# Patient Record
Sex: FEMALE | Race: WHITE | NOT HISPANIC OR LATINO | Employment: FULL TIME | URBAN - METROPOLITAN AREA
[De-identification: names, ages, dates, MRNs, and addresses within clinical notes are randomized per-mention and may not be internally consistent; named-entity substitution may affect disease eponyms.]

---

## 2017-01-17 LAB
C TRACH DNA SPEC QL PROBE+SIG AMP: NEGATIVE
N GONORRHOEA DNA SPEC QL PROBE+SIG AMP: NEGATIVE
SPECIMEN DESCRIP: NORMAL
SPECIMEN DESCRIPTION: NORMAL

## 2017-02-22 ENCOUNTER — TRANSFERRED RECORDS (OUTPATIENT)
Dept: HEALTH INFORMATION MANAGEMENT | Facility: CLINIC | Age: 27
End: 2017-02-22

## 2017-04-10 ENCOUNTER — PRENATAL OFFICE VISIT (OUTPATIENT)
Dept: OBGYN | Facility: CLINIC | Age: 27
End: 2017-04-10
Payer: COMMERCIAL

## 2017-04-10 ENCOUNTER — MYC MEDICAL ADVICE (OUTPATIENT)
Dept: OBGYN | Facility: CLINIC | Age: 27
End: 2017-04-10

## 2017-04-10 VITALS
DIASTOLIC BLOOD PRESSURE: 67 MMHG | WEIGHT: 197.2 LBS | HEIGHT: 64 IN | TEMPERATURE: 97.3 F | SYSTOLIC BLOOD PRESSURE: 113 MMHG | BODY MASS INDEX: 33.67 KG/M2 | HEART RATE: 88 BPM

## 2017-04-10 DIAGNOSIS — Z34.02 ENCOUNTER FOR SUPERVISION OF NORMAL FIRST PREGNANCY IN SECOND TRIMESTER: Primary | ICD-10-CM

## 2017-04-10 DIAGNOSIS — Z34.02 ENCOUNTER FOR SUPERVISION OF NORMAL FIRST PREGNANCY IN SECOND TRIMESTER: ICD-10-CM

## 2017-04-10 DIAGNOSIS — E03.9 HYPOTHYROIDISM, UNSPECIFIED TYPE: Primary | ICD-10-CM

## 2017-04-10 PROBLEM — Z34.00 SUPERVISION OF NORMAL FIRST PREGNANCY: Status: ACTIVE | Noted: 2017-04-10

## 2017-04-10 PROBLEM — Z23 NEED FOR TDAP VACCINATION: Status: ACTIVE | Noted: 2017-04-10

## 2017-04-10 PROCEDURE — 99207 ZZC PRENATAL VISIT: CPT | Performed by: NURSE PRACTITIONER

## 2017-04-10 PROCEDURE — 99000 SPECIMEN HANDLING OFFICE-LAB: CPT | Performed by: NURSE PRACTITIONER

## 2017-04-10 PROCEDURE — 81511 FTL CGEN ABNOR FOUR ANAL: CPT | Mod: 90 | Performed by: NURSE PRACTITIONER

## 2017-04-10 PROCEDURE — 36415 COLL VENOUS BLD VENIPUNCTURE: CPT | Performed by: NURSE PRACTITIONER

## 2017-04-10 RX ORDER — LEVOTHYROXINE SODIUM 137 UG/1
137 TABLET ORAL DAILY
COMMUNITY
End: 2017-04-12

## 2017-04-10 RX ORDER — RIZATRIPTAN BENZOATE 10 MG/1
10 TABLET, ORALLY DISINTEGRATING ORAL
COMMUNITY
End: 2017-11-16

## 2017-04-10 ASSESSMENT — PAIN SCALES - GENERAL: PAINLEVEL: NO PAIN (0)

## 2017-04-10 NOTE — PROGRESS NOTES
Patient presents for routine prenatal visit. She is transferring care from [place].  Had 2 prenatal visits there including all her baseline labs and a dating ultrasound confirming FARHAN-which is based on her LMP.  Has all her records on a flash drive and will print off her OB records and bring them to the clinic to be scanned into her chart. Did have her thyroid checked at her last visit in [place] and was normal.     I discussed with the patient the option of maternal serum screening for chromasomal abnormalities (such as Trisomy 18 and 21) and neural tube defects.  We discussed the screening nature of this test and the potential for false negative and false positive results and the possible need for additional testing including Level 2 ultrasound and amniocentesis. She is given the opportunity to ask questions and have them answered. She accepts testing.  Screening ultrasound ordered.    Discussed visit schedule, delivery hospital, delivery providers.    Prenatal flowsheet reviewed and updated as needed.  Denies vaginal bleeding, loss of fluid, contractions or cramping.  Patient without complaint.   Advice as per Anticipatory Guidance/Checklist updated.  PE: See OB vitals    Questions asked and answered. Next OB visit in 4 week(s) with Dr. Robles.    Estelle TRINH CNP

## 2017-04-10 NOTE — Clinical Note
Please abstract the following data from this visit with this patient into the appropriate field in Epic:  Pap smear done on this date: 6/2016 (approximately), by this group: [place], results were Normal.

## 2017-04-10 NOTE — MR AVS SNAPSHOT
After Visit Summary   4/10/2017    Jennifer Pastrana    MRN: [id number]           Patient Information     Date Of Birth          1990        Visit Information        Provider Department      4/10/2017 7:30 AM Estelle Pacheco APRN CNP [facility]        Today's Diagnoses     Encounter for supervision of normal first pregnancy in second trimester    -  1       Follow-ups after your visit        Future tests that were ordered for you today     Open Future Orders        Priority Expected Expires Ordered    US OB > 14 Weeks Complete Single Routine  7/9/2017 4/10/2017            Who to contact     If you have questions or need follow up information about today's clinic visit or your schedule please contact [facility] directly at [phone].  Normal or non-critical lab and imaging results will be communicated to you by Revealhart, letter or phone within 4 business days after the clinic has received the results. If you do not hear from us within 7 days, please contact the clinic through Revealhart or phone. If you have a critical or abnormal lab result, we will notify you by phone as soon as possible.  Submit refill requests through Missy's Candy or call your pharmacy and they will forward the refill request to us. Please allow 3 business days for your refill to be completed.          Additional Information About Your Visit        MyChart Information     Missy's Candy gives you secure access to your electronic health record. If you see a primary care provider, you can also send messages to your care team and make appointments. If you have questions, please call your primary care clinic.  If you do not have a primary care provider, please call [phone] and they will assist you.        Care EveryWhere ID     This is your Care EveryWhere ID. This could be used by other organizations to access your [place] medical records  [id number]        Your Vitals Were     Pulse Temperature Height  "Last Period BMI (Body Mass Index)       88 97.3  F (36.3  C) (Oral) 5' 3.5\" (1.613 m) 12/04/2016 (Exact Date) 34.38 kg/m2        Blood Pressure from Last 3 Encounters:   04/10/17 113/67   07/24/15 121/77   02/25/15 125/79    Weight from Last 3 Encounters:   04/10/17 197 lb 3.2 oz (89.4 kg)   07/24/15 188 lb 6.4 oz (85.5 kg)   02/25/15 183 lb (83 kg)              We Performed the Following     Maternal quad screen          Today's Medication Changes          These changes are accurate as of: 4/10/17  7:59 AM.  If you have any questions, ask your nurse or doctor.               These medicines have changed or have updated prescriptions.        Dose/Directions    albuterol 108 (90 BASE) MCG/ACT Inhaler   Commonly known as:  PROAIR HFA/PROVENTIL HFA/VENTOLIN HFA   This may have changed:  Another medication with the same name was removed. Continue taking this medication, and follow the directions you see here.   Used for:  SOB (shortness of breath)   Changed by:  Rik Tate PA-C        Dose:  2 puff   Inhale 2 puffs into the lungs every 6 hours   Quantity:  1 Inhaler   Refills:  0       SYNTHROID 137 MCG tablet   This may have changed:  Another medication with the same name was removed. Continue taking this medication, and follow the directions you see here.   Generic drug:  levothyroxine   Changed by:  Estelle Pacheco APRN CNP        Dose:  137 mcg   Take 137 mcg by mouth daily   Refills:  0         Stop taking these medicines if you haven't already. Please contact your care team if you have questions.     calcium carbonate-vitamin D 500-400 MG-UNIT Tabs per tablet   Stopped by:  Estelle Pacheco APRN CNP           fexofenadine 180 MG tablet   Commonly known as:  ALLEGRA   Stopped by:  Estelle Pacheco APRN CNP           levonorgestrel-ethinyl estradiol 0.15-0.03 MG per tablet   Commonly known as:  SEASONALE   Stopped by:  Estelle Pacheco APRN CNP           multivitamin, therapeutic " with minerals Tabs tablet   Stopped by:  Estelle Pacheco APRN CNP           OVER-THE-COUNTER   Stopped by:  Estelle Pacheco APRN CNP           sertraline 50 MG tablet   Commonly known as:  ZOLOFT   Stopped by:  Estelle Pacheco APRN CNP           SUMAtriptan 50 MG tablet   Commonly known as:  IMITREX   Stopped by:  Estelle Pacheco APRN CNP                    Primary Care Provider Office Phone # Fax #    [facility] [phone] [address] [facility] [address]        Thank you!     Thank you for choosing [facility]  for your care. Our goal is always to provide you with excellent care. Hearing back from our patients is one way we can continue to improve our services. Please take a few minutes to complete the written survey that you may receive in the mail after your visit with us. Thank you!             Your Updated Medication List - Protect others around you: Learn how to safely use, store and throw away your medicines at www.disposemymeds.org.          This list is accurate as of: 4/10/17  7:59 AM.  Always use your most recent med list.                   Brand Name Dispense Instructions for use    albuterol 108 (90 BASE) MCG/ACT Inhaler    PROAIR HFA/PROVENTIL HFA/VENTOLIN HFA    1 Inhaler    Inhale 2 puffs into the lungs every 6 hours       CLARITIN PO          fluticasone 50 MCG/ACT spray    FLONASE    1 Package    Spray 1-2 sprays into both nostrils daily       MAGNESIUM OXIDE PO      Take 400 mg by mouth daily       PRENATAL VITAMINS PO      Take 1 tablet by mouth daily       rizatriptan 10 MG ODT tab    MAXALT-MLT     Take 10 mg by mouth at onset of headache for migraine       SYNTHROID 137 MCG tablet   Generic drug:  levothyroxine      Take 137 mcg by mouth daily

## 2017-04-10 NOTE — PROGRESS NOTES
Patient brought in records - done on 2/2017:    Thyroid panel-normal  HIV negative  HepBSAg Negative  Varicella Immune  Rubella Immune  RPR NR  1 hour   CBC WNL  Urine Culture Negative  A positive, antibody negative  Chlamydia Negative  Gonorrhea Negative  Pap 2016 NIL  Estelle Pacheco APRN CNP

## 2017-04-10 NOTE — NURSING NOTE
"Chief Complaint   Patient presents with     Prenatal Care     OB Transfer       Initial /67  Pulse 88  Temp 97.3  F (36.3  C) (Oral)  Ht 5' 3.5\" (1.613 m)  Wt 197 lb 3.2 oz (89.4 kg)  LMP 12/04/2016 (Exact Date)  BMI 34.38 kg/m2 Estimated body mass index is 34.38 kg/(m^2) as calculated from the following:    Height as of this encounter: 5' 3.5\" (1.613 m).    Weight as of this encounter: 197 lb 3.2 oz (89.4 kg)..  BP completed using cuff size: regular    [place] expectant family book and hospital information given to patient      Maddi Gennyamanda CMA      "

## 2017-04-11 NOTE — TELEPHONE ENCOUNTER
Patient calling to check on status of message below has not heard back please call to advise.  was seen in clinic yesterday and forgot to request this. Wants Estelle to fill all of her meds she states. Please call to advise.

## 2017-04-12 LAB
# FETUSES US: NORMAL
AFP ADJ MOM AMN: 1.28
AFP SERPL-MCNC: 46 NG/ML
AGE - REPORTED: 28.8
DATING METHOD: NORMAL
DIABETIC AT CONCEPTION: NO
FAMILY MEMBER DISEASES HX: NO
FAMILY MEMBER DISEASES HX: NO
GA METHOD: NORMAL
GA: 18.14 WK
HCG MOM SERPL: 0.55
HCG SERPL-ACNC: NORMAL M[IU]/ML
HX OF HEREDITARY DISORDERS: NO
IDDM PATIENT QL: NO
INHIBIN A MOM SERPL: 0.9
INHIBIN A SERPL-MCNC: 128
INTEGRATED SCN PATIENT-IMP: NORMAL
LMP START DATE: NORMAL
PATHOLOGY STUDY: NORMAL
PREV HX CHROMOSOME ABNORMALITY: NO
SPECIMEN DRAWN SERPL: NORMAL
TWINS: NO
U ESTRIOL MOM SERPL: 1.26
U ESTRIOL SERPL-MCNC: 1.73 NG/ML

## 2017-04-12 RX ORDER — LEVOTHYROXINE SODIUM 137 UG/1
137 TABLET ORAL DAILY
Qty: 90 TABLET | Refills: 1 | Status: SHIPPED | OUTPATIENT
Start: 2017-04-12 | End: 2017-04-27 | Stop reason: DRUGHIGH

## 2017-04-12 RX ORDER — SWAB
1 SWAB, NON-MEDICATED MISCELLANEOUS DAILY
Qty: 90 EACH | Refills: 1 | Status: SHIPPED | OUTPATIENT
Start: 2017-04-12 | End: 2017-10-11

## 2017-04-18 ENCOUNTER — OFFICE VISIT (OUTPATIENT)
Dept: FAMILY MEDICINE | Facility: CLINIC | Age: 27
End: 2017-04-18
Payer: COMMERCIAL

## 2017-04-18 VITALS
SYSTOLIC BLOOD PRESSURE: 138 MMHG | TEMPERATURE: 97.2 F | HEART RATE: 88 BPM | DIASTOLIC BLOOD PRESSURE: 64 MMHG | WEIGHT: 199 LBS | OXYGEN SATURATION: 100 % | BODY MASS INDEX: 34.7 KG/M2

## 2017-04-18 DIAGNOSIS — R06.02 SOB (SHORTNESS OF BREATH): ICD-10-CM

## 2017-04-18 DIAGNOSIS — J31.0 CHRONIC RHINITIS: ICD-10-CM

## 2017-04-18 PROCEDURE — 99213 OFFICE O/P EST LOW 20 MIN: CPT | Performed by: PHYSICIAN ASSISTANT

## 2017-04-18 RX ORDER — FLUTICASONE PROPIONATE 50 MCG
1-2 SPRAY, SUSPENSION (ML) NASAL DAILY
Qty: 1 BOTTLE | Refills: 11 | Status: SHIPPED | OUTPATIENT
Start: 2017-04-18 | End: 2018-06-29

## 2017-04-18 RX ORDER — ALBUTEROL SULFATE 90 UG/1
2 AEROSOL, METERED RESPIRATORY (INHALATION) EVERY 6 HOURS
Qty: 1 INHALER | Refills: 2 | Status: SHIPPED | OUTPATIENT
Start: 2017-04-18 | End: 2018-09-21

## 2017-04-18 RX ORDER — LEVOTHYROXINE SODIUM 137 UG/1
137 TABLET ORAL DAILY
Qty: 90 TABLET | Refills: 1 | Status: CANCELLED | OUTPATIENT
Start: 2017-04-18

## 2017-04-18 RX ORDER — RIZATRIPTAN BENZOATE 10 MG/1
10 TABLET, ORALLY DISINTEGRATING ORAL
Qty: 30 TABLET | Status: CANCELLED | OUTPATIENT
Start: 2017-04-18

## 2017-04-18 NOTE — PROGRESS NOTES
SUBJECTIVE:                                                    Jennifer Pastrana is a 26 year old female who presents to clinic today for the following health issues:      Establish Care and refill medications.     Flonase controls - nasal congestion. She uses this year round.  Albuterol - uses once monthly at the most. Uses for shortness of breath or wheezing. Symptoms are induced by smoke and pollution.   She is 19 weeks pregnant.    Thyroid checked 2/22/17. Well controlled. Medical records are being scanned into chart. She does not need any synthroid at this time.    She is having headaches 1-2 times weekly. Feels like her typical headaches. She is taking tylenol, which is helping to control her headaches.     She denies any other concerns today.      Problem list and histories reviewed & adjusted, as indicated.  Additional history: as documented    Patient Active Problem List   Diagnosis     CARDIOVASCULAR SCREENING; LDL GOAL LESS THAN 160     Hypothyroidism     Dry eye syndrome- mild     Dysplasia of cervix, low grade (MILO 1)     Generalized anxiety disorder     Need for Tdap vaccination     Supervision of normal first pregnancy     Past Surgical History:   Procedure Laterality Date     NO HISTORY OF SURGERY         Social History   Substance Use Topics     Smoking status: Never Smoker     Smokeless tobacco: Never Used      Comment: Nonsmoking household     Alcohol use No     Family History   Problem Relation Age of Onset     Breast Cancer Maternal Grandmother      CANCER Maternal Grandmother      breast     HEART DISEASE Maternal Grandfather      50s     Myocardial Infarction Maternal Grandfather      HEART DISEASE Paternal Grandfather      50s     Myocardial Infarction Paternal Grandfather      Thyroid Disease Mother      graves-decompression and strabismus     CANCER Paternal Grandmother      cervical     Thyroid Disease Paternal Aunt      nine affected in family     Glaucoma No family hx of      Macular  Degeneration No family hx of      CEREBROVASCULAR DISEASE No family hx of      Hypertension No family hx of      DIABETES No family hx of          Current Outpatient Prescriptions   Medication Sig Dispense Refill     levothyroxine (SYNTHROID) 137 MCG tablet Take 1 tablet (137 mcg) by mouth daily 90 tablet 1     Prenatal Vit-Fe Fumarate-FA (PRENATAL MULTIVITAMIN  WITH IRON) 28-0.8 MG TABS Take 1 tablet by mouth daily 90 each 1     Loratadine (CLARITIN PO)        Prenatal Multivit-Min-Fe-FA (PRENATAL VITAMINS PO) Take 1 tablet by mouth daily       MAGNESIUM OXIDE PO Take 400 mg by mouth daily       albuterol (PROAIR HFA, PROVENTIL HFA, VENTOLIN HFA) 108 (90 BASE) MCG/ACT inhaler Inhale 2 puffs into the lungs every 6 hours 1 Inhaler 0     fluticasone (FLONASE) 50 MCG/ACT nasal spray Spray 1-2 sprays into both nostrils daily 1 Package 11     rizatriptan (MAXALT-MLT) 10 MG ODT tab Take 10 mg by mouth at onset of headache for migraine Reported on 4/18/2017       No Known Allergies  BP Readings from Last 3 Encounters:   04/18/17 138/64   04/10/17 113/67   07/24/15 121/77    Wt Readings from Last 3 Encounters:   04/18/17 199 lb (90.3 kg)   04/10/17 197 lb 3.2 oz (89.4 kg)   07/24/15 188 lb 6.4 oz (85.5 kg)                    Reviewed and updated as needed this visit by clinical staff       Reviewed and updated as needed this visit by Provider         ROS:  Constitutional, HEENT, cardiovascular, pulmonary systems are negative, except as otherwise noted.    OBJECTIVE:                                                    /64  Pulse 88  Temp 97.2  F (36.2  C) (Oral)  Wt 199 lb (90.3 kg)  LMP 12/04/2016 (Exact Date)  SpO2 100%  BMI 34.7 kg/m2  Body mass index is 34.7 kg/(m^2).  GENERAL: healthy, alert and no distress  EYES: Eyes grossly normal to inspection  HENT: normal cephalic/atraumatic, ear canals and TM's normal, nose and mouth without ulcers or lesions, rhinorrhea clear, oropharynx clear and oral mucous  membranes moist  NECK: no adenopathy, no asymmetry, masses, or scars and thyroid normal to palpation  RESP: lungs clear to auscultation - no rales, rhonchi or wheezes  CV: regular rate and rhythm, normal S1 S2, no S3 or S4, no murmur, click or rub, no peripheral edema and peripheral pulses strong  MS: no gross musculoskeletal defects noted, no edema  SKIN: no suspicious lesions or rashes    Diagnostic Test Results:  none      ASSESSMENT/PLAN:                                                        ICD-10-CM    1. SOB (shortness of breath) R06.02 albuterol (PROAIR HFA/PROVENTIL HFA/VENTOLIN HFA) 108 (90 BASE) MCG/ACT Inhaler   2. Chronic rhinitis J31.0 fluticasone (FLONASE) 50 MCG/ACT spray     Discussed Maxalt is not safe in pregnancy. She does not need a refill of this medication today. Will be okay to refill after pregnancy if not breast feeding for treatment of headaches.   Patient advised to return in 1 year for medication review. Return sooner if any concerns.    Katerin Urrutia PA-C  [facility]

## 2017-04-18 NOTE — MR AVS SNAPSHOT
After Visit Summary   4/18/2017    Jennifer Pastrana    MRN: [id number]           Patient Information     Date Of Birth          1990        Visit Information        Provider Department      4/18/2017 7:50 AM Katerin Urrutia PA-C [facility]        Today's Diagnoses     SOB (shortness of breath)        Chronic rhinitis           Follow-ups after your visit        Your next 10 appointments already scheduled     Apr 20, 2017  8:00 AM CDT   US OB > 14 WEEKS COMPLETE SINGLE with ANDUS1   [facility] ([facility])    [address] [facility] [address] [phone]           Please bring a list of your medicines (including vitamins, minerals and over-the-counter drugs). Also, tell your doctor about any allergies you may have. Wear comfortable clothes and leave your valuables at home.  If you re less than 20 weeks drink four 8-ounce glasses of fluid an hour before your exam. If you need to empty your bladder before your exam, try to release only a little urine. Then, drink another glass of fluid.  You may have up to two family members in the exam room. If you bring a small child, an adult must be there to care for him or her.  Please call the Imaging Department at your exam site with any questions.              Who to contact     If you have questions or need follow up information about today's clinic visit or your schedule please contact [facility] directly at [phone].  Normal or non-critical lab and imaging results will be communicated to you by MyChart, letter or phone within 4 business days after the clinic has received the results. If you do not hear from us within 7 days, please contact the clinic through MyChart or phone. If you have a critical or abnormal lab result, we will notify you by phone as soon as possible.  Submit refill requests through Sunrise or call your pharmacy and they will forward the refill request to us.  Please allow 3 business days for your refill to be completed.          Additional Information About Your Visit        MyChart Information     P[employer] Diagnosticshart gives you secure access to your electronic health record. If you see a primary care provider, you can also send messages to your care team and make appointments. If you have questions, please call your primary care clinic.  If you do not have a primary care provider, please call [phone] and they will assist you.        Care EveryWhere ID     This is your Care EveryWhere ID. This could be used by other organizations to access your [place] medical records  [id number]        Your Vitals Were     Pulse Temperature Last Period Pulse Oximetry BMI (Body Mass Index)       88 97.2  F (36.2  C) (Oral) 12/04/2016 (Exact Date) 100% 34.7 kg/m2        Blood Pressure from Last 3 Encounters:   04/18/17 138/64   04/10/17 113/67   07/24/15 121/77    Weight from Last 3 Encounters:   04/18/17 199 lb (90.3 kg)   04/10/17 197 lb 3.2 oz (89.4 kg)   07/24/15 188 lb 6.4 oz (85.5 kg)              Today, you had the following     No orders found for display         Where to get your medicines      These medications were sent to [id number] [address] [address]  [address] [facility] [address]    Hours:  24-hours Phone:  [phone]     albuterol 108 (90 BASE) MCG/ACT Inhaler    fluticasone 50 MCG/ACT spray          Primary Care Provider Office Phone # Fax #    [facility] [phone] [address] [address] [facility] [address] [address]        Thank you!     Thank you for choosing [facility]  for your care. Our goal is always to provide you with excellent care. Hearing back from our patients is one way we can continue to improve our services. Please take a few minutes to complete the written survey that you may receive in the mail after your visit with us. Thank you!              Your Updated Medication List - Protect others around you: Learn how to safely use, store and throw away your medicines at www.disposemymeds.org.          This list is accurate as of: 4/18/17  8:31 AM.  Always use your most recent med list.                   Brand Name Dispense Instructions for use    albuterol 108 (90 BASE) MCG/ACT Inhaler    PROAIR HFA/PROVENTIL HFA/VENTOLIN HFA    1 Inhaler    Inhale 2 puffs into the lungs every 6 hours       CLARITIN PO          fluticasone 50 MCG/ACT spray    FLONASE    1 Bottle    Spray 1-2 sprays into both nostrils daily       levothyroxine 137 MCG tablet    SYNTHROID    90 tablet    Take 1 tablet (137 mcg) by mouth daily       MAGNESIUM OXIDE PO      Take 400 mg by mouth daily       prenatal multivitamin  with iron 28-0.8 MG Tabs     90 each    Take 1 tablet by mouth daily       PRENATAL VITAMINS PO      Take 1 tablet by mouth daily       rizatriptan 10 MG ODT tab    MAXALT-MLT     Take 10 mg by mouth at onset of headache for migraine Reported on 4/18/2017

## 2017-04-20 ENCOUNTER — RADIANT APPOINTMENT (OUTPATIENT)
Dept: ULTRASOUND IMAGING | Facility: CLINIC | Age: 27
End: 2017-04-20
Attending: NURSE PRACTITIONER
Payer: COMMERCIAL

## 2017-04-20 DIAGNOSIS — Z34.02 ENCOUNTER FOR SUPERVISION OF NORMAL FIRST PREGNANCY IN SECOND TRIMESTER: ICD-10-CM

## 2017-04-20 PROCEDURE — 76805 OB US >/= 14 WKS SNGL FETUS: CPT

## 2017-04-26 ENCOUNTER — PRENATAL OFFICE VISIT (OUTPATIENT)
Dept: OBGYN | Facility: CLINIC | Age: 27
End: 2017-04-26
Payer: COMMERCIAL

## 2017-04-26 VITALS
WEIGHT: 200 LBS | SYSTOLIC BLOOD PRESSURE: 111 MMHG | DIASTOLIC BLOOD PRESSURE: 72 MMHG | TEMPERATURE: 98.2 F | BODY MASS INDEX: 34.87 KG/M2 | OXYGEN SATURATION: 97 % | HEART RATE: 86 BPM

## 2017-04-26 DIAGNOSIS — R51.9 HEADACHE IN PREGNANCY, SECOND TRIMESTER: Primary | ICD-10-CM

## 2017-04-26 DIAGNOSIS — O26.892 HEADACHE IN PREGNANCY, SECOND TRIMESTER: Primary | ICD-10-CM

## 2017-04-26 DIAGNOSIS — E03.9 HYPOTHYROIDISM, UNSPECIFIED TYPE: ICD-10-CM

## 2017-04-26 PROBLEM — O26.899 HEADACHE IN PREGNANCY: Status: ACTIVE | Noted: 2017-04-26

## 2017-04-26 LAB
T4 FREE SERPL-MCNC: 1.13 NG/DL (ref 0.76–1.46)
TSH SERPL DL<=0.005 MIU/L-ACNC: 5.41 MU/L (ref 0.4–4)

## 2017-04-26 PROCEDURE — 99207 ZZC PRENATAL VISIT: CPT | Performed by: NURSE PRACTITIONER

## 2017-04-26 PROCEDURE — 84443 ASSAY THYROID STIM HORMONE: CPT | Performed by: NURSE PRACTITIONER

## 2017-04-26 PROCEDURE — 84439 ASSAY OF FREE THYROXINE: CPT | Performed by: NURSE PRACTITIONER

## 2017-04-26 PROCEDURE — 36415 COLL VENOUS BLD VENIPUNCTURE: CPT | Performed by: NURSE PRACTITIONER

## 2017-04-26 NOTE — MR AVS SNAPSHOT
After Visit Summary   4/26/2017    Jennifer Pastrana    MRN: [id number]           Patient Information     Date Of Birth          1990        Visit Information        Provider Department      4/26/2017 1:30 PM Estelle Pacheco APRN CNP [facility]        Today's Diagnoses     Headache in pregnancy, second trimester    -  1    Hypothyroidism, unspecified type           Follow-ups after your visit        Additional Services     NEUROLOGY ADULT REFERRAL       Your provider has referred you to: [facility]: [facility] [phone]   [email or web address]    Reason for Referral: Consult    Please be aware that coverage of these services is subject to the terms and limitations of your health insurance plan.  Call member services at your health plan with any benefit or coverage questions.      Please bring the following with you to your appointment:    (1) Any X-Rays, CTs or MRIs which have been performed.  Contact the facility where they were done to arrange for  prior to your scheduled appointment.    (2) List of current medications  (3) This referral request   (4) Any documents/labs given to you for this referral                  Your next 10 appointments already scheduled     Apr 28, 2017  8:20 AM DEANDRE Nice Short with Katerin Urrutia PA-C   [facility] ([facility]    [address] [address] [address] [phone]              Who to contact     If you have questions or need follow up information about today's clinic visit or your schedule please contact [facility] directly at [phone].  Normal or non-critical lab and imaging results will be communicated to you by MyChart, letter or phone within 4 business days after the clinic has received the results. If you do not hear from us within 7 days, please contact the clinic through [employer] or  phone. If you have a critical or abnormal lab result, we will notify you by phone as soon as possible.  Submit refill requests through NCTech or call your pharmacy and they will forward the refill request to us. Please allow 3 business days for your refill to be completed.          Additional Information About Your Visit        gridCommhart Information     NCTech gives you secure access to your electronic health record. If you see a primary care provider, you can also send messages to your care team and make appointments. If you have questions, please call your primary care clinic.  If you do not have a primary care provider, please call [phone] and they will assist you.        Care EveryWhere ID     This is your Care EveryWhere ID. This could be used by other organizations to access your [place] medical records  [id number]        Your Vitals Were     Pulse Temperature Last Period Pulse Oximetry BMI (Body Mass Index)       86 98.2  F (36.8  C) (Oral) 12/04/2016 (Exact Date) 97% 34.87 kg/m2        Blood Pressure from Last 3 Encounters:   04/26/17 111/72   04/18/17 138/64   04/10/17 113/67    Weight from Last 3 Encounters:   04/26/17 200 lb (90.7 kg)   04/18/17 199 lb (90.3 kg)   04/10/17 197 lb 3.2 oz (89.4 kg)              We Performed the Following     NEUROLOGY ADULT REFERRAL     TSH with free T4 reflex        Primary Care Provider Office Phone # Fax #    [facility] [phone] [address] [facility] [address]        Thank you!     Thank you for choosing [facility]  for your care. Our goal is always to provide you with excellent care. Hearing back from our patients is one way we can continue to improve our services. Please take a few minutes to complete the written survey that you may receive in the mail after your visit with us. Thank you!             Your Updated Medication List - Protect others around you: Learn how to safely use, store and throw away  your medicines at www.disposemymeds.org.          This list is accurate as of: 4/26/17  2:01 PM.  Always use your most recent med list.                   Brand Name Dispense Instructions for use    albuterol 108 (90 BASE) MCG/ACT Inhaler    PROAIR HFA/PROVENTIL HFA/VENTOLIN HFA    1 Inhaler    Inhale 2 puffs into the lungs every 6 hours       CLARITIN PO      Reported on 4/26/2017       fluticasone 50 MCG/ACT spray    FLONASE    1 Bottle    Spray 1-2 sprays into both nostrils daily       levothyroxine 137 MCG tablet    SYNTHROID    90 tablet    Take 1 tablet (137 mcg) by mouth daily       MAGNESIUM OXIDE PO      Take 400 mg by mouth daily Reported on 4/26/2017       prenatal multivitamin  with iron 28-0.8 MG Tabs     90 each    Take 1 tablet by mouth daily       PRENATAL VITAMINS PO      Take 1 tablet by mouth daily       rizatriptan 10 MG ODT tab    MAXALT-MLT     Take 10 mg by mouth at onset of headache for migraine Reported on 4/26/2017

## 2017-04-26 NOTE — PROGRESS NOTES
Patient presents for problem prenatal visit. Prenatal flowsheet reviewed and updated as needed.  Denies vaginal bleeding, loss of fluid, contractions or cramping.  Patient with complaint. Has been having daily headaches and they seem to be worsening in the last 1 week. Has a history of migraine headaches and would take Maxalt for treatment, but knows she should not use it in pregnancy. Headaches not as severe as her migraines, but occurring daily. Frontal and slightly right temporal. No associated vision changes, nausea, sensitivity to lights, sounds. Tylenol had been resolving the headaches, but has not helped in the last 2 days. Is staying hydrated. Does have congestion from seasonal allergies.   Today had some mild lightheadedness a few times this morning-occurred with bending down to  something, turning her head quickly. This was prior to eating or drinking anything today. This seems to have resolved. No LOC, SOB, chest pains, FELIX, vision changes. Is feeling some minimal fetal movement.   Advice as per Anticipatory Guidance/Checklist updated.  PE: See OB vitals  NEURO Exam WNL  RESPIRATORY: Clear to auscultation bilaterally.  CV: Regular rate and rhythm without murmur, gallop, rub  ABDOMEN: Soft, nontender, nondistended, normoactive bowel sounds. No hepatosplenomegaly. No guarding, rebounding, or rigidity.    A/P:  (O26.892,  R51) Headache in pregnancy, second trimester  (primary encounter diagnosis)  Comment: We discussed headaches vs migraines. Patient has never seen Neurology. We reviewed relief measures in addition to Tylenol she can try including small amounts of caffeine, massage., chiropractor. Neurology referral entered as this has become an ongoing daily issue. Warning signs to monitor for and report immediately discussed with patient and she verbalizes understanding. We reviewed her lightheadedness and reviewed slow position changes, good body mechanics, adequate hydration. Patient is given an  opportunity to ask questions and have them answered. Aware of when she would need to return to clinic.  Plan: NEUROLOGY ADULT REFERRAL     (E03.9) Hypothyroidism, unspecified type  Comment: Due to new symptoms, will check her TSH to ensure thyroid function is being managed adequately.   Plan: TSH with free T4 reflex        Estelle TRINH CNP

## 2017-04-26 NOTE — NURSING NOTE
"Chief Complaint   Patient presents with     Dizziness     x 1 day     Headache     x 5 days       Initial /72  Pulse 86  Temp 98.2  F (36.8  C) (Oral)  Wt 200 lb (90.7 kg)  LMP 12/04/2016 (Exact Date)  SpO2 97%  BMI 34.87 kg/m2 Estimated body mass index is 34.87 kg/(m^2) as calculated from the following:    Height as of 4/10/17: 5' 3.5\" (1.613 m).    Weight as of this encounter: 200 lb (90.7 kg).  BP completed using cuff size: regular  Tia ELLEN BREWSTER ([facility])  1:32 PM 4/26/2017    "

## 2017-04-27 ENCOUNTER — MYC MEDICAL ADVICE (OUTPATIENT)
Dept: OBGYN | Facility: CLINIC | Age: 27
End: 2017-04-27

## 2017-04-27 RX ORDER — LEVOTHYROXINE SODIUM 150 UG/1
150 TABLET ORAL DAILY
Qty: 90 TABLET | Refills: 1 | Status: SHIPPED | OUTPATIENT
Start: 2017-04-27 | End: 2017-10-27

## 2017-04-28 NOTE — TELEPHONE ENCOUNTER
Left a message for patient to call back at [phone] and ask for [facility] in women's.  Need to verify which provider gave her the prescritption that she currently has for 150 mcg levothyroxine.    Elizabeth Salazar RN

## 2017-05-01 NOTE — TELEPHONE ENCOUNTER
levothyroxine (SYNTHROID/LEVOTHROID) 150 MCG tablet 90 tablet 1 4/27/2017  --   Sig: Take 1 tablet (150 mcg) by mouth daily     Read by Jennifer Pastrana at 4/27/2017 10:57 PM   Jennifer,     Your TSH did come back a little elevated, though your Free T4 was normal. I would like to increase your dose of Synthroid slightly. I sent a new prescription to [facility] for 150 mcg to take once daily. We will recheck your thyroid function after being on the new dose for 4-6 weeks. Please let me know if you have any questions.        Patient noted that she understands orders now. Will close encounter. Sugey Fernando RN, BAN

## 2017-05-09 ENCOUNTER — TELEPHONE (OUTPATIENT)
Dept: OBGYN | Facility: CLINIC | Age: 27
End: 2017-05-09

## 2017-05-09 ENCOUNTER — OFFICE VISIT (OUTPATIENT)
Dept: FAMILY MEDICINE | Facility: CLINIC | Age: 27
End: 2017-05-09
Payer: COMMERCIAL

## 2017-05-09 ENCOUNTER — RADIANT APPOINTMENT (OUTPATIENT)
Dept: ULTRASOUND IMAGING | Facility: CLINIC | Age: 27
End: 2017-05-09
Attending: PHYSICIAN ASSISTANT
Payer: COMMERCIAL

## 2017-05-09 ENCOUNTER — TELEPHONE (OUTPATIENT)
Dept: FAMILY MEDICINE | Facility: CLINIC | Age: 27
End: 2017-05-09

## 2017-05-09 ENCOUNTER — APPOINTMENT (OUTPATIENT)
Dept: ULTRASOUND IMAGING | Facility: CLINIC | Age: 27
End: 2017-05-09
Payer: COMMERCIAL

## 2017-05-09 VITALS
HEART RATE: 84 BPM | WEIGHT: 199 LBS | BODY MASS INDEX: 34.7 KG/M2 | OXYGEN SATURATION: 98 % | DIASTOLIC BLOOD PRESSURE: 72 MMHG | TEMPERATURE: 98.1 F | SYSTOLIC BLOOD PRESSURE: 113 MMHG

## 2017-05-09 DIAGNOSIS — M79.651 PAIN OF RIGHT THIGH: Primary | ICD-10-CM

## 2017-05-09 DIAGNOSIS — M79.651 PAIN OF RIGHT THIGH: ICD-10-CM

## 2017-05-09 DIAGNOSIS — R25.2 MUSCLE CRAMP: ICD-10-CM

## 2017-05-09 DIAGNOSIS — Z34.90 PREGNANCY, UNSPECIFIED GESTATIONAL AGE: ICD-10-CM

## 2017-05-09 PROCEDURE — 99213 OFFICE O/P EST LOW 20 MIN: CPT | Performed by: PHYSICIAN ASSISTANT

## 2017-05-09 PROCEDURE — 93971 EXTREMITY STUDY: CPT | Mod: RT

## 2017-05-09 NOTE — TELEPHONE ENCOUNTER
Please inform patient her ultrasound was negative for a DVT. I have referred her to physical therapy - [phone]. I recommend staying hydrating, gentle massage and stretching, and tylenol as needed for discomfort. She should return to the clinic if any concerns. Thank you.     Katerin Urrutia PA-C

## 2017-05-09 NOTE — NURSING NOTE
"Chief Complaint   Patient presents with     leg cramps       Initial /72  Pulse 84  Temp 98.1  F (36.7  C) (Oral)  Wt 199 lb (90.3 kg)  LMP 12/04/2016 (Exact Date)  SpO2 98%  BMI 34.7 kg/m2 Estimated body mass index is 34.7 kg/(m^2) as calculated from the following:    Height as of 4/10/17: 5' 3.5\" (1.613 m).    Weight as of this encounter: 199 lb (90.3 kg).  Medication Reconciliation: complete     Ora Love cma      "

## 2017-05-09 NOTE — TELEPHONE ENCOUNTER
Noted patient is scheduled with Katerin Urrutia PA-C today at 1210.   Left message asking pt to call back to clinic about her leg problem. Explained do see that patient has appt today with FP. Left clinic call back phone number and RN hours. Sugey Fernando RN, BAN

## 2017-05-09 NOTE — PROGRESS NOTES
SUBJECTIVE:                                                    Jennifer Pastrana is a 26 year old female who presents to clinic today for the following health issues:      Right leg cramp. X 1 month.    She has bruising of both legs. States she bruises easily - unsure if due to dogs jumping on her.  No ecchymosis or erythema in location of the pain. Describes pain as a cramp. Stretching improves pain.  Denies any swelling, numbness and tingling.  Denies chest pain, palpitations, and shortness of breath.  Denies recent travels.  She is trying to stay hydrated. She is eating a well balanced diet.  She is 22 weeks pregnant.   She denies any other concerns.         Problem list and histories reviewed & adjusted, as indicated.  Additional history: none    Patient Active Problem List   Diagnosis     CARDIOVASCULAR SCREENING; LDL GOAL LESS THAN 160     Hypothyroidism     Dry eye syndrome- mild     Dysplasia of cervix, low grade (MILO 1)     Generalized anxiety disorder     Need for Tdap vaccination     Supervision of normal first pregnancy     Headache in pregnancy-NEURO referral     Past Surgical History:   Procedure Laterality Date     NO HISTORY OF SURGERY         Social History   Substance Use Topics     Smoking status: Never Smoker     Smokeless tobacco: Never Used      Comment: Nonsmoking household     Alcohol use No     Family History   Problem Relation Age of Onset     Breast Cancer Maternal Grandmother      CANCER Maternal Grandmother      breast     HEART DISEASE Maternal Grandfather      50s     Myocardial Infarction Maternal Grandfather      HEART DISEASE Paternal Grandfather      50s     Myocardial Infarction Paternal Grandfather      Thyroid Disease Mother      graves-decompression and strabismus     CANCER Paternal Grandmother      cervical     Thyroid Disease Paternal Aunt      nine affected in family     Glaucoma No family hx of      Macular Degeneration No family hx of      CEREBROVASCULAR DISEASE No family  hx of      Hypertension No family hx of      DIABETES No family hx of          Current Outpatient Prescriptions   Medication Sig Dispense Refill     levothyroxine (SYNTHROID/LEVOTHROID) 150 MCG tablet Take 1 tablet (150 mcg) by mouth daily 90 tablet 1     albuterol (PROAIR HFA/PROVENTIL HFA/VENTOLIN HFA) 108 (90 BASE) MCG/ACT Inhaler Inhale 2 puffs into the lungs every 6 hours 1 Inhaler 2     fluticasone (FLONASE) 50 MCG/ACT spray Spray 1-2 sprays into both nostrils daily 1 Bottle 11     Prenatal Vit-Fe Fumarate-FA (PRENATAL MULTIVITAMIN  WITH IRON) 28-0.8 MG TABS Take 1 tablet by mouth daily 90 each 1     Loratadine (CLARITIN PO) Reported on 4/26/2017       rizatriptan (MAXALT-MLT) 10 MG ODT tab Take 10 mg by mouth at onset of headache for migraine Reported on 4/26/2017       MAGNESIUM OXIDE PO Take 400 mg by mouth daily Reported on 4/26/2017       No Known Allergies  BP Readings from Last 3 Encounters:   05/09/17 113/72   04/26/17 111/72   04/18/17 138/64    Wt Readings from Last 3 Encounters:   05/09/17 199 lb (90.3 kg)   04/26/17 200 lb (90.7 kg)   04/18/17 199 lb (90.3 kg)                    Reviewed and updated as needed this visit by clinical staff       Reviewed and updated as needed this visit by Provider         ROS:  Constitutional, cardiovascular, pulmonary, musculoskeletal and integumentary systems are negative, except as otherwise noted.    OBJECTIVE:                                                    /72  Pulse 84  Temp 98.1  F (36.7  C) (Oral)  Wt 199 lb (90.3 kg)  LMP 12/04/2016 (Exact Date)  SpO2 98%  BMI 34.7 kg/m2  Body mass index is 34.7 kg/(m^2).  GENERAL: healthy, alert and no distress  RESP: lungs clear to auscultation - no rales, rhonchi or wheezes  CV: regular rate and rhythm, normal S1 S2, no S3 or S4, no murmur, click or rub, no peripheral edema and peripheral pulses strong  MS: right thigh - mild tenderness to palpation of the mid-anterior thigh, no obvious swelling, no  erythema or ecchymosis, normal range of motion of the extremity without pain, normal sensation and capillary refill, negative Margot's signs   SKIN: no suspicious lesions or rashes    Diagnostic Test Results:  Ultrasound right lower extremity - no DVT seen     ASSESSMENT/PLAN:                                                        ICD-10-CM    1. Pain of right thigh M79.651 US Lower Extremity Venous Duplex Right     [facility] PT, HAND, AND CHIROPRACTIC REFERRAL     CANCELED: RT DUPLEX LO EXTREM ART UNILAT/LTD   2. Pregnancy, unspecified gestational age Z33.1 US Lower Extremity Venous Duplex Right     CANCELED: RT DUPLEX LO EXTREM ART UNILAT/LTD   3. Muscle cramp R25.2 [facility], HAND, AND CHIROPRACTIC REFERRAL       See telephone encounter for patient instructions.    Katerin Urrutia PA-C  [facility]

## 2017-05-09 NOTE — TELEPHONE ENCOUNTER
5.9.17  Jennifer Pastrana, patient returning call to [facility] in womens.  Please call back.

## 2017-05-09 NOTE — TELEPHONE ENCOUNTER
Pt called back and was informed that she should be seen in FP today, and that a nurse will be calling her back.  Ginna Alva, CMA

## 2017-05-09 NOTE — TELEPHONE ENCOUNTER
[5/9/2017 8:54 AM] Estelle Pacheco P:   Hi Sugey, Are you or Elizabeth (when she's in) able to call my 9:10 on Thurs Jennifer Robledo? Looks like she's scheduled because of a possible blood clot in her thigh? This should be triaged and if it does seem like there is an issue, she should be seen today and by FP so they can determine if US is needed. Thank you!    Note patient has appt with GAYATHRI Elise, CNP on 05-11-17 scheduled via [facility] for follow up leg cramp possible clot in right thigh.      Left message asking pt to call back to clinic about her appt scheduled on Thursday. Left clinic call back phone number and RN hours. Sugey Fernando RN, BAN

## 2017-05-09 NOTE — TELEPHONE ENCOUNTER
Per patient:  Onset approx 1 month ago had really bad cramp in right thigh that felt like a prasanth horse.  Since then gets periodic pain in the same spot approx 2 times per week.  Lasts up to about a half hour when occurs.  Describes as a 3 on 0-10 pain; slightly sharp when occurs.  She stretches it out when occurs and does seem to make it feel better.  No redness, tenderness (other than when pain occurs), increased warmth.  States had her  press on it and he questioned if it was a knot but then pressed harder and wasn't sure what it was.  No family history known of clotting disorders.  No known family history of DVT's/PE's.  She has never had blood clots herself.  She will keep appointment today with STEFFEN Urrutia PA-C.  [facility].  She states that she is aware of appointment; she had called and made it.  Provider (Katerin) updated.  Angie Calero RN

## 2017-05-09 NOTE — MR AVS SNAPSHOT
After Visit Summary   5/9/2017    Jennifer Pastrana    MRN: [id number]           Patient Information     Date Of Birth          1990        Visit Information        Provider Department      5/9/2017 12:10 PM Katerin Urrutia PA-C [facility]        Today's Diagnoses     Pain of right thigh    -  1    Pregnancy, unspecified gestational age        Muscle cramp           Follow-ups after your visit        Additional Services     [facility], HAND, AND CHIROPRACTIC REFERRAL       **This order will print in the [facility] Scheduling Office**    Physical Therapy, Hand Therapy and Chiropractic Care are available through:    *[facility]  *[facility]  *[facility]    Call one number to schedule at any of the above locations: [phone].    Your provider has referred you to: Physical Therapy at [facility] or [facility]    Indication/Reason for Referral: right thigh pain and muscle cramp  Onset of Illness: 1 month  Therapy Orders: Evaluate and Treat  Special Programs: None  Special Request: None    [place]      Additional Comments for the Therapist or Chiropractor: none    Please be aware that coverage of these services is subject to the terms and limitations of your health insurance plan.  Call member services at your health plan with any benefit or coverage questions.      Please bring the following to your appointment:    *Your personal calendar for scheduling future appointments  *Comfortable clothing                  Your next 10 appointments already scheduled     Jun 21, 2017  2:00 PM CDT   New Visit with Abdi Steward MD   [facility] ([facility])    [address] [place] [address] [phone]              Who to contact     If you have questions or need follow up information about today's clinic visit or your schedule please contact [facility] directly at  [phone].  Normal or non-critical lab and imaging results will be communicated to you by [facility], letter or phone within 4 business days after the clinic has received the results. If you do not hear from us within 7 days, please contact the clinic through [facility] or phone. If you have a critical or abnormal lab result, we will notify you by phone as soon as possible.  Submit refill requests through [facility] or call your pharmacy and they will forward the refill request to us. Please allow 3 business days for your refill to be completed.          Additional Information About Your Visit        G[facility]art Information     [facility] gives you secure access to your electronic health record. If you see a primary care provider, you can also send messages to your care team and make appointments. If you have questions, please call your primary care clinic.  If you do not have a primary care provider, please call [phone] and they will assist you.        Care EveryWhere ID     This is your Care EveryWhere ID. This could be used by other organizations to access your [place] medical records  [id number]        Your Vitals Were     Pulse Temperature Last Period Pulse Oximetry BMI (Body Mass Index)       84 98.1  F (36.7  C) (Oral) 12/04/2016 (Exact Date) 98% 34.7 kg/m2        Blood Pressure from Last 3 Encounters:   05/09/17 113/72   04/26/17 111/72   04/18/17 138/64    Weight from Last 3 Encounters:   05/09/17 199 lb (90.3 kg)   04/26/17 200 lb (90.7 kg)   04/18/17 199 lb (90.3 kg)              We Performed the Following     [facility] PT, HAND, AND CHIROPRACTIC REFERRAL          Today's Medication Changes          These changes are accurate as of: 5/9/17  2:46 PM.  If you have any questions, ask your nurse or doctor.               Stop taking these medicines if you haven't already. Please contact your care team if you have questions.     PRENATAL VITAMINS PO   Stopped by:  Katerin Urrutia PA-C                    Primary  Care Provider Office Phone # Fax #    [facility] [phone] [address] [facility] [address]        Thank you!     Thank you for choosing [facility]  for your care. Our goal is always to provide you with excellent care. Hearing back from our patients is one way we can continue to improve our services. Please take a few minutes to complete the written survey that you may receive in the mail after your visit with us. Thank you!             Your Updated Medication List - Protect others around you: Learn how to safely use, store and throw away your medicines at www.disposemymeds.org.          This list is accurate as of: 5/9/17  2:46 PM.  Always use your most recent med list.                   Brand Name Dispense Instructions for use    albuterol 108 (90 BASE) MCG/ACT Inhaler    PROAIR HFA/PROVENTIL HFA/VENTOLIN HFA    1 Inhaler    Inhale 2 puffs into the lungs every 6 hours       CLARITIN PO      Reported on 4/26/2017       fluticasone 50 MCG/ACT spray    FLONASE    1 Bottle    Spray 1-2 sprays into both nostrils daily       levothyroxine 150 MCG tablet    SYNTHROID/LEVOTHROID    90 tablet    Take 1 tablet (150 mcg) by mouth daily       MAGNESIUM OXIDE PO      Take 400 mg by mouth daily Reported on 4/26/2017       prenatal multivitamin  with iron 28-0.8 MG Tabs     90 each    Take 1 tablet by mouth daily       rizatriptan 10 MG ODT tab    MAXALT-MLT     Take 10 mg by mouth at onset of headache for migraine Reported on 4/26/2017

## 2017-05-09 NOTE — TELEPHONE ENCOUNTER
Spoke with patient.  Made aware of provider's instructions below.  Had been notified by this RN as well after radiologist called and stated there was no evidence of a DVT.  She will call and schedule physical therapy.  Aware of provider instructions; states understanding. Angie Calero RN

## 2017-06-05 ENCOUNTER — PRENATAL OFFICE VISIT (OUTPATIENT)
Dept: OBGYN | Facility: CLINIC | Age: 27
End: 2017-06-05
Payer: COMMERCIAL

## 2017-06-05 VITALS
WEIGHT: 203.2 LBS | TEMPERATURE: 97.4 F | OXYGEN SATURATION: 100 % | DIASTOLIC BLOOD PRESSURE: 73 MMHG | HEART RATE: 91 BPM | BODY MASS INDEX: 35.43 KG/M2 | SYSTOLIC BLOOD PRESSURE: 109 MMHG

## 2017-06-05 DIAGNOSIS — Z34.02 ENCOUNTER FOR SUPERVISION OF NORMAL FIRST PREGNANCY IN SECOND TRIMESTER: Primary | ICD-10-CM

## 2017-06-05 LAB
GLUCOSE 1H P 50 G GLC PO SERPL-MCNC: 108 MG/DL (ref 60–129)
HGB BLD-MCNC: 12.4 G/DL (ref 11.7–15.7)

## 2017-06-05 PROCEDURE — 99207 ZZC PRENATAL VISIT: CPT | Performed by: OBSTETRICS & GYNECOLOGY

## 2017-06-05 PROCEDURE — 85018 HEMOGLOBIN: CPT | Performed by: OBSTETRICS & GYNECOLOGY

## 2017-06-05 PROCEDURE — 82950 GLUCOSE TEST: CPT | Performed by: OBSTETRICS & GYNECOLOGY

## 2017-06-05 PROCEDURE — 36415 COLL VENOUS BLD VENIPUNCTURE: CPT | Performed by: OBSTETRICS & GYNECOLOGY

## 2017-06-05 NOTE — PROGRESS NOTES
26w1d  Complaints of diarrhea twice daily x 3 days. Improving. Denies Vomiting. No abdominal cramps or pains. Self resolving GI problem.Has Neurology appointment in 2 weeks. Routine anticipatory guidance.  US was normal.    Glucola given.  return to clinic in 2-4 weeks.      ICD-10-CM    1. Encounter for supervision of normal first pregnancy in second trimester Z34.02 Glucose tolerance gest screen 1 hour     Hemoglobin     Hemoglobin     CEPHAS AGBEH, MD.

## 2017-06-05 NOTE — MR AVS SNAPSHOT
After Visit Summary   6/5/2017    Jennifer Pastrana    MRN: [id number]           Patient Information     Date Of Birth          1990        Visit Information        Provider Department      6/5/2017 9:00 AM Agbeh, Cephas Mawuena, MD [facility]        Today's Diagnoses     Encounter for supervision of normal first pregnancy in second trimester    -  1       Follow-ups after your visit        Your next 10 appointments already scheduled     Jun 21, 2017  2:00 PM CDT   New Visit with Abdi Steward MD   [facility] ([facility]    [id number] [place] [address] [phone]              Who to contact     If you have questions or need follow up information about today's clinic visit or your schedule please contact [facility] directly at [phone].  Normal or non-critical lab and imaging results will be communicated to you by MyChart, letter or phone within 4 business days after the clinic has received the results. If you do not hear from us within 7 days, please contact the clinic through MyChart or phone. If you have a critical or abnormal lab result, we will notify you by phone as soon as possible.  Submit refill requests through [facility] or call your pharmacy and they will forward the refill request to us. Please allow 3 business days for your refill to be completed.          Additional Information About Your Visit        MyChart Information     [facility] gives you secure access to your electronic health record. If you see a primary care provider, you can also send messages to your care team and make appointments. If you have questions, please call your primary care clinic.  If you do not have a primary care provider, please call [phone] and they will assist you.        Care EveryWhere ID     This is your Care EveryWhere ID. This could be used by other organizations to access your [facility]  records  [id number]        Your Vitals Were     Pulse Temperature Last Period Pulse Oximetry BMI (Body Mass Index)       91 97.4  F (36.3  C) (Tympanic) 12/04/2016 (Exact Date) 100% 35.43 kg/m2        Blood Pressure from Last 3 Encounters:   06/05/17 109/73   05/09/17 113/72   04/26/17 111/72    Weight from Last 3 Encounters:   06/05/17 203 lb 3.2 oz (92.2 kg)   05/09/17 199 lb (90.3 kg)   04/26/17 200 lb (90.7 kg)              We Performed the Following     Glucose tolerance gest screen 1 hour     Hemoglobin        Primary Care Provider Office Phone # Fax #    [facility] [phone] [address] [facility] [address]        Thank you!     Thank you for choosing [facility]  for your care. Our goal is always to provide you with excellent care. Hearing back from our patients is one way we can continue to improve our services. Please take a few minutes to complete the written survey that you may receive in the mail after your visit with us. Thank you!             Your Updated Medication List - Protect others around you: Learn how to safely use, store and throw away your medicines at www.disposemymeds.org.          This list is accurate as of: 6/5/17 11:02 AM.  Always use your most recent med list.                   Brand Name Dispense Instructions for use    albuterol 108 (90 BASE) MCG/ACT Inhaler    PROAIR HFA/PROVENTIL HFA/VENTOLIN HFA    1 Inhaler    Inhale 2 puffs into the lungs every 6 hours       CLARITIN PO      Reported on 4/26/2017       fluticasone 50 MCG/ACT spray    FLONASE    1 Bottle    Spray 1-2 sprays into both nostrils daily       levothyroxine 150 MCG tablet    SYNTHROID/LEVOTHROID    90 tablet    Take 1 tablet (150 mcg) by mouth daily       MAGNESIUM OXIDE PO      Take 400 mg by mouth daily Reported on 4/26/2017       prenatal multivitamin  with iron 28-0.8 MG Tabs     90 each    Take 1 tablet by mouth daily       rizatriptan 10 MG ODT tab     MAXALT-MLT     Take 10 mg by mouth at onset of headache for migraine Reported on 4/26/2017

## 2017-06-05 NOTE — NURSING NOTE
"Chief Complaint   Patient presents with     Prenatal Care     26.1 diarrhea since Friday       Initial /73 (BP Location: Left arm, Patient Position: Chair, Cuff Size: Adult Large)  Pulse 91  Temp 97.4  F (36.3  C) (Tympanic)  Wt 203 lb 3.2 oz (92.2 kg)  LMP 12/04/2016 (Exact Date)  SpO2 100%  BMI 35.43 kg/m2 Estimated body mass index is 35.43 kg/(m^2) as calculated from the following:    Height as of 4/10/17: 5' 3.5\" (1.613 m).    Weight as of this encounter: 203 lb 3.2 oz (92.2 kg).  Medication Reconciliation: complete     Lissa Minaya LPN    "

## 2017-06-21 ENCOUNTER — OFFICE VISIT (OUTPATIENT)
Dept: NEUROLOGY | Facility: CLINIC | Age: 27
End: 2017-06-21
Attending: NURSE PRACTITIONER
Payer: COMMERCIAL

## 2017-06-21 ENCOUNTER — TELEPHONE (OUTPATIENT)
Dept: OBGYN | Facility: CLINIC | Age: 27
End: 2017-06-21

## 2017-06-21 VITALS
HEIGHT: 64 IN | DIASTOLIC BLOOD PRESSURE: 78 MMHG | HEART RATE: 102 BPM | SYSTOLIC BLOOD PRESSURE: 116 MMHG | BODY MASS INDEX: 34.81 KG/M2 | WEIGHT: 203.9 LBS

## 2017-06-21 DIAGNOSIS — G43.009 MIGRAINE WITHOUT AURA AND WITHOUT STATUS MIGRAINOSUS, NOT INTRACTABLE: ICD-10-CM

## 2017-06-21 DIAGNOSIS — R51.9 HEADACHE IN PREGNANCY, SECOND TRIMESTER: Primary | ICD-10-CM

## 2017-06-21 DIAGNOSIS — O26.892 HEADACHE IN PREGNANCY, SECOND TRIMESTER: Primary | ICD-10-CM

## 2017-06-21 PROCEDURE — 99203 OFFICE O/P NEW LOW 30 MIN: CPT | Performed by: PSYCHIATRY & NEUROLOGY

## 2017-06-21 ASSESSMENT — PAIN SCALES - GENERAL: PAINLEVEL: MODERATE PAIN (4)

## 2017-06-21 NOTE — TELEPHONE ENCOUNTER
Patient calling was seen by her neurologist today and was put on a new medication Tylenol #3 300 mg 30 mg codeine  1 every 4 hours as needed. Was told by neurologist to check with OB provider before taking is currently 29 weeks pregnant. Please call to advise.

## 2017-06-21 NOTE — PROGRESS NOTES
HISTORY OF PRESENT ILLNESS:  Jennifer Robledo is a 26-year-old female referred by Estelle Pacheco for neurologic evaluation.  She currently is in her 29th week of pregnancy.  She is experiencing headaches.      The patient began experiencing migraine type headaches in 2013.  Her typical headache is above her left eye.  This is severe aching pain with some nausea on occasion and often with photophobia.  She has never had an aura or any focal neurologic symptoms.  The headaches untreated could last anywhere from 1-8 hours.  In the past, she was tried on Imitrex which did help but lost its effectiveness and later switched to Maxalt which would consistently abort her headaches.  She was on amitriptyline and later Cymbalta but the latter was stopped when she became pregnant.      She had no headaches during her first trimester, and then when she entered her second trimester she was experiencing daily headaches.  The headache frequency now has decreased to about 1 headache a week for the last 3 weeks.  She tells me they are typical of her migraine.  She was getting some benefit from Tylenol but its lost its effectiveness.  She is taking Magnesium 400 mg and was taking this even before pregnancy.  She discontinued her Maxalt use when she became pregnant.      Her headaches are not provoked by strain and are not positional.      PAST MEDICAL HISTORY:  Notable for hypothyroidism and asthma.      CURRENT MEDICATIONS:  Tylenol, levothyroxine, albuterol, Flonase, prenatal vitamins, Claritin and magnesium oxide 400 mg.      ALLERGIES:  She has no medical allergies.      FAMILY HISTORY:  Notable for her mother and paternal aunts having migraine.  There is a positive family history on her father's side of aneurysms, but her father had imaging done and did not have an aneurysm when this was checked.      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      PHYSICAL EXAMINATION:   GENERAL:   Reveals a patient who is alert and cooperative.  She does not appear in distress.   VITAL SIGNS:  Heart rate 102.  Blood pressure 116/78.   CRANIAL NERVES:  Funduscopic examination reveals sharp disc margins.  Venous pulsations are noted.  Visual fields are intact.  Cranial nerves II-XII are intact.  Motor, sensory, cerebellar and gait testing are normal.   REFLEXES:  2+.  Plantar responses are flexor.      IMPRESSION:   1.  Headaches associated with pregnancy.   2.  History of migraine without aura.      She has a normal neurologic exam.      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  Unfortunately, Tylenol by itself has lost its effectiveness.  I would not at this juncture resume a triptan agent such as Maxalt.      I have suggested that she consider Tylenol with codeine.  She is in her second trimester now.  She would not be able to continue this late in her third trimester because of the risk of  opioid withdrawal.      I have suggested she discuss with Estelle Pacheco if she would be comfortable for the patient using Tylenol with codeine for acute headache management.  I did give her a prescription if she is going to use that.      I am hopeful that her headaches are starting to diminish as they have gone from daily headaches now to weekly headaches and I did tell her this is a typical pattern during pregnancy.      I do plan to see her back in a month.         AZALIA PARRISH MD             D: 2017 14:37   T: 2017 17:09   MT: [id number]      Name:     KYRA MELENDEZ   MRN:      [id number]        Account:      [id number]   :      1990           Visit Date:   2017      Document: [id number]       cc: Estelle Urrutia PA-C

## 2017-06-21 NOTE — LETTER
6/21/2017      RE: Jennifer Pastrana  [address] [place] [address]     Dear Colleague,    Thank you for referring your patient, Jennifer Pastrana, to the [facility]. Please see a copy of my visit note below.    HISTORY OF PRESENT ILLNESS:  Jennifer Robledo is a 26-year-old female referred by Estelle Pacheco for neurologic evaluation.  She currently is in her 29th week of pregnancy.  She is experiencing headaches.      The patient began experiencing migraine type headaches in 2013.  Her typical headache is above her left eye.  This is severe aching pain with some nausea on occasion and often with photophobia.  She has never had an aura or any focal neurologic symptoms.  The headaches untreated could last anywhere from 1-8 hours.  In the past, she was tried on Imitrex which did help but lost its effectiveness and later switched to Maxalt which would consistently abort her headaches.  She was on amitriptyline and later Cymbalta but the latter was stopped when she became pregnant.      She had no headaches during her first trimester, and then when she entered her second trimester she was experiencing daily headaches.  The headache frequency now has decreased to about 1 headache a week for the last 3 weeks.  She tells me they are typical of her migraine.  She was getting some benefit from Tylenol but its lost its effectiveness.  She is taking Magnesium 400 mg and was taking this even before pregnancy.  She discontinued her Maxalt use when she became pregnant.      Her headaches are not provoked by strain and are not positional.      PAST MEDICAL HISTORY:  Notable for hypothyroidism and asthma.      CURRENT MEDICATIONS:  Tylenol, levothyroxine, albuterol, Flonase, prenatal vitamins, Claritin and magnesium oxide 400 mg.      ALLERGIES:  She has no medical allergies.      FAMILY HISTORY:  Notable for her mother and paternal aunts having migraine.  There is a positive family history on her father's side of  aneurysms, but her father had imaging done and did not have an aneurysm when this was checked.      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      PHYSICAL EXAMINATION:   GENERAL:  Reveals a patient who is alert and cooperative.  She does not appear in distress.   VITAL SIGNS:  Heart rate 102.  Blood pressure 116/78.   CRANIAL NERVES:  Funduscopic examination reveals sharp disc margins.  Venous pulsations are noted.  Visual fields are intact.  Cranial nerves II-XII are intact.  Motor, sensory, cerebellar and gait testing are normal.   REFLEXES:  2+.  Plantar responses are flexor.      IMPRESSION:   1.  Headaches associated with pregnancy.   2.  History of migraine without aura.      She has a normal neurologic exam.      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  Unfortunately, Tylenol by itself has lost its effectiveness.  I would not at this juncture resume a triptan agent such as Maxalt.      I have suggested that she consider Tylenol with codeine.  She is in her second trimester now.  She would not be able to continue this late in her third trimester because of the risk of  opioid withdrawal.      I have suggested she discuss with Estelle Pacheco if she would be comfortable for the patient using Tylenol with codeine for acute headache management.  I did give her a prescription if she is going to use that.      I am hopeful that her headaches are starting to diminish as they have gone from daily headaches now to weekly headaches and I did tell her this is a typical pattern during pregnancy.      I do plan to see her back in a month.         AZALIA PARRISH MD             D: 2017 14:37   T: 2017 17:09   MT: [id number]      Name:     KYRA MELENDEZ   MRN:      [id number]        Account:      [id number]   :      1990           Visit Date:   2017      Document: [id number]        cc: Estelle Urrutia PA-C       Again, thank you for allowing me to participate in the care of your patient.      Sincerely,    Abdi Steward MD

## 2017-06-21 NOTE — MR AVS SNAPSHOT
After Visit Summary   6/21/2017    Jennifer Pastrana    MRN: [id number]           Patient Information     Date Of Birth          1990        Visit Information        Provider Department      6/21/2017 2:00 PM Abdi Steward MD [facility]        Today's Diagnoses     Headache in pregnancy, second trimester    -  1    Migraine without aura and without status migrainosus, not intractable           Follow-ups after your visit        Follow-up notes from your care team     Discussed this visit Return in about 1 month (around 7/21/2017).      Your next 10 appointments already scheduled     Jul 26, 2017  1:00 PM CDT   Return Visit with Abdi Steward MD   [facility]    [address] [place] [id number] [phone]              Who to contact     If you have questions or need follow up information about today's clinic visit or your schedule please contact [facility] directly at [phone].  Normal or non-critical lab and imaging results will be communicated to you by [employer], letter or phone within 4 business days after the clinic has received the results. If you do not hear from us within 7 days, please contact the clinic through [employer] or phone. If you have a critical or abnormal lab result, we will notify you by phone as soon as possible.  Submit refill requests through [employer] or call your pharmacy and they will forward the refill request to us. Please allow 3 business days for your refill to be completed.          Additional Information About Your Visit        J.G. inkhart Information     [employer] gives you secure access to your electronic health record. If you see a primary care provider, you can also send messages to your care team and make appointments. If you have questions, please call your primary care clinic.  If you do not have a primary care provider, please call [phone] and they will  "assist you.      [employer] is an electronic gateway that provides easy, online access to your medical records. With [employer], you can request a clinic appointment, read your test results, renew a prescription or communicate with your care team.     To access your existing account, please contact your [facility] or call [phone] for assistance.        Care EveryWhere ID     This is your Care EveryWhere ID. This could be used by other organizations to access your [facility] medical records  [id number]        Your Vitals Were     Pulse Height Last Period BMI (Body Mass Index)          102 1.619 m (5' 3.75\") 12/04/2016 (Exact Date) 35.27 kg/m2         Blood Pressure from Last 3 Encounters:   06/21/17 116/78   06/05/17 109/73   05/09/17 113/72    Weight from Last 3 Encounters:   06/21/17 92.5 kg (203 lb 14.4 oz)   06/05/17 92.2 kg (203 lb 3.2 oz)   05/09/17 90.3 kg (199 lb)              Today, you had the following     No orders found for display         Today's Medication Changes          These changes are accurate as of: 6/21/17  2:31 PM.  If you have any questions, ask your nurse or doctor.               Start taking these medicines.        Dose/Directions    acetaminophen-codeine 300-30 MG per tablet   Commonly known as:  TYLENOL #3   Used for:  Headache in pregnancy, second trimester, Migraine without aura and without status migrainosus, not intractable   Started by:  Abdi Steward MD        Dose:  1 tablet   Take 1 tablet by mouth every 4 hours as needed for pain maximum 4 tablet(s) per day   Quantity:  18 tablet   Refills:  1            Where to get your medicines      Some of these will need a paper prescription and others can be bought over the counter.  Ask your nurse if you have questions.     Bring a paper prescription for each of these medications     acetaminophen-codeine 300-30 MG per tablet                Primary Care Provider Office Phone # Fax #    Katerin Tirado " NIKO Urrutia [phone]       [facility] [id number] [place] [address]        Equal Access to Services     LLOYD MIRELES : Hadii salma wang kareno Soirvinali, waaxda luqadaha, qaybta kaalmada adelaishada, alverto eli eliseolazara goff savannah sierra. So [facility] [phone].    ATENCIÓN: Si habla español, tiene a patterson disposición servicios gratuitos de asistencia lingüística. Llame al [phone].    We comply with applicable federal civil rights laws and [place] laws. We do not discriminate on the basis of race, color, national origin, age, disability sex, sexual orientation or gender identity.            Thank you!     Thank you for choosing [facility]  for your care. Our goal is always to provide you with excellent care. Hearing back from our patients is one way we can continue to improve our services. Please take a few minutes to complete the written survey that you may receive in the mail after your visit with us. Thank you!             Your Updated Medication List - Protect others around you: Learn how to safely use, store and throw away your medicines at www.disposemymeds.org.          This list is accurate as of: 6/21/17  2:31 PM.  Always use your most recent med list.                   Brand Name Dispense Instructions for use Diagnosis    acetaminophen-codeine 300-30 MG per tablet    TYLENOL #3    18 tablet    Take 1 tablet by mouth every 4 hours as needed for pain maximum 4 tablet(s) per day    Headache in pregnancy, second trimester, Migraine without aura and without status migrainosus, not intractable       albuterol 108 (90 BASE) MCG/ACT Inhaler    PROAIR HFA/PROVENTIL HFA/VENTOLIN HFA    1 Inhaler    Inhale 2 puffs into the lungs every 6 hours    SOB (shortness of breath)       CLARITIN PO      Reported on 4/26/2017        fluticasone 50 MCG/ACT spray    FLONASE    1 Bottle    Spray 1-2 sprays into both nostrils daily    Chronic rhinitis       levothyroxine 150  MCG tablet    SYNTHROID/LEVOTHROID    90 tablet    Take 1 tablet (150 mcg) by mouth daily    Hypothyroidism, unspecified type       MAGNESIUM OXIDE PO      Take 400 mg by mouth daily Reported on 4/26/2017        prenatal multivitamin  with iron 28-0.8 MG Tabs     90 each    Take 1 tablet by mouth daily    Encounter for supervision of normal first pregnancy in second trimester       rizatriptan 10 MG ODT tab    MAXALT-MLT     Take 10 mg by mouth at onset of headache for migraine Reported on 4/26/2017        TYLENOL EXTRA STRENGTH PO      Take 2 tablets by mouth as needed

## 2017-06-21 NOTE — NURSING NOTE
"Jennifer Pastrana's goals for this visit include: consult  She requests these members of her care team be copied on today's visit information:     PCP: Katerin Urrutia    Referring Provider:  GAYATHRI Lee CNP  [facility]  [address] [place] [address]    Chief Complaint   Patient presents with     Consult       Initial /78  Pulse 102  Ht 1.619 m (5' 3.75\")  Wt 92.5 kg (203 lb 14.4 oz)  LMP 12/04/2016 (Exact Date)  BMI 35.27 kg/m2 Estimated body mass index is 35.27 kg/(m^2) as calculated from the following:    Height as of this encounter: 1.619 m (5' 3.75\").    Weight as of this encounter: 92.5 kg (203 lb 14.4 oz).  Medication Reconciliation: complete    Do you need any medication refills at today's visit? n  "

## 2017-06-22 NOTE — TELEPHONE ENCOUNTER
[facility] Call Center    Phone Message    Name of Caller: Jennifer Pastrana    Phone Number: Home number on file [phone] (home)    Best time to return call: TODAY    May a detailed message be left on voicemail: no    Relation to patient: Self    Reason for Call: Other: Pt returning a call to [facility] clinic.      Action Taken: Message routed to:  [facility] p [phone]

## 2017-06-22 NOTE — TELEPHONE ENCOUNTER
Left message asking pt to call back to clinic about her message she left yesterday. Left clinic call back phone number and RN hours. Sugey Fernando RN, BAN

## 2017-06-22 NOTE — TELEPHONE ENCOUNTER
Noted patient was seen by  Dr. Prado yesterday. Notes as below:  I have suggested she discuss with Estelle Pacheco if she would be comfortable for the patient using Tylenol with codeine for acute headache management    Will route to GAYATHRI Elise, CNP for review & orders. Sugey Fernando RN, BAN

## 2017-06-22 NOTE — TELEPHONE ENCOUNTER
"Return call to patient. Gave orders and advise per GAYATHRI Elise, CNP. Patient stated she is aware of dangers of codeine in pregnancy and only will use if needed. She stated she hopes no more than 1 or 2 tabs/week \"at the most.\" Patient stated she does not want to take meds in the last \"few\" weeks of pregnancy. Encouraged increased water intake to be >  oz water/day especially with h/a hx and summer heat & humidity. Patient agreed to follow plan. Sugey Fernando RN, BAN  "

## 2017-06-22 NOTE — TELEPHONE ENCOUNTER
Visit notes from neurology reviewed. Patient's headaches have decreased in frequency to 1 weekly now in the last few weeks. She was given the prescription for medication by Neurology. I am ok with her using this on an as needed basis. Refills would need to be from Neurologist and as patient progresses through the later part of the last trimester, we will taper her off if she is still needing it to avoid  withdrawal symptoms. Thank you. Estelle TRINH CNP

## 2017-07-13 ENCOUNTER — TELEPHONE (OUTPATIENT)
Dept: OBGYN | Facility: CLINIC | Age: 27
End: 2017-07-13

## 2017-07-13 NOTE — TELEPHONE ENCOUNTER
French Mccabe,    Is it ok to have pt repeat pap and HPV test at postpartum visit? Thanks.    Netta Garcia  Pap Tracking RN

## 2017-07-13 NOTE — TELEPHONE ENCOUNTER
Yes, it would be fine to wait until postpartum visit to complete Pap and HPV test. Thank you. Estelle TRINH CNP

## 2017-07-14 ENCOUNTER — PRENATAL OFFICE VISIT (OUTPATIENT)
Dept: OBGYN | Facility: CLINIC | Age: 27
End: 2017-07-14
Payer: COMMERCIAL

## 2017-07-14 VITALS
HEART RATE: 102 BPM | DIASTOLIC BLOOD PRESSURE: 73 MMHG | OXYGEN SATURATION: 94 % | BODY MASS INDEX: 35.4 KG/M2 | SYSTOLIC BLOOD PRESSURE: 117 MMHG | TEMPERATURE: 97.8 F | WEIGHT: 204.6 LBS

## 2017-07-14 DIAGNOSIS — Z23 NEED FOR TDAP VACCINATION: Primary | ICD-10-CM

## 2017-07-14 DIAGNOSIS — E03.9 HYPOTHYROIDISM, UNSPECIFIED TYPE: ICD-10-CM

## 2017-07-14 DIAGNOSIS — Z34.03 ENCOUNTER FOR SUPERVISION OF NORMAL FIRST PREGNANCY IN THIRD TRIMESTER: ICD-10-CM

## 2017-07-14 LAB — TSH SERPL DL<=0.005 MIU/L-ACNC: 1.43 MU/L (ref 0.4–4)

## 2017-07-14 PROCEDURE — 84443 ASSAY THYROID STIM HORMONE: CPT | Performed by: OBSTETRICS & GYNECOLOGY

## 2017-07-14 PROCEDURE — 36415 COLL VENOUS BLD VENIPUNCTURE: CPT | Performed by: OBSTETRICS & GYNECOLOGY

## 2017-07-14 PROCEDURE — 99207 ZZC PRENATAL VISIT: CPT | Performed by: OBSTETRICS & GYNECOLOGY

## 2017-07-14 PROCEDURE — 90715 TDAP VACCINE 7 YRS/> IM: CPT | Performed by: OBSTETRICS & GYNECOLOGY

## 2017-07-14 PROCEDURE — 90471 IMMUNIZATION ADMIN: CPT | Performed by: OBSTETRICS & GYNECOLOGY

## 2017-07-14 NOTE — NURSING NOTE
Tdap given - see immunizations  Lissa Minaya LPN    Screening Questionnaire for Adult Immunization    Are you sick today?   No   Do you have allergies to medications, food, a vaccine component or latex?   No   Have you ever had a serious reaction after receiving a vaccination?   No   Do you have a long-term health problem with heart disease, lung disease, asthma, kidney disease, metabolic disease (e.g. diabetes), anemia, or other blood disorder?   No   Do you have cancer, leukemia, HIV/AIDS, or any other immune system problem?   No   In the past 3 months, have you taken medications that affect  your immune system, such as prednisone, other steroids, or anticancer drugs; drugs for the treatment of rheumatoid arthritis, Crohn s disease, or psoriasis; or have you had radiation treatments?   No   Have you had a seizure, or a brain or other nervous system problem?   No   During the past year, have you received a transfusion of blood or blood     products, or been given immune (gamma) globulin or antiviral drug?   No   For women: Are you pregnant or is there a chance you could become        pregnant during the next month?   Yes   Have you received any vaccinations in the past 4 weeks?   No     Immunization questionnaire was positive for at least one answer.  Notified .      MNVFC doesn't apply on this patient    Per orders of Dr. Agbeh, injection of Tdap given by Lissa Minaya. Patient instructed to remain in clinic for 15 minutes afterwards, and to report any adverse reaction to me immediately.       Screening performed by Lissa Minaya on 7/14/2017 at 8:30 AM.

## 2017-07-14 NOTE — PATIENT INSTRUCTIONS
If you have any questions regarding your visit, Please contact your care team.    Women s Health CLINIC HOURS TELEPHONE NUMBER   Douglass Agbeh, M.D.    Lissa Johnson- RAZ Mayes - RAZ [place] -         Monday-[facility]  8:00 am - 5 pm  Tuesday- [facility]  8:00am- 5 pm  Wednesday- Off  Thursday- [facility]  8:00 am- 5 pm  Friday-[place]  8:00 am 5 pm [facility]  [address] [place] [address]  [phone] ask for Women's [facility]    Imaging [phone]    [facility]  [address] [facility]  [place] [address] [phone]  Imaging [phone]     Urgent Care locations:    [facility] Saturday and Sunday   9 am - 5 pm    Monday-Friday   12 pm - 8 pm  Saturday and Sunday   9 am - 5 pm   [phone]       If you need a medication refill, please contact your pharmacy. Please allow 3 business days for your refill to be completed.  As always, Thank you for trusting us with your healthcare needs!

## 2017-07-14 NOTE — PROGRESS NOTES
31w5d.    No HA, visual changes, N/V etc.Requests refills for synthroid.Per refills, there should be one more. Orders placed for TSH. PNE classes planned/done. RTC 2 wk/prn.    ICD-10-CM    1. Need for Tdap vaccination Z23 TDAP VACCINE (ADACEL)     ADMIN 1st VACCINE     TSH with free T4 reflex   2. Encounter for supervision of normal first pregnancy in third trimester Z34.03 TDAP VACCINE (ADACEL)     ADMIN 1st VACCINE     TSH with free T4 reflex   3. Hypothyroidism, unspecified type E03.9 TDAP VACCINE (ADACEL)     ADMIN 1st VACCINE     TSH with free T4 reflex     CEPHAS AGBEH, MD.

## 2017-07-14 NOTE — MR AVS SNAPSHOT
After Visit Summary   7/14/2017    Jennifer Pastrana    MRN: [id number]           Patient Information     Date Of Birth          1990        Visit Information        Provider Department      7/14/2017 8:30 AM Agbeh, Cephas Mawuena, MD [facility]        Care Instructions                                                           If you have any questions regarding your visit, Please contact your care team.    [facility] CLINIC HOURS TELEPHONE NUMBER   Cephas Agbeh, M.D. Kristen - GERARDO Johnson- RAZ Mayes - RN    Dorcas -         Monday-[facility]  8:00 am - 5 pm  Tuesday- [facility]  8:00am- 5 pm  Wednesday- Off  Thursday- [facility]  8:00 am- 5 pm  Friday-[place]  8:00 am 5 pm [facility]  [address]  [place], [place] [address] [phone] ask for [facility]    Imaging [phone]    [facility]  [address]  [place], [place] [address] [phone]  Imaging [phone]     Urgent Care locations:    [facility] Saturday and Sunday   9 am - 5 pm    Monday-Friday   12 pm - 8 pm  Saturday and Sunday   9 am - 5 pm   [phone]       If you need a medication refill, please contact your pharmacy. Please allow 3 business days for your refill to be completed.  As always, Thank you for trusting us with your healthcare needs!                 Follow-ups after your visit        Your next 10 appointments already scheduled     Jul 14, 2017  8:30 AM CDT   MyChart OB Short with Cephas Mawuena Agbeh, MD   [facility] [place] ([facility])    [address] [facility] [id number] [phone]            Jul 26, 2017  1:00 PM CDT   Return Visit with Abdi Steward MD   [facility] ([facility])    [address] [facility] [id number] [phone]              Who to contact     If you have questions or  need follow up information about today's clinic visit or your schedule please contact [facility] directly at [phone].  Normal or non-critical lab and imaging results will be communicated to you by MyChart, letter or phone within 4 business days after the clinic has received the results. If you do not hear from us within 7 days, please contact the clinic through Picatichart or phone. If you have a critical or abnormal lab result, we will notify you by phone as soon as possible.  Submit refill requests through FreeAgent or call your pharmacy and they will forward the refill request to us. Please allow 3 business days for your refill to be completed.          Additional Information About Your Visit        PicaticharRerecipe Information     FreeAgent gives you secure access to your electronic health record. If you see a primary care provider, you can also send messages to your care team and make appointments. If you have questions, please call your primary care clinic.  If you do not have a primary care provider, please call [phone] and they will assist you.        Care EveryWhere ID     This is your Care EveryWhere ID. This could be used by other organizations to access your [facility] medical records  [id number]        Your Vitals Were     Pulse Temperature Last Period Pulse Oximetry BMI (Body Mass Index)       102 97.8  F (36.6  C) (Tympanic) 12/04/2016 (Exact Date) 94% 35.4 kg/m2        Blood Pressure from Last 3 Encounters:   07/14/17 117/73   06/21/17 116/78   06/05/17 109/73    Weight from Last 3 Encounters:   07/14/17 204 lb 9.6 oz (92.8 kg)   06/21/17 203 lb 14.4 oz (92.5 kg)   06/05/17 203 lb 3.2 oz (92.2 kg)              Today, you had the following     No orders found for display       Primary Care Provider Office Phone # Fax #    Katerin Urrutia PA-C [phone]       [facility] [address] [facility] [address]        Equal Access to Services     LLOYD MIRELES :  Hadii aad ku hadmerao Soirvinali, waaxda luqadaha, qaybta kaalmada janet, alverto sierra. So [facility] [phone].    ATENCIÓN: Si chantal forbes, tiene a patterson disposición servicios gratuitos de asistencia lingüística. Llame al [phone].    We comply with applicable federal civil rights laws and [place] laws. We do not discriminate on the basis of race, color, national origin, age, disability sex, sexual orientation or gender identity.            Thank you!     Thank you for choosing [facility]  for your care. Our goal is always to provide you with excellent care. Hearing back from our patients is one way we can continue to improve our services. Please take a few minutes to complete the written survey that you may receive in the mail after your visit with us. Thank you!             Your Updated Medication List - Protect others around you: Learn how to safely use, store and throw away your medicines at www.disposemymeds.org.          This list is accurate as of: 7/14/17  8:26 AM.  Always use your most recent med list.                   Brand Name Dispense Instructions for use Diagnosis    acetaminophen-codeine 300-30 MG per tablet    TYLENOL #3    18 tablet    Take 1 tablet by mouth every 4 hours as needed for pain maximum 4 tablet(s) per day    Headache in pregnancy, second trimester, Migraine without aura and without status migrainosus, not intractable       albuterol 108 (90 BASE) MCG/ACT Inhaler    PROAIR HFA/PROVENTIL HFA/VENTOLIN HFA    1 Inhaler    Inhale 2 puffs into the lungs every 6 hours    SOB (shortness of breath)       CLARITIN PO      Reported on 4/26/2017        fluticasone 50 MCG/ACT spray    FLONASE    1 Bottle    Spray 1-2 sprays into both nostrils daily    Chronic rhinitis       levothyroxine 150 MCG tablet    SYNTHROID/LEVOTHROID    90 tablet    Take 1 tablet (150 mcg) by mouth daily    Hypothyroidism, unspecified type       MAGNESIUM OXIDE PO      Take  400 mg by mouth daily Reported on 4/26/2017        prenatal multivitamin  with iron 28-0.8 MG Tabs     90 each    Take 1 tablet by mouth daily    Encounter for supervision of normal first pregnancy in second trimester       rizatriptan 10 MG ODT tab    MAXALT-MLT     Take 10 mg by mouth at onset of headache for migraine Reported on 4/26/2017        TYLENOL EXTRA STRENGTH PO      Take 2 tablets by mouth as needed

## 2017-07-26 ENCOUNTER — OFFICE VISIT (OUTPATIENT)
Dept: NEUROLOGY | Facility: CLINIC | Age: 27
End: 2017-07-26
Payer: COMMERCIAL

## 2017-07-26 ENCOUNTER — PRENATAL OFFICE VISIT (OUTPATIENT)
Dept: OBGYN | Facility: CLINIC | Age: 27
End: 2017-07-26
Payer: COMMERCIAL

## 2017-07-26 VITALS
SYSTOLIC BLOOD PRESSURE: 121 MMHG | DIASTOLIC BLOOD PRESSURE: 83 MMHG | OXYGEN SATURATION: 96 % | WEIGHT: 205 LBS | HEART RATE: 111 BPM | TEMPERATURE: 96.8 F | BODY MASS INDEX: 35.46 KG/M2

## 2017-07-26 VITALS
HEART RATE: 111 BPM | SYSTOLIC BLOOD PRESSURE: 121 MMHG | TEMPERATURE: 96.8 F | DIASTOLIC BLOOD PRESSURE: 83 MMHG | BODY MASS INDEX: 35.46 KG/M2 | WEIGHT: 205 LBS

## 2017-07-26 DIAGNOSIS — R51.9 HEADACHE IN PREGNANCY, THIRD TRIMESTER: Primary | ICD-10-CM

## 2017-07-26 DIAGNOSIS — E03.9 HYPOTHYROIDISM, UNSPECIFIED TYPE: ICD-10-CM

## 2017-07-26 DIAGNOSIS — Z34.03 ENCOUNTER FOR SUPERVISION OF NORMAL FIRST PREGNANCY IN THIRD TRIMESTER: Primary | ICD-10-CM

## 2017-07-26 DIAGNOSIS — O26.893 HEADACHE IN PREGNANCY, THIRD TRIMESTER: Primary | ICD-10-CM

## 2017-07-26 PROCEDURE — 99207 ZZC PRENATAL VISIT: CPT | Performed by: OBSTETRICS & GYNECOLOGY

## 2017-07-26 PROCEDURE — 99212 OFFICE O/P EST SF 10 MIN: CPT | Performed by: PSYCHIATRY & NEUROLOGY

## 2017-07-26 ASSESSMENT — PAIN SCALES - GENERAL: PAINLEVEL: NO PAIN (0)

## 2017-07-26 NOTE — MR AVS SNAPSHOT
After Visit Summary   7/26/2017    Jennifer Pastrana    MRN: [id number]           Patient Information     Date Of Birth          1990        Visit Information        Provider Department      7/26/2017 2:15 PM Petros Robles MD [facility]        Today's Diagnoses     Encounter for supervision of normal first pregnancy in third trimester    -  1    Hypothyroidism, unspecified type           Follow-ups after your visit        Follow-up notes from your care team     Return in about 2 weeks (around 8/9/2017) for Prenatal Visit.      Your next 10 appointments already scheduled     Jul 26, 2017  2:15 PM CDT   MyChart OB Short with Petros Robles MD   [facility] ([facility])    [address] [place] [address] [phone]            Aug 10, 2017  8:30 AM CDT   MyChart OB Short with Cephas Mawuena Agbeh, MD   [facility]    [address] [place] [id number] [phone]              Who to contact     If you have questions or need follow up information about today's clinic visit or your schedule please contact [facility] directly at [phone].  Normal or non-critical lab and imaging results will be communicated to you by MyChart, letter or phone within 4 business days after the clinic has received the results. If you do not hear from us within 7 days, please contact the clinic through MyChart or phone. If you have a critical or abnormal lab result, we will notify you by phone as soon as possible.  Submit refill requests through [employer] or call your pharmacy and they will forward the refill request to us. Please allow 3 business days for your refill to be completed.          Additional Information About Your Visit        MyChart Information     [employer] gives you secure access to your electronic health record. If you see a primary care provider, you can also  send messages to your care team and make appointments. If you have questions, please call your primary care clinic.  If you do not have a primary care provider, please call [phone] and they will assist you.        Care EveryWhere ID     This is your Care EveryWhere ID. This could be used by other organizations to access your [facility] medical records  [id number]        Your Vitals Were     Pulse Temperature Last Period Breastfeeding? BMI (Body Mass Index)       111 96.8  F (36  C) (Oral) 12/04/2016 (Exact Date) No 35.46 kg/m2        Blood Pressure from Last 3 Encounters:   07/26/17 121/83   07/26/17 121/83   07/14/17 117/73    Weight from Last 3 Encounters:   07/26/17 93 kg (205 lb)   07/26/17 93 kg (205 lb)   07/14/17 92.8 kg (204 lb 9.6 oz)              Today, you had the following     No orders found for display       Primary Care Provider Office Phone # Fax #    Katerin Urrutia PA-C [phone]       [address] [address]        Equal Access to Services     ASHA MIRELES : Hadii aad ku hadasho Soomaali, waaxda luqadaha, qaybta kaalmada adeegyada, waxay idiin hayaan adeeg kharatamir lamary . So [facility] [phone].    ATENCIÓN: Si habla español, tiene a patterson disposición servicios gratuitos de asistencia lingüística. Llame al [phone].    We comply with applicable federal civil rights laws and [place] laws. We do not discriminate on the basis of race, color, national origin, age, disability sex, sexual orientation or gender identity.            Thank you!     Thank you for choosing [facility]  for your care. Our goal is always to provide you with excellent care. Hearing back from our patients is one way we can continue to improve our services. Please take a few minutes to complete the written survey that you may receive in the mail after your visit with us. Thank you!             Your Updated Medication List - Protect others  around you: Learn how to safely use, store and throw away your medicines at www.disposemymeds.org.          This list is accurate as of: 7/26/17  1:31 PM.  Always use your most recent med list.                   Brand Name Dispense Instructions for use Diagnosis    acetaminophen-codeine 300-30 MG per tablet    TYLENOL #3    18 tablet    Take 1 tablet by mouth every 4 hours as needed for pain maximum 4 tablet(s) per day    Headache in pregnancy, second trimester, Migraine without aura and without status migrainosus, not intractable       albuterol 108 (90 BASE) MCG/ACT Inhaler    PROAIR HFA/PROVENTIL HFA/VENTOLIN HFA    1 Inhaler    Inhale 2 puffs into the lungs every 6 hours    SOB (shortness of breath)       CLARITIN PO      Reported on 4/26/2017        fluticasone 50 MCG/ACT spray    FLONASE    1 Bottle    Spray 1-2 sprays into both nostrils daily    Chronic rhinitis       levothyroxine 150 MCG tablet    SYNTHROID/LEVOTHROID    90 tablet    Take 1 tablet (150 mcg) by mouth daily    Hypothyroidism, unspecified type       MAGNESIUM OXIDE PO      Take 400 mg by mouth daily Reported on 4/26/2017        prenatal multivitamin  with iron 28-0.8 MG Tabs     90 each    Take 1 tablet by mouth daily    Encounter for supervision of normal first pregnancy in second trimester       rizatriptan 10 MG ODT tab    MAXALT-MLT     Take 10 mg by mouth at onset of headache for migraine Reported on 4/26/2017        TYLENOL EXTRA STRENGTH PO      Take 2 tablets by mouth as needed

## 2017-07-26 NOTE — PROGRESS NOTES
Jennifer Pastrana returns for follow-up. Patient  I saw a month ago with headaches associated with pregnancy. She has a history of migraine without aura. She is currently near her 34th week of pregnancy. Her headaches have decreased in their frequency and now she is only having a headache every 1-1/2-2 weeks. She has taken Tylenol 3 only 2 or 3 times. The pregnancy is going well.    Examination reveals she is alert and cooperative. Heart rate 111. Blood pressure 121/83. Funduscopic examination reveals sharp disc margins. Visual fields are intact. Cranial nerves II-12 are intact. Motor sensory cerebellar and gait testing are normal. Reflexes are normal (2+) plantar responses are flexor    Impression:  Pregnancy associated headaches-improved    History of migraine without aura    Plan:  She is improving no additional treatment is warranted. Her neurologic examination is normal.    She'll follow-up with me in the future as needed.    Abdi Steward MD

## 2017-07-26 NOTE — LETTER
7/26/2017        RE: Jennifer Pastrana  [address] [facility] [address]        Jennifer Pastrana returns for follow-up. Patient  I saw a month ago with headaches associated with pregnancy. She has a history of migraine without aura. She is currently near her 34th week of pregnancy. Her headaches have decreased in their frequency and now she is only having a headache every 1-1/2-2 weeks. She has taken Tylenol 3 only 2 or 3 times. The pregnancy is going well.    Examination reveals she is alert and cooperative. Heart rate 111. Blood pressure 121/83. Funduscopic examination reveals sharp disc margins. Visual fields are intact. Cranial nerves II-12 are intact. Motor sensory cerebellar and gait testing are normal. Reflexes are normal (2+) plantar responses are flexor    Impression:  Pregnancy associated headaches-improved    History of migraine without aura    Plan:  She is improving no additional treatment is warranted. Her neurologic examination is normal.    She'll follow-up with me in the future as needed.    Abdi Steward MD      Sincerely,        Abdi Steward MD

## 2017-07-26 NOTE — MR AVS SNAPSHOT
After Visit Summary   7/26/2017    Jennifer Pastrana    MRN: [id number]           Patient Information     Date Of Birth          1990        Visit Information        Provider Department      7/26/2017 1:00 PM Abdi Steward MD [facility]        Today's Diagnoses     Headache in pregnancy, third trimester    -  1       Follow-ups after your visit        Follow-up notes from your care team     Discussed this visit Return if symptoms worsen or fail to improve.      Your next 10 appointments already scheduled     Jul 26, 2017  2:15 PM CDT   MyChart OB Short with Petros Robles MD   [facility] ([facility])    [address] [place] [id number] [phone]            Aug 10, 2017  8:30 AM CDT   MyChart OB Short with Cephas Mawuena Agbeh, MD   [facility]    [address] [place] [id number] [phone]              Who to contact     If you have questions or need follow up information about today's clinic visit or your schedule please contact [facility] directly at [phone].  Normal or non-critical lab and imaging results will be communicated to you by MyChart, letter or phone within 4 business days after the clinic has received the results. If you do not hear from us within 7 days, please contact the clinic through MyChart or phone. If you have a critical or abnormal lab result, we will notify you by phone as soon as possible.  Submit refill requests through D[employer] or call your pharmacy and they will forward the refill request to us. Please allow 3 business days for your refill to be completed.          Additional Information About Your Visit        MyChart Information     D[employer] Edge gives you secure access to your electronic health record. If you see a primary care provider, you can also send messages to your care team and make appointments. If you have  questions, please call your primary care clinic.  If you do not have a primary care provider, please call [phone] and they will assist you.      Adility is an electronic gateway that provides easy, online access to your medical records. With [employer], you can request a clinic appointment, read your test results, renew a prescription or communicate with your care team.     To access your existing account, please contact your [facility] or call [phone] for assistance.        Care EveryWhere ID     This is your Care EveryWhere ID. This could be used by other organizations to access your [facility] medical records  [id number]        Your Vitals Were     Pulse Temperature Last Period Pulse Oximetry BMI (Body Mass Index)       111 96.8  F (36  C) (Oral) 12/04/2016 (Exact Date) 96% 35.46 kg/m2        Blood Pressure from Last 3 Encounters:   07/26/17 121/83   07/14/17 117/73   06/21/17 116/78    Weight from Last 3 Encounters:   07/26/17 93 kg (205 lb)   07/14/17 92.8 kg (204 lb 9.6 oz)   06/21/17 92.5 kg (203 lb 14.4 oz)              Today, you had the following     No orders found for display       Primary Care Provider Office Phone # Fax #    Katerin Urrutia PA-C [phone]       [address] [address]        Equal Access to Services     LLOYD MIRELES : Hadii aad ku hadasho Soomaali, waaxda luqadaha, qaybta kaalmada adeegyada, waxay marcelloin hayfrankie nuñez . So [facility] [phone].    ATENCIÓN: Si habla español, tiene a patterson disposición servicios gratuitos de asistencia lingüística. Llcris al [phone].    We comply with applicable federal civil rights laws and [place] laws. We do not discriminate on the basis of race, color, national origin, age, disability sex, sexual orientation or gender identity.            Thank you!     Thank you for choosing [facility]  for your care. Our goal is  always to provide you with excellent care. Hearing back from our patients is one way we can continue to improve our services. Please take a few minutes to complete the written survey that you may receive in the mail after your visit with us. Thank you!             Your Updated Medication List - Protect others around you: Learn how to safely use, store and throw away your medicines at www.disposemymeds.org.          This list is accurate as of: 7/26/17  1:05 PM.  Always use your most recent med list.                   Brand Name Dispense Instructions for use Diagnosis    acetaminophen-codeine 300-30 MG per tablet    TYLENOL #3    18 tablet    Take 1 tablet by mouth every 4 hours as needed for pain maximum 4 tablet(s) per day    Headache in pregnancy, second trimester, Migraine without aura and without status migrainosus, not intractable       albuterol 108 (90 BASE) MCG/ACT Inhaler    PROAIR HFA/PROVENTIL HFA/VENTOLIN HFA    1 Inhaler    Inhale 2 puffs into the lungs every 6 hours    SOB (shortness of breath)       CLARITIN PO      Reported on 4/26/2017        fluticasone 50 MCG/ACT spray    FLONASE    1 Bottle    Spray 1-2 sprays into both nostrils daily    Chronic rhinitis       levothyroxine 150 MCG tablet    SYNTHROID/LEVOTHROID    90 tablet    Take 1 tablet (150 mcg) by mouth daily    Hypothyroidism, unspecified type       MAGNESIUM OXIDE PO      Take 400 mg by mouth daily Reported on 4/26/2017        prenatal multivitamin  with iron 28-0.8 MG Tabs     90 each    Take 1 tablet by mouth daily    Encounter for supervision of normal first pregnancy in second trimester       rizatriptan 10 MG ODT tab    MAXALT-MLT     Take 10 mg by mouth at onset of headache for migraine Reported on 4/26/2017        TYLENOL EXTRA STRENGTH PO      Take 2 tablets by mouth as needed

## 2017-07-26 NOTE — LETTER
7/26/2017       RE: Jennifer Pastrana  [address] [facility] [address]     Dear Colleague,    Thank you for referring your patient, Jennifer Pastrana, to the [facility]. Please see a copy of my visit note below.    Jennifer Pastrana returns for follow-up. Patient  I saw a month ago with headaches associated with pregnancy. She has a history of migraine without aura. She is currently near her 34th week of pregnancy. Her headaches have decreased in their frequency and now she is only having a headache every 1-1/2-2 weeks. She has taken Tylenol 3 only 2 or 3 times. The pregnancy is going well.    Examination reveals she is alert and cooperative. Heart rate 111. Blood pressure 121/83. Funduscopic examination reveals sharp disc margins. Visual fields are intact. Cranial nerves II-12 are intact. Motor sensory cerebellar and gait testing are normal. Reflexes are normal (2+) plantar responses are flexor    Impression:  Pregnancy associated headaches-improved    History of migraine without aura    Plan:  She is improving no additional treatment is warranted. Her neurologic examination is normal.    She'll follow-up with me in the future as needed.    Abdi Steward MD

## 2017-07-26 NOTE — NURSING NOTE
"Chief Complaint   Patient presents with     Prenatal Care     OBV 33w3d       Initial /83 (BP Location: Left arm, Patient Position: Chair, Cuff Size: Adult Regular)  Pulse 111  Temp 96.8  F (36  C) (Oral)  Wt 93 kg (205 lb)  LMP 12/04/2016 (Exact Date)  Breastfeeding? No  BMI 35.46 kg/m2 Estimated body mass index is 35.46 kg/(m^2) as calculated from the following:    Height as of 6/21/17: 1.619 m (5' 3.75\").    Weight as of this encounter: 93 kg (205 lb).  Medication Reconciliation: complete   Ira Veloz CMA      "

## 2017-07-26 NOTE — PROGRESS NOTES
Patient presents for routine prenatal visit at 33w3d.  Patient without complaint. Having some sciatic pain  PE: See OB vitals  Body mass index is 35.46 kg/(m^2).    Questions asked and answered.    Also having hemorrhoids.    Petros Robles MD FACOG

## 2017-07-26 NOTE — NURSING NOTE
"Jennifer Pastrana's goals for this visit include: Return  She requests these members of her care team be copied on today's visit information: PCP    PCP: Katerin Urrutia    Referring Provider:  No referring provider defined for this encounter.    Chief Complaint   Patient presents with     RECHECK     Return 1 month       Initial /83 (BP Location: Left arm, Patient Position: Chair, Cuff Size: Adult Regular)  Pulse 111  Temp 96.8  F (36  C) (Oral)  Wt 93 kg (205 lb)  LMP 12/04/2016 (Exact Date)  SpO2 96%  BMI 35.46 kg/m2 Estimated body mass index is 35.46 kg/(m^2) as calculated from the following:    Height as of 6/21/17: 1.619 m (5' 3.75\").    Weight as of this encounter: 93 kg (205 lb).  Medication Reconciliation: complete       Medication Refills: None      SMA Ignacio      "

## 2017-08-01 ENCOUNTER — OFFICE VISIT (OUTPATIENT)
Dept: FAMILY MEDICINE | Facility: CLINIC | Age: 27
End: 2017-08-01
Payer: COMMERCIAL

## 2017-08-01 VITALS
TEMPERATURE: 97.9 F | DIASTOLIC BLOOD PRESSURE: 72 MMHG | HEART RATE: 82 BPM | WEIGHT: 208 LBS | OXYGEN SATURATION: 96 % | SYSTOLIC BLOOD PRESSURE: 112 MMHG | BODY MASS INDEX: 35.98 KG/M2

## 2017-08-01 DIAGNOSIS — R39.9 URINARY SYMPTOM OR SIGN: ICD-10-CM

## 2017-08-01 DIAGNOSIS — O23.43 UTI IN PREGNANCY, THIRD TRIMESTER: Primary | ICD-10-CM

## 2017-08-01 LAB
ALBUMIN UR-MCNC: NEGATIVE MG/DL
APPEARANCE UR: CLEAR
BACTERIA #/AREA URNS HPF: ABNORMAL /HPF
BILIRUB UR QL STRIP: NEGATIVE
COLOR UR AUTO: YELLOW
GLUCOSE UR STRIP-MCNC: NEGATIVE MG/DL
HGB UR QL STRIP: NEGATIVE
KETONES UR STRIP-MCNC: NEGATIVE MG/DL
LEUKOCYTE ESTERASE UR QL STRIP: ABNORMAL
NITRATE UR QL: NEGATIVE
NON-SQ EPI CELLS #/AREA URNS LPF: ABNORMAL /LPF
PH UR STRIP: 8.5 PH (ref 5–7)
RBC #/AREA URNS AUTO: ABNORMAL /HPF (ref 0–2)
SP GR UR STRIP: 1.01 (ref 1–1.03)
URN SPEC COLLECT METH UR: ABNORMAL
UROBILINOGEN UR STRIP-ACNC: 0.2 EU/DL (ref 0.2–1)
WBC #/AREA URNS AUTO: ABNORMAL /HPF (ref 0–2)

## 2017-08-01 PROCEDURE — 81001 URINALYSIS AUTO W/SCOPE: CPT | Performed by: NURSE PRACTITIONER

## 2017-08-01 PROCEDURE — 99213 OFFICE O/P EST LOW 20 MIN: CPT | Performed by: NURSE PRACTITIONER

## 2017-08-01 RX ORDER — NITROFURANTOIN 25; 75 MG/1; MG/1
100 CAPSULE ORAL 2 TIMES DAILY
Qty: 14 CAPSULE | Refills: 0 | Status: SHIPPED | OUTPATIENT
Start: 2017-08-01 | End: 2017-08-10

## 2017-08-01 NOTE — PATIENT INSTRUCTIONS
Take full course of antibiotics unless we call you to tell you otherwise based on the urine culture.  Follow up if your symptoms persist or worsen.       Understanding Urinary Tract Infections (UTIs)  Most UTIs are caused by bacteria, although they may also be caused by viruses or fungi. Bacteria from the bowel are the most common source of infection. The infection may start because of any of the following:    Sexual activity. During sex, bacteria can travel from the penis, vagina, or rectum into the urethra.     Bacteria on the skin outside the rectum may travel into the urethra. This is more common in women since the rectum and urethra are closer to each other than in men. Wiping from front to back after using the toilet and keeping the area clean can help prevent germs from getting to the urethra.    Blockage of urine flow through the urinary tract. If urine sits too long, germs may start to grow out of control.      Parts of the urinary tract  The infection can occur in any part of the urinary tract.    The kidneys collect and store urine.    The ureters carry urine from the kidneys to the bladder.    The bladder holds urine until you are ready to let it out.    The urethra carries urine from the bladder out of the body. It is shorter in women, so bacteria can move through it more easily. The urethra is longer in men, so a UTI is less likely to reach the bladder or kidneys in men.  Date Last Reviewed: 1/1/2017 2000-2017 [employer]. [address] [place] [address]. All rights reserved. This information is not intended as a substitute for professional medical care. Always follow your healthcare professional's instructions.

## 2017-08-01 NOTE — MR AVS SNAPSHOT
After Visit Summary   8/1/2017    Jennifer Pastrana    MRN: [id number]           Patient Information     Date Of Birth          1990        Visit Information        Provider Department      8/1/2017 9:40 AM Kirstin Vaughan NP [facility]        Today's Diagnoses     Urinary symptom or sign    -  1    UTI in pregnancy, third trimester          Care Instructions    Take full course of antibiotics unless we call you to tell you otherwise based on the urine culture.  Follow up if your symptoms persist or worsen.       Understanding Urinary Tract Infections (UTIs)  Most UTIs are caused by bacteria, although they may also be caused by viruses or fungi. Bacteria from the bowel are the most common source of infection. The infection may start because of any of the following:    Sexual activity. During sex, bacteria can travel from the penis, vagina, or rectum into the urethra.     Bacteria on the skin outside the rectum may travel into the urethra. This is more common in women since the rectum and urethra are closer to each other than in men. Wiping from front to back after using the toilet and keeping the area clean can help prevent germs from getting to the urethra.    Blockage of urine flow through the urinary tract. If urine sits too long, germs may start to grow out of control.      Parts of the urinary tract  The infection can occur in any part of the urinary tract.    The kidneys collect and store urine.    The ureters carry urine from the kidneys to the bladder.    The bladder holds urine until you are ready to let it out.    The urethra carries urine from the bladder out of the body. It is shorter in women, so bacteria can move through it more easily. The urethra is longer in men, so a UTI is less likely to reach the bladder or kidneys in men.  Date Last Reviewed: 1/1/2017 2000-2017 [employer]. [address] [place] [address], [place] [address]. All rights reserved. This  information is not intended as a substitute for professional medical care. Always follow your healthcare professional's instructions.                Follow-ups after your visit        Follow-up notes from your care team     Return if symptoms worsen or fail to improve.      Your next 10 appointments already scheduled     Aug 10, 2017  8:30 AM CDT   [employer] [facility] Short with Cephas Mawuena Agbeh, [facility]   [facility]    [address] [facility]  [place] [address] [phone]              Who to contact     Normal or non-critical lab and imaging results will be communicated to you by MyChart, letter or phone within 4 business days after the clinic has received the results. If you do not hear from us within 7 days, please contact the clinic through [employer] or phone. If you have a critical or abnormal lab result, we will notify you by phone as soon as possible.  Submit refill requests through [employer] or call your pharmacy and they will forward the refill request to us. Please allow 3 business days for your refill to be completed.          If you need to speak with a  for additional information , please call: [phone]             Additional Information About Your Visit        [employer] Information     [employer] gives you secure access to your electronic health record. If you see a primary care provider, you can also send messages to your care team and make appointments. If you have questions, please call your primary care clinic.  If you do not have a primary care provider, please call [phone] and they will assist you.        Care EveryWhere ID     This is your Care EveryWhere ID. This could be used by other organizations to access your [place] medical records  [id number]        Your Vitals Were     Pulse Temperature Last Period Pulse Oximetry BMI (Body Mass Index)       82 97.9  F (36.6  C) (Oral) 12/04/2016 (Exact Date) 96% 35.98 kg/m2        Blood Pressure  from Last 3 Encounters:   08/01/17 112/72   07/26/17 121/83   07/26/17 121/83    Weight from Last 3 Encounters:   08/01/17 208 lb (94.3 kg)   07/26/17 205 lb (93 kg)   07/26/17 205 lb (93 kg)              We Performed the Following     UA reflex to Microscopic and Culture     Urine Microscopic          Today's Medication Changes          These changes are accurate as of: 8/1/17 10:06 AM.  If you have any questions, ask your nurse or doctor.               Start taking these medicines.        Dose/Directions    nitroFURantoin (macrocrystal-monohydrate) 100 MG capsule   Commonly known as:  MACROBID   Used for:  UTI in pregnancy, third trimester   Started by:  Kirstin Vaughan NP        Dose:  100 mg   Take 1 capsule (100 mg) by mouth 2 times daily   Quantity:  14 capsule   Refills:  0            Where to get your medicines      These medications were sent to [facility] [address] [facility]  [address] [facility], [place] [address]     Phone:  [phone]     nitroFURantoin (macrocrystal-monohydrate) 100 MG capsule                Primary Care Provider Office Phone # Fax #    Katerin Urrutia PA-C [phone]       [facility] [address] [place] [address]        Equal Access to Services     LLOYD MIRELES AH: Hadii aad ku hadasho Soomaali, waaxda luqadaha, qaybta kaalmada adeegyada, alverto sierra. So [facility] [phone].    ATENCIÓN: Si habla español, tiene a patterson disposición servicios gratuitos de asistencia lingüística. Mayo al [phone].    We comply with applicable federal civil rights laws and [place] laws. We do not discriminate on the basis of race, color, national origin, age, disability sex, sexual orientation or gender identity.            Thank you!     Thank you for choosing [facility]  for your care. Our goal is always to provide you with excellent care. Hearing back from our patients is one way we  can continue to improve our services. Please take a few minutes to complete the written survey that you may receive in the mail after your visit with us. Thank you!             Your Updated Medication List - Protect others around you: Learn how to safely use, store and throw away your medicines at www.disposemymeds.org.          This list is accurate as of: 8/1/17 10:06 AM.  Always use your most recent med list.                   Brand Name Dispense Instructions for use Diagnosis    acetaminophen-codeine 300-30 MG per tablet    TYLENOL #3    18 tablet    Take 1 tablet by mouth every 4 hours as needed for pain maximum 4 tablet(s) per day    Headache in pregnancy, second trimester, Migraine without aura and without status migrainosus, not intractable       albuterol 108 (90 BASE) MCG/ACT Inhaler    PROAIR HFA/PROVENTIL HFA/VENTOLIN HFA    1 Inhaler    Inhale 2 puffs into the lungs every 6 hours    SOB (shortness of breath)       CLARITIN PO      Reported on 4/26/2017        fluticasone 50 MCG/ACT spray    FLONASE    1 Bottle    Spray 1-2 sprays into both nostrils daily    Chronic rhinitis       levothyroxine 150 MCG tablet    SYNTHROID/LEVOTHROID    90 tablet    Take 1 tablet (150 mcg) by mouth daily    Hypothyroidism, unspecified type       MAGNESIUM OXIDE PO      Take 400 mg by mouth daily Reported on 4/26/2017        nitroFURantoin (macrocrystal-monohydrate) 100 MG capsule    MACROBID    14 capsule    Take 1 capsule (100 mg) by mouth 2 times daily    UTI in pregnancy, third trimester       prenatal multivitamin  with iron 28-0.8 MG Tabs     90 each    Take 1 tablet by mouth daily    Encounter for supervision of normal first pregnancy in second trimester       rizatriptan 10 MG ODT tab    MAXALT-MLT     Take 10 mg by mouth at onset of headache for migraine Reported on 4/26/2017        TYLENOL EXTRA STRENGTH PO      Take 2 tablets by mouth as needed

## 2017-08-01 NOTE — PROGRESS NOTES
SUBJECTIVE:                                                    Jennifer Pastrana is a 26 year old female who presents to clinic today for the following health issues:      Urinary SYMPTOMS     Onset: yesterday    Description:   Painful urination (Dysuria): YES  Blood in urine (Hematuria): YES  Frequency/Urgency: YES  Abdominal/Pelvic/Flank Pain : no     Other vaginal symptoms: vaginal discharge- clear white no itching    Progression of Symptoms:  Same- similar to previous UTI    History:   History of frequent UTI's: no (2 in lifetime)  History of kidney stones: no   Sexually Active: YES- no risk for STI  New Partner: no     Possibility of pregnancy: Yes- 34 weeks (BOY), baby is moving per pt     Therapies Tried and outcome: none       Problem list and histories reviewed & adjusted, as indicated.  Additional history: as documented    Patient Active Problem List   Diagnosis     CARDIOVASCULAR SCREENING; LDL GOAL LESS THAN 160     Hypothyroidism     Dry eye syndrome- mild     Dysplasia of cervix, low grade (MILO 1)     Generalized anxiety disorder     Supervision of normal first pregnancy     Headache in pregnancy-NEURO referral     Past Surgical History:   Procedure Laterality Date     NO HISTORY OF SURGERY         Social History   Substance Use Topics     Smoking status: Never Smoker     Smokeless tobacco: Never Used      Comment: Nonsmoking household     Alcohol use No     Family History   Problem Relation Age of Onset     Breast Cancer Maternal Grandmother      CANCER Maternal Grandmother      breast     HEART DISEASE Maternal Grandfather      50s     Myocardial Infarction Maternal Grandfather      HEART DISEASE Paternal Grandfather      50s     Myocardial Infarction Paternal Grandfather      Thyroid Disease Mother      graves-decompression and strabismus     CANCER Paternal Grandmother      cervical     Thyroid Disease Paternal Aunt      nine affected in family     Glaucoma No family hx of      Macular Degeneration  No family hx of      CEREBROVASCULAR DISEASE No family hx of      Hypertension No family hx of      DIABETES No family hx of          Current Outpatient Prescriptions   Medication Sig Dispense Refill     nitroFURantoin, macrocrystal-monohydrate, (MACROBID) 100 MG capsule Take 1 capsule (100 mg) by mouth 2 times daily 14 capsule 0     Acetaminophen (TYLENOL EXTRA STRENGTH PO) Take 2 tablets by mouth as needed       acetaminophen-codeine (TYLENOL #3) 300-30 MG per tablet Take 1 tablet by mouth every 4 hours as needed for pain maximum 4 tablet(s) per day 18 tablet 1     levothyroxine (SYNTHROID/LEVOTHROID) 150 MCG tablet Take 1 tablet (150 mcg) by mouth daily 90 tablet 1     albuterol (PROAIR HFA/PROVENTIL HFA/VENTOLIN HFA) 108 (90 BASE) MCG/ACT Inhaler Inhale 2 puffs into the lungs every 6 hours 1 Inhaler 2     fluticasone (FLONASE) 50 MCG/ACT spray Spray 1-2 sprays into both nostrils daily 1 Bottle 11     Prenatal Vit-Fe Fumarate-FA (PRENATAL MULTIVITAMIN  WITH IRON) 28-0.8 MG TABS Take 1 tablet by mouth daily 90 each 1     Loratadine (CLARITIN PO) Reported on 4/26/2017       rizatriptan (MAXALT-MLT) 10 MG ODT tab Take 10 mg by mouth at onset of headache for migraine Reported on 4/26/2017       MAGNESIUM OXIDE PO Take 400 mg by mouth daily Reported on 4/26/2017       No Known Allergies  BP Readings from Last 3 Encounters:   08/01/17 112/72   07/26/17 121/83   07/26/17 121/83    Wt Readings from Last 3 Encounters:   08/01/17 208 lb (94.3 kg)   07/26/17 205 lb (93 kg)   07/26/17 205 lb (93 kg)            Labs reviewed in EPIC    Reviewed and updated as needed this visit by clinical staff  Tobacco  Allergies  Meds  Med Hx  Surg Hx  Fam Hx  Soc Hx      Reviewed and updated as needed this visit by Provider         ROS:  Constitutional, HEENT, cardiovascular, pulmonary, GI, , musculoskeletal, neuro, skin, endocrine and psych systems are negative, except as otherwise noted.      OBJECTIVE:   /72  Pulse 82   Temp 97.9  F (36.6  C) (Oral)  Wt 208 lb (94.3 kg)  LMP 12/04/2016 (Exact Date)  SpO2 96%  BMI 35.98 kg/m2  Body mass index is 35.98 kg/(m^2).  GENERAL: healthy, alert and no distress  RESP: lungs clear to auscultation - no rales, rhonchi or wheezes  CV: regular rate and rhythm, normal S1 S2, no S3 or S4, no murmur, click or rub, no peripheral edema and peripheral pulses strong  ABDOMEN: soft, nontender, no hepatosplenomegaly, no masses and bowel sounds normal POSITIVE for tenderness to palpation over bladder, no CVA tenderness  SKIN: no suspicious lesions or rashes  PSYCH: mentation appears normal, affect normal/bright    Diagnostic Test Results:  See orders    ASSESSMENT/PLAN:         ICD-10-CM    1. UTI in pregnancy, third trimester O23.43 nitroFURantoin, macrocrystal-monohydrate, (MACROBID) 100 MG capsule    34 weeks   2. Urinary symptom or sign R39.9 UA reflex to Microscopic and Culture     Urine Microscopic       See Patient Instructions: Take full course of antibiotics unless we call you to tell you otherwise based on the urine culture.  Follow up if your symptoms persist or worsen.     Kirstin Vaughan, REEMA  [facility]

## 2017-08-10 ENCOUNTER — PRENATAL OFFICE VISIT (OUTPATIENT)
Dept: OBGYN | Facility: CLINIC | Age: 27
End: 2017-08-10
Payer: COMMERCIAL

## 2017-08-10 VITALS
TEMPERATURE: 97.7 F | BODY MASS INDEX: 35.98 KG/M2 | DIASTOLIC BLOOD PRESSURE: 80 MMHG | OXYGEN SATURATION: 98 % | HEART RATE: 97 BPM | WEIGHT: 208 LBS | SYSTOLIC BLOOD PRESSURE: 113 MMHG

## 2017-08-10 DIAGNOSIS — Z34.03 ENCOUNTER FOR SUPERVISION OF NORMAL FIRST PREGNANCY IN THIRD TRIMESTER: Primary | ICD-10-CM

## 2017-08-10 PROCEDURE — 99207 ZZC PRENATAL VISIT: CPT | Performed by: OBSTETRICS & GYNECOLOGY

## 2017-08-10 PROCEDURE — 87653 STREP B DNA AMP PROBE: CPT | Performed by: OBSTETRICS & GYNECOLOGY

## 2017-08-10 NOTE — NURSING NOTE
"Chief Complaint   Patient presents with     Prenatal Care     35.4       Initial /80 (BP Location: Left arm, Patient Position: Sitting, Cuff Size: Adult Regular)  Pulse 97  Temp 97.7  F (36.5  C) (Tympanic)  Wt 208 lb (94.3 kg)  LMP 12/04/2016 (Exact Date)  SpO2 98%  BMI 35.98 kg/m2 Estimated body mass index is 35.98 kg/(m^2) as calculated from the following:    Height as of 6/21/17: 5' 3.75\" (1.619 m).    Weight as of this encounter: 208 lb (94.3 kg).  Medication Reconciliation: complete     Lissa Minaya LPN    "

## 2017-08-10 NOTE — MR AVS SNAPSHOT
After Visit Summary   8/10/2017    Jennifer Pastrana    MRN: [id number]           Patient Information     Date Of Birth          1990        Visit Information        Provider Department      8/10/2017 8:30 AM Agbeh, Cephas Mawuena, MD [facility]        Today's Diagnoses     Encounter for supervision of normal first pregnancy in third trimester    -  1      Care Instructions                                                           If you have any questions regarding your visit, Please contact your care team.    Women s Health CLINIC HOURS TELEPHONE NUMBER   Cephas Agbeh, M.D.    Lissa Johnson- RAZ Mayes - RN    [place] -         Monday-[facility]  8:00 am - 5 pm  Tuesday- [facility]  8:00am- 5 pm  Wednesday- Off  Thursday- [facility]  8:00 am- 5 pm  Friday-[place]  8:00 am 5 pm [facility]  [address] [place] [address]  [phone] ask for Women's Clinic    Imaging [phone]    [facility]  [address] [place] [address]  [phone]  Imaging [phone]     Urgent Care locations:    [facility] Saturday and Sunday   9 am - 5 pm    Monday-Friday   12 pm - 8 pm  Saturday and Sunday   9 am - 5 pm   [phone]       If you need a medication refill, please contact your pharmacy. Please allow 3 business days for your refill to be completed.  As always, Thank you for trusting us with your healthcare needs!                 Follow-ups after your visit        Your next 10 appointments already scheduled     Aug 17, 2017  8:15 AM CDT   MyChart OB Short with Cephas Mawuena Agbeh, MD   [facility] ([facility]    [address] [facility] [address] [phone]            Aug 23, 2017  7:50 AM CDT   MyChart OB Short with GAYATHRI Lee CNP   [facility]    [address]  [address] [facility] [address] [phone]            Aug 31, 2017  9:30 AM CDT   [employer] OB Short with Cephas Mawuena Agbeh, MD   [facility] [place] ([facility])    [address]  [place] [address] [phone]              Who to contact     If you have questions or need follow up information about today's clinic visit or your schedule please contact [facility] directly at [phone].  Normal or non-critical lab and imaging results will be communicated to you by [employer], letter or phone within 4 business days after the clinic has received the results. If you do not hear from us within 7 days, please contact the clinic through [employer] or phone. If you have a critical or abnormal lab result, we will notify you by phone as soon as possible.  Submit refill requests through [employer] or call your pharmacy and they will forward the refill request to us. Please allow 3 business days for your refill to be completed.          Additional Information About Your Visit        MyChart Information     [employer] gives you secure access to your electronic health record. If you see a primary care provider, you can also send messages to your care team and make appointments. If you have questions, please call your primary care clinic.  If you do not have a primary care provider, please call [phone] and they will assist you.        Care EveryWhere ID     This is your Care EveryWhere ID. This could be used by other organizations to access your [place] medical records  [id number]        Your Vitals Were     Pulse Temperature Last Period Pulse Oximetry BMI (Body Mass Index)       97 97.7  F (36.5  C) (Tympanic) 12/04/2016 (Exact Date) 98% 35.98 kg/m2        Blood Pressure from Last 3 Encounters:   08/10/17 113/80   08/01/17 112/72   07/26/17 121/83    Weight from Last 3 Encounters:   08/10/17 208 lb (94.3 kg)   08/01/17 208 lb (94.3 kg)   07/26/17 205 lb (93 kg)               We Performed the Following     Group B strep PCR          Today's Medication Changes          These changes are accurate as of: 8/10/17  8:36 AM.  If you have any questions, ask your nurse or doctor.               Stop taking these medicines if you haven't already. Please contact your care team if you have questions.     nitroFURantoin (macrocrystal-monohydrate) 100 MG capsule   Commonly known as:  MACROBID   Stopped by:  Agbeh, Cephas Mawuena, MD                    Primary Care Provider Office Phone # Fax     Katerin Candida Urrutia PA-C [phone] [address] [facility] [address]        Equal Access to Services     [facility]: Hadii salma wang hadasho Soolesya, waaxda luqadaha, qaybta kaalmada adelazarayada, alverto nuñez . So [facility] [phone].    ATENCIÓN: Si habla español, tiene a patterson disposición servicios gratuitos de asistencia lingüística. Llame al [phone].    We comply with applicable federal civil rights laws and [place] laws. We do not discriminate on the basis of race, color, national origin, age, disability sex, sexual orientation or gender identity.            Thank you!     Thank you for choosing [facility]  for your care. Our goal is always to provide you with excellent care. Hearing back from our patients is one way we can continue to improve our services. Please take a few minutes to complete the written survey that you may receive in the mail after your visit with us. Thank you!             Your Updated Medication List - Protect others around you: Learn how to safely use, store and throw away your medicines at www.disposemymeds.org.          This list is accurate as of: 8/10/17  8:36 AM.  Always use your most recent med list.                   Brand Name Dispense Instructions for use Diagnosis    acetaminophen-codeine 300-30 MG per tablet    TYLENOL #3    18 tablet    Take 1 tablet by mouth every 4 hours as needed for pain  maximum 4 tablet(s) per day    Headache in pregnancy, second trimester, Migraine without aura and without status migrainosus, not intractable       albuterol 108 (90 BASE) MCG/ACT Inhaler    PROAIR HFA/PROVENTIL HFA/VENTOLIN HFA    1 Inhaler    Inhale 2 puffs into the lungs every 6 hours    SOB (shortness of breath)       CLARITIN PO      Reported on 4/26/2017        COLACE PO           fluticasone 50 MCG/ACT spray    FLONASE    1 Bottle    Spray 1-2 sprays into both nostrils daily    Chronic rhinitis       levothyroxine 150 MCG tablet    SYNTHROID/LEVOTHROID    90 tablet    Take 1 tablet (150 mcg) by mouth daily    Hypothyroidism, unspecified type       MAGNESIUM OXIDE PO      Take 400 mg by mouth daily Reported on 4/26/2017        prenatal multivitamin  with iron 28-0.8 MG Tabs     90 each    Take 1 tablet by mouth daily    Encounter for supervision of normal first pregnancy in second trimester       rizatriptan 10 MG ODT tab    MAXALT-MLT     Take 10 mg by mouth at onset of headache for migraine Reported on 4/26/2017        TYLENOL EXTRA STRENGTH PO      Take 2 tablets by mouth as needed

## 2017-08-10 NOTE — PATIENT INSTRUCTIONS
If you have any questions regarding your visit, Please contact your care team.    Women s Health CLINIC HOURS TELEPHONE NUMBER   Douglass Agbeh, M.D.    Lissa Johnson- RAZ Mayes - RAZ [place] -         Monday-[facility]  8:00 am - 5 pm  Tuesday- [facility]  8:00am- 5 pm  Wednesday- Off  Thursday- [facility]  8:00 am- 5 pm  Friday-[place]  8:00 am 5 pm [facility]  [address] [place] [address]  [phone] ask for Women's [facility]    Imaging [phone]    [facility]  [address]  [place] [address] [phone]  Imaging [phone]     Urgent Care locations:    [facility] Saturday and Sunday   9 am - 5 pm    Monday-Friday   12 pm - 8 pm  Saturday and Sunday   9 am - 5 pm   [phone]       If you need a medication refill, please contact your pharmacy. Please allow 3 business days for your refill to be completed.  As always, Thank you for trusting us with your healthcare needs!

## 2017-08-10 NOTE — PROGRESS NOTES
35w4d  No HA, visual changes, N/V etc.  Labor plan and warning s/s discussed. Routine AG. See flowsheet. RTC 1 wk/prn.  GBS done.    ICD-10-CM    1. Encounter for supervision of normal first pregnancy in third trimester Z34.03 Group B strep PCR     CEPHAS AGBEH, MD.  .

## 2017-08-12 LAB
GP B STREP DNA SPEC QL NAA+PROBE: NORMAL
SPECIMEN SOURCE: NORMAL

## 2017-08-17 ENCOUNTER — PRENATAL OFFICE VISIT (OUTPATIENT)
Dept: OBGYN | Facility: CLINIC | Age: 27
End: 2017-08-17
Payer: COMMERCIAL

## 2017-08-17 VITALS
WEIGHT: 210 LBS | TEMPERATURE: 97.7 F | SYSTOLIC BLOOD PRESSURE: 117 MMHG | OXYGEN SATURATION: 94 % | BODY MASS INDEX: 36.33 KG/M2 | HEART RATE: 90 BPM | DIASTOLIC BLOOD PRESSURE: 79 MMHG

## 2017-08-17 DIAGNOSIS — Z34.03 ENCOUNTER FOR SUPERVISION OF NORMAL FIRST PREGNANCY IN THIRD TRIMESTER: Primary | ICD-10-CM

## 2017-08-17 PROCEDURE — 99207 ZZC PRENATAL VISIT: CPT | Performed by: OBSTETRICS & GYNECOLOGY

## 2017-08-17 NOTE — PROGRESS NOTES
36w4d  Tired.  No HA, visual changes, N/V etc. Has occ. Braxtin alaniz. Labor plan and warning s/s discussed. RTC 1 wk/prn.  GBS is negative.    ICD-10-CM    1. Encounter for supervision of normal first pregnancy in third trimester Z34.03      CEPHAS AGBEH, MD.  .

## 2017-08-17 NOTE — MR AVS SNAPSHOT
After Visit Summary   8/17/2017    Jennifer Pastrana    MRN: [id number]           Patient Information     Date Of Birth          1990        Visit Information        Provider Department      8/17/2017 8:15 AM Agbeh, Cephas Mawuena, MD [facility]        Care Instructions                                                           If you have any questions regarding your visit, Please contact your care team.    Women s Health CLINIC HOURS TELEPHONE NUMBER   Cephas Agbeh, M.D.    Lissa - GERARDO Johnson- RAZ Mayes - RN    Dorcas -         Monday-[facility]  8:00 am - 5 pm  Tuesday- [facility]  8:00am- 5 pm  Wednesday- Off  Thursday- [facility]  8:00 am- 5 pm  Friday-[place]  8:00 am 5 pm [facility]  [address]  [place] [address] [phone] ask for [facility]    Imaging [phone]    [facility]  [address]  [place], [place] [address] [phone]  Imaging [phone]     Urgent Care locations:    [facility] Saturday and Sunday   9 am - 5 pm    Monday-Friday   12 pm - 8 pm  Saturday and Sunday   9 am - 5 pm   [phone]       If you need a medication refill, please contact your pharmacy. Please allow 3 business days for your refill to be completed.  As always, Thank you for trusting us with your healthcare needs!                 Follow-ups after your visit        Your next 10 appointments already scheduled     Aug 23, 2017  7:50 AM CDT   MyChart OB Short with GAYATHRI [facility] ([facility])    [address] [facility] [address] [phone]            Aug 31, 2017  9:30 AM CDT   MyChart OB Short with Cephas Mawuena Agbeh, MD   [facility] ([facility])    [address] [facility] [id number] [phone]              Who to contact     If you have questions  or need follow up information about today's clinic visit or your schedule please contact [facility] directly at [phone].  Normal or non-critical lab and imaging results will be communicated to you by BioRelixhart, letter or phone within 4 business days after the clinic has received the results. If you do not hear from us within 7 days, please contact the clinic through BioRelixhart or phone. If you have a critical or abnormal lab result, we will notify you by phone as soon as possible.  Submit refill requests through Alacritech or call your pharmacy and they will forward the refill request to us. Please allow 3 business days for your refill to be completed.          Additional Information About Your Visit        BioRelixharTap 'n Tap Information     Al[employer]ch gives you secure access to your electronic health record. If you see a primary care provider, you can also send messages to your care team and make appointments. If you have questions, please call your primary care clinic.  If you do not have a primary care provider, please call [phone] and they will assist you.        Care EveryWhere ID     This is your Care EveryWhere ID. This could be used by other organizations to access your [facility] medical records  [id number]        Your Vitals Were     Pulse Temperature Last Period Pulse Oximetry BMI (Body Mass Index)       90 97.7  F (36.5  C) (Tympanic) 12/04/2016 (Exact Date) 94% 36.33 kg/m2        Blood Pressure from Last 3 Encounters:   08/17/17 117/79   08/10/17 113/80   08/01/17 112/72    Weight from Last 3 Encounters:   08/17/17 210 lb (95.3 kg)   08/10/17 208 lb (94.3 kg)   08/01/17 208 lb (94.3 kg)              Today, you had the following     No orders found for display       Primary Care Provider Office Phone # Fax #    Katerin Urrutia PA-C [phone] [address] [facility] [address]        Equal Access to Services     LLYOD MIRELES : delicia Rosa  magdachristiano alverto cota. So [facility] [phone].    ATENCIÓN: Si chantal forbes, tiene a patterson disposición servicios gratuitos de asistencia lingüística. Mayo al [phone].    We comply with applicable federal civil rights laws and [place] laws. We do not discriminate on the basis of race, color, national origin, age, disability sex, sexual orientation or gender identity.            Thank you!     Thank you for choosing [facility]  for your care. Our goal is always to provide you with excellent care. Hearing back from our patients is one way we can continue to improve our services. Please take a few minutes to complete the written survey that you may receive in the mail after your visit with us. Thank you!             Your Updated Medication List - Protect others around you: Learn how to safely use, store and throw away your medicines at www.disposemymeds.org.          This list is accurate as of: 8/17/17  8:25 AM.  Always use your most recent med list.                   Brand Name Dispense Instructions for use Diagnosis    acetaminophen-codeine 300-30 MG per tablet    TYLENOL #3    18 tablet    Take 1 tablet by mouth every 4 hours as needed for pain maximum 4 tablet(s) per day    Headache in pregnancy, second trimester, Migraine without aura and without status migrainosus, not intractable       albuterol 108 (90 BASE) MCG/ACT Inhaler    PROAIR HFA/PROVENTIL HFA/VENTOLIN HFA    1 Inhaler    Inhale 2 puffs into the lungs every 6 hours    SOB (shortness of breath)       CLARITIN PO      Reported on 4/26/2017        COLACE PO           fluticasone 50 MCG/ACT spray    FLONASE    1 Bottle    Spray 1-2 sprays into both nostrils daily    Chronic rhinitis       levothyroxine 150 MCG tablet    SYNTHROID/LEVOTHROID    90 tablet    Take 1 tablet (150 mcg) by mouth daily    Hypothyroidism, unspecified type       MAGNESIUM OXIDE PO      Take 400 mg by mouth  daily Reported on 4/26/2017        prenatal multivitamin  with iron 28-0.8 MG Tabs     90 each    Take 1 tablet by mouth daily    Encounter for supervision of normal first pregnancy in second trimester       rizatriptan 10 MG ODT tab    MAXALT-MLT     Take 10 mg by mouth at onset of headache for migraine Reported on 4/26/2017        TYLENOL EXTRA STRENGTH PO      Take 2 tablets by mouth as needed

## 2017-08-17 NOTE — PATIENT INSTRUCTIONS
If you have any questions regarding your visit, Please contact your care team.    Women s Health CLINIC HOURS TELEPHONE NUMBER   Douglass Agbeh, M.D.    Lissa Johnson- RAZ Mayes - RAZ Toscano -         Monday-[facility]  8:00 am - 5 pm  Tuesday- [facility]  8:00am- 5 pm  Wednesday- Off  Thursday- [facility]  8:00 am- 5 pm  Friday-[place]  8:00 am 5 pm [facility]  [address] [place] [address]  [phone] ask for [facility]    Imaging [phone]    [facility]  [address] [facility]  [place] [address] [phone]  Imaging [phone]     Urgent Care locations:    [facility] Saturday and Sunday   9 am - 5 pm    Monday-Friday   12 pm - 8 pm  Saturday and Sunday   9 am - 5 pm   [phone]       If you need a medication refill, please contact your pharmacy. Please allow 3 business days for your refill to be completed.  As always, Thank you for trusting us with your healthcare needs!

## 2017-08-23 ENCOUNTER — PRENATAL OFFICE VISIT (OUTPATIENT)
Dept: OBGYN | Facility: CLINIC | Age: 27
End: 2017-08-23
Payer: COMMERCIAL

## 2017-08-23 VITALS
BODY MASS INDEX: 35.68 KG/M2 | TEMPERATURE: 97.1 F | WEIGHT: 209 LBS | DIASTOLIC BLOOD PRESSURE: 71 MMHG | HEART RATE: 79 BPM | SYSTOLIC BLOOD PRESSURE: 114 MMHG | HEIGHT: 64 IN

## 2017-08-23 DIAGNOSIS — Z34.03 ENCOUNTER FOR SUPERVISION OF NORMAL FIRST PREGNANCY IN THIRD TRIMESTER: Primary | ICD-10-CM

## 2017-08-23 PROCEDURE — 99207 ZZC PRENATAL VISIT: CPT | Performed by: NURSE PRACTITIONER

## 2017-08-23 ASSESSMENT — PAIN SCALES - GENERAL: PAINLEVEL: NO PAIN (0)

## 2017-08-23 NOTE — NURSING NOTE
"Chief Complaint   Patient presents with     Prenatal Care     37w3d       Initial /71  Pulse 79  Temp 97.1  F (36.2  C) (Oral)  Ht 5' 3.5\" (1.613 m)  Wt 209 lb (94.8 kg)  LMP 12/04/2016 (Exact Date)  BMI 36.44 kg/m2 Estimated body mass index is 36.44 kg/(m^2) as calculated from the following:    Height as of this encounter: 5' 3.5\" (1.613 m).    Weight as of this encounter: 209 lb (94.8 kg)..  BP completed using cuff size: jessie Solomon CMA    "

## 2017-08-23 NOTE — PROGRESS NOTES
Patient presents for routine prenatal visit. Prenatal flowsheet reviewed and updated as needed.  Denies vaginal bleeding, loss of fluid, contractions or cramping.  Patient without complaint. Reviewed signs and symptoms of labor and when to proceed to L&D.  Advice as per Anticipatory Guidance/Checklist updated.  PE: See OB vitals    Questions asked and answered. Next OB visit in 1 week(s) with Dr. Agbeh.    Estelle TRINH CNP

## 2017-08-25 DIAGNOSIS — Z34.90 ENCOUNTER FOR SUPERVISION OF NORMAL PREGNANCY, ANTEPARTUM, UNSPECIFIED GRAVIDITY: Primary | ICD-10-CM

## 2017-08-25 NOTE — TELEPHONE ENCOUNTER
Pending Prescriptions:                       Disp   Refills    Misc. Devices (BREAST PUMP) MISC                              Si each

## 2017-08-28 RX ORDER — BREAST PUMP
1 EACH MISCELLANEOUS
Qty: 1 EACH | Refills: 0 | Status: SHIPPED | OUTPATIENT
Start: 2017-08-28 | End: 2017-09-19

## 2017-08-28 NOTE — TELEPHONE ENCOUNTER
Next 5 appointments (look out 90 days)     Aug 31, 2017  9:30 AM CDT   ESTABLISHED PRENATAL with Cephas Mawuena Agbeh, MD   [facility] ([facility])    [address] [facility] [address]   [phone]            Sep 07, 2017  8:00 AM CDT   ESTABLISHED PRENATAL with Cephas Mawuena Agbeh, MD   [facility] ([facility])    [address] [facility] [address]   [phone]                Rx pended. Will route to Dr. Agbeh to sign Rx.  Sugye Fernando RN, BAN

## 2017-08-29 RX ORDER — BREAST PUMP
1 EACH MISCELLANEOUS DAILY PRN
Qty: 1 EACH | Refills: 0 | Status: SHIPPED | OUTPATIENT
Start: 2017-08-29 | End: 2017-09-19

## 2017-08-31 ENCOUNTER — TELEPHONE (OUTPATIENT)
Dept: OBGYN | Facility: CLINIC | Age: 27
End: 2017-08-31

## 2017-08-31 ENCOUNTER — PRENATAL OFFICE VISIT (OUTPATIENT)
Dept: OBGYN | Facility: CLINIC | Age: 27
End: 2017-08-31
Payer: COMMERCIAL

## 2017-08-31 VITALS
WEIGHT: 210 LBS | TEMPERATURE: 97.9 F | OXYGEN SATURATION: 96 % | BODY MASS INDEX: 36.62 KG/M2 | SYSTOLIC BLOOD PRESSURE: 124 MMHG | DIASTOLIC BLOOD PRESSURE: 84 MMHG | HEART RATE: 91 BPM

## 2017-08-31 DIAGNOSIS — Z34.03 ENCOUNTER FOR SUPERVISION OF NORMAL FIRST PREGNANCY IN THIRD TRIMESTER: Primary | ICD-10-CM

## 2017-08-31 PROCEDURE — 99207 ZZC PRENATAL VISIT: CPT | Performed by: OBSTETRICS & GYNECOLOGY

## 2017-08-31 NOTE — TELEPHONE ENCOUNTER
"Patient stated she was seen by Dr. Agbeh this morning and had a cervical check. She stated she noted some very mild cramping earlier but has subsided now. She stated she had \"pretty light spotting.\" Patient does not think she has lost her mucous plug yet. Advise some spotting after cervical check is normal and not concerning at this time. Recommended she call back if vaginal bleeding or contractions. Patient verbalized understanding and agreed to follow plan. Sugey Fernando RN, BAN  "

## 2017-08-31 NOTE — NURSING NOTE
"Chief Complaint   Patient presents with     Prenatal Care       Initial /84  Pulse 91  Temp 97.9  F (36.6  C) (Oral)  Wt 210 lb (95.3 kg)  LMP 12/04/2016 (Exact Date)  SpO2 96%  BMI 36.62 kg/m2 Estimated body mass index is 36.62 kg/(m^2) as calculated from the following:    Height as of 8/23/17: 5' 3.5\" (1.613 m).    Weight as of this encounter: 210 lb (95.3 kg).  Medication Reconciliation: complete  Rachel Hair M.A.    "

## 2017-08-31 NOTE — PROGRESS NOTES
38w4d  No HA, visual changes, N/V etc.  Labor plan and warning s/s discussed. RTC 1 wk/prn.    ICD-10-CM    1. Encounter for supervision of normal first pregnancy in third trimester Z34.03      CEPHAS AGBEH, MD.

## 2017-08-31 NOTE — MR AVS SNAPSHOT
After Visit Summary   8/31/2017    Jennifer Pastrana    MRN: [id number]           Patient Information     Date Of Birth          1990        Visit Information        Provider Department      8/31/2017 9:30 AM Agbeh, Cephas Mawuena, MD [facility]        Today's Diagnoses     Encounter for supervision of normal first pregnancy in third trimester    -  1       Follow-ups after your visit        Your next 10 appointments already scheduled     Sep 07, 2017  8:00 AM CDT   ESTABLISHED PRENATAL with Cephas Mawuena Agbeh, MD   [facility] ([facility])    [address]  [place] [address] [phone]              Who to contact     If you have questions or need follow up information about today's clinic visit or your schedule please contact [facility] directly at [phone].  Normal or non-critical lab and imaging results will be communicated to you by MyChart, letter or phone within 4 business days after the clinic has received the results. If you do not hear from us within 7 days, please contact the clinic through MyChart or phone. If you have a critical or abnormal lab result, we will notify you by phone as soon as possible.  Submit refill requests through [employer]Stream or call your pharmacy and they will forward the refill request to us. Please allow 3 business days for your refill to be completed.          Additional Information About Your Visit        MyChart Information     [employer] gives you secure access to your electronic health record. If you see a primary care provider, you can also send messages to your care team and make appointments. If you have questions, please call your primary care clinic.  If you do not have a primary care provider, please call [phone] and they will assist you.        Care EveryWhere ID     This is your Care EveryWhere ID. This could be used by other organizations to access your [facility]  records  [id number]        Your Vitals Were     Pulse Temperature Last Period Pulse Oximetry BMI (Body Mass Index)       91 97.9  F (36.6  C) (Oral) 12/04/2016 (Exact Date) 96% 36.62 kg/m2        Blood Pressure from Last 3 Encounters:   08/31/17 124/84   08/23/17 114/71   08/17/17 117/79    Weight from Last 3 Encounters:   08/31/17 210 lb (95.3 kg)   08/23/17 209 lb (94.8 kg)   08/17/17 210 lb (95.3 kg)              Today, you had the following     No orders found for display       Primary Care Provider Office Phone # Fax #    Katerin Urrutia PA-C [phone] [address] [facility] [address]        Equal Access to Services     [facility] : Hadii salma wang hadasho Soolesya, waaxda luqadaha, qaybta kaalmada adelazarayalaura, alverto nuñez . So [facility] [phone].    ATENCIÓN: Si habla español, tiene a patterson disposición servicios gratuitos de asistencia lingüística. Mayo al [phone].    We comply with applicable federal civil rights laws and [place] laws. We do not discriminate on the basis of race, color, national origin, age, disability sex, sexual orientation or gender identity.            Thank you!     Thank you for choosing [facility]  for your care. Our goal is always to provide you with excellent care. Hearing back from our patients is one way we can continue to improve our services. Please take a few minutes to complete the written survey that you may receive in the mail after your visit with us. Thank you!             Your Updated Medication List - Protect others around you: Learn how to safely use, store and throw away your medicines at www.disposemymeds.org.          This list is accurate as of: 8/31/17  9:44 AM.  Always use your most recent med list.                   Brand Name Dispense Instructions for use Diagnosis    acetaminophen-codeine 300-30 MG per tablet    TYLENOL #3    18 tablet    Take 1 tablet by mouth every 4 hours as  needed for pain maximum 4 tablet(s) per day    Headache in pregnancy, second trimester, Migraine without aura and without status migrainosus, not intractable       albuterol 108 (90 BASE) MCG/ACT Inhaler    PROAIR HFA/PROVENTIL HFA/VENTOLIN HFA    1 Inhaler    Inhale 2 puffs into the lungs every 6 hours    SOB (shortness of breath)       * breast pump Misc     1 each    1 each every 2 hours as needed    Encounter for supervision of normal pregnancy, antepartum, unspecified        * breast pump Misc     1 each    1 each daily as needed    Patient is a currently breast-feeding mother       CLARITIN PO      Reported on 2017        COLACE PO           fluticasone 50 MCG/ACT spray    FLONASE    1 Bottle    Spray 1-2 sprays into both nostrils daily    Chronic rhinitis       levothyroxine 150 MCG tablet    SYNTHROID/LEVOTHROID    90 tablet    Take 1 tablet (150 mcg) by mouth daily    Hypothyroidism, unspecified type       MAGNESIUM OXIDE PO      Take 400 mg by mouth daily Reported on 2017        prenatal multivitamin  with iron 28-0.8 MG Tabs     90 each    Take 1 tablet by mouth daily    Encounter for supervision of normal first pregnancy in second trimester       rizatriptan 10 MG ODT tab    MAXALT-MLT     Take 10 mg by mouth at onset of headache for migraine Reported on 2017        TYLENOL EXTRA STRENGTH PO      Take 2 tablets by mouth as needed        * Notice:  This list has 2 medication(s) that are the same as other medications prescribed for you. Read the directions carefully, and ask your doctor or other care provider to review them with you.

## 2017-09-07 ENCOUNTER — PRENATAL OFFICE VISIT (OUTPATIENT)
Dept: OBGYN | Facility: CLINIC | Age: 27
End: 2017-09-07
Payer: COMMERCIAL

## 2017-09-07 VITALS
TEMPERATURE: 97.5 F | SYSTOLIC BLOOD PRESSURE: 123 MMHG | WEIGHT: 210.2 LBS | OXYGEN SATURATION: 98 % | BODY MASS INDEX: 36.65 KG/M2 | HEART RATE: 95 BPM | DIASTOLIC BLOOD PRESSURE: 83 MMHG

## 2017-09-07 DIAGNOSIS — Z34.03 ENCOUNTER FOR SUPERVISION OF NORMAL FIRST PREGNANCY IN THIRD TRIMESTER: Primary | ICD-10-CM

## 2017-09-07 PROCEDURE — 59025 FETAL NON-STRESS TEST: CPT | Performed by: OBSTETRICS & GYNECOLOGY

## 2017-09-07 PROCEDURE — 99207 ZZC PRENATAL VISIT: CPT | Performed by: OBSTETRICS & GYNECOLOGY

## 2017-09-07 NOTE — NURSING NOTE
"Chief Complaint   Patient presents with     Prenatal Care     39.4       Initial /83 (BP Location: Left arm, Patient Position: Chair, Cuff Size: Adult Large)  Pulse 95  Temp 97.5  F (36.4  C) (Tympanic)  Wt 210 lb 3.2 oz (95.3 kg)  LMP 12/04/2016 (Exact Date)  SpO2 98%  BMI 36.65 kg/m2 Estimated body mass index is 36.65 kg/(m^2) as calculated from the following:    Height as of 8/23/17: 5' 3.5\" (1.613 m).    Weight as of this encounter: 210 lb 3.2 oz (95.3 kg).  Medication Reconciliation: complete     Lissa Minaya LPN    "

## 2017-09-07 NOTE — MR AVS SNAPSHOT
After Visit Summary   9/7/2017    Jennifer Pastrana    MRN: [id number]           Patient Information     Date Of Birth          1990        Visit Information        Provider Department      9/7/2017 8:00 AM Agbeh, Cephas Mawuena, MD [facility]        Today's Diagnoses     Encounter for supervision of normal first pregnancy in third trimester    -  1      Care Instructions                                                           If you have any questions regarding your visit, Please contact your care team.    Women s Health CLINIC HOURS TELEPHONE NUMBER   Cephas Agbeh, M.D.    Lissa Johnson- RAZ Mayes - RN    Dorcas -         Monday-[facility]  8:00 am - 5 pm  Tuesday- [facility] [place]  8:00am- 5 pm  Wednesday- Off  Thursday- [facility]  8:00 am- 5 pm  Friday-[place]  8:00 am 5 pm [facility]  [address]  [place] [address]  [phone] ask for [facility]    Imaging [phone]    [facility]  [address] [place] [address]  [phone]  Imaging [phone]     Urgent Care locations:    [facility] Saturday and Sunday   9 am - 5 pm    Monday-Friday   12 pm - 8 pm  Saturday and Sunday   9 am - 5 pm   [phone]       If you need a medication refill, please contact your pharmacy. Please allow 3 business days for your refill to be completed.  As always, Thank you for trusting us with your healthcare needs!                 Follow-ups after your visit        Your next 10 appointments already scheduled     Sep 14, 2017  8:00 AM CDT   US OB BIOPHYS SINGLE GESTATION W MEASURE with BEUS1   [facility] ([facility])    [address] [facility] [id number] [phone]           Please bring a list of your medicines (including vitamins, minerals and over-the-counter drugs). Also, tell your doctor about any allergies  you may have. Wear comfortable clothes and leave your valuables at home.  If you re less than 20 weeks drink four 8-ounce glasses of fluid an hour before your exam. If you need to empty your bladder before your exam, try to release only a little urine. Then, drink another glass of fluid.  You may have up to two family members in the exam room. If you bring a small child, an adult must be there to care for him or her.  Please call the Imaging Department at your exam site with any questions.            Sep 14, 2017  8:45 AM CDT   ESTABLISHED PRENATAL with Cephas Mawuena Agbeh, MD   [facility] ([facility])    [address] [facility] [address] [phone]              Future tests that were ordered for you today     Open Future Orders        Priority Expected Expires Ordered    US OB Biophys Single Gestation Measure Routine  9/7/2018 9/7/2017            Who to contact     If you have questions or need follow up information about today's clinic visit or your schedule please contact [facility] directly at [phone].  Normal or non-critical lab and imaging results will be communicated to you by MyChart, letter or phone within 4 business days after the clinic has received the results. If you do not hear from us within 7 days, please contact the clinic through Newswiredhart or phone. If you have a critical or abnormal lab result, we will notify you by phone as soon as possible.  Submit refill requests through Oneflare or call your pharmacy and they will forward the refill request to us. Please allow 3 business days for your refill to be completed.          Additional Information About Your Visit        Oneflare Information     Oneflare gives you secure access to your electronic health record. If you see a primary care provider, you can also send messages to your care team and make appointments. If you have questions, please call your primary care clinic.  If you do not have  a primary care provider, please call [phone] and they will assist you.        Care EveryWhere ID     This is your Care EveryWhere ID. This could be used by other organizations to access your [place] medical records  [id number]        Your Vitals Were     Pulse Temperature Last Period Pulse Oximetry BMI (Body Mass Index)       95 97.5  F (36.4  C) (Tympanic) 12/04/2016 (Exact Date) 98% 36.65 kg/m2        Blood Pressure from Last 3 Encounters:   09/07/17 123/83   08/31/17 124/84   08/23/17 114/71    Weight from Last 3 Encounters:   09/07/17 210 lb 3.2 oz (95.3 kg)   08/31/17 210 lb (95.3 kg)   08/23/17 209 lb (94.8 kg)               Primary Care Provider Office Phone # Fax #    Katerin Candida Urrutia PA-C [phone] [address] [facility] [address]        Equal Access to Services     ASHA MIRELES : Hadii aad ku hadasho Soomaali, waaxda luqadaha, qaybta kaalmada adeegyada, waxay idiin hayyannn hanna nuñez . So [facility] [phone].    ATENCIÓN: Si habla español, tiene a patterson disposición servicios gratuitos de asistencia lingüística. [facility] [phone].    We comply with applicable federal civil rights laws and [place] laws. We do not discriminate on the basis of race, color, national origin, age, disability sex, sexual orientation or gender identity.            Thank you!     Thank you for choosing [facility] (formerly [facility])  for your care. Our goal is always to provide you with excellent care. Hearing back from our patients is one way we can continue to improve our services. Please take a few minutes to complete the written survey that you may receive in the mail after your visit with us. Thank you!             Your Updated Medication List - Protect others around you: Learn how to safely use, store and throw away your medicines at www.disposemymeds.org.          This list is accurate as of: 9/7/17  8:20 AM.  Always use your most recent med list.                   Brand Name  Dispense Instructions for use Diagnosis    acetaminophen-codeine 300-30 MG per tablet    TYLENOL #3    18 tablet    Take 1 tablet by mouth every 4 hours as needed for pain maximum 4 tablet(s) per day    Headache in pregnancy, second trimester, Migraine without aura and without status migrainosus, not intractable       albuterol 108 (90 BASE) MCG/ACT Inhaler    PROAIR HFA/PROVENTIL HFA/VENTOLIN HFA    1 Inhaler    Inhale 2 puffs into the lungs every 6 hours    SOB (shortness of breath)       * breast pump Misc     1 each    1 each every 2 hours as needed    Encounter for supervision of normal pregnancy, antepartum, unspecified        * breast pump Misc     1 each    1 each daily as needed    Patient is a currently breast-feeding mother       CLARITIN PO      Reported on 2017        COLACE PO           fluticasone 50 MCG/ACT spray    FLONASE    1 Bottle    Spray 1-2 sprays into both nostrils daily    Chronic rhinitis       levothyroxine 150 MCG tablet    SYNTHROID/LEVOTHROID    90 tablet    Take 1 tablet (150 mcg) by mouth daily    Hypothyroidism, unspecified type       MAGNESIUM OXIDE PO      Take 400 mg by mouth daily Reported on 2017        prenatal multivitamin  with iron 28-0.8 MG Tabs     90 each    Take 1 tablet by mouth daily    Encounter for supervision of normal first pregnancy in second trimester       rizatriptan 10 MG ODT tab    MAXALT-MLT     Take 10 mg by mouth at onset of headache for migraine Reported on 2017        TYLENOL EXTRA STRENGTH PO      Take 2 tablets by mouth as needed        * Notice:  This list has 2 medication(s) that are the same as other medications prescribed for you. Read the directions carefully, and ask your doctor or other care provider to review them with you.

## 2017-09-07 NOTE — PATIENT INSTRUCTIONS
If you have any questions regarding your visit, Please contact your care team.    Women s Health CLINIC HOURS TELEPHONE NUMBER   Douglass Agbeh, M.D.    Lissa Johnson- RAZ Mayes - RAZ Toscano -         Monday-[facility]  8:00 am - 5 pm  Tuesday- [facility]  8:00am- 5 pm  Wednesday- Off  Thursday- [facility]  8:00 am- 5 pm  Friday-[place]  8:00 am 5 pm [facility]  [address] [place] [address]  [phone] ask for [facility]    Imaging [phone]    [facility]  [address] [facility]  [place] [address] [phone]  Imaging [phone]     Urgent Care locations:    [facility] Saturday and Sunday   9 am - 5 pm    Monday-Friday   12 pm - 8 pm  Saturday and Sunday   9 am - 5 pm   [phone]       If you need a medication refill, please contact your pharmacy. Please allow 3 business days for your refill to be completed.  As always, Thank you for trusting us with your healthcare needs!

## 2017-09-07 NOTE — PROGRESS NOTES
39w4d  No leakage of fluid.  Denies HA, visual changes etc.  Discussed labor plan as well as cervical ripening/induction options at 41 weeks if undelivered. ACOG pamphlets were provided on the above topics. return to clinic in 1 week   Reviewed when to call.   BPP/NST ordered.  NST IS:  Reactive (2 accl > 15 BPM in 20 min., each lasting approx. 15 seconds)  NST Baseline Rate 140s  Variability:  Average  Accelerations:Present  Variable Decelerations:No  Other Decelerations:No  CEPHAS AGBEH, MD.

## 2017-09-12 ENCOUNTER — NURSE TRIAGE (OUTPATIENT)
Dept: NURSING | Facility: CLINIC | Age: 27
End: 2017-09-12

## 2017-09-12 NOTE — TELEPHONE ENCOUNTER
"  Reason for Disposition    [1] First baby (primipara) AND [2] contractions < 6 minutes apart  AND [3] present 2 hours    Additional Information    Negative: Passed out (i.e., lost consciousness, collapsed and was not responding)    Negative: Shock suspected (e.g., cold/pale/clammy skin, too weak to stand, low BP, rapid pulse)    Negative: Difficult to awaken or acting confused  (e.g., disoriented, slurred speech)    Negative: [1] SEVERE abdominal pain (e.g., excruciating) AND [2] constant AND [3] present > 1 hour    Negative: Severe bleeding (e.g., continuous red blood from vagina, or large blood clots)    Negative: Umbilical cord hanging out of the vagina (shiny, white, curled appearance, \"like telephone cord\")    Negative: Uncontrollable urge to push (i.e., feels like baby is coming out now)    Negative: Can see baby    Negative: Sounds like a life-threatening emergency to the triager    Negative: Pregnant < 37 weeks (i.e., )    Negative: [1] Uncertain delivery date AND [2] possibly pregnant < 37 weeks (i.e., )    Protocols used: PREGNANCY - LABOR-ADULT-  Patient is pregnant and FARHAN was 09/10/17. Patient states she is having labor pains that are 6 minutes apart.  "

## 2017-09-18 ENCOUNTER — NURSE TRIAGE (OUTPATIENT)
Dept: NURSING | Facility: CLINIC | Age: 27
End: 2017-09-18

## 2017-09-19 ENCOUNTER — OFFICE VISIT (OUTPATIENT)
Dept: FAMILY MEDICINE | Facility: CLINIC | Age: 27
End: 2017-09-19
Payer: COMMERCIAL

## 2017-09-19 VITALS
BODY MASS INDEX: 34 KG/M2 | TEMPERATURE: 97.9 F | OXYGEN SATURATION: 98 % | HEART RATE: 79 BPM | DIASTOLIC BLOOD PRESSURE: 83 MMHG | SYSTOLIC BLOOD PRESSURE: 117 MMHG | WEIGHT: 195 LBS

## 2017-09-19 DIAGNOSIS — S31.41XD: ICD-10-CM

## 2017-09-19 DIAGNOSIS — R30.0 DYSURIA: Primary | ICD-10-CM

## 2017-09-19 LAB
ALBUMIN UR-MCNC: NEGATIVE MG/DL
APPEARANCE UR: CLEAR
BILIRUB UR QL STRIP: NEGATIVE
COLOR UR AUTO: YELLOW
GLUCOSE UR STRIP-MCNC: NEGATIVE MG/DL
HGB UR QL STRIP: ABNORMAL
KETONES UR STRIP-MCNC: NEGATIVE MG/DL
LEUKOCYTE ESTERASE UR QL STRIP: ABNORMAL
MUCOUS THREADS #/AREA URNS LPF: PRESENT /LPF
NITRATE UR QL: NEGATIVE
PH UR STRIP: 5.5 PH (ref 5–7)
RBC #/AREA URNS AUTO: ABNORMAL /HPF
SOURCE: ABNORMAL
SP GR UR STRIP: 1.02 (ref 1–1.03)
UROBILINOGEN UR STRIP-ACNC: 0.2 EU/DL (ref 0.2–1)
WBC #/AREA URNS AUTO: ABNORMAL /HPF

## 2017-09-19 PROCEDURE — 81001 URINALYSIS AUTO W/SCOPE: CPT | Performed by: FAMILY MEDICINE

## 2017-09-19 PROCEDURE — 99214 OFFICE O/P EST MOD 30 MIN: CPT | Performed by: FAMILY MEDICINE

## 2017-09-19 RX ORDER — IBUPROFEN 600 MG/1
600 TABLET, FILM COATED ORAL
COMMUNITY
End: 2018-06-29

## 2017-09-19 NOTE — PROGRESS NOTES
SUBJECTIVE:  Jennifer Pastrana, a 26 year old female scheduled an appointment to discuss the following issues:    Check sutures. Had tear near urethra. 6 days ago.   No fevers or chills. Some bleeding still - improving. Mild yellowish discharge. Mild pain but more irriation. Sitz baths/ice prn. No std concerns. No fevers or chills. No nausea, vomiting or diarrhea. No abdominal pain. Emotionally ok.   No nausea, vomiting or diarrhea. No constipation.  Breast feeding. Boy 7lbs 3 oz - doing ok.   Mild dysuria.   Ob/gyn -   Past Medical History:   Diagnosis Date     AR (allergic rhinitis)      ASCUS with positive high risk HPV 2/2013    type 53     Cervical high risk HPV (human papillomavirus) test positive 2/25/15     Dysplasia of cervix, low grade (MILO 1)      Hypothyroidism      Mood disorder (H)        Past Surgical History:   Procedure Laterality Date     NO HISTORY OF SURGERY         Family History   Problem Relation Age of Onset     Breast Cancer Maternal Grandmother      CANCER Maternal Grandmother      breast     HEART DISEASE Maternal Grandfather      50s     Myocardial Infarction Maternal Grandfather      HEART DISEASE Paternal Grandfather      50s     Myocardial Infarction Paternal Grandfather      Thyroid Disease Mother      graves-decompression and strabismus     CANCER Paternal Grandmother      cervical     Thyroid Disease Paternal Aunt      nine affected in family     Glaucoma No family hx of      Macular Degeneration No family hx of      CEREBROVASCULAR DISEASE No family hx of      Hypertension No family hx of      DIABETES No family hx of        Social History   Substance Use Topics     Smoking status: Never Smoker     Smokeless tobacco: Never Used      Comment: Nonsmoking household     Alcohol use No       ROS:  All other ROS negative  OBJECTIVE:  /83  Pulse 79  Temp 97.9  F (36.6  C) (Oral)  Wt 195 lb (88.5 kg)  LMP 12/04/2016 (Exact Date)  SpO2 98%  Breastfeeding? Yes  BMI 34  kg/m2  EXAM:  GENERAL APPEARANCE: healthy, alert and no distress  RESP: lungs clear to auscultation - no rales, rhonchi or wheezes  CV: regular rates and rhythm, normal S1 S2, no S3 or S4 and no murmur, click or rub -  ABDOMEN:  soft, nontender, no HSM or masses and bowel sounds normal  GU_female: sutures intact with healthy granulation tissues. Mild scant yellowish discharge without odor. No redness. Scant bleeding seen. Mild tenderness on exam. Minimal swelling  MS: extremities normal- no gross deformities noted, no evidence of inflammation in joints, FROM in all extremities.  PSYCH: mentation appears normal and affect normal/bright    ASSESSMENT / PLAN:  (R30.0) Dysuria  (primary encounter diagnosis)  Comment: u/a clear  Plan: UA reflex to Microscopic and Culture, Urine         Microscopic, amoxicillin-clavulanate         (AUGMENTIN) 875-125 MG per tablet        HOLD augmentin. Take if worse if fevers or chills with history uti's. Reveiwed risks and side effects of medication  Expected course and warning signs reviewed. Call/email with questions/concerns    (S31.41XD) Labial tear, subsequent encounter  Comment: appears to be healing ok. No fevers or chills or greenish/foul discharge. Might have a little foreign body reaction? But on overt infection  Plan: amoxicillin-clavulanate (AUGMENTIN) 875-125 MG         per tablet        HOLD augmentin - take if worsening pain/ foul discharge or fevers or chills. Expected course and warning signs reviewed. Call/email with questions/concerns. Reveiwed risks and side effects of medication  Continue sitz bath/ice prn can see ob/gyn asap if believes a lot worse too.     Nathan Mancera

## 2017-09-19 NOTE — MR AVS SNAPSHOT
After Visit Summary   9/19/2017    Jennifer Pastrana    MRN: [id number]           Patient Information     Date Of Birth          1990        Visit Information        Provider Department      9/19/2017 3:10 PM Nathan Mancera MD [facility]        Today's Diagnoses     Dysuria    -  1    Labial tear, subsequent encounter           Follow-ups after your visit        Who to contact     If you have questions or need follow up information about today's clinic visit or your schedule please contact [facility] directly at [phone].  Normal or non-critical lab and imaging results will be communicated to you by Rage Frameworkshart, letter or phone within 4 business days after the clinic has received the results. If you do not hear from us within 7 days, please contact the clinic through Roth Builderst or phone. If you have a critical or abnormal lab result, we will notify you by phone as soon as possible.  Submit refill requests through Portfolia or call your pharmacy and they will forward the refill request to us. Please allow 3 business days for your refill to be completed.          Additional Information About Your Visit        MyChart Information     Portfolia gives you secure access to your electronic health record. If you see a primary care provider, you can also send messages to your care team and make appointments. If you have questions, please call your primary care clinic.  If you do not have a primary care provider, please call [phone] and they will assist you.        Care EveryWhere ID     This is your Care EveryWhere ID. This could be used by other organizations to access your [place] medical records  [id number]        Your Vitals Were     Pulse Temperature Last Period Pulse Oximetry Breastfeeding? BMI (Body Mass Index)    79 97.9  F (36.6  C) (Oral) 12/04/2016 (Exact Date) 98% Yes 34 kg/m2       Blood Pressure from Last 3 Encounters:   09/19/17 117/83   09/07/17 123/83    08/31/17 124/84    Weight from Last 3 Encounters:   09/19/17 195 lb (88.5 kg)   09/07/17 210 lb 3.2 oz (95.3 kg)   08/31/17 210 lb (95.3 kg)              We Performed the Following     UA reflex to Microscopic and Culture     Urine Microscopic          Today's Medication Changes          These changes are accurate as of: 9/19/17  4:44 PM.  If you have any questions, ask your nurse or doctor.               Start taking these medicines.        Dose/Directions    amoxicillin-clavulanate 875-125 MG per tablet   Commonly known as:  AUGMENTIN   Used for:  Labial tear, subsequent encounter, Dysuria   Started by:  Nathan Mancera MD        Dose:  1 tablet   Take 1 tablet by mouth 2 times daily for 7 days   Quantity:  14 tablet   Refills:  0            Where to get your medicines      Some of these will need a paper prescription and others can be bought over the counter.  Ask your nurse if you have questions.     Bring a paper prescription for each of these medications     amoxicillin-clavulanate 875-125 MG per tablet                Primary Care Provider Office Phone # Fax #    Katerin Urrutia PA-C [phone] [address] [facility] [address]        Equal Access to Services     ASHA MIRELES AH: Hadii salma joneso Soomaali, waaxda luqadaha, qaybta kaalmada adeegyada, alverto sierra. So [facility] [phone].    ATENCIÓN: Si habla español, tiene a patterson disposición servicios gratuitos de asistencia lingüística. Mayo al [phone].    We comply with applicable federal civil rights laws and [place] laws. We do not discriminate on the basis of race, color, national origin, age, disability sex, sexual orientation or gender identity.            Thank you!     Thank you for choosing [facility]  for your care. Our goal is always to provide you with excellent care. Hearing back from our patients is one way we can continue to improve our services. Please take  a few minutes to complete the written survey that you may receive in the mail after your visit with us. Thank you!             Your Updated Medication List - Protect others around you: Learn how to safely use, store and throw away your medicines at www.disposemymeds.org.          This list is accurate as of: 9/19/17  4:44 PM.  Always use your most recent med list.                   Brand Name Dispense Instructions for use Diagnosis    acetaminophen-codeine 300-30 MG per tablet    TYLENOL #3    18 tablet    Take 1 tablet by mouth every 4 hours as needed for pain maximum 4 tablet(s) per day    Headache in pregnancy, second trimester, Migraine without aura and without status migrainosus, not intractable       albuterol 108 (90 BASE) MCG/ACT Inhaler    PROAIR HFA/PROVENTIL HFA/VENTOLIN HFA    1 Inhaler    Inhale 2 puffs into the lungs every 6 hours    SOB (shortness of breath)       amoxicillin-clavulanate 875-125 MG per tablet    AUGMENTIN    14 tablet    Take 1 tablet by mouth 2 times daily for 7 days    Labial tear, subsequent encounter, Dysuria       CLARITIN PO      Reported on 4/26/2017        COLACE PO           fluticasone 50 MCG/ACT spray    FLONASE    1 Bottle    Spray 1-2 sprays into both nostrils daily    Chronic rhinitis       ibuprofen 600 MG tablet    ADVIL/MOTRIN     Take by mouth every 6 hours as needed for moderate pain        levothyroxine 150 MCG tablet    SYNTHROID/LEVOTHROID    90 tablet    Take 1 tablet (150 mcg) by mouth daily    Hypothyroidism, unspecified type       MAGNESIUM OXIDE PO      Take 400 mg by mouth daily Reported on 4/26/2017        prenatal multivitamin  with iron 28-0.8 MG Tabs     90 each    Take 1 tablet by mouth daily    Encounter for supervision of normal first pregnancy in second trimester       rizatriptan 10 MG ODT tab    MAXALT-MLT     Take 10 mg by mouth at onset of headache for migraine Reported on 4/26/2017        TYLENOL EXTRA STRENGTH PO      Take 2 tablets by mouth  as needed

## 2017-09-19 NOTE — NURSING NOTE
"Chief Complaint   Patient presents with     Infection     possible infection in the urethra area per pt x 2-3 days        Initial /83  Pulse 79  Temp 97.9  F (36.6  C) (Oral)  Wt 195 lb (88.5 kg)  LMP 12/04/2016 (Exact Date)  SpO2 98%  Breastfeeding? Yes  BMI 34 kg/m2 Estimated body mass index is 34 kg/(m^2) as calculated from the following:    Height as of 8/23/17: 5' 3.5\" (1.613 m).    Weight as of this encounter: 195 lb (88.5 kg).  Medication Reconciliation: complete      Poonam Levin MA    "

## 2017-09-19 NOTE — TELEPHONE ENCOUNTER
Additional Information    Negative: [1] Major abdominal surgical wound AND [2] visible internal organs    Negative: Sounds like a life-threatening emergency to the triager    Negative: Surgical incision symptoms and questions    Negative: New cut and caller wonders if it needs stitches    Negative: Skin glue (Dermabond) questions    Negative: Wound looks infected    Negative: [1] Bleeding from wound AND [2] won't stop after 10 minutes of direct pressure    Negative: [1] Suture came out early AND [2] wound gaping AND [3] < 48 hours since sutures placed    Negative: Patient sounds very sick or weak to the triager    Negative: [1] Wound gaping open AND [2] length of opening > 1/2 inch (6 mm) AND [3] > 48 hours since sutures placed    Negative: [1] Wound gaping open AND [2] on the face AND [3] > 48 hours since sutures placed    Negative: Suture removal is overdue    Negative: [1] Suture came out early AND [2] > 48 hours since sutures placed AND [3] caller wants wound checked    Negative: [1] Numbness extends beyond the wound edges AND [2] lasts > 8 hours    Negative: [1] Suture came out early AND [2] > 48 hours since sutures placed  (all triage questions negative)    Care of sutured wound,  questions about    Protocols used: SUTURE OR STAPLE QUESTIONS-ADULT-    Patient with questions about sutures placed postpartum.  Additionally patient reports some yellowish discharge since yesterday from the site and tenderness.  Patient is unable to visualize the site.  Advised that patient be seen by a provider within 72 hours per nursing judgment to verify that patient is not developing infection at the site.    Kirstin Burrell RN  [facility]

## 2017-09-20 ENCOUNTER — TELEPHONE (OUTPATIENT)
Dept: OBGYN | Facility: OTHER | Age: 27
End: 2017-09-20

## 2017-09-24 ENCOUNTER — HEALTH MAINTENANCE LETTER (OUTPATIENT)
Age: 27
End: 2017-09-24

## 2017-09-28 ENCOUNTER — MEDICAL CORRESPONDENCE (OUTPATIENT)
Dept: HEALTH INFORMATION MANAGEMENT | Facility: CLINIC | Age: 27
End: 2017-09-28

## 2017-10-10 ENCOUNTER — E-VISIT (OUTPATIENT)
Dept: FAMILY MEDICINE | Facility: CLINIC | Age: 27
End: 2017-10-10
Payer: COMMERCIAL

## 2017-10-10 DIAGNOSIS — Z30.011 ENCOUNTER FOR INITIAL PRESCRIPTION OF CONTRACEPTIVE PILLS: Primary | ICD-10-CM

## 2017-10-10 PROCEDURE — 99444 ZZC PHYSICIAN ONLINE EVALUATION & MANAGEMENT SERVICE: CPT | Performed by: PHYSICIAN ASSISTANT

## 2017-10-10 NOTE — MR AVS SNAPSHOT
After Visit Summary   10/10/2017    Jennifer Pastrana    MRN: [id number]           Patient Information     Date Of Birth          1990        Visit Information        Provider Department      10/10/2017 4:12 PM Katerin Urrutia PA-C [facility]        Today's Diagnoses     Encounter for initial prescription of contraceptive pills    -  1       Follow-ups after your visit        Your next 10 appointments already scheduled     Oct 26, 2017  1:00 PM CDT   [facility] [facility] with Cephas Mawuena Agbeh, MD   [facility]    [address] [facility] [address] [phone]              Who to contact     If you have questions or need follow up information about today's clinic visit or your schedule please contact [facility] directly at [phone].  Normal or non-critical lab and imaging results will be communicated to you by MyChart, letter or phone within 4 business days after the clinic has received the results. If you do not hear from us within 7 days, please contact the clinic through Optosecurityhart or phone. If you have a critical or abnormal lab result, we will notify you by phone as soon as possible.  Submit refill requests through Sensinode or call your pharmacy and they will forward the refill request to us. Please allow 3 business days for your refill to be completed.          Additional Information About Your Visit        MyChart Information     Sensinode gives you secure access to your electronic health record. If you see a primary care provider, you can also send messages to your care team and make appointments. If you have questions, please call your primary care clinic.  If you do not have a primary care provider, please call [phone] and they will assist you.        Care EveryWhere ID     This is your Care EveryWhere ID. This could be used by other organizations to access your [facility]  records  [id number]        Your Vitals Were     Last Period                   12/04/2016 (Exact Date)            Blood Pressure from Last 3 Encounters:   10/11/17 123/86   09/19/17 117/83   09/07/17 123/83    Weight from Last 3 Encounters:   10/11/17 195 lb 12.8 oz (88.8 kg)   09/19/17 195 lb (88.5 kg)   09/07/17 210 lb 3.2 oz (95.3 kg)              Today, you had the following     No orders found for display         Today's Medication Changes          These changes are accurate as of: 10/10/17 11:59 PM.  If you have any questions, ask your nurse or doctor.               Start taking these medicines.        Dose/Directions    norgestimate-ethinyl estradiol 0.25-35 MG-MCG per tablet   Commonly known as:  ORTHO-CYCLEN, SPRINTEC   Used for:  Encounter for initial prescription of contraceptive pills        Dose:  1 tablet   Take 1 tablet by mouth daily   Quantity:  28 tablet   Refills:  5            Where to get your medicines      These medications were sent to [facility] [address] [place] [address] & [facility]  [address] [facility] [address]    Hours:  24-hours Phone:  [phone]     norgestimate-ethinyl estradiol 0.25-35 MG-MCG per tablet                Primary Care Provider Office Phone # Fax #    Katerin Candida Urrutia PA-C [phone] [address] [facility] [address]        Equal Access to Services     [facility] AH: Hadii aad ku hadasho Soomaali, waaxda luqadaha, qaybta kaalmada adeegyada, alverto sierra. So [facility] [phone].    ATENCIÓN: Si habla español, tiene a patterson disposición servicios gratuitos de asistencia lingüística. Llame al [phone].    We comply with applicable federal civil rights laws and [place] laws. We do not discriminate on the basis of race, color, national origin, age, disability, sex, sexual orientation, or gender identity.            Thank you!     Thank you for  choosing [facility]  for your care. Our goal is always to provide you with excellent care. Hearing back from our patients is one way we can continue to improve our services. Please take a few minutes to complete the written survey that you may receive in the mail after your visit with us. Thank you!             Your Updated Medication List - Protect others around you: Learn how to safely use, store and throw away your medicines at www.disposemymeds.org.          This list is accurate as of: 10/10/17 11:59 PM.  Always use your most recent med list.                   Brand Name Dispense Instructions for use Diagnosis    acetaminophen-codeine 300-30 MG per tablet    TYLENOL #3    18 tablet    Take 1 tablet by mouth every 4 hours as needed for pain maximum 4 tablet(s) per day    Headache in pregnancy, second trimester, Migraine without aura and without status migrainosus, not intractable       albuterol 108 (90 BASE) MCG/ACT Inhaler    PROAIR HFA/PROVENTIL HFA/VENTOLIN HFA    1 Inhaler    Inhale 2 puffs into the lungs every 6 hours    SOB (shortness of breath)       CLARITIN PO      Take 10 mg by mouth daily Reported on 4/26/2017        COLACE PO      Take 100 mg by mouth daily        fluticasone 50 MCG/ACT spray    FLONASE    1 Bottle    Spray 1-2 sprays into both nostrils daily    Chronic rhinitis       ibuprofen 600 MG tablet    ADVIL/MOTRIN     Take 600 mg by mouth 1-2 daily        levothyroxine 150 MCG tablet    SYNTHROID/LEVOTHROID    90 tablet    Take 1 tablet (150 mcg) by mouth daily    Hypothyroidism, unspecified type       MAGNESIUM OXIDE PO      Take 400 mg by mouth daily Reported on 4/26/2017        norgestimate-ethinyl estradiol 0.25-35 MG-MCG per tablet    ORTHO-CYCLEN, SPRINTEC    28 tablet    Take 1 tablet by mouth daily    Encounter for initial prescription of contraceptive pills       rizatriptan 10 MG ODT tab    MAXALT-MLT     Take 10 mg by mouth at onset of headache for migraine  Reported on 4/26/2017

## 2017-10-11 ENCOUNTER — OFFICE VISIT (OUTPATIENT)
Dept: NEUROLOGY | Facility: CLINIC | Age: 27
End: 2017-10-11
Payer: COMMERCIAL

## 2017-10-11 VITALS
BODY MASS INDEX: 34.14 KG/M2 | OXYGEN SATURATION: 97 % | SYSTOLIC BLOOD PRESSURE: 123 MMHG | WEIGHT: 195.8 LBS | HEART RATE: 92 BPM | DIASTOLIC BLOOD PRESSURE: 86 MMHG

## 2017-10-11 DIAGNOSIS — G43.009 MIGRAINE WITHOUT AURA AND WITHOUT STATUS MIGRAINOSUS, NOT INTRACTABLE: Primary | ICD-10-CM

## 2017-10-11 PROCEDURE — 99213 OFFICE O/P EST LOW 20 MIN: CPT | Performed by: PSYCHIATRY & NEUROLOGY

## 2017-10-11 RX ORDER — TOPIRAMATE 25 MG/1
TABLET, FILM COATED ORAL
Qty: 60 TABLET | Refills: 1 | Status: SHIPPED | OUTPATIENT
Start: 2017-10-11 | End: 2017-11-16 | Stop reason: DRUGHIGH

## 2017-10-11 RX ORDER — RIZATRIPTAN BENZOATE 10 MG/1
10 TABLET, ORALLY DISINTEGRATING ORAL
Qty: 12 TABLET | Refills: 1 | Status: SHIPPED | OUTPATIENT
Start: 2017-10-11 | End: 2018-10-04

## 2017-10-11 RX ORDER — NORGESTIMATE AND ETHINYL ESTRADIOL 0.25-0.035
1 KIT ORAL DAILY
Qty: 28 TABLET | Refills: 5 | Status: SHIPPED | OUTPATIENT
Start: 2017-10-11 | End: 2017-10-26

## 2017-10-11 ASSESSMENT — PAIN SCALES - GENERAL: PAINLEVEL: NO PAIN (0)

## 2017-10-11 NOTE — LETTER
10/11/2017        RE: Jennifer Pastrana  [address] [facility] [address]        HISTORY OF PRESENT ILLNESS:  Jennifer Pastrana returns for followup of migraine headaches.      She is a patient who has had migraine without aura for about 4 years.  I saw her during pregnancy when her headaches flared up.  She was prescribed Tylenol #3.  When I saw her back in 07/2017 near her 34th week her headaches have decreased in their frequency.      She is now postpartum.  She stopped breast feeding about 3 weeks ago and her migraine headaches returned.  These are similar to her previous headache.  She is having a headache nearly every day.  She has been taking ibuprofen and Tylenol #3.      PHYSICAL EXAMINATION:   VITAL SIGNS:  Her heart rate is 92.  Blood pressure 123/86.   CRANIAL NERVES:  Funduscopic examination reveals sharp disc margins.  Visual fields are intact.  Cranial nerves II-XII are intact.  Motor, sensory, cerebellar and gait testing are normal.   REFLEXES:  2+.  Plantar responses are flexor.      IMPRESSION:  Migraine without aura.      PLAN:  I discussed given her current headache frequency that she should consider resuming a preventive agent and avoid overuse of analgesics as well as Maxalt which she has used in the past for abortive therapy.      I discussed amitriptyline.  She had been on that in the past and that caused unacceptable weight gain.      I discussed some other treatment options.  Given weight gain is a concern, I suggested Topamax.  She has some concerns about cognitive side effects.  I pointed out that only a minority of patients experience this and typically it is at a higher dose.  I reviewed other potential side effects with her and also pointed out that she should not attempt pregnancy and take appropriate precautions while taking Topamax.  She understands this.      She is going to start on Topamax 25 mg a day for a week and then she can go to 50 mg a day depending on her response.  If  she develops any untoward side effects she can stop the drug immediately without speaking to me.      I plan to see her back in a month.         ABDI STEWARD MD             D: 10/11/2017 14:48   T: 10/12/2017 17:56   MT: LALO#[id number]      Name:     KYRA MELENDEZ   MRN:      [id number]        Account:      [id number]   :      1990           Visit Date:   10/11/2017      Document: [id number]          Sincerely,        Abdi Steward MD

## 2017-10-11 NOTE — TELEPHONE ENCOUNTER
Prescription for birth control was sent.  Please ensure you are not pregnant prior to starting the birth control.     Birth control instructions:    1) Take at the same time every day.    2) The more times you miss a pill, the less effective the pill with be for contraception and managing periods.    3)  The pill will become effective at preventing pregnancy after 7 days of use.  Use a back up condom until then.    4)  Birth control does not protect you against sexually transmitted infections (STDS or STIS) so always use protection if not in a monogamous relationship.    5) Risks/side effects can include blood clots, high blood pressure, headaches, nausea (try taking with food if this occurs).  Do not smoke with this as this increases your blood clot risk.  If you become pregnant, stop taking the pill right away.    6) The first 3 months of being on birth control, you may have irregular periods or spotting.  Typically this will even out after your body adjusts.  If you have any questions or concerns, let me know.     I am recommended you follow up in the clinic in 6 months for a medication review/recheck. Please come into the clinic sooner if any concerns.

## 2017-10-11 NOTE — NURSING NOTE
"Jennifer Pastrana's goals for this visit include: return  She requests these members of her care team be copied on today's visit information:     PCP: Katerin Urrutia    Referring Provider:  No referring provider defined for this encounter.    Chief Complaint   Patient presents with     RECHECK       Initial /86  Pulse 92  Wt 88.8 kg (195 lb 12.8 oz)  LMP 12/04/2016 (Exact Date)  SpO2 97%  BMI 34.14 kg/m2 Estimated body mass index is 34.14 kg/(m^2) as calculated from the following:    Height as of 8/23/17: 1.613 m (5' 3.5\").    Weight as of this encounter: 88.8 kg (195 lb 12.8 oz).  Medication Reconciliation: complete    Do you need any medication refills at today's visit? y  "

## 2017-10-11 NOTE — MR AVS SNAPSHOT
After Visit Summary   10/11/2017    Jennifer Pastrana    MRN: [id number]           Patient Information     Date Of Birth          1990        Visit Information        Provider Department      10/11/2017 2:30 PM Abdi Steward MD [facility]        Today's Diagnoses     Migraine without aura and without status migrainosus, not intractable    -  1       Follow-ups after your visit        Follow-up notes from your care team     Discussed this visit Return in about 1 month (around 11/11/2017).      Your next 10 appointments already scheduled     Oct 26, 2017  1:00 PM CDT   MyChart OB-GYN Post-Partum with Cephas Mawuena Agbeh, MD   [facility] ([facility]    [id number] [place] [address] [phone]            Nov 16, 2017  1:00 PM CST   Return Visit with Abdi Steward MD   [facility] ([facility])    [address] [place] [address] [phone]              Who to contact     If you have questions or need follow up information about today's clinic visit or your schedule please contact [facility] directly at [phone].  Normal or non-critical lab and imaging results will be communicated to you by MyChart, letter or phone within 4 business days after the clinic has received the results. If you do not hear from us within 7 days, please contact the clinic through [employer] Wrapshart or phone. If you have a critical or abnormal lab result, we will notify you by phone as soon as possible.  Submit refill requests through [employer] Grille or call your pharmacy and they will forward the refill request to us. Please allow 3 business days for your refill to be completed.          Additional Information About Your Visit        Military Wrapshart Information     B[employer]ille gives you secure access to your electronic health record. If you see a primary care provider, you can also send messages to your care team  and make appointments. If you have questions, please call your primary care clinic.  If you do not have a primary care provider, please call [phone] and they will assist you.      EZ4U is an electronic gateway that provides easy, online access to your medical records. With EZ4U, you can request a clinic appointment, read your test results, renew a prescription or communicate with your care team.     To access your existing account, please contact your [facility] or call [phone] for assistance.        Care EveryWhere ID     This is your Care EveryWhere ID. This could be used by other organizations to access your [place] medical records  [id number]        Your Vitals Were     Pulse Last Period Pulse Oximetry BMI (Body Mass Index)          92 12/04/2016 (Exact Date) 97% 34.14 kg/m2         Blood Pressure from Last 3 Encounters:   10/11/17 123/86   09/19/17 117/83   09/07/17 123/83    Weight from Last 3 Encounters:   10/11/17 88.8 kg (195 lb 12.8 oz)   09/19/17 88.5 kg (195 lb)   09/07/17 95.3 kg (210 lb 3.2 oz)              Today, you had the following     No orders found for display         Today's Medication Changes          These changes are accurate as of: 10/11/17  2:46 PM.  If you have any questions, ask your nurse or doctor.               Start taking these medicines.        Dose/Directions    topiramate 25 MG tablet   Commonly known as:  TOPAMAX   Used for:  Migraine without aura and without status migrainosus, not intractable   Started by:  Abdi Steward MD        One a day x 1 week then, 1 twice a day   Quantity:  60 tablet   Refills:  1         These medicines have changed or have updated prescriptions.        Dose/Directions    * rizatriptan 10 MG ODT tab   Commonly known as:  MAXALT-MLT   This may have changed:  Another medication with the same name was added. Make sure you understand how and when to take each.   Changed by:  Estelle Pacheco APRN  CNP        Dose:  10 mg   Take 10 mg by mouth at onset of headache for migraine Reported on 4/26/2017   Refills:  0       * rizatriptan 10 MG ODT tab   Commonly known as:  MAXALT-MLT   This may have changed:  You were already taking a medication with the same name, and this prescription was added. Make sure you understand how and when to take each.   Used for:  Migraine without aura and without status migrainosus, not intractable   Changed by:  Abdi Steward MD        Dose:  10 mg   Take 1 tablet (10 mg) by mouth at onset of headache for migraine May repeat in 2 hours. Max 3 tablets/24 hours.   Quantity:  12 tablet   Refills:  1       * Notice:  This list has 2 medication(s) that are the same as other medications prescribed for you. Read the directions carefully, and ask your doctor or other care provider to review them with you.         Where to get your medicines      These medications were sent to [facility] [address] [place] [address] [id number] [place] [address] & [facility]  [address] [facility] [address]    Hours:  24-hours Phone:  [phone]     rizatriptan 10 MG ODT tab    topiramate 25 MG tablet                Primary Care Provider Office Phone # Fax #    Katerin Urrutia PA-C [phone] [address] [facility] [address]        Equal Access to Services     [facility] AH: Hadii aad ku hadasho Soomaali, waaxda luqadaha, qaybta kaalmada adeegyada, alverto armendarizin hayyannn hanna nuñez . So [facility] [phone].    ATENCIÓN: Si habla español, tiene a patterson disposición servicios gratuitos de asistencia lingüística. Llame al [phone].    We comply with applicable federal civil rights laws and [place] laws. We do not discriminate on the basis of race, color, national origin, age, disability, sex, sexual orientation, or gender identity.            Thank you!     Thank you for choosing [facility]  for your care. Our  goal is always to provide you with excellent care. Hearing back from our patients is one way we can continue to improve our services. Please take a few minutes to complete the written survey that you may receive in the mail after your visit with us. Thank you!             Your Updated Medication List - Protect others around you: Learn how to safely use, store and throw away your medicines at www.disposemymeds.org.          This list is accurate as of: 10/11/17  2:46 PM.  Always use your most recent med list.                   Brand Name Dispense Instructions for use Diagnosis    acetaminophen-codeine 300-30 MG per tablet    TYLENOL #3    18 tablet    Take 1 tablet by mouth every 4 hours as needed for pain maximum 4 tablet(s) per day    Headache in pregnancy, second trimester, Migraine without aura and without status migrainosus, not intractable       albuterol 108 (90 BASE) MCG/ACT Inhaler    PROAIR HFA/PROVENTIL HFA/VENTOLIN HFA    1 Inhaler    Inhale 2 puffs into the lungs every 6 hours    SOB (shortness of breath)       CLARITIN PO      Take 10 mg by mouth daily Reported on 4/26/2017        COLACE PO      Take 100 mg by mouth daily        fluticasone 50 MCG/ACT spray    FLONASE    1 Bottle    Spray 1-2 sprays into both nostrils daily    Chronic rhinitis       ibuprofen 600 MG tablet    ADVIL/MOTRIN     Take 600 mg by mouth 1-2 daily        levothyroxine 150 MCG tablet    SYNTHROID/LEVOTHROID    90 tablet    Take 1 tablet (150 mcg) by mouth daily    Hypothyroidism, unspecified type       MAGNESIUM OXIDE PO      Take 400 mg by mouth daily Reported on 4/26/2017        norgestimate-ethinyl estradiol 0.25-35 MG-MCG per tablet    ORTHO-CYCLEN, SPRINTEC    28 tablet    Take 1 tablet by mouth daily    Encounter for initial prescription of contraceptive pills       * rizatriptan 10 MG ODT tab    MAXALT-MLT     Take 10 mg by mouth at onset of headache for migraine Reported on 4/26/2017        * rizatriptan 10 MG ODT tab     MAXALT-MLT    12 tablet    Take 1 tablet (10 mg) by mouth at onset of headache for migraine May repeat in 2 hours. Max 3 tablets/24 hours.    Migraine without aura and without status migrainosus, not intractable       topiramate 25 MG tablet    TOPAMAX    60 tablet    One a day x 1 week then, 1 twice a day    Migraine without aura and without status migrainosus, not intractable       * Notice:  This list has 2 medication(s) that are the same as other medications prescribed for you. Read the directions carefully, and ask your doctor or other care provider to review them with you.

## 2017-10-12 NOTE — PROGRESS NOTES
HISTORY OF PRESENT ILLNESS:  Jennifer Pastrana returns for followup of migraine headaches.      She is a patient who has had migraine without aura for about 4 years.  I saw her during pregnancy when her headaches flared up.  She was prescribed Tylenol #3.  When I saw her back in 07/2017 near her 34th week her headaches have decreased in their frequency.      She is now postpartum.  She stopped breast feeding about 3 weeks ago and her migraine headaches returned.  These are similar to her previous headache.  She is having a headache nearly every day.  She has been taking ibuprofen and Tylenol #3.      PHYSICAL EXAMINATION:   VITAL SIGNS:  Her heart rate is 92.  Blood pressure 123/86.   CRANIAL NERVES:  Funduscopic examination reveals sharp disc margins.  Visual fields are intact.  Cranial nerves II-XII are intact.  Motor, sensory, cerebellar and gait testing are normal.   REFLEXES:  2+.  Plantar responses are flexor.      IMPRESSION:  Migraine without aura.      PLAN:  I discussed given her current headache frequency that she should consider resuming a preventive agent and avoid overuse of analgesics as well as Maxalt which she has used in the past for abortive therapy.      I discussed amitriptyline.  She had been on that in the past and that caused unacceptable weight gain.      I discussed some other treatment options.  Given weight gain is a concern, I suggested Topamax.  She has some concerns about cognitive side effects.  I pointed out that only a minority of patients experience this and typically it is at a higher dose.  I reviewed other potential side effects with her and also pointed out that she should not attempt pregnancy and take appropriate precautions while taking Topamax.  She understands this.      She is going to start on Topamax 25 mg a day for a week and then she can go to 50 mg a day depending on her response.  If she develops any untoward side effects she can stop the drug immediately without  speaking to me.      I plan to see her back in a month.         AZALIA PARRISH MD             D: 10/11/2017 14:48   T: 10/12/2017 17:56   MT: LALO#[id number]      Name:     KYRA MELENDEZ   MRN:      [id number]        Account:      [id number]   :      1990           Visit Date:   10/11/2017      Document: [id number]

## 2017-10-26 ENCOUNTER — PRENATAL OFFICE VISIT (OUTPATIENT)
Dept: OBGYN | Facility: CLINIC | Age: 27
End: 2017-10-26
Payer: COMMERCIAL

## 2017-10-26 VITALS
SYSTOLIC BLOOD PRESSURE: 118 MMHG | DIASTOLIC BLOOD PRESSURE: 77 MMHG | OXYGEN SATURATION: 96 % | TEMPERATURE: 97 F | BODY MASS INDEX: 34.18 KG/M2 | HEART RATE: 97 BPM | WEIGHT: 196 LBS

## 2017-10-26 DIAGNOSIS — Z30.011 ENCOUNTER FOR INITIAL PRESCRIPTION OF CONTRACEPTIVE PILLS: ICD-10-CM

## 2017-10-26 DIAGNOSIS — N87.0 DYSPLASIA OF CERVIX, LOW GRADE (CIN 1): ICD-10-CM

## 2017-10-26 PROCEDURE — G0145 SCR C/V CYTO,THINLAYER,RESCR: HCPCS | Performed by: OBSTETRICS & GYNECOLOGY

## 2017-10-26 PROCEDURE — 99207 ZZC POST PARTUM EXAM: CPT | Performed by: OBSTETRICS & GYNECOLOGY

## 2017-10-26 RX ORDER — NORGESTIMATE AND ETHINYL ESTRADIOL 0.25-0.035
1 KIT ORAL DAILY
Qty: 90 TABLET | Refills: 3 | Status: SHIPPED | OUTPATIENT
Start: 2017-10-26 | End: 2018-09-21

## 2017-10-26 ASSESSMENT — PATIENT HEALTH QUESTIONNAIRE - PHQ9: SUM OF ALL RESPONSES TO PHQ QUESTIONS 1-9: 4
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
## 2017-10-26 NOTE — MR AVS SNAPSHOT
After Visit Summary   10/26/2017    Jennifer Pastrana    MRN: [id number]           Patient Information     Date Of Birth          1990        Visit Information        Provider Department      10/26/2017 1:00 PM Agbeh, Cephas Mawuena, MD [facility]        Today's Diagnoses     Routine postpartum follow-up    -  1    Encounter for initial prescription of contraceptive pills        Dysplasia of cervix, low grade (MLIO 1)           Follow-ups after your visit        Your next 10 appointments already scheduled     Nov 16, 2017  1:00 PM CST   Return Visit with Abdi Steward MD   [facility] ([facility])    [address] [place] [address] [phone]              Who to contact     If you have questions or need follow up information about today's clinic visit or your schedule please contact [facility] directly at [phone].  Normal or non-critical lab and imaging results will be communicated to you by MyChart, letter or phone within 4 business days after the clinic has received the results. If you do not hear from us within 7 days, please contact the clinic through MyChart or phone. If you have a critical or abnormal lab result, we will notify you by phone as soon as possible.  Submit refill requests through [employer] or call your pharmacy and they will forward the refill request to us. Please allow 3 business days for your refill to be completed.          Additional Information About Your Visit        MyChart Information     [employer] gives you secure access to your electronic health record. If you see a primary care provider, you can also send messages to your care team and make appointments. If you have questions, please call your primary care clinic.  If you do not have a primary care provider, please call [phone] and they will assist you.        Care EveryWhere ID     This is your Care EveryWhere ID. This could be used  by other organizations to access your [place] medical records  [id number]        Your Vitals Were     Pulse Temperature Pulse Oximetry Breastfeeding? BMI (Body Mass Index)       97 97  F (36.1  C) (Tympanic) 96% No 34.18 kg/m2        Blood Pressure from Last 3 Encounters:   10/26/17 118/77   10/11/17 123/86   09/19/17 117/83    Weight from Last 3 Encounters:   10/26/17 196 lb (88.9 kg)   10/11/17 195 lb 12.8 oz (88.8 kg)   09/19/17 195 lb (88.5 kg)              We Performed the Following     Pap imaged thin layer screen reflex to HPV if ASCUS - recommend age 25 - 29          Where to get your medicines      [id number] [address] [facility]  [address] [facility] [place] [facility] [address]    Hours:  24-hours Phone:  [phone]     norgestimate-ethinyl estradiol 0.25-35 MG-MCG per tablet          Primary Care Provider Office Phone # Fax #    Katerin Urrutia PA-C [phone] [address] [facility] [address]        Equal Access to Services     LLOYD MIRELES : Hadii salma wang hadasho Soomaali, waaxda luqadaha, qaybta kaalmada adeegyada, alverto sierra. So [facility] [phone].    ATENCIÓN: Si habla español, tiene a patterson disposición servicios gratuitos de asistencia lingüística. ame al [phone].    We comply with applicable federal civil rights laws and [place] laws. We do not discriminate on the basis of race, color, national origin, age, disability, sex, sexual orientation, or gender identity.            Thank you!     Thank you for choosing [facility]  for your care. Our goal is always to provide you with excellent care. Hearing back from our patients is one way we can continue to improve our services. Please take a few minutes to complete the written survey that you may receive in the mail after your visit with us. Thank you!             Your  Updated Medication List - Protect others around you: Learn how to safely use, store and throw away your medicines at www.disposemymeds.org.          This list is accurate as of: 10/26/17  1:21 PM.  Always use your most recent med list.                   Brand Name Dispense Instructions for use Diagnosis    acetaminophen-codeine 300-30 MG per tablet    TYLENOL #3    18 tablet    Take 1 tablet by mouth every 4 hours as needed for pain maximum 4 tablet(s) per day    Headache in pregnancy, second trimester, Migraine without aura and without status migrainosus, not intractable       albuterol 108 (90 BASE) MCG/ACT Inhaler    PROAIR HFA/PROVENTIL HFA/VENTOLIN HFA    1 Inhaler    Inhale 2 puffs into the lungs every 6 hours    SOB (shortness of breath)       CLARITIN PO      Take 10 mg by mouth daily Reported on 4/26/2017        COLACE PO      Take 100 mg by mouth daily        fluticasone 50 MCG/ACT spray    FLONASE    1 Bottle    Spray 1-2 sprays into both nostrils daily    Chronic rhinitis       ibuprofen 600 MG tablet    ADVIL/MOTRIN     Take 600 mg by mouth 1-2 daily        levothyroxine 150 MCG tablet    SYNTHROID/LEVOTHROID    90 tablet    Take 1 tablet (150 mcg) by mouth daily    Hypothyroidism, unspecified type       MAGNESIUM OXIDE PO      Take 400 mg by mouth daily Reported on 4/26/2017        norgestimate-ethinyl estradiol 0.25-35 MG-MCG per tablet    ORTHO-CYCLEN, SPRINTEC    90 tablet    Take 1 tablet by mouth daily    Encounter for initial prescription of contraceptive pills       * rizatriptan 10 MG ODT tab    MAXALT-MLT     Take 10 mg by mouth at onset of headache for migraine Reported on 4/26/2017        * rizatriptan 10 MG ODT tab    MAXALT-MLT    12 tablet    Take 1 tablet (10 mg) by mouth at onset of headache for migraine May repeat in 2 hours. Max 3 tablets/24 hours.    Migraine without aura and without status migrainosus, not intractable       topiramate 25 MG tablet    TOPAMAX    60 tablet    One a  day x 1 week then, 1 twice a day    Migraine without aura and without status migrainosus, not intractable       * Notice:  This list has 2 medication(s) that are the same as other medications prescribed for you. Read the directions carefully, and ask your doctor or other care provider to review them with you.

## 2017-10-26 NOTE — NURSING NOTE
"Chief Complaint   Patient presents with     Prenatal Care     postpartum delivered 9/3/17 Greil       Initial /77 (BP Location: Left arm, Patient Position: Chair, Cuff Size: Adult Large)  Pulse 97  Temp 97  F (36.1  C) (Tympanic)  Wt 196 lb (88.9 kg)  LMP 12/04/2016 (Exact Date)  SpO2 96%  Breastfeeding? No  BMI 34.18 kg/m2 Estimated body mass index is 34.18 kg/(m^2) as calculated from the following:    Height as of 8/23/17: 5' 3.5\" (1.613 m).    Weight as of this encounter: 196 lb (88.9 kg).  Medication Reconciliation: complete   Lissa Minaya LPN    "

## 2017-10-27 DIAGNOSIS — E03.9 HYPOTHYROIDISM, UNSPECIFIED TYPE: ICD-10-CM

## 2017-10-30 LAB
COPATH REPORT: NORMAL
PAP: NORMAL

## 2017-10-30 RX ORDER — LEVOTHYROXINE SODIUM 150 UG/1
TABLET ORAL
Qty: 90 TABLET | Refills: 2 | Status: SHIPPED | OUTPATIENT
Start: 2017-10-30 | End: 2018-09-21

## 2017-11-16 ENCOUNTER — OFFICE VISIT (OUTPATIENT)
Dept: NEUROLOGY | Facility: CLINIC | Age: 27
End: 2017-11-16
Payer: COMMERCIAL

## 2017-11-16 VITALS
OXYGEN SATURATION: 99 % | SYSTOLIC BLOOD PRESSURE: 117 MMHG | WEIGHT: 200 LBS | BODY MASS INDEX: 34.87 KG/M2 | HEART RATE: 88 BPM | DIASTOLIC BLOOD PRESSURE: 80 MMHG

## 2017-11-16 DIAGNOSIS — G43.009 MIGRAINE WITHOUT AURA AND WITHOUT STATUS MIGRAINOSUS, NOT INTRACTABLE: Primary | ICD-10-CM

## 2017-11-16 DIAGNOSIS — E03.9 HYPOTHYROIDISM, UNSPECIFIED TYPE: ICD-10-CM

## 2017-11-16 DIAGNOSIS — R51.9 DAILY HEADACHE: ICD-10-CM

## 2017-11-16 DIAGNOSIS — Z82.49 FAMILY HISTORY OF ANEURYSM: ICD-10-CM

## 2017-11-16 PROCEDURE — 99213 OFFICE O/P EST LOW 20 MIN: CPT | Performed by: PSYCHIATRY & NEUROLOGY

## 2017-11-16 RX ORDER — TOPIRAMATE 25 MG/1
TABLET, FILM COATED ORAL
Qty: 120 TABLET | Refills: 1 | Status: SHIPPED | OUTPATIENT
Start: 2017-11-16 | End: 2018-02-15

## 2017-11-16 RX ORDER — DIAZEPAM 5 MG
TABLET ORAL
Qty: 2 TABLET | Refills: 0 | Status: SHIPPED | OUTPATIENT
Start: 2017-11-16 | End: 2018-02-15

## 2017-11-16 RX ORDER — DIAZEPAM 5 MG
TABLET ORAL
Qty: 2 TABLET | Refills: 0 | Status: SHIPPED | OUTPATIENT
Start: 2017-11-16 | End: 2017-11-16

## 2017-11-16 ASSESSMENT — PAIN SCALES - GENERAL: PAINLEVEL: MILD PAIN (2)

## 2017-11-16 NOTE — MR AVS SNAPSHOT
After Visit Summary   11/16/2017    Jennifer Pastrana    MRN: [id number]           Patient Information     Date Of Birth          1990        Visit Information        Provider Department      11/16/2017 1:00 PM Abdi Steward MD [facility]        Today's Diagnoses     Migraine without aura and without status migrainosus, not intractable    -  1    Hypothyroidism, unspecified type        Family history of aneurysm        Daily headache           Follow-ups after your visit        Follow-up notes from your care team     Call patient with results Return in about 6 weeks (around 12/28/2017).      Your next 10 appointments already scheduled     Nov 28, 2017 12:30 PM CST   MR BRAIN W/O CONTRAST with MGMR1   [facility]    [address] [place] [address] [phone]           Take your medicines as usual, unless your doctor tells you not to. Bring a list of your current medicines to your exam (including vitamins, minerals and over-the-counter drugs). Also bring the results of similar scans you may have had.  Please remove any body piercings and hair extensions before you arrive.  Follow your doctor s orders. If you do not, we may have to postpone your exam.  You will not have contrast for this exam. You do not need to do anything special to prepare.  The MRI machine uses a strong magnet. Please wear clothes without metal (snaps, zippers). A sweatsuit works well, or we may give you a hospital gown.   **IMPORTANT** THE INSTRUCTIONS BELOW ARE ONLY FOR THOSE PATIENTS WHO HAVE BEEN TOLD THEY WILL RECEIVE SEDATION OR GENERAL ANESTHESIA DURING THEIR MRI PROCEDURE:  IF YOU WILL RECEIVE SEDATION (take medicine to help you relax during your exam):   You must get the medicine from your doctor before you arrive. Bring the medicine to the exam. Do not take it at home.   Arrive one hour early. Bring someone who can take you home after  the test. Your medicine will make you sleepy. After the exam, you may not drive, take a bus or take a taxi by yourself.   No eating 8 hours before your exam. You may have clear liquids up until 4 hours before your exam. (Clear liquids include water, clear tea, black coffee and fruit juice without pulp.)  IF YOU WILL RECEIVE ANESTHESIA (be asleep for your exam):   Arrive 1 1/2 hours early. Bring someone who can take you home after the test. You may not drive, take a bus or take a taxi by yourself.   No eating 8 hours before your exam. You may have clear liquids up until 4 hours before your exam. (Clear liquids include water, clear tea, black coffee and fruit juice without pulp.)   You will spend four to five hours in the recovery room.  Please call the Imaging Department at your exam site with any questions.            Nov 28, 2017  1:15 PM CST   MR HEAD W/O & W CONTRAST ANGIOGRAM with MGMR1   [facility]    [address] [place] [address] [phone]           Take your medicines as usual, unless your doctor tells you not to. Bring a list of your current medicines to your exam (including vitamins, minerals and over-the-counter drugs).  You will be given intravenous contrast for this exam. To prepare:   The day before your exam, drink extra fluids at least six 8-ounce glasses (unless your doctor tells you to restrict your fluids).   Have a blood test (creatinine test) within 30 days of your exam. Go to your clinic or Diagnostic Imaging Department for this test.  The MRI machine uses a strong magnet. Please wear clothes without metal (snaps, zippers). A sweatsuit works well, or we may give you a hospital gown.  Please remove any body piercings and hair extensions before you arrive. You will also remove watches, jewelry, hairpins, wallets, dentures, partial dental plates and hearing aids. You may wear contact lenses, and you may be able to wear your  rings. We have a safe place to keep your personal items, but it is safer to leave them at home.   **IMPORTANT** THE INSTRUCTIONS BELOW ARE ONLY FOR THOSE PATIENTS WHO HAVE BEEN TOLD THEY WILL RECEIVE SEDATION OR GENERAL ANESTHESIA DURING THEIR MRI PROCEDURE:  IF YOU WILL RECEIVE SEDATION (take medicine to help you relax during your exam):   You must get the medicine from your doctor before you arrive. Bring the medicine to the exam. Do not take it at home.   Arrive one hour early. Bring someone who can take you home after the test. Your medicine will make you sleepy. After the exam, you may not drive, take a bus or take a taxi by yourself.   No eating 8 hours before your exam. You may have clear liquids up until 4 hours before your exam. (Clear liquids include water, clear tea, black coffee and fruit juice without pulp.)  IF YOU WILL RECEIVE ANESTHESIA (be asleep for your exam):   Arrive 1 1/2 hours early. Bring someone who can take you home after the test. You may not drive, take a bus or take a taxi by yourself.   No eating 8 hours before your exam. You may have clear liquids up until 4 hours before your exam. (Clear liquids include water, clear tea, black coffee and fruit juice without pulp.)  Please call the Imaging Department at your exam site with any questions.            Dec 27, 2017  4:00 PM CST   Return Visit with Abdi Steward MD   [facility]    [address] [place] [address] [phone]              Future tests that were ordered for you today     Open Future Orders        Priority Expected Expires Ordered    MR Brain w/o Contrast Routine  11/16/2018 11/16/2017    MR Head w/o & w Contrast Angiogram Routine  11/16/2018 11/16/2017            Who to contact     If you have questions or need follow up information about today's clinic visit or your schedule please contact [facility] directly at [phone].  Normal or  non-critical lab and imaging results will be communicated to you by [employer], letter or phone within 4 business days after the clinic has received the results. If you do not hear from us within 7 days, please contact the clinic through [employer] or phone. If you have a critical or abnormal lab result, we will notify you by phone as soon as possible.  Submit refill requests through [employer] or call your pharmacy and they will forward the refill request to us. Please allow 3 business days for your refill to be completed.          Additional Information About Your Visit        [employer] Information     [employer] gives you secure access to your electronic health record. If you see a primary care provider, you can also send messages to your care team and make appointments. If you have questions, please call your primary care clinic.  If you do not have a primary care provider, please call [phone] and they will assist you.      [employer] is an electronic gateway that provides easy, online access to your medical records. With [employer], you can request a clinic appointment, read your test results, renew a prescription or communicate with your care team.     To access your existing account, please contact your [facility] or call [phone] for assistance.        Care EveryWhere ID     This is your Care EveryWhere ID. This could be used by other organizations to access your [place] medical records  [id number]        Your Vitals Were     Pulse Pulse Oximetry BMI (Body Mass Index)             88 99% 34.87 kg/m2          Blood Pressure from Last 3 Encounters:   11/16/17 117/80   10/26/17 118/77   10/11/17 123/86    Weight from Last 3 Encounters:   11/16/17 90.7 kg (200 lb)   10/26/17 88.9 kg (196 lb)   10/11/17 88.8 kg (195 lb 12.8 oz)                 Today's Medication Changes          These changes are accurate as of: 11/16/17  1:37 PM.  If you have any questions, ask your nurse or doctor.                Start taking these medicines.        Dose/Directions    diazepam 5 MG tablet   Commonly known as:  VALIUM   Used for:  Migraine without aura and without status migrainosus, not intractable   Started by:  Abdi Steward MD        One tablet one half hour before MRI. May take second tablet if needed   Quantity:  2 tablet   Refills:  0         These medicines have changed or have updated prescriptions.        Dose/Directions    topiramate 25 MG tablet   Commonly known as:  TOPAMAX   This may have changed:  additional instructions   Used for:  Migraine without aura and without status migrainosus, not intractable   Changed by:  Abdi Steward MD        Take 3 a day. May increase to 4 a day after 1 week   Quantity:  120 tablet   Refills:  1            Where to get your medicines      These medications were sent to [facility] [address] [place] [address] [facility]  [address] [facility] [address]    Hours:  24-hours Phone:  [phone]     topiramate 25 MG tablet         Some of these will need a paper prescription and others can be bought over the counter.  Ask your nurse if you have questions.     Bring a paper prescription for each of these medications     diazepam 5 MG tablet                Primary Care Provider Office Phone # Fax #    Katerin Urrutia PA-C [phone] [address] [address] [facility] [address]        Equal Access to Services     LLODY MIRELES : Hadii aad ku hadasho Soomaali, waaxda luqadaha, qaybta kaalmada adeegyada, alverto sierra. So [facility] [phone].    ATENCIÓN: Si habla español, tiene a patterson disposición servicios gratuitos de asistencia lingüística. Mayo al [phone].    We comply with applicable federal civil rights laws and [place] laws. We do not discriminate on the basis of race, color, national origin, age, disability, sex, sexual orientation, or gender  identity.            Thank you!     Thank you for choosing [facility]  for your care. Our goal is always to provide you with excellent care. Hearing back from our patients is one way we can continue to improve our services. Please take a few minutes to complete the written survey that you may receive in the mail after your visit with us. Thank you!             Your Updated Medication List - Protect others around you: Learn how to safely use, store and throw away your medicines at www.disposemymeds.org.          This list is accurate as of: 11/16/17  1:37 PM.  Always use your most recent med list.                   Brand Name Dispense Instructions for use Diagnosis    acetaminophen-codeine 300-30 MG per tablet    TYLENOL #3    18 tablet    Take 1 tablet by mouth every 4 hours as needed for pain maximum 4 tablet(s) per day    Headache in pregnancy, second trimester, Migraine without aura and without status migrainosus, not intractable       albuterol 108 (90 BASE) MCG/ACT Inhaler    PROAIR HFA/PROVENTIL HFA/VENTOLIN HFA    1 Inhaler    Inhale 2 puffs into the lungs every 6 hours    SOB (shortness of breath)       CLARITIN PO      Take 10 mg by mouth daily Reported on 4/26/2017        diazepam 5 MG tablet    VALIUM    2 tablet    One tablet one half hour before MRI. May take second tablet if needed    Migraine without aura and without status migrainosus, not intractable       fluticasone 50 MCG/ACT spray    FLONASE    1 Bottle    Spray 1-2 sprays into both nostrils daily    Chronic rhinitis       ibuprofen 600 MG tablet    ADVIL/MOTRIN     Take 600 mg by mouth 1-2 daily        levothyroxine 150 MCG tablet    SYNTHROID/LEVOTHROID    90 tablet    TAKE 1 TABLET BY MOUTH ONCE DAILY    Hypothyroidism, unspecified type       MAGNESIUM OXIDE PO      Take 400 mg by mouth daily Reported on 4/26/2017        norgestimate-ethinyl estradiol 0.25-35 MG-MCG per tablet    ORTHO-CYCLEN, SPRINTEC    90 tablet    Take 1  tablet by mouth daily    Encounter for initial prescription of contraceptive pills       rizatriptan 10 MG ODT tab    MAXALT-MLT    12 tablet    Take 1 tablet (10 mg) by mouth at onset of headache for migraine May repeat in 2 hours. Max 3 tablets/24 hours.    Migraine without aura and without status migrainosus, not intractable       topiramate 25 MG tablet    TOPAMAX    120 tablet    Take 3 a day. May increase to 4 a day after 1 week    Migraine without aura and without status migrainosus, not intractable

## 2017-11-16 NOTE — NURSING NOTE
"Jennifer Pastrana's goals for this visit include: return  She requests these members of her care team be copied on today's visit information:     PCP: Katerin Urrutia    Referring Provider:  No referring provider defined for this encounter.    Chief Complaint   Patient presents with     RECHECK       Initial /80  Pulse 88  Wt 90.7 kg (200 lb)  SpO2 99%  BMI 34.87 kg/m2 Estimated body mass index is 34.87 kg/(m^2) as calculated from the following:    Height as of 8/23/17: 1.613 m (5' 3.5\").    Weight as of this encounter: 90.7 kg (200 lb).  Medication Reconciliation: complete    Do you need any medication refills at today's visit? y  "

## 2017-11-16 NOTE — LETTER
2017        RE: Jennifer Melendez  [address] [place] [address]        Jennifer Robldeo returns for followup.  She is a patient with a history of migraine without aura.  She had an intensification of her headaches during pregnancy.  They ultimately resolved later in pregnancy, but once she stopped breast feeding in September her headaches returned occurring on a daily basis.  When I saw her a month ago she was prescribed Topamax.  She went up to 50 mg and the headaches resolved, but unfortunately this past week they have returned again and they are occurring on a daily basis.  She is taking ibuprofen and Maxalt a couple times a week for the headaches.      She is tolerating the current dose of Topamax (50 mg) well.  She has noted a change in her taste for carbonated beverages, but this was anticipated.  She has not had any cognitive side effects.      The patient today also voiced concerns about aneurysm screening.  There is a strong family history of intracranial aneurysms on her father's side, although to date her father has not been found to have one.  She was advised that she should be screened for intracranial aneurysms.      IMPRESSION:   1.  Migraine without aura.   2.  Daily headaches.   3.  Family history of intracranial aneurysms.      PLAN:  She decided she would be willing to try and increase the Topamax dose higher.  If she develops any side effects, she can reduce the dose.  She was going to go up to 75 mg a day for a week and then has the latitude to go to 100 mg.      I am going to go ahead and order a head MRI scan with intracranial MR angiogram in view of her daily headaches and positive family history of aneurysm.  I will communicate those results to her.      I plan to see her back in 6 weeks.         AZALIA PARRISH MD             D: 2017 13:35   T: 2017 22:05   MT: LALO#[id number]      Name:     JENNIFER MELENDEZ   MRN:      [id number]        Account:      [id number]   :       1990           Visit Date:   11/16/2017      Document: [id number]          Sincerely,        Abdi Steward MD

## 2017-11-17 NOTE — PROGRESS NOTES
Jennifer Robledo returns for followup.  She is a patient with a history of migraine without aura.  She had an intensification of her headaches during pregnancy.  They ultimately resolved later in pregnancy, but once she stopped breast feeding in September her headaches returned occurring on a daily basis.  When I saw her a month ago she was prescribed Topamax.  She went up to 50 mg and the headaches resolved, but unfortunately this past week they have returned again and they are occurring on a daily basis.  She is taking ibuprofen and Maxalt a couple times a week for the headaches.      She is tolerating the current dose of Topamax (50 mg) well.  She has noted a change in her taste for carbonated beverages, but this was anticipated.  She has not had any cognitive side effects.      The patient today also voiced concerns about aneurysm screening.  There is a strong family history of intracranial aneurysms on her father's side, although to date her father has not been found to have one.  She was advised that she should be screened for intracranial aneurysms.      IMPRESSION:   1.  Migraine without aura.   2.  Daily headaches.   3.  Family history of intracranial aneurysms.      PLAN:  She decided she would be willing to try and increase the Topamax dose higher.  If she develops any side effects, she can reduce the dose.  She was going to go up to 75 mg a day for a week and then has the latitude to go to 100 mg.      I am going to go ahead and order a head MRI scan with intracranial MR angiogram in view of her daily headaches and positive family history of aneurysm.  I will communicate those results to her.      I plan to see her back in 6 weeks.     ADDENDUM 11/29/17: Reviewed MRI and MRA. Discussed with patient. Brain normal. No aneurysm. Dr Kelley mentioned asymmetrically smaller branches of L MCA and L PCA which may be normal but can be seen in cerebral vasoconstriction syndrome. I feel this is unlikely. Generally  her HAs build up over 30 minutes. HAs are similar to migraine she experienced pre pregnancy, during pregnancy and now post partum. She will continue with trial of increasing Topamax. Some tingling since dose increased to 75 mg.        AZALIA PARRISH MD             D: 2017 13:35   T: 2017 22:05   MT: LALO#[id number]      Name:     KYRA MELENDEZ   MRN:      [id number]        Account:      [id number]   :      1990           Visit Date:   2017      Document: [id number]

## 2017-11-28 ENCOUNTER — RADIANT APPOINTMENT (OUTPATIENT)
Dept: MRI IMAGING | Facility: CLINIC | Age: 27
End: 2017-11-28
Attending: PSYCHIATRY & NEUROLOGY
Payer: COMMERCIAL

## 2017-11-28 DIAGNOSIS — R51.9 DAILY HEADACHE: ICD-10-CM

## 2017-11-28 DIAGNOSIS — Z82.49 FAMILY HISTORY OF ANEURYSM: ICD-10-CM

## 2017-11-28 PROCEDURE — 70551 MRI BRAIN STEM W/O DYE: CPT | Performed by: RADIOLOGY

## 2017-11-28 PROCEDURE — 70544 MR ANGIOGRAPHY HEAD W/O DYE: CPT | Mod: 59 | Performed by: RADIOLOGY

## 2018-02-07 ENCOUNTER — APPOINTMENT (OUTPATIENT)
Dept: OPTOMETRY | Facility: CLINIC | Age: 28
End: 2018-02-07
Payer: COMMERCIAL

## 2018-02-07 ENCOUNTER — OFFICE VISIT (OUTPATIENT)
Dept: OPTOMETRY | Facility: CLINIC | Age: 28
End: 2018-02-07
Payer: COMMERCIAL

## 2018-02-07 DIAGNOSIS — H52.222 REGULAR ASTIGMATISM OF LEFT EYE: ICD-10-CM

## 2018-02-07 DIAGNOSIS — H52.13 MYOPIA OF BOTH EYES: Primary | ICD-10-CM

## 2018-02-07 PROCEDURE — 92015 DETERMINE REFRACTIVE STATE: CPT | Performed by: OPTOMETRIST

## 2018-02-07 PROCEDURE — 92310 CONTACT LENS FITTING OU: CPT | Mod: GA | Performed by: OPTOMETRIST

## 2018-02-07 PROCEDURE — V2020 VISION SVCS FRAMES PURCHASES: HCPCS | Performed by: OPTOMETRIST

## 2018-02-07 PROCEDURE — 92004 COMPRE OPH EXAM NEW PT 1/>: CPT | Performed by: OPTOMETRIST

## 2018-02-07 PROCEDURE — V2100 LENS SPHER SINGLE PLANO 4.00: HCPCS | Mod: RT | Performed by: OPTOMETRIST

## 2018-02-07 ASSESSMENT — REFRACTION_CURRENTRX
OS_BRAND: ALCON DAILIES TOTAL 1 BC 8.5, D 14.1
OD_SPHERE: -2.25
OD_BRAND: ALCON DAILIES TOTAL 1 BC 8.5, D 14.1
OS_SPHERE: -2.00

## 2018-02-07 ASSESSMENT — VISUAL ACUITY
OS_CC: 20/30
OS_CC: 20/20
OS_SC: 20/20
OD_CC: 20/40
OD_SC: 20/100
OS_CC+: -1
OS_SC: 20/100
OD_CC+: -2
CORRECTION_TYPE: GLASSES
OD_SC+: -1
METHOD: SNELLEN - LINEAR
OD_CC: 20/20
OD_SC: 20/20

## 2018-02-07 ASSESSMENT — REFRACTION_WEARINGRX
OS_AXIS: 060
OS_SPHERE: -1.00
OS_CYLINDER: +0.50
OD_SPHERE: -1.00
SPECS_TYPE: SVL
OD_CYLINDER: SPHERE

## 2018-02-07 ASSESSMENT — REFRACTION_MANIFEST
OD_SPHERE: -2.00
OS_CYLINDER: +0.50
OS_CYLINDER: +0.75
METHOD_AUTOREFRACTION: 1
OS_AXIS: 075
OS_SPHERE: -2.25
OD_CYLINDER: SPHERE
OS_SPHERE: -2.25
OS_AXIS: 77
OD_SPHERE: -2.25

## 2018-02-07 ASSESSMENT — CONF VISUAL FIELD
OS_NORMAL: 1
OD_NORMAL: 1
METHOD: COUNTING FINGERS

## 2018-02-07 ASSESSMENT — TONOMETRY
IOP_METHOD: APPLANATION
OD_IOP_MMHG: 14
OS_IOP_MMHG: 14

## 2018-02-07 ASSESSMENT — KERATOMETRY
OD_K2POWER_DIOPTERS: 43.75
OS_K1POWER_DIOPTERS: 43.25
OD_AXISANGLE2_DEGREES: 22
OS_AXISANGLE2_DEGREES: 161
OS_K2POWER_DIOPTERS: 44.50
OD_K1POWER_DIOPTERS: 43.25

## 2018-02-07 ASSESSMENT — EXTERNAL EXAM - LEFT EYE: OS_EXAM: NORMAL

## 2018-02-07 ASSESSMENT — CUP TO DISC RATIO
OS_RATIO: 0.2
OD_RATIO: 0.2

## 2018-02-07 ASSESSMENT — SLIT LAMP EXAM - LIDS
COMMENTS: NORMAL
COMMENTS: NORMAL

## 2018-02-07 ASSESSMENT — EXTERNAL EXAM - RIGHT EYE: OD_EXAM: NORMAL

## 2018-02-07 NOTE — PROGRESS NOTES
Chief Complaint   Patient presents with     COMPREHENSIVE EYE EXAM     Contact Lens Re-fitting     wants to talk about cl fit/wearing, aware of fee and waiver         Previous contact lens wearer? Yes: Last wore in 2015, stopped because they were uncomfortable. Last wore Proclear   Comfort of contact lenses :They were good for the first day, but after that it felt like a FB, just not comfortable   Satisfied with current lenses: Hasn't even tried for a couple years         Last Eye Exam: 4/21/2014  Dilated Previously: Yes    What are you currently using to see?  Glasses, but would like to discuss trying contacts again     Distance Vision Acuity: Noticed gradual change in both eyes, slight changes, thinks that she should be able to see a little clearer     Near Vision Acuity: Satisfied with vision while reading and using computer with glasses    Eye Comfort: good  Do you use eye drops? 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       OBJECTIVE: See Ophthalmology exam    ASSESSMENT:    ICD-10-CM    1. Myopia of both eyes H52.13 EYE EXAM (SIMPLE-NONBILLABLE)     REFRACTION     CONTACT LENS FITTING,BILAT   2. Regular astigmatism of left eye H52.222       PLAN:     Patient Instructions   Patient was advised of today's exam findings.  Fill glasses prescription  Contact lenses prescription released to patient today.  Test new trials one week, then alright to order supply.  Return for contact lenses check appointment as needed if any vision or comfort concerns  Return in 1 year for eye exam    Muna Slade O.D.  [facility]   [address] [place] [address]  [phone]    To order your contacts online please log on to [email or web address] .  Go to the link which is found on the Eye Care and Vision Services page.    Another option is to call  [phone] to  order your contacts.

## 2018-02-07 NOTE — LETTER
2/7/2018         RE: Jennifer Pastrana  [address] [place] [facility] [address]        Dear Colleague,    Thank you for referring your patient, Jennifer Pastrana, to the [facility]. Please see a copy of my visit note below.    Chief Complaint   Patient presents with     COMPREHENSIVE EYE EXAM     Contact Lens Re-fitting     wants to talk about cl fit/wearing, aware of fee and waiver         Previous contact lens wearer? Yes: Last wore in 2015, stopped because they were uncomfortable. Last wore Proclear   Comfort of contact lenses :They were good for the first day, but after that it felt like a FB, just not comfortable   Satisfied with current lenses: Hasn't even tried for a couple years         Last Eye Exam: 4/21/2014  Dilated Previously: Yes    What are you currently using to see?  Glasses, but would like to discuss trying contacts again     Distance Vision Acuity: Noticed gradual change in both eyes, slight changes, thinks that she should be able to see a little clearer     Near Vision Acuity: Satisfied with vision while reading and using computer with glasses    Eye Comfort: good  Do you use eye drops? 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       OBJECTIVE: See Ophthalmology exam    ASSESSMENT:    ICD-10-CM    1. Myopia of both eyes H52.13 EYE EXAM (SIMPLE-NONBILLABLE)     REFRACTION     CONTACT LENS FITTING,BILAT   2. Regular astigmatism of left eye H52.222       PLAN:     Patient Instructions   Patient was advised of today's exam findings.  Fill glasses prescription  Contact lenses prescription released to patient today.  Test new trials one week, then alright to order supply.  Return for contact lenses check appointment as needed if any vision or comfort concerns  Return in 1 year for eye exam    Muna Slade O.D.  [facility]   [address]  [place] [address]  [phone]    To order your contacts online please log on to [email or web address] .  Go to the link which is found on the Eye Care and Vision Services page.    Another option is to call  [phone] to order your contacts.                                             Again, thank you for allowing me to participate in the care of your patient.        Sincerely,        Muna Slade, OD

## 2018-02-07 NOTE — MR AVS SNAPSHOT
After Visit Summary   2/7/2018    Jennifer Pastrana    MRN: [id number]           Patient Information     Date Of Birth          1990        Visit Information        Provider Department      2/7/2018 3:00 PM Muna Slade, OD [facility]        Today's Diagnoses     Myopia of both eyes    -  1    Regular astigmatism of left eye          Care Instructions    Patient was advised of today's exam findings.  Fill glasses prescription  Contact lenses prescription released to patient today.  Test new trials one week, then alright to order supply.  Return for contact lenses check appointment as needed if any vision or comfort concerns  Return in 1 year for eye exam    Muna Slade O.D.  [facility]   [address] [place] [address] [phone]    To order your contacts online please log on to [employer] .  Go to the link which is found on the Eye Care and Vision Services page.    Another option is to call  [phone] to order your contacts.                                Follow-ups after your visit        Your next 10 appointments already scheduled     Feb 15, 2018  4:00 PM CST   Return Visit with Abdi Steward MD   [facility]    [address] [place] [id number] [phone]              Who to contact     If you have questions or need follow up information about today's clinic visit or your schedule please contact [facility] directly at [phone].  Normal or non-critical lab and imaging results will be communicated to you by MyChart, letter or phone within 4 business days after the clinic has received the results. If you do not hear from us within 7 days, please contact the clinic through MyChart or phone. If you have a critical or abnormal lab result, we will notify you by phone as soon as possible.  Submit refill requests through [email or web address] or call your pharmacy and they will  forward the refill request to us. Please allow 3 business days for your refill to be completed.          Additional Information About Your Visit        [employer] Information     [employer] gives you secure access to your electronic health record. If you see a primary care provider, you can also send messages to your care team and make appointments. If you have questions, please call your primary care clinic.  If you do not have a primary care provider, please call [phone] and they will assist you.        Care EveryWhere ID     This is your Care EveryWhere ID. This could be used by other organizations to access your [facility] medical records  [id number]         Blood Pressure from Last 3 Encounters:   11/16/17 117/80   10/26/17 118/77   10/11/17 123/86    Weight from Last 3 Encounters:   11/16/17 90.7 kg (200 lb)   10/26/17 88.9 kg (196 lb)   10/11/17 88.8 kg (195 lb 12.8 oz)              We Performed the Following     CONTACT LENS FITTING,BILAT     EYE EXAM (SIMPLE-NONBILLABLE)     REFRACTION        Primary Care Provider Office Phone # Fax #    Katerin Candida Urrutia PA-C [phone] [address] [facility] [address]        Equal Access to Services     LLOYD MIRELES : Hadii aad ku hadasho Soomaali, waaxda luqadaha, qaybta kaalmada adeegyada, waxay idiin hayyannn hanna khmain lamary sierra. So [facility] [phone].    ATENCIÓN: Si habla español, tiene a patterson disposición servicios gratuitos de asistencia lingüística. Llame al [phone].    We comply with applicable federal civil rights laws and [place] laws. We do not discriminate on the basis of race, color, national origin, age, disability, sex, sexual orientation, or gender identity.            Thank you!     Thank you for choosing [facility]  for your care. Our goal is always to provide you with excellent care. Hearing back from our patients is one way we can continue to improve our services. Please take a few minutes to complete  the written survey that you may receive in the mail after your visit with us. Thank you!             Your Updated Medication List - Protect others around you: Learn how to safely use, store and throw away your medicines at www.disposemymeds.org.          This list is accurate as of 2/7/18  3:47 PM.  Always use your most recent med list.                   Brand Name Dispense Instructions for use Diagnosis    acetaminophen-codeine 300-30 MG per tablet    TYLENOL #3    18 tablet    Take 1 tablet by mouth every 4 hours as needed for pain maximum 4 tablet(s) per day    Headache in pregnancy, second trimester, Migraine without aura and without status migrainosus, not intractable       albuterol 108 (90 BASE) MCG/ACT Inhaler    PROAIR HFA/PROVENTIL HFA/VENTOLIN HFA    1 Inhaler    Inhale 2 puffs into the lungs every 6 hours    SOB (shortness of breath)       CLARITIN PO      Take 10 mg by mouth daily Reported on 4/26/2017        diazepam 5 MG tablet    VALIUM    2 tablet    One tablet one half hour before MRI. May take second tablet if needed    Migraine without aura and without status migrainosus, not intractable       fluticasone 50 MCG/ACT spray    FLONASE    1 Bottle    Spray 1-2 sprays into both nostrils daily    Chronic rhinitis       ibuprofen 600 MG tablet    ADVIL/MOTRIN     Take 600 mg by mouth 1-2 daily        levothyroxine 150 MCG tablet    SYNTHROID/LEVOTHROID    90 tablet    TAKE 1 TABLET BY MOUTH ONCE DAILY    Hypothyroidism, unspecified type       MAGNESIUM OXIDE PO      Take 400 mg by mouth daily Reported on 4/26/2017        norgestimate-ethinyl estradiol 0.25-35 MG-MCG per tablet    ORTHO-CYCLEN, SPRINTEC    90 tablet    Take 1 tablet by mouth daily    Encounter for initial prescription of contraceptive pills       rizatriptan 10 MG ODT tab    MAXALT-MLT    12 tablet    Take 1 tablet (10 mg) by mouth at onset of headache for migraine May repeat in 2 hours. Max 3 tablets/24 hours.    Migraine without aura  and without status migrainosus, not intractable       topiramate 25 MG tablet    TOPAMAX    120 tablet    Take 3 a day. May increase to 4 a day after 1 week    Migraine without aura and without status migrainosus, not intractable

## 2018-02-07 NOTE — PATIENT INSTRUCTIONS
Patient was advised of today's exam findings.  Fill glasses prescription  Contact lenses prescription released to patient today.  Test new trials one week, then alright to order supply.  Return for contact lenses check appointment as needed if any vision or comfort concerns  Return in 1 year for eye exam    Muna Slade O.D.  [facility]   [address] [place] [address]  [phone]    To order your contacts online please log on to [email or web address] .  Go to the link which is found on the Eye Care and Vision Services page.    Another option is to call  [phone] to order your contacts.

## 2018-02-15 ENCOUNTER — OFFICE VISIT (OUTPATIENT)
Dept: NEUROLOGY | Facility: CLINIC | Age: 28
End: 2018-02-15
Payer: COMMERCIAL

## 2018-02-15 VITALS
SYSTOLIC BLOOD PRESSURE: 121 MMHG | HEART RATE: 77 BPM | DIASTOLIC BLOOD PRESSURE: 80 MMHG | HEIGHT: 63 IN | OXYGEN SATURATION: 100 % | BODY MASS INDEX: 34.55 KG/M2 | WEIGHT: 195 LBS

## 2018-02-15 DIAGNOSIS — G43.009 MIGRAINE WITHOUT AURA AND WITHOUT STATUS MIGRAINOSUS, NOT INTRACTABLE: Primary | ICD-10-CM

## 2018-02-15 PROCEDURE — 99213 OFFICE O/P EST LOW 20 MIN: CPT | Performed by: PSYCHIATRY & NEUROLOGY

## 2018-02-15 RX ORDER — NORTRIPTYLINE HCL 10 MG
CAPSULE ORAL
Qty: 90 CAPSULE | Refills: 2 | Status: SHIPPED | OUTPATIENT
Start: 2018-02-15 | End: 2018-04-05

## 2018-02-15 ASSESSMENT — PAIN SCALES - GENERAL: PAINLEVEL: NO PAIN (0)

## 2018-02-15 NOTE — LETTER
2/15/2018         RE: Jennifer Pastrana  [address] [facility] [address]        Dear Colleague,    Thank you for referring your patient, Jennifer Pastrana, to the [facility]. Please see a copy of my visit note below.    Visit Date:   02/15/2018      HISTORY OF PRESENT ILLNESS:  Jennifer Pastrana returns for followup.  She is a patient with migraine without aura.  She had an intensification of headaches during her pregnancy.  They resolved, but then returned after she stopped breastfeeding.  She was having daily headaches.  She was prescribed Topamax that initially helped, but then the headaches returned again on a daily basis.  When I saw her in November, we increased her Topamax dose and she is now taking 100 mg a day.  There has been improvement in her headache frequency, but she does not like the paresthesias associated with the drug.  She is having 1 or 2 mild headaches a week that typically start gradually, but can come on rather abruptly and then build in intensity.  About once or twice a month, she will have a more severe left-sided headache.  She describes her headaches as dull or throbbing and with the severe ones, she has photosensitivity. She has no neurologic symptoms with the headaches. For acute management, she will take ibuprofen and then if needed, Maxalt, which does provide relief.      The patient did report to me in November a family history of aneurysms.  In view of her worsening headaches and that history, she did have a head MRI and an intracranial MR angiogram.  The brain is unremarkable.  There was no evidence of intracranial aneurysm.  There was an asymmetry of the left middle cerebral artery and left posterior cerebral artery compared to the right.  Dr. Kelley commented that all of this could be a normal variant.  He also put forth the possibility it could relate to cerebral vasoconstriction syndrome.  I did not think that that was clinically relevant.  She does not have  any focal neurologic symptoms associated with her headaches.      CURRENT MEDICATIONS:  Topamax 100 mg a day, levothyroxine, birth control pill, Maxalt 10 mg p.r.n., ibuprofen, albuterol, Flonase and Claritin.      PHYSICAL EXAMINATION:   GENERAL:  Reveals she is alert and cooperative.   VITAL SIGNS:  Heart rate is 77.  Blood pressure is 121/80.   NEUROLOGIC:  Funduscopic examination reveals sharp disc margins.  Pupils are equal, round and react well to light.  Visual fields are intact.  Cranial nerves II through XII are intact.  Motor, sensory, cerebellar gait and reflex testing are normal.  Plantar responses are flexor.      IMPRESSION:  Migraine without aura.      PLAN:  While the Topamax has definitely improved her headache frequency, she would like to come off the drug due to the paresthesias she is experiencing.      She will reduce her Topamax dose by 25 mg a week until off.      We discussed a trial of nortriptyline.  I reviewed potential side effects.  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      She is going to start on nortriptyline 10 mg at night for a week, then she can go to 20 mg for a week and then 30 mg depending on her response and tolerance to the drug.      I plan to see her back in 5-6 weeks.         ABDI STEWARD MD             D: 02/15/2018   T: 02/15/2018   MT: BESSIE      Name:     KYRA MELENDEZ   MRN:      [id number]        Account:      [id number]   :      1990           Visit Date:   02/15/2018      Document: [id number]        Again, thank you for allowing me to participate in the care of your patient.        Sincerely,        Abdi Steward MD

## 2018-02-15 NOTE — PROGRESS NOTES
Visit Date:   02/15/2018      HISTORY OF PRESENT ILLNESS:  Jennifer Pastrana returns for followup.  She is a patient with migraine without aura.  She had an intensification of headaches during her pregnancy.  They resolved, but then returned after she stopped breastfeeding.  She was having daily headaches.  She was prescribed Topamax that initially helped, but then the headaches returned again on a daily basis.  When I saw her in November, we increased her Topamax dose and she is now taking 100 mg a day.  There has been improvement in her headache frequency, but she does not like the paresthesias associated with the drug.  She is having 1 or 2 mild headaches a week that typically start gradually, but can come on rather abruptly and then build in intensity.  About once or twice a month, she will have a more severe left-sided headache.  She describes her headaches as dull or throbbing and with the severe ones, she has photosensitivity. She has no neurologic symptoms with the headaches. For acute management, she will take ibuprofen and then if needed, Maxalt, which does provide relief.      The patient did report to me in November a family history of aneurysms.  In view of her worsening headaches and that history, she did have a head MRI and an intracranial MR angiogram.  The brain is unremarkable.  There was no evidence of intracranial aneurysm.  There was an asymmetry of the left middle cerebral artery and left posterior cerebral artery compared to the right.  Dr. Kelley commented that all of this could be a normal variant.  He also put forth the possibility it could relate to cerebral vasoconstriction syndrome.  I did not think that that was clinically relevant.  She does not have any focal neurologic symptoms associated with her headaches.      CURRENT MEDICATIONS:  Topamax 100 mg a day, levothyroxine, birth control pill, Maxalt 10 mg p.r.n., ibuprofen, albuterol, Flonase and Claritin.      PHYSICAL EXAMINATION:    GENERAL:  Reveals she is alert and cooperative.   VITAL SIGNS:  Heart rate is 77.  Blood pressure is 121/80.   NEUROLOGIC:  Funduscopic examination reveals sharp disc margins.  Pupils are equal, round and react well to light.  Visual fields are intact.  Cranial nerves II through XII are intact.  Motor, sensory, cerebellar gait and reflex testing are normal.  Plantar responses are flexor.      IMPRESSION:  Migraine without aura.      PLAN:  While the Topamax has definitely improved her headache frequency, she would like to come off the drug due to the paresthesias she is experiencing.      She will reduce her Topamax dose by 25 mg a week until off.      We discussed a trial of nortriptyline.  I reviewed potential side effects.  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      She is going to start on nortriptyline 10 mg at night for a week, then she can go to 20 mg for a week and then 30 mg depending on her response and tolerance to the drug.      I plan to see her back in 5-6 weeks.         AZALIA PARRISH MD             D: 02/15/2018   T: 02/15/2018   MT: BESSIE      Name:     KYRA MELENDEZ   MRN:      [id number]        Account:      [id number]   :      1990           Visit Date:   02/15/2018      Document: [id number]

## 2018-02-15 NOTE — MR AVS SNAPSHOT
After Visit Summary   2/15/2018    Jennifer Pastrana    MRN: [id number]           Patient Information     Date Of Birth          1990        Visit Information        Provider Department      2/15/2018 4:00 PM Abdi Steward MD [facility]        Today's Diagnoses     Migraine without aura and without status migrainosus, not intractable    -  1       Follow-ups after your visit        Follow-up notes from your care team     Discussed this visit Return in about 6 weeks (around 3/29/2018).      Your next 10 appointments already scheduled     Feb 15, 2018  4:00 PM CST   [facility]ward [facility]   [facility]    [address] [place] [address] [phone]              Who to contact     If you have questions or need follow up information about today's clinic visit or your schedule please contact [facility] directly at [phone].  Normal or non-critical lab and imaging results will be communicated to you by [employer], letter or phone within 4 business days after the clinic has received the results. If you do not hear from us within 7 days, please contact the clinic through [employer] or phone. If you have a critical or abnormal lab result, we will notify you by phone as soon as possible.  Submit refill requests through [employer] or call your pharmacy and they will forward the refill request to us. Please allow 3 business days for your refill to be completed.          Additional Information About Your Visit        MyChart Information     [employer] gives you secure access to your electronic health record. If you see a primary care provider, you can also send messages to your care team and make appointments. If you have questions, please call your primary care clinic.  If you do not have a primary care provider, please call [phone] and they will assist you.      [employer] is an electronic gateway  "that provides easy, online access to your medical records. With [employer], you can request a clinic appointment, read your test results, renew a prescription or communicate with your care team.     To access your existing account, please contact your [facility] or call [phone] for assistance.        Care EveryWhere ID     This is your Care EveryWhere ID. This could be used by other organizations to access your [facility] medical records  [id number]        Your Vitals Were     Pulse Height Last Period Pulse Oximetry BMI (Body Mass Index)       77 1.6 m (5' 3\") 02/04/2018 100% 34.54 kg/m2        Blood Pressure from Last 3 Encounters:   02/15/18 121/80   11/16/17 117/80   10/26/17 118/77    Weight from Last 3 Encounters:   02/15/18 88.5 kg (195 lb)   11/16/17 90.7 kg (200 lb)   10/26/17 88.9 kg (196 lb)              Today, you had the following     No orders found for display         Today's Medication Changes          These changes are accurate as of 2/15/18  3:55 PM.  If you have any questions, ask your nurse or doctor.               Start taking these medicines.        Dose/Directions    nortriptyline 10 MG capsule   Commonly known as:  PAMELOR   Used for:  Migraine without aura and without status migrainosus, not intractable   Started by:  Abdi tSeward MD        One at night x 1 week then 2 at night x 1 week then 3 at night   Quantity:  90 capsule   Refills:  2            Where to get your medicines      These medications were sent to [id number] [facility] [address] [facility]  [address] [facility] [address]    Hours:  24-hours Phone:  [phone]     nortriptyline 10 MG capsule                Primary Care Provider Office Phone # Fax #    Katerin Urrutia PA-C [phone] [address] [facility] [[email or web address]] [id number]        Equal Access to Services     LLOYD MIRELES AH: Hadii salma wang " panchito Contreras, wascarda luqadaha, qaybta kavannessa gonzales, alverto marcelloin hayaan eliseolazara goff ladominiclara tal So [facility] [phone].    ATENCIÓN: Si sharmilala leidy, tiene a patterson disposición servicios gratuitos de asistencia lingüística. Mayo al [phone].    We comply with applicable federal civil rights laws and [place] laws. We do not discriminate on the basis of race, color, national origin, age, disability, sex, sexual orientation, or gender identity.            Thank you!     Thank you for choosing [facility]  for your care. Our goal is always to provide you with excellent care. Hearing back from our patients is one way we can continue to improve our services. Please take a few minutes to complete the written survey that you may receive in the mail after your visit with us. Thank you!             Your Updated Medication List - Protect others around you: Learn how to safely use, store and throw away your medicines at www.disposemymeds.org.          This list is accurate as of 2/15/18  3:55 PM.  Always use your most recent med list.                   Brand Name Dispense Instructions for use Diagnosis    albuterol 108 (90 BASE) MCG/ACT Inhaler    PROAIR HFA/PROVENTIL HFA/VENTOLIN HFA    1 Inhaler    Inhale 2 puffs into the lungs every 6 hours    SOB (shortness of breath)       CLARITIN PO      Take 10 mg by mouth daily Reported on 4/26/2017        fluticasone 50 MCG/ACT spray    FLONASE    1 Bottle    Spray 1-2 sprays into both nostrils daily    Chronic rhinitis       ibuprofen 600 MG tablet    ADVIL/MOTRIN     Take 600 mg by mouth 1-2 daily        levothyroxine 150 MCG tablet    SYNTHROID/LEVOTHROID    90 tablet    TAKE 1 TABLET BY MOUTH ONCE DAILY    Hypothyroidism, unspecified type       norgestimate-ethinyl estradiol 0.25-35 MG-MCG per tablet    ORTHO-CYCLEN, SPRINTEC    90 tablet    Take 1 tablet by mouth daily    Encounter for initial prescription of contraceptive pills       nortriptyline 10  MG capsule    PAMELOR    90 capsule    One at night x 1 week then 2 at night x 1 week then 3 at night    Migraine without aura and without status migrainosus, not intractable       rizatriptan 10 MG ODT tab    MAXALT-MLT    12 tablet    Take 1 tablet (10 mg) by mouth at onset of headache for migraine May repeat in 2 hours. Max 3 tablets/24 hours.    Migraine without aura and without status migrainosus, not intractable

## 2018-03-04 ENCOUNTER — OFFICE VISIT (OUTPATIENT)
Dept: URGENT CARE | Facility: URGENT CARE | Age: 28
End: 2018-03-04
Payer: COMMERCIAL

## 2018-03-04 VITALS
HEART RATE: 113 BPM | BODY MASS INDEX: 33.9 KG/M2 | DIASTOLIC BLOOD PRESSURE: 84 MMHG | SYSTOLIC BLOOD PRESSURE: 121 MMHG | OXYGEN SATURATION: 100 % | TEMPERATURE: 97.4 F | WEIGHT: 191.4 LBS

## 2018-03-04 DIAGNOSIS — J02.0 STREP THROAT: Primary | ICD-10-CM

## 2018-03-04 LAB
DEPRECATED S PYO AG THROAT QL EIA: ABNORMAL
SPECIMEN SOURCE: ABNORMAL

## 2018-03-04 PROCEDURE — 87880 STREP A ASSAY W/OPTIC: CPT | Performed by: INTERNAL MEDICINE

## 2018-03-04 PROCEDURE — 99213 OFFICE O/P EST LOW 20 MIN: CPT | Performed by: INTERNAL MEDICINE

## 2018-03-04 RX ORDER — AMOXICILLIN 400 MG/5ML
875 POWDER, FOR SUSPENSION ORAL 2 TIMES DAILY
Qty: 218 ML | Refills: 0 | Status: SHIPPED | OUTPATIENT
Start: 2018-03-04 | End: 2019-01-31

## 2018-03-04 NOTE — PROGRESS NOTES
SUBJECTIVE:  Jennifer Pastrana is an 27 year old female who presents for sore throat for two days. Has had headaches that will come and go but can be bad.  Some chills and sweats with fever.  Fever up to 101+.  No cough.  Mild nasal congestion.  No n/v/d.  Not eating much as doesn't feel very hungry and hurts to swallow.  No known exposures.  No recent travel.  No skin rashes.         has a past medical history of AR (allergic rhinitis); ASCUS with positive high risk HPV (2/2013); Cervical high risk HPV (human papillomavirus) test positive (2/25/15); Dysplasia of cervix, low grade (MILO 1); Hypothyroidism; and Mood disorder (H).  Social History     Social History     Marital status:      Spouse name: N/A     Number of children: N/A     Years of education: N/A     Social History Main Topics     Smoking status: Never Smoker     Smokeless tobacco: Never Used      Comment: Nonsmoking household     Alcohol use No     Drug use: No     Sexual activity: Yes     Partners: Male     Other Topics Concern     Parent/Sibling W/ Cabg, Mi Or Angioplasty Before 65f 55m? Yes      Service No     Blood Transfusions No     Caffeine Concern No     Occupational Exposure No     Hobby Hazards No     Sleep Concern No     Stress Concern No     Weight Concern Yes     Special Diet No     Back Care Yes     Exercise No     Bike Helmet No     Seat Belt Yes     Self-Exams No     Social History Narrative     Family History   Problem Relation Age of Onset     Breast Cancer Maternal Grandmother      CANCER Maternal Grandmother      breast     HEART DISEASE Maternal Grandfather      50s     Myocardial Infarction Maternal Grandfather      HEART DISEASE Paternal Grandfather      50s     Myocardial Infarction Paternal Grandfather      Thyroid Disease Mother      graves-decompression and strabismus     CANCER Paternal Grandmother      cervical     Thyroid Disease Paternal Aunt      nine affected in family     Glaucoma No family hx of       Macular Degeneration No family hx of      CEREBROVASCULAR DISEASE No family hx of      Hypertension No family hx of      DIABETES No family hx of        ALLERGIES:  Review of patient's allergies indicates no known allergies.    Current Outpatient Prescriptions   Medication     nortriptyline (PAMELOR) 10 MG capsule     levothyroxine (SYNTHROID/LEVOTHROID) 150 MCG tablet     norgestimate-ethinyl estradiol (ORTHO-CYCLEN, SPRINTEC) 0.25-35 MG-MCG per tablet     rizatriptan (MAXALT-MLT) 10 MG ODT tab     ibuprofen (ADVIL/MOTRIN) 600 MG tablet     albuterol (PROAIR HFA/PROVENTIL HFA/VENTOLIN HFA) 108 (90 BASE) MCG/ACT Inhaler     fluticasone (FLONASE) 50 MCG/ACT spray     Loratadine (CLARITIN PO)     No current facility-administered medications for this visit.          ROS:  ROS is done and is negative for general, constitutional, eye, ENT, Respiratory, cardiovascular, GI, , Skin, musculoskeletal except as noted elsewhere.      OBJECTIVE:  /84  Pulse 113  Temp 97.4  F (36.3  C) (Oral)  Wt 191 lb 6.4 oz (86.8 kg)  LMP 02/04/2018  SpO2 100%  BMI 33.9 kg/m2  GENERAL APPEARANCE: Alert, in no acute distress  EYES: normal  EARS: External ears normal. Canals clear. TM's normal.  NOSE:normal  OROPHARYNX:mild erythema, no tonsillar hypertrophy and small exudates present  NECK:No adenopathy,masses or thyromegaly  RESP: normal and clear to auscultation  CV:regular rate and rhythm and no murmurs, clicks, or gallops  ABDOMEN: Abdomen soft, non-tender. BS normal. No masses, organomegaly  SKIN: no ulcers, lesions or rash  MUSCULOSKELETAL:Musculoskeletal normal      RESULTS  Results for orders placed or performed in visit on 03/04/18   Strep, Rapid Screen   Result Value Ref Range    Specimen Description Throat     Rapid Strep A Screen (A)      POSITIVE: Group A Streptococcal antigen detected by immunoassay.   .  No results found for this or any previous visit (from the past 48 hour(s)).    ASSESSMENT/PLAN:    ASSESSMENT /  PLAN:  (J02.0) Strep throat  (primary encounter diagnosis)  Comment: sxs c/w strep and rapid test positive. Pt prefers liquid due to pain in throat.  Plan: Strep, Rapid Screen, amoxicillin (AMOXIL) 400         MG/5ML suspension        Reviewed medication instructions and side effects. Follow up if experiences side effects.. I reviewed supportive care, expected course, and signs of concern.  Follow up as needed or if she does not improve within 5 day(s) or if worsens in any way.  Reviewed red flag symptoms and is to go to the ER if experiences any of these.      See [facility] for orders, medications, letters, patient instructions    Nichole Deleon M.D.

## 2018-03-04 NOTE — MR AVS SNAPSHOT
After Visit Summary   3/4/2018    Jennifer Pastrana    MRN: [id number]           Patient Information     Date Of Birth          1990        Visit Information        Provider Department      3/4/2018 3:50 PM Nichole Deleon MD [facility]        Today's Diagnoses     Strep throat    -  1       Follow-ups after your visit        Follow-up notes from your care team     Return in about 5 days (around 3/9/2018), or if symptoms worsen or fail to improve, for follow up with primary doctor.      Your next 10 appointments already scheduled     Mar 29, 2018  4:00 PM CDT   [facility]    [address] [place] [address] [phone]              Who to contact     If you have questions or need follow up information about today's clinic visit or your schedule please contact [facility] directly at [phone].  Normal or non-critical lab and imaging results will be communicated to you by [employer], letter or phone within 4 business days after the clinic has received the results. If you do not hear from us within 7 days, please contact the clinic through [employer] or phone. If you have a critical or abnormal lab result, we will notify you by phone as soon as possible.  Submit refill requests through [employer] or call your pharmacy and they will forward the refill request to us. Please allow 3 business days for your refill to be completed.          Additional Information About Your Visit        MyChart Information     [employer] gives you secure access to your electronic health record. If you see a primary care provider, you can also send messages to your care team and make appointments. If you have questions, please call your primary care clinic.  If you do not have a primary care provider, please call [phone] and they will assist you.        Care EveryWhere ID     This is your Care  EveryWhere ID. This could be used by other organizations to access your [facility] medical records  [id number]        Your Vitals Were     Pulse Temperature Last Period Pulse Oximetry BMI (Body Mass Index)       113 97.4  F (36.3  C) (Oral) 02/04/2018 100% 33.9 kg/m2        Blood Pressure from Last 3 Encounters:   03/04/18 121/84   02/15/18 121/80   11/16/17 117/80    Weight from Last 3 Encounters:   03/04/18 191 lb 6.4 oz (86.8 kg)   02/15/18 195 lb (88.5 kg)   11/16/17 200 lb (90.7 kg)              We Performed the Following     Strep, Rapid Screen          Today's Medication Changes          These changes are accurate as of 3/4/18  4:31 PM.  If you have any questions, ask your nurse or doctor.               Start taking these medicines.        Dose/Directions    amoxicillin 400 MG/5ML suspension   Commonly known as:  AMOXIL   Used for:  Strep throat   Started by:  Nichole Deleon MD        Dose:  875 mg   Take 10.9 mLs (875 mg) by mouth 2 times daily for 10 days   Quantity:  218 mL   Refills:  0            Where to get your medicines      These medications were sent to [facility] [address] [place] [address] [address] [facility]  [address] [facility] [address]     Phone:  [phone]     amoxicillin 400 MG/5ML suspension                Primary Care Provider Office Phone # Fax #    Nathan Mancera [phone] [address] [facility] [address]        Equal Access to Services     [facility] AH: Hadii aad ku hadashahmet Soolesya, waaxda luqadaha, qaybta kaalmada alverto gonzales. So [facility] [phone].    ATENCIÓN: Si habla español, tiene a patterson disposición servicios gratuitos de asistencia lingüística. Llame al [phone].    We comply with applicable federal civil rights laws and [place] laws. We do not discriminate on the basis of race, color, national origin, age, disability, sex, sexual  orientation, or gender identity.            Thank you!     Thank you for choosing [facility]  for your care. Our goal is always to provide you with excellent care. Hearing back from our patients is one way we can continue to improve our services. Please take a few minutes to complete the written survey that you may receive in the mail after your visit with us. Thank you!             Your Updated Medication List - Protect others around you: Learn how to safely use, store and throw away your medicines at www.disposemymeds.org.          This list is accurate as of 3/4/18  4:31 PM.  Always use your most recent med list.                   Brand Name Dispense Instructions for use Diagnosis    albuterol 108 (90 BASE) MCG/ACT Inhaler    PROAIR HFA/PROVENTIL HFA/VENTOLIN HFA    1 Inhaler    Inhale 2 puffs into the lungs every 6 hours    SOB (shortness of breath)       amoxicillin 400 MG/5ML suspension    AMOXIL    218 mL    Take 10.9 mLs (875 mg) by mouth 2 times daily for 10 days    Strep throat       CLARITIN PO      Take 10 mg by mouth daily Reported on 4/26/2017        fluticasone 50 MCG/ACT spray    FLONASE    1 Bottle    Spray 1-2 sprays into both nostrils daily    Chronic rhinitis       ibuprofen 600 MG tablet    ADVIL/MOTRIN     Take 600 mg by mouth 1-2 daily        levothyroxine 150 MCG tablet    SYNTHROID/LEVOTHROID    90 tablet    TAKE 1 TABLET BY MOUTH ONCE DAILY    Hypothyroidism, unspecified type       norgestimate-ethinyl estradiol 0.25-35 MG-MCG per tablet    ORTHO-CYCLEN, SPRINTEC    90 tablet    Take 1 tablet by mouth daily    Encounter for initial prescription of contraceptive pills       nortriptyline 10 MG capsule    PAMELOR    90 capsule    One at night x 1 week then 2 at night x 1 week then 3 at night    Migraine without aura and without status migrainosus, not intractable       rizatriptan 10 MG ODT tab    MAXALT-MLT    12 tablet    Take 1 tablet (10 mg) by mouth at onset of headache for  migraine May repeat in 2 hours. Max 3 tablets/24 hours.    Migraine without aura and without status migrainosus, not intractable

## 2018-03-04 NOTE — NURSING NOTE
"Chief Complaint   Patient presents with     Ent Problem     dx with flu yesterday using [facility], headache, fever 101-tylenol and ibu       Initial /84  Pulse 113  Temp 97.4  F (36.3  C) (Oral)  Wt 191 lb 6.4 oz (86.8 kg)  LMP 02/04/2018  SpO2 100%  BMI 33.9 kg/m2 Estimated body mass index is 33.9 kg/(m^2) as calculated from the following:    Height as of 2/15/18: 5' 3\" (1.6 m).    Weight as of this encounter: 191 lb 6.4 oz (86.8 kg).  Medication Reconciliation: complete     Adrianne Swanson MA  "

## 2018-04-05 ENCOUNTER — OFFICE VISIT (OUTPATIENT)
Dept: NEUROLOGY | Facility: CLINIC | Age: 28
End: 2018-04-05
Payer: COMMERCIAL

## 2018-04-05 VITALS
HEART RATE: 101 BPM | DIASTOLIC BLOOD PRESSURE: 87 MMHG | OXYGEN SATURATION: 98 % | BODY MASS INDEX: 33.87 KG/M2 | WEIGHT: 191.2 LBS | SYSTOLIC BLOOD PRESSURE: 122 MMHG

## 2018-04-05 DIAGNOSIS — G43.009 MIGRAINE WITHOUT AURA AND WITHOUT STATUS MIGRAINOSUS, NOT INTRACTABLE: ICD-10-CM

## 2018-04-05 PROCEDURE — 99212 OFFICE O/P EST SF 10 MIN: CPT | Performed by: PSYCHIATRY & NEUROLOGY

## 2018-04-05 RX ORDER — NORTRIPTYLINE HCL 10 MG
CAPSULE ORAL
Qty: 270 CAPSULE | Refills: 2 | Status: SHIPPED | OUTPATIENT
Start: 2018-04-05 | End: 2018-10-04

## 2018-04-05 NOTE — NURSING NOTE
"Jennifer Pastrana's goals for this visit include: Migraines doing a little better   She requests these members of her care team be copied on today's visit information: yes    PCP: Nathan Mancera    Referring Provider:  Nathan [place]  [address] [place] [address]    Chief Complaint   Patient presents with     Headache       Initial /87 (BP Location: Left arm, Patient Position: Chair, Cuff Size: Adult Large)  Pulse 101  Wt 86.7 kg (191 lb 3.2 oz)  SpO2 98%  BMI 33.87 kg/m2 Estimated body mass index is 33.87 kg/(m^2) as calculated from the following:    Height as of 2/15/18: 1.6 m (5' 3\").    Weight as of this encounter: 86.7 kg (191 lb 3.2 oz).  Medication Reconciliation: complete    Do you need any medication refills at today's visit? no    "

## 2018-04-05 NOTE — PROGRESS NOTES
Visit Date:   2018      Jennifer Melendez returns for followup of migraine without aura.      SUBJECTIVE:  Since I saw her in February, she has switched off Topamax, which was causing intolerable paresthesias and is now on nortriptyline 30 mg at night.  She is tolerating the drug well.  She feels it is helping her headaches.  She is having 1 headache every week to every other week now.  She typically will take ibuprofen for these, although she has Maxalt.  She has not had to use Maxalt for the past month.      PHYSICAL EXAMINATION:   VITAL SIGNS:  Examination reveals her heart rate is 101, blood pressure 122/87.      IMPRESSION:  Migraine without aura.      PLAN:  I discussed perhaps increasing the nortriptyline dose further, but she is comfortable with how she is doing now.      She will contact me if her headaches become more problematic.  Otherwise, I plan to see her back in 6 months.         AZALIA PARRISH MD             D: 2018   T: 2018   MT: DILIA      Name:     JENNIFER MELENDEZ   MRN:      [id number]        Account:      [id number]   :      1990           Visit Date:   2018      Document: [id number]

## 2018-04-05 NOTE — LETTER
2018         RE: Jennifer Melendez  [address] [facility] [address]        Dear Colleague,    Thank you for referring your patient, Jennifer Melendez, to the [facility]. Please see a copy of my visit note below.    Visit Date:   2018      Jennifer Melendez returns for followup of migraine without aura.      SUBJECTIVE:  Since I saw her in February, she has switched off Topamax, which was causing intolerable paresthesias and is now on nortriptyline 30 mg at night.  She is tolerating the drug well.  She feels it is helping her headaches.  She is having 1 headache every week to every other week now.  She typically will take ibuprofen for these, although she has Maxalt.  She has not had to use Maxalt for the past month.      PHYSICAL EXAMINATION:   VITAL SIGNS:  Examination reveals her heart rate is 101, blood pressure 122/87.      IMPRESSION:  Migraine without aura.      PLAN:  I discussed perhaps increasing the nortriptyline dose further, but she is comfortable with how she is doing now.      She will contact me if her headaches become more problematic.  Otherwise, I plan to see her back in 6 months.         ABDI STEWARD MD             D: 2018   T: 2018   MT: NTS      Name:     JENNIFER MELENDEZ   MRN:      [id number]        Account:      [id number]   :      1990           Visit Date:   2018      Document: [id number]        Again, thank you for allowing me to participate in the care of your patient.        Sincerely,        Abdi Steward MD

## 2018-04-05 NOTE — MR AVS SNAPSHOT
After Visit Summary   4/5/2018    Jennifer Pastrana    MRN: [id number]           Patient Information     Date Of Birth          1990        Visit Information        Provider Department      4/5/2018 8:00 AM Abdi Steward MD [facility]        Today's Diagnoses     Migraine without aura and without status migrainosus, not intractable           Follow-ups after your visit        Follow-up notes from your care team     Discussed this visit Return in about 6 months (around 10/5/2018).      Who to contact     If you have questions or need follow up information about today's clinic visit or your schedule please contact [facility] directly at [phone].  Normal or non-critical lab and imaging results will be communicated to you by Xenetahart, letter or phone within 4 business days after the clinic has received the results. If you do not hear from us within 7 days, please contact the clinic through Xenetahart or phone. If you have a critical or abnormal lab result, we will notify you by phone as soon as possible.  Submit refill requests through [email or web address] or call your pharmacy and they will forward the refill request to us. Please allow 3 business days for your refill to be completed.          Additional Information About Your Visit        MyChart Information     [email or web address] gives you secure access to your electronic health record. If you see a primary care provider, you can also send messages to your care team and make appointments. If you have questions, please call your primary care clinic.  If you do not have a primary care provider, please call [phone] and they will assist you.      [email or web address] is an electronic gateway that provides easy, online access to your medical records. With [email or web address], you can request a clinic appointment, read your test results, renew a prescription or communicate with your care team.     To access your existing account, please contact your [employer]  [facility] or call [phone] for assistance.        Care EveryWhere ID     This is your Care EveryWhere ID. This could be used by other organizations to access your [facility] medical records  [id number]        Your Vitals Were     Pulse Pulse Oximetry BMI (Body Mass Index)             101 98% 33.87 kg/m2          Blood Pressure from Last 3 Encounters:   04/05/18 122/87   03/04/18 121/84   02/15/18 121/80    Weight from Last 3 Encounters:   04/05/18 86.7 kg (191 lb 3.2 oz)   03/04/18 86.8 kg (191 lb 6.4 oz)   02/15/18 88.5 kg (195 lb)              Today, you had the following     No orders found for display         Today's Medication Changes          These changes are accurate as of 4/5/18  8:10 AM.  If you have any questions, ask your nurse or doctor.               These medicines have changed or have updated prescriptions.        Dose/Directions    nortriptyline 10 MG capsule   Commonly known as:  PAMELOR   This may have changed:  additional instructions   Used for:  Migraine without aura and without status migrainosus, not intractable   Changed by:  Abdi Steward MD        3 CAPS at bedtime   Quantity:  270 capsule   Refills:  2            Where to get your medicines      These medications were sent to [facility] [id number] [place] [address] & [facility]  [address] [facility] [address]    Hours:  24-hours Phone:  [phone]     nortriptyline 10 MG capsule                Primary Care Provider Office Phone # Fax #    Nathan Mancera [phone] [address] [facility] [address]        Equal Access to Services     [facility] : Hadii aad ku hadasho Soomaali, waaxda luqadaha, qaybta kaalmada adeegyada, alverto sierra. So [facility] [phone].    ATENCIÓN: Si habla español, tiene a patterson disposición servicios gratuitos de asistencia lingüística. Llame al  [phone].    We comply with applicable federal civil rights laws and [place] laws. We do not discriminate on the basis of race, color, national origin, age, disability, sex, sexual orientation, or gender identity.            Thank you!     Thank you for choosing [facility]  for your care. Our goal is always to provide you with excellent care. Hearing back from our patients is one way we can continue to improve our services. Please take a few minutes to complete the written survey that you may receive in the mail after your visit with us. Thank you!             Your Updated Medication List - Protect others around you: Learn how to safely use, store and throw away your medicines at www.disposemymeds.org.          This list is accurate as of 4/5/18  8:10 AM.  Always use your most recent med list.                   Brand Name Dispense Instructions for use Diagnosis    albuterol 108 (90 BASE) MCG/ACT Inhaler    PROAIR HFA/PROVENTIL HFA/VENTOLIN HFA    1 Inhaler    Inhale 2 puffs into the lungs every 6 hours    SOB (shortness of breath)       CLARITIN PO      Take 10 mg by mouth daily Reported on 4/26/2017        fluticasone 50 MCG/ACT spray    FLONASE    1 Bottle    Spray 1-2 sprays into both nostrils daily    Chronic rhinitis       ibuprofen 600 MG tablet    ADVIL/MOTRIN     Take 600 mg by mouth 1-2 daily        levothyroxine 150 MCG tablet    SYNTHROID/LEVOTHROID    90 tablet    TAKE 1 TABLET BY MOUTH ONCE DAILY    Hypothyroidism, unspecified type       norgestimate-ethinyl estradiol 0.25-35 MG-MCG per tablet    ORTHO-CYCLEN, SPRINTEC    90 tablet    Take 1 tablet by mouth daily    Encounter for initial prescription of contraceptive pills       nortriptyline 10 MG capsule    PAMELOR    270 capsule    3 CAPS at bedtime    Migraine without aura and without status migrainosus, not intractable       rizatriptan 10 MG ODT tab    MAXALT-MLT    12 tablet    Take 1 tablet (10 mg) by mouth at onset of  headache for migraine May repeat in 2 hours. Max 3 tablets/24 hours.    Migraine without aura and without status migrainosus, not intractable

## 2018-04-16 ENCOUNTER — RADIANT APPOINTMENT (OUTPATIENT)
Dept: GENERAL RADIOLOGY | Facility: CLINIC | Age: 28
End: 2018-04-16
Attending: FAMILY MEDICINE
Payer: COMMERCIAL

## 2018-04-16 ENCOUNTER — OFFICE VISIT (OUTPATIENT)
Dept: FAMILY MEDICINE | Facility: CLINIC | Age: 28
End: 2018-04-16
Payer: COMMERCIAL

## 2018-04-16 VITALS
HEART RATE: 96 BPM | OXYGEN SATURATION: 100 % | HEIGHT: 63 IN | TEMPERATURE: 98.5 F | BODY MASS INDEX: 34.45 KG/M2 | DIASTOLIC BLOOD PRESSURE: 82 MMHG | WEIGHT: 194.4 LBS | SYSTOLIC BLOOD PRESSURE: 123 MMHG

## 2018-04-16 DIAGNOSIS — R20.2 PARESTHESIA OF LEFT UPPER EXTREMITY: Primary | ICD-10-CM

## 2018-04-16 LAB
ERYTHROCYTE [DISTWIDTH] IN BLOOD BY AUTOMATED COUNT: 12.7 % (ref 10–15)
HCT VFR BLD AUTO: 40.9 % (ref 35–47)
HGB BLD-MCNC: 13.6 G/DL (ref 11.7–15.7)
MCH RBC QN AUTO: 29.9 PG (ref 26.5–33)
MCHC RBC AUTO-ENTMCNC: 33.3 G/DL (ref 31.5–36.5)
MCV RBC AUTO: 90 FL (ref 78–100)
PLATELET # BLD AUTO: 289 10E9/L (ref 150–450)
RBC # BLD AUTO: 4.55 10E12/L (ref 3.8–5.2)
TSH SERPL DL<=0.005 MIU/L-ACNC: 0.56 MU/L (ref 0.4–4)
VIT B12 SERPL-MCNC: 295 PG/ML (ref 193–986)
WBC # BLD AUTO: 6.8 10E9/L (ref 4–11)

## 2018-04-16 PROCEDURE — 72040 X-RAY EXAM NECK SPINE 2-3 VW: CPT | Mod: FY

## 2018-04-16 PROCEDURE — 36415 COLL VENOUS BLD VENIPUNCTURE: CPT | Performed by: FAMILY MEDICINE

## 2018-04-16 PROCEDURE — 82306 VITAMIN D 25 HYDROXY: CPT | Performed by: FAMILY MEDICINE

## 2018-04-16 PROCEDURE — 85027 COMPLETE CBC AUTOMATED: CPT | Performed by: FAMILY MEDICINE

## 2018-04-16 PROCEDURE — 82607 VITAMIN B-12: CPT | Performed by: FAMILY MEDICINE

## 2018-04-16 PROCEDURE — 84443 ASSAY THYROID STIM HORMONE: CPT | Performed by: FAMILY MEDICINE

## 2018-04-16 PROCEDURE — 99214 OFFICE O/P EST MOD 30 MIN: CPT | Performed by: FAMILY MEDICINE

## 2018-04-16 ASSESSMENT — PATIENT HEALTH QUESTIONNAIRE - PHQ9: 5. POOR APPETITE OR OVEREATING: NOT AT ALL

## 2018-04-16 ASSESSMENT — ANXIETY QUESTIONNAIRES
7. FEELING AFRAID AS IF SOMETHING AWFUL MIGHT HAPPEN: NOT AT ALL
6. BECOMING EASILY ANNOYED OR IRRITABLE: SEVERAL DAYS
IF YOU CHECKED OFF ANY PROBLEMS ON THIS QUESTIONNAIRE, HOW DIFFICULT HAVE THESE PROBLEMS MADE IT FOR YOU TO DO YOUR WORK, TAKE CARE OF THINGS AT HOME, OR GET ALONG WITH OTHER PEOPLE: NOT DIFFICULT AT ALL
5. BEING SO RESTLESS THAT IT IS HARD TO SIT STILL: NOT AT ALL
2. NOT BEING ABLE TO STOP OR CONTROL WORRYING: NOT AT ALL
GAD7 TOTAL SCORE: 1
3. WORRYING TOO MUCH ABOUT DIFFERENT THINGS: NOT AT ALL
1. FEELING NERVOUS, ANXIOUS, OR ON EDGE: NOT AT ALL

## 2018-04-16 NOTE — MR AVS SNAPSHOT
"              After Visit Summary   4/16/2018    Jennifer Pastrana    MRN: [id number]           Patient Information     Date Of Birth          1990        Visit Information        Provider Department      4/16/2018 8:30 AM Ashley Fish MD [facility]        Today's Diagnoses     Paresthesia of left upper extremity    -  1      Care Instructions      Will notify you with results, in the interim take OTC Vitamin B12 1000 mcg/day.    Paraesthesias  Paraesthesia is a burning or prickling sensation that is sometimes felt in the hands, arms, legs or feet. It can also occur in other parts of the body. It can also feel like tingling or numbness, skin crawling, or itching. The feeling is not comfortable, but it is not painful. (The \"pins and needles\" feeling that happens when a foot or hand \"falls asleep\" is a temporary paraesthesia.)  Paraesthesias that last or come and go may be caused by medical issues that need to be treated. These include stroke, a bulging disk pressing on a nerve, a trapped nerve, vitamin deficiencies, or even certain medicines.  Tests are often done. These tests may include blood tests, X-ray, CT (computerized tomography) scan, or a muscle test (electromyography). Depending on the cause, treatment may include physical therapy.  Home care    Tell the healthcare provider about all medicines you take. This includes prescription and over-the-counter medicines, vitamins, and herbs. Ask if any of the medicines may be causing your problems. Do not make any changes to prescription medicines without talking to your healthcare provider first.    You may be prescribed medicines to help relieve the tingling feeling or for pain. Take all medicines as directed.    A numb hand or foot may be more prone to injury. To help protect it:    Always use oven mitts.    Test water with an unaffected hand or foot.    Use caution when trimming nails. File sharp areas.    Wear shoes that fit well to avoid " pressure points, blisters, and ulcers.    Inspect your hands and feet carefully (including the soles of your feet and between your toes) at least once a week. If you see red areas, sores, or other problems, tell your healthcare provider.  Follow-up care  Follow up with your doctor or as advised by our staff. You may need further testing or evaluation.  When to seek medical advice  Call your healthcare provider right away if any of the following occur:    Numbness or weakness of the face, one arm, or one leg    Slurred speech, confusion, trouble speaking, walking, or seeing    Severe headache, fainting spell, dizziness, or seizure    Chest, arm, neck, or upper back pain    Loss of bladder or bowel control    Open wound with redness, swelling, or pus  Date Last Reviewed: 9/25/2015 2000-2017 [employer]. [address] [place] [address]. All rights reserved. This information is not intended as a substitute for professional medical care. Always follow your healthcare professional's instructions.                Follow-ups after your visit        Follow-up notes from your care team     Return in about 2 weeks (around 4/30/2018), or if symptoms worsen or fail to improve.      Your next 10 appointments already scheduled     Oct 04, 2018 10:00 AM CDT   Return Visit with Abdi Steward MD   [facility]    [address] [place] [id number] [phone]              Who to contact     Normal or non-critical lab and imaging results will be communicated to you by MyChart, letter or phone within 4 business days after the clinic has received the results. If you do not hear from us within 7 days, please contact the clinic through MyChart or phone. If you have a critical or abnormal lab result, we will notify you by phone as soon as possible.  Submit refill requests through [employer] or call your pharmacy and they will forward the refill request  "to us. Please allow 3 business days for your refill to be completed.          If you need to speak with a  for additional information , please call: [phone]             Additional Information About Your Visit        [employer] Information     [employer] gives you secure access to your electronic health record. If you see a primary care provider, you can also send messages to your care team and make appointments. If you have questions, please call your primary care clinic.  If you do not have a primary care provider, please call [phone] and they will assist you.        Care EveryWhere ID     This is your Care EveryWhere ID. This could be used by other organizations to access your [place] medical records  [id number]        Your Vitals Were     Pulse Temperature Height Last Period Pulse Oximetry Breastfeeding?    96 98.5  F (36.9  C) (Tympanic) 5' 3\" (1.6 m) 04/02/2018 (Approximate) 100% No    BMI (Body Mass Index)                   34.44 kg/m2            Blood Pressure from Last 3 Encounters:   04/16/18 123/82   04/05/18 122/87   03/04/18 121/84    Weight from Last 3 Encounters:   04/16/18 194 lb 6.4 oz (88.2 kg)   04/05/18 191 lb 3.2 oz (86.7 kg)   03/04/18 191 lb 6.4 oz (86.8 kg)              We Performed the Following     CBC with platelets     TSH with free T4 reflex     Vitamin B12     Vitamin D Deficiency     XR Cervical Spine 2/3 Views        Primary Care Provider Office Phone # Fax #    Nathan Mancera [phone] [address] [facility] [address]        Equal Access to Services     [facility]: Hadii aad felipe hadasho Soolesya, waaxda luqadaha, qaybta kaalmaalverto lange . So [facility] [phone].    ATENCIÓN: Si habla español, tiene a patterson disposición servicios gratuitos de asistencia lingüística. Llame al [phone].    We comply with applicable federal civil rights laws and [place] laws. We do not discriminate on " the basis of race, color, national origin, age, disability, sex, sexual orientation, or gender identity.            Thank you!     Thank you for choosing [facility]  for your care. Our goal is always to provide you with excellent care. Hearing back from our patients is one way we can continue to improve our services. Please take a few minutes to complete the written survey that you may receive in the mail after your visit with us. Thank you!             Your Updated Medication List - Protect others around you: Learn how to safely use, store and throw away your medicines at www.disposemymeds.org.          This list is accurate as of 4/16/18  8:56 AM.  Always use your most recent med list.                   Brand Name Dispense Instructions for use Diagnosis    albuterol 108 (90 Base) MCG/ACT Inhaler    PROAIR HFA/PROVENTIL HFA/VENTOLIN HFA    1 Inhaler    Inhale 2 puffs into the lungs every 6 hours    SOB (shortness of breath)       CLARITIN PO      Take 10 mg by mouth daily Reported on 4/26/2017        fluticasone 50 MCG/ACT spray    FLONASE    1 Bottle    Spray 1-2 sprays into both nostrils daily    Chronic rhinitis       ibuprofen 600 MG tablet    ADVIL/MOTRIN     Take 600 mg by mouth 1-2 daily        levothyroxine 150 MCG tablet    SYNTHROID/LEVOTHROID    90 tablet    TAKE 1 TABLET BY MOUTH ONCE DAILY    Hypothyroidism, unspecified type       norgestimate-ethinyl estradiol 0.25-35 MG-MCG per tablet    ORTHO-CYCLEN, SPRINTEC    90 tablet    Take 1 tablet by mouth daily    Encounter for initial prescription of contraceptive pills       nortriptyline 10 MG capsule    PAMELOR    270 capsule    3 CAPS at bedtime    Migraine without aura and without status migrainosus, not intractable       rizatriptan 10 MG ODT tab    MAXALT-MLT    12 tablet    Take 1 tablet (10 mg) by mouth at onset of headache for migraine May repeat in 2 hours. Max 3 tablets/24 hours.    Migraine without aura and without status  migrainosus, not intractable

## 2018-04-16 NOTE — PROGRESS NOTES
SUBJECTIVE:   Jennifer Pastrana is a 27 year old female who presents to clinic today for the following health issues:      Joint Pain    Onset: x 2 months     Description:   Location: top of left shoulder blade   Character: tingling sensation lasting 2-10 minutes 5x/ day     Intensity: 8/10 worsens with pressure    Progression of Symptoms: tingling episodes are happening more frequently     Accompanying Signs & Symptoms:  Other symptoms: none    History:   Previous similar pain: YES- 2x years ago trigger point. Went through PT for relief in the past while living in [place]     Precipitating factors:   Trauma or overuse: no     Alleviating factors:  Improved by: nothing    Therapies Tried and outcome: none        Denies any known history of chronic neck pain or left upper extremity weakness or numbness.     Right hand dominant.     Problem list and histories reviewed & adjusted, as indicated.  Additional history: as documented    Patient Active Problem List   Diagnosis     CARDIOVASCULAR SCREENING; LDL GOAL LESS THAN 160     Hypothyroidism     Dry eye syndrome- mild     Dysplasia of cervix, low grade (MILO 1)     Generalized anxiety disorder     Supervision of normal first pregnancy     Headache in pregnancy-NEURO referral     Migraine without aura and without status migrainosus, not intractable     Past Surgical History:   Procedure Laterality Date     NO HISTORY OF SURGERY         Social History   Substance Use Topics     Smoking status: Never Smoker     Smokeless tobacco: Never Used      Comment: Nonsmoking household     Alcohol use No     Family History   Problem Relation Age of Onset     Breast Cancer Maternal Grandmother      CANCER Maternal Grandmother      breast     HEART DISEASE Maternal Grandfather      50s     Myocardial Infarction Maternal Grandfather      HEART DISEASE Paternal Grandfather      50s     Myocardial Infarction Paternal Grandfather      Thyroid Disease Mother      graves-decompression  "and strabismus     CANCER Paternal Grandmother      cervical     Thyroid Disease Paternal Aunt      nine affected in family     Glaucoma No family hx of      Macular Degeneration No family hx of      CEREBROVASCULAR DISEASE No family hx of      Hypertension No family hx of      DIABETES No family hx of          Current Outpatient Prescriptions   Medication Sig Dispense Refill     nortriptyline (PAMELOR) 10 MG capsule 3 CAPS at bedtime 270 capsule 2     levothyroxine (SYNTHROID/LEVOTHROID) 150 MCG tablet TAKE 1 TABLET BY MOUTH ONCE DAILY 90 tablet 2     norgestimate-ethinyl estradiol (ORTHO-CYCLEN, SPRINTEC) 0.25-35 MG-MCG per tablet Take 1 tablet by mouth daily 90 tablet 3     ibuprofen (ADVIL/MOTRIN) 600 MG tablet Take 600 mg by mouth 1-2 daily       fluticasone (FLONASE) 50 MCG/ACT spray Spray 1-2 sprays into both nostrils daily 1 Bottle 11     Loratadine (CLARITIN PO) Take 10 mg by mouth daily Reported on 4/26/2017       rizatriptan (MAXALT-MLT) 10 MG ODT tab Take 1 tablet (10 mg) by mouth at onset of headache for migraine May repeat in 2 hours. Max 3 tablets/24 hours. (Patient not taking: Reported on 4/16/2018) 12 tablet 1     albuterol (PROAIR HFA/PROVENTIL HFA/VENTOLIN HFA) 108 (90 BASE) MCG/ACT Inhaler Inhale 2 puffs into the lungs every 6 hours (Patient not taking: Reported on 4/16/2018) 1 Inhaler 2     No Known Allergies    Reviewed and updated as needed this visit by clinical staff       Reviewed and updated as needed this visit by Provider         ROS:  Constitutional, HEENT, cardiovascular, pulmonary, gi and gu systems are negative, except as otherwise noted.    OBJECTIVE:     /82  Pulse 96  Temp 98.5  F (36.9  C) (Tympanic)  Ht 5' 3\" (1.6 m)  Wt 194 lb 6.4 oz (88.2 kg)  LMP 04/02/2018 (Approximate)  SpO2 100%  Breastfeeding? No  BMI 34.44 kg/m2  Body mass index is 34.44 kg/(m^2).  GENERAL: healthy, alert and no distress  HENT: ear canals and TM's normal, nose and mouth without ulcers or " lesions  NECK: no adenopathy, no asymmetry, masses, or scars and thyroid normal to palpation  RESP: lungs clear to auscultation - no rales, rhonchi or wheezes  CV: regular rate and rhythm, normal S1 S2, no S3 or S4, no murmur, click or rub, no peripheral edema and peripheral pulses strong  MS: no gross musculoskeletal defects noted, no edema  NEURO: Normal strength and tone, mentation intact and speech normal    Diagnostic Test Results:  Lab and X ray in process.     ASSESSMENT/PLAN:     Jennifer was seen today for numbness.    Diagnoses and all orders for this visit:    Paresthesia of left upper extremity  -     XR Cervical Spine 2/3 Views  -     Vitamin D Deficiency  -     Vitamin B12  -     TSH with free T4 reflex  -     CBC with platelets      Will notify her with results.   In the interim, take OTC Vitamin B12 1000 mcg daily.     Follow up in 2 weeks, sooner if needed.     Ashley Fish MD  [facility]

## 2018-04-16 NOTE — PATIENT INSTRUCTIONS
"  Will notify you with results, in the interim take OTC Vitamin B12 1000 mcg/day.    Paraesthesias  Paraesthesia is a burning or prickling sensation that is sometimes felt in the hands, arms, legs or feet. It can also occur in other parts of the body. It can also feel like tingling or numbness, skin crawling, or itching. The feeling is not comfortable, but it is not painful. (The \"pins and needles\" feeling that happens when a foot or hand \"falls asleep\" is a temporary paraesthesia.)  Paraesthesias that last or come and go may be caused by medical issues that need to be treated. These include stroke, a bulging disk pressing on a nerve, a trapped nerve, vitamin deficiencies, or even certain medicines.  Tests are often done. These tests may include blood tests, X-ray, CT (computerized tomography) scan, or a muscle test (electromyography). Depending on the cause, treatment may include physical therapy.  Home care    Tell the healthcare provider about all medicines you take. This includes prescription and over-the-counter medicines, vitamins, and herbs. Ask if any of the medicines may be causing your problems. Do not make any changes to prescription medicines without talking to your healthcare provider first.    You may be prescribed medicines to help relieve the tingling feeling or for pain. Take all medicines as directed.    A numb hand or foot may be more prone to injury. To help protect it:    Always use oven mitts.    Test water with an unaffected hand or foot.    Use caution when trimming nails. File sharp areas.    Wear shoes that fit well to avoid pressure points, blisters, and ulcers.    Inspect your hands and feet carefully (including the soles of your feet and between your toes) at least once a week. If you see red areas, sores, or other problems, tell your healthcare provider.  Follow-up care  Follow up with your doctor or as advised by our staff. You may need further testing or evaluation.  When to seek " medical advice  Call your healthcare provider right away if any of the following occur:    Numbness or weakness of the face, one arm, or one leg    Slurred speech, confusion, trouble speaking, walking, or seeing    Severe headache, fainting spell, dizziness, or seizure    Chest, arm, neck, or upper back pain    Loss of bladder or bowel control    Open wound with redness, swelling, or pus  Date Last Reviewed: 9/25/2015 2000-2017 [employer]. [address] [place] [address]. All rights reserved. This information is not intended as a substitute for professional medical care. Always follow your healthcare professional's instructions.

## 2018-04-17 LAB — DEPRECATED CALCIDIOL+CALCIFEROL SERPL-MC: 25 UG/L (ref 20–75)

## 2018-04-17 ASSESSMENT — PATIENT HEALTH QUESTIONNAIRE - PHQ9: SUM OF ALL RESPONSES TO PHQ QUESTIONS 1-9: 3

## 2018-04-17 ASSESSMENT — ANXIETY QUESTIONNAIRES: GAD7 TOTAL SCORE: 1

## 2018-05-03 ENCOUNTER — OFFICE VISIT (OUTPATIENT)
Dept: FAMILY MEDICINE | Facility: CLINIC | Age: 28
End: 2018-05-03
Payer: COMMERCIAL

## 2018-05-03 VITALS
HEART RATE: 89 BPM | HEIGHT: 63 IN | TEMPERATURE: 98.3 F | SYSTOLIC BLOOD PRESSURE: 122 MMHG | DIASTOLIC BLOOD PRESSURE: 86 MMHG | WEIGHT: 187.6 LBS | BODY MASS INDEX: 33.24 KG/M2

## 2018-05-03 DIAGNOSIS — R20.2 PARESTHESIA OF LEFT UPPER EXTREMITY: Primary | ICD-10-CM

## 2018-05-03 PROCEDURE — 99213 OFFICE O/P EST LOW 20 MIN: CPT | Performed by: FAMILY MEDICINE

## 2018-05-03 NOTE — PATIENT INSTRUCTIONS
"  Paraesthesias  Paraesthesia is a burning or prickling sensation that is sometimes felt in the hands, arms, legs or feet. It can also occur in other parts of the body. It can also feel like tingling or numbness, skin crawling, or itching. The feeling is not comfortable, but it is not painful. (The \"pins and needles\" feeling that happens when a foot or hand \"falls asleep\" is a temporary paraesthesia.)  Paraesthesias that last or come and go may be caused by medical issues that need to be treated. These include stroke, a bulging disk pressing on a nerve, a trapped nerve, vitamin deficiencies, or even certain medicines.  Tests are often done. These tests may include blood tests, X-ray, CT (computerized tomography) scan, or a muscle test (electromyography). Depending on the cause, treatment may include physical therapy.  Home care    Tell the healthcare provider about all medicines you take. This includes prescription and over-the-counter medicines, vitamins, and herbs. Ask if any of the medicines may be causing your problems. Do not make any changes to prescription medicines without talking to your healthcare provider first.    You may be prescribed medicines to help relieve the tingling feeling or for pain. Take all medicines as directed.    A numb hand or foot may be more prone to injury. To help protect it:  ? Always use oven mitts.  ? Test water with an unaffected hand or foot.  ? Use caution when trimming nails. File sharp areas.  ? Wear shoes that fit well to avoid pressure points, blisters, and ulcers.  ? Inspect your hands and feet carefully (including the soles of your feet and between your toes) at least once a week. If you see red areas, sores, or other problems, tell your healthcare provider.  Follow-up care  Follow up with your doctor or as advised by our staff. You may need further testing or evaluation.  When to seek medical advice  Call your healthcare provider right away if any of the following " occur:    Numbness or weakness of the face, one arm, or one leg    Slurred speech, confusion, trouble speaking, walking, or seeing    Severe headache, fainting spell, dizziness, or seizure    Chest, arm, neck, or upper back pain    Loss of bladder or bowel control    Open wound with redness, swelling, or pus  Date Last Reviewed: 9/25/2015 2000-2017 [employer]. [address] [place] [address]. All rights reserved. This information is not intended as a substitute for professional medical care. Always follow your healthcare professional's instructions.

## 2018-05-03 NOTE — MR AVS SNAPSHOT
"              After Visit Summary   5/3/2018    Jennifer Pastrana    MRN: [id number]           Patient Information     Date Of Birth          1990        Visit Information        Provider Department      5/3/2018 11:00 AM Ashley Fish MD [facility]        Today's Diagnoses     Paresthesia of left upper extremity    -  1      Care Instructions      Paraesthesias  Paraesthesia is a burning or prickling sensation that is sometimes felt in the hands, arms, legs or feet. It can also occur in other parts of the body. It can also feel like tingling or numbness, skin crawling, or itching. The feeling is not comfortable, but it is not painful. (The \"pins and needles\" feeling that happens when a foot or hand \"falls asleep\" is a temporary paraesthesia.)  Paraesthesias that last or come and go may be caused by medical issues that need to be treated. These include stroke, a bulging disk pressing on a nerve, a trapped nerve, vitamin deficiencies, or even certain medicines.  Tests are often done. These tests may include blood tests, X-ray, CT (computerized tomography) scan, or a muscle test (electromyography). Depending on the cause, treatment may include physical therapy.  Home care    Tell the healthcare provider about all medicines you take. This includes prescription and over-the-counter medicines, vitamins, and herbs. Ask if any of the medicines may be causing your problems. Do not make any changes to prescription medicines without talking to your healthcare provider first.    You may be prescribed medicines to help relieve the tingling feeling or for pain. Take all medicines as directed.    A numb hand or foot may be more prone to injury. To help protect it:  ? Always use oven mitts.  ? Test water with an unaffected hand or foot.  ? Use caution when trimming nails. File sharp areas.  ? Wear shoes that fit well to avoid pressure points, blisters, and ulcers.  ? Inspect your hands and feet carefully " (including the soles of your feet and between your toes) at least once a week. If you see red areas, sores, or other problems, tell your healthcare provider.  Follow-up care  Follow up with your doctor or as advised by our staff. You may need further testing or evaluation.  When to seek medical advice  Call your healthcare provider right away if any of the following occur:    Numbness or weakness of the face, one arm, or one leg    Slurred speech, confusion, trouble speaking, walking, or seeing    Severe headache, fainting spell, dizziness, or seizure    Chest, arm, neck, or upper back pain    Loss of bladder or bowel control    Open wound with redness, swelling, or pus  Date Last Reviewed: 9/25/2015 2000-2017 [employer]. [address] [place] [address]. All rights reserved. This information is not intended as a substitute for professional medical care. Always follow your healthcare professional's instructions.                Follow-ups after your visit        Additional Services     NEUROLOGY ADULT REFERRAL       Your provider has referred you for the following:   [place] [facility] [place] [facility] [phone]   [email or web address]    Please be aware that coverage of these services is subject to the terms and limitations of your health insurance plan.  Call member services at your health plan with any benefit or coverage questions.      Please bring the following with you to your appointment:    (1) Any X-Rays, CTs or MRIs which have been performed.  Contact the facility where they were done to arrange for  prior to your scheduled appointment.    (2) List of current medications  (3) This referral request   (4) Any documents/labs given to you for this referral                  Follow-up notes from your care team     Return if symptoms worsen or fail to improve.      Your next 10 appointments already scheduled     Oct 04, 2018  "10:00 AM CDT   Return Visit with Abdi Steward MD   [facility]    [address] [address] [place] [id number] [phone]              Who to contact     Normal or non-critical lab and imaging results will be communicated to you by v2telhart, letter or phone within 4 business days after the clinic has received the results. If you do not hear from us within 7 days, please contact the clinic through MyChart or phone. If you have a critical or abnormal lab result, we will notify you by phone as soon as possible.  Submit refill requests through Talkray or call your pharmacy and they will forward the refill request to us. Please allow 3 business days for your refill to be completed.          If you need to speak with a  for additional information , please call: [phone]             Additional Information About Your Visit        Talkray Information     Talkray gives you secure access to your electronic health record. If you see a primary care provider, you can also send messages to your care team and make appointments. If you have questions, please call your primary care clinic.  If you do not have a primary care provider, please call [phone] and they will assist you.        Care EveryWhere ID     This is your Care EveryWhere ID. This could be used by other organizations to access your [facility] medical records  [id number]        Your Vitals Were     Pulse Temperature Height Breastfeeding? BMI (Body Mass Index)       89 98.3  F (36.8  C) (Tympanic) 5' 3\" (1.6 m) No 33.23 kg/m2        Blood Pressure from Last 3 Encounters:   05/03/18 122/86   04/16/18 123/82   04/05/18 122/87    Weight from Last 3 Encounters:   05/03/18 187 lb 9.6 oz (85.1 kg)   04/16/18 194 lb 6.4 oz (88.2 kg)   04/05/18 191 lb 3.2 oz (86.7 kg)              We Performed the Following     NEUROLOGY ADULT REFERRAL        Primary Care Provider Office Phone # Fax #    Nathan " Matt Mancera [phone]       [address] [facility] [address]        Equal Access to Services     LLOYD MIRELES : Hadii aad ku hadjulia Contreras, delicia magdarigobertoha, chelsea gilvannessa gonzales, alverto eli eliseolazara goff laFreidafrankie sierra. So [facility] [phone].    ATENCIÓN: Si habla español, tiene a patterson disposición servicios gratuitos de asistencia lingüística. Llame al [phone].    We comply with applicable federal civil rights laws and [place] laws. We do not discriminate on the basis of race, color, national origin, age, disability, sex, sexual orientation, or gender identity.            Thank you!     Thank you for choosing [facility] [place]  for your care. Our goal is always to provide you with excellent care. Hearing back from our patients is one way we can continue to improve our services. Please take a few minutes to complete the written survey that you may receive in the mail after your visit with us. Thank you!             Your Updated Medication List - Protect others around you: Learn how to safely use, store and throw away your medicines at www.disposemymeds.org.          This list is accurate as of 5/3/18 11:22 AM.  Always use your most recent med list.                   Brand Name Dispense Instructions for use Diagnosis    albuterol 108 (90 Base) MCG/ACT Inhaler    PROAIR HFA/PROVENTIL HFA/VENTOLIN HFA    1 Inhaler    Inhale 2 puffs into the lungs every 6 hours    SOB (shortness of breath)       CLARITIN PO      Take 10 mg by mouth daily Reported on 4/26/2017        fluticasone 50 MCG/ACT spray    FLONASE    1 Bottle    Spray 1-2 sprays into both nostrils daily    Chronic rhinitis       ibuprofen 600 MG tablet    ADVIL/MOTRIN     Take 600 mg by mouth 1-2 daily        levothyroxine 150 MCG tablet    SYNTHROID/LEVOTHROID    90 tablet    TAKE 1 TABLET BY MOUTH ONCE DAILY    Hypothyroidism, unspecified type       norgestimate-ethinyl estradiol 0.25-35 MG-MCG per tablet     ORTHO-CYCLEN, SPRINTEC    90 tablet    Take 1 tablet by mouth daily    Encounter for initial prescription of contraceptive pills       nortriptyline 10 MG capsule    PAMELOR    270 capsule    3 CAPS at bedtime    Migraine without aura and without status migrainosus, not intractable       rizatriptan 10 MG ODT tab    MAXALT-MLT    12 tablet    Take 1 tablet (10 mg) by mouth at onset of headache for migraine May repeat in 2 hours. Max 3 tablets/24 hours.    Migraine without aura and without status migrainosus, not intractable

## 2018-05-03 NOTE — PROGRESS NOTES
SUBJECTIVE:   Jennifer Pastrana is a 27 year old female who presents to clinic today for the following health issues:      Paresthesia of left upper extremity- Follow up   Reporting tingling has now started to radiate to right shoulder blade. No significant improvement.   Has been taking Vitamin B12 and Vitamin D as directed.     Right hand dominant.   Has an 8 month old child.   Denies having any neck pain or shoulder/arm pain.     Problem list and histories reviewed & adjusted, as indicated.  Additional history: as documented    Patient Active Problem List   Diagnosis     CARDIOVASCULAR SCREENING; LDL GOAL LESS THAN 160     Hypothyroidism     Dry eye syndrome- mild     Dysplasia of cervix, low grade (MILO 1)     Generalized anxiety disorder     Supervision of normal first pregnancy     Headache in pregnancy-NEURO referral     Migraine without aura and without status migrainosus, not intractable     Past Surgical History:   Procedure Laterality Date     NO HISTORY OF SURGERY         Social History   Substance Use Topics     Smoking status: Never Smoker     Smokeless tobacco: Never Used      Comment: Nonsmoking household     Alcohol use No     Family History   Problem Relation Age of Onset     Breast Cancer Maternal Grandmother      CANCER Maternal Grandmother      breast     HEART DISEASE Maternal Grandfather      50s     Myocardial Infarction Maternal Grandfather      HEART DISEASE Paternal Grandfather      50s     Myocardial Infarction Paternal Grandfather      Thyroid Disease Mother      graves-decompression and strabismus     CANCER Paternal Grandmother      cervical     Thyroid Disease Paternal Aunt      nine affected in family     Glaucoma No family hx of      Macular Degeneration No family hx of      CEREBROVASCULAR DISEASE No family hx of      Hypertension No family hx of      DIABETES No family hx of          Current Outpatient Prescriptions   Medication Sig Dispense Refill     albuterol (PROAIR  "HFA/PROVENTIL HFA/VENTOLIN HFA) 108 (90 BASE) MCG/ACT Inhaler Inhale 2 puffs into the lungs every 6 hours 1 Inhaler 2     fluticasone (FLONASE) 50 MCG/ACT spray Spray 1-2 sprays into both nostrils daily 1 Bottle 11     ibuprofen (ADVIL/MOTRIN) 600 MG tablet Take 600 mg by mouth 1-2 daily       levothyroxine (SYNTHROID/LEVOTHROID) 150 MCG tablet TAKE 1 TABLET BY MOUTH ONCE DAILY 90 tablet 2     Loratadine (CLARITIN PO) Take 10 mg by mouth daily Reported on 4/26/2017       norgestimate-ethinyl estradiol (ORTHO-CYCLEN, SPRINTEC) 0.25-35 MG-MCG per tablet Take 1 tablet by mouth daily 90 tablet 3     nortriptyline (PAMELOR) 10 MG capsule 3 CAPS at bedtime 270 capsule 2     rizatriptan (MAXALT-MLT) 10 MG ODT tab Take 1 tablet (10 mg) by mouth at onset of headache for migraine May repeat in 2 hours. Max 3 tablets/24 hours. 12 tablet 1     No Known Allergies    Reviewed and updated as needed this visit by clinical staff       Reviewed and updated as needed this visit by Provider         ROS:  Constitutional, HEENT, cardiovascular, pulmonary, gi and gu systems are negative, except as otherwise noted.    OBJECTIVE:     /86  Pulse 89  Temp 98.3  F (36.8  C) (Tympanic)  Ht 5' 3\" (1.6 m)  Wt 187 lb 9.6 oz (85.1 kg)  Breastfeeding? No  BMI 33.23 kg/m2  Body mass index is 33.23 kg/(m^2).  GENERAL: healthy, alert and no distress  NECK: no adenopathy, no asymmetry, masses, or scars and thyroid normal to palpation  MS: no gross musculoskeletal defects noted, no edema    Diagnostic Test Results:  Reviewed and discussed with patient prior to discharge.  Results for orders placed or performed in visit on 04/16/18   XR Cervical Spine 2/3 Views    Narrative    XR CERVICAL SPINE 2/3 VWS 4/16/2018 9:29 AM    COMPARISON: None.    HISTORY: Left upper extremity paresthesia.      Impression    IMPRESSION: Straightening of the normal cervical lordosis, may be  positional in nature. Vertebral heights and disc spaces are " preserved  without evidence of fracture. No significant listhesis identified at  any level. No prevertebral soft tissue swelling.    KAREN GILLESPIE MD   Vitamin D Deficiency   Result Value Ref Range    Vitamin D Deficiency screening 25 20 - 75 ug/L   Vitamin B12   Result Value Ref Range    Vitamin B12 295 193 - 986 pg/mL   TSH with free T4 reflex   Result Value Ref Range    TSH 0.56 0.40 - 4.00 mU/L   CBC with platelets   Result Value Ref Range    WBC 6.8 4.0 - 11.0 10e9/L    RBC Count 4.55 3.8 - 5.2 10e12/L    Hemoglobin 13.6 11.7 - 15.7 g/dL    Hematocrit 40.9 35.0 - 47.0 %    MCV 90 78 - 100 fl    MCH 29.9 26.5 - 33.0 pg    MCHC 33.3 31.5 - 36.5 g/dL    RDW 12.7 10.0 - 15.0 %    Platelet Count 289 150 - 450 10e9/L         ASSESSMENT/PLAN:     Jennifer was seen today for shoulder pain.    Diagnoses and all orders for this visit:    Paresthesia of left upper extremity  -     NEUROLOGY ADULT REFERRAL for EEG  In the interim continue Vitamin B12 and Vitamin D supplementation.       Will notify her with results.     Patient education and Handout given       Follow up if symptoms fail to improve or worsen.      The patient was in agreement with the plan today and had no questions or concerns prior to leaving the clinic.      Ashley Fish MD  [facility]

## 2018-06-12 ENCOUNTER — OFFICE VISIT (OUTPATIENT)
Dept: NEUROLOGY | Facility: CLINIC | Age: 28
End: 2018-06-12
Attending: FAMILY MEDICINE
Payer: COMMERCIAL

## 2018-06-12 VITALS
WEIGHT: 188.4 LBS | HEIGHT: 64 IN | BODY MASS INDEX: 32.17 KG/M2 | OXYGEN SATURATION: 99 % | HEART RATE: 90 BPM | DIASTOLIC BLOOD PRESSURE: 80 MMHG | SYSTOLIC BLOOD PRESSURE: 120 MMHG

## 2018-06-12 DIAGNOSIS — R20.9 SENSORY DISORDER: Primary | ICD-10-CM

## 2018-06-12 PROCEDURE — 99214 OFFICE O/P EST MOD 30 MIN: CPT | Performed by: PSYCHIATRY & NEUROLOGY

## 2018-06-12 RX ORDER — DIAZEPAM 5 MG
5 TABLET ORAL SEE ADMIN INSTRUCTIONS
Qty: 2 TABLET | Refills: 0 | Status: SHIPPED | OUTPATIENT
Start: 2018-06-12 | End: 2018-06-29

## 2018-06-12 ASSESSMENT — PAIN SCALES - GENERAL: PAINLEVEL: NO PAIN (0)

## 2018-06-12 NOTE — LETTER
6/12/2018         RE: Jennifer Pastrana  [address] [facility] [address]        Dear Colleague,    Thank you for referring your patient, Jennifer Pastrana, to the [facility]. Please see a copy of my visit note below.    Visit Date:   06/12/2018      This is a patient of Ashley Fish MD, [facility]          This patient is a 27-year-old right-handed woman seen for evaluation of sensory disturbance over the left shoulder and shoulder blade.  She ordinarily sees Dr. Steward for migraine.  She is seen today for a new problem.  She reports the tingling began around January.  She delivered her first child last fall.  This began after that.  She describes it as a pins and needle sensation over the left shoulder and shoulder blade.  It may migrate over to the right on a daily basis intermittently and sporadically, but the main symptom is on the left.  It is not really a pain, but more a sensory disturbance.  Her neck does not bother her too much.  She has not noticed any weakness in the left arm or shoulder.  She does not feel clumsy in the left hand.  She has no symptoms in her legs.  The symptom is worse now than it was when it started, but it has not changed much lately.  She does not have symptoms with regard to vision, hearing, speech or swallowing.  She has never had a problem like this before.  She has no issues with bowel or bladder control.      PAST MEDICAL HISTORY:  Largely noncontributory.  She does not have diabetes, high blood pressure or asthma.  She is on thyroid replacement.  She has not had pertinent surgery or trauma to the head or neck.  She is not pregnant.  She does not drink or smoke.      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      FAMILY HISTORY:  Noncontributory.      PHYSICAL EXAMINATION:   GENERAL:  The patient is cooperative and in no distress.   VITAL SIGNS:  Her blood pressure is 120/80.   NECK:  There are no carotid  bruits.   HEART:  Auscultation of the heart shows S1, S2, without murmur, rub or gallop.   CHEST:  Clear to auscultation.   MUSCULOSKELETAL:  She has fairly good cervical range of motion, although movements to the right can provoke the symptoms in the left shoulder.   CRANIAL NERVES:  Cranial nerve testing shows full visual fields to confrontation.  Funduscopic exam shows sharp discs bilaterally.  Visual acuity is 20/20 bilaterally.  Eye movements are complete and conjugate without nystagmus.  Pupils react to light.  Facies are symmetric.  Tongue protrudes in the midline.   MOTOR:  Evaluation shows no pronator drift, normal finger tapping, finger-nose-finger and heel-knee-shin.  She has good strength in the arms, hands, legs and feet.  Muscle stretch reflexes are low amplitude and symmetric in the arms and legs.  Toes are downgoing.   SENSORY:  Exam shows preserved vibration and temperature as well as pinprick, including testing over the left shoulder blade.     GAIT, STATION AND COORDINATION:  Romberg sign is absent.  She can walk on her heels, toes and tandem.      ASSESSMENT:  Left shoulder sensory disturbance.      DISCUSSION:  This patient is seen for evaluation of sensory disturbance over the left shoulder blade.  There is occasional migration of the symptom over to the right.  Her exam on this point is normal.  I do not have an explanation for her.  I reviewed differential diagnosis including structural and inflammatory lesions that could be a factor.  I am going to obtain an MRI of the cervical spine in this regard.  I will see her in followup to review these results, or she could follow up with Dr. Steward.      She did fill out a medical history form including a 14-point review of systems.  There is nothing to add from this form to the history.         KALE GARCIA MD             D: 06/12/2018   T: 06/12/2018   MT: BASIA      Name:     KYRA MELENDEZ   MRN:      [id number]        Account:      [id number]    :      1990           Visit Date:   2018      Document: [id number]        Again, thank you for allowing me to participate in the care of your patient.        Sincerely,        Gil Wilson MD

## 2018-06-12 NOTE — PROGRESS NOTES
Visit Date:   06/12/2018      This is a patient of Ashley Fish MD, [facility]          This patient is a 27-year-old right-handed woman seen for evaluation of sensory disturbance over the left shoulder and shoulder blade.  She ordinarily sees Dr. Steward for migraine.  She is seen today for a new problem.  She reports the tingling began around January.  She delivered her first child last fall.  This began after that.  She describes it as a pins and needle sensation over the left shoulder and shoulder blade.  It may migrate over to the right on a daily basis intermittently and sporadically, but the main symptom is on the left.  It is not really a pain, but more a sensory disturbance.  Her neck does not bother her too much.  She has not noticed any weakness in the left arm or shoulder.  She does not feel clumsy in the left hand.  She has no symptoms in her legs.  The symptom is worse now than it was when it started, but it has not changed much lately.  She does not have symptoms with regard to vision, hearing, speech or swallowing.  She has never had a problem like this before.  She has no issues with bowel or bladder control.      PAST MEDICAL HISTORY:  Largely noncontributory.  She does not have diabetes, high blood pressure or asthma.  She is on thyroid replacement.  She has not had pertinent surgery or trauma to the head or neck.  She is not pregnant.  She does not drink or smoke.      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      FAMILY HISTORY:  Noncontributory.      PHYSICAL EXAMINATION:   GENERAL:  The patient is cooperative and in no distress.   VITAL SIGNS:  Her blood pressure is 120/80.   NECK:  There are no carotid bruits.   HEART:  Auscultation of the heart shows S1, S2, without murmur, rub or gallop.   CHEST:  Clear to auscultation.   MUSCULOSKELETAL:  She has fairly good cervical range of motion, although movements to the right can provoke the symptoms in  the left shoulder.   CRANIAL NERVES:  Cranial nerve testing shows full visual fields to confrontation.  Funduscopic exam shows sharp discs bilaterally.  Visual acuity is 20/20 bilaterally.  Eye movements are complete and conjugate without nystagmus.  Pupils react to light.  Facies are symmetric.  Tongue protrudes in the midline.   MOTOR:  Evaluation shows no pronator drift, normal finger tapping, finger-nose-finger and heel-knee-shin.  She has good strength in the arms, hands, legs and feet.  Muscle stretch reflexes are low amplitude and symmetric in the arms and legs.  Toes are downgoing.   SENSORY:  Exam shows preserved vibration and temperature as well as pinprick, including testing over the left shoulder blade.     GAIT, STATION AND COORDINATION:  Romberg sign is absent.  She can walk on her heels, toes and tandem.      ASSESSMENT:  Left shoulder sensory disturbance.      DISCUSSION:  This patient is seen for evaluation of sensory disturbance over the left shoulder blade.  There is occasional migration of the symptom over to the right.  Her exam on this point is normal.  I do not have an explanation for her.  I reviewed differential diagnosis including structural and inflammatory lesions that could be a factor.  I am going to obtain an MRI of the cervical spine in this regard.  I will see her in followup to review these results, or she could follow up with Dr. Steward.      She did fill out a medical history form including a 14-point review of systems.  There is nothing to add from this form to the history.         KALE GARCIA MD             D: 2018   T: 2018   MT: BASIA      Name:     KYRA MELENDEZ   MRN:      [id number]        Account:      [id number]   :      1990           Visit Date:   2018      Document: [id number]          6/15 addendum: reviewed normal MRI cervical spine with pt. F/u as sched.

## 2018-06-12 NOTE — NURSING NOTE
"Jennifer Pastrana's goals for this visit include:   She requests these members of her care team be copied on today's visit information:     PCP: Nathan Mancera    Referring Provider:  Ashley Fish MD  [address] [place] [address]    /80  Pulse 90  Ht 1.626 m (5' 4\")  Wt 85.5 kg (188 lb 6.4 oz)  SpO2 99%  BMI 32.34 kg/m2   "

## 2018-06-12 NOTE — MR AVS SNAPSHOT
After Visit Summary   6/12/2018    Jennifer Pastrana    MRN: [id number]           Patient Information     Date Of Birth          1990        Visit Information        Provider Department      6/12/2018 1:30 PM Gil Wilson MD [facility]        Today's Diagnoses     Sensory disorder    -  1       Follow-ups after your visit        Follow-up notes from your care team     Return in about 4 weeks (around 7/10/2018).      Your next 10 appointments already scheduled     Jun 14, 2018  1:15 PM CDT   MR CERVICAL SPINE W/O & W CONTRAST with BEMR[facility]   [facility]    [address] [id number] [phone]           Take your medicines as usual, unless your doctor tells you not to. Bring a list of your current medicines to your exam (including vitamins, minerals and over-the-counter drugs).  You may or may not receive intravenous (IV) contrast for this exam pending the discretion of the Radiologist.  You do not need to do anything special to prepare.  The MRI machine uses a strong magnet. Please wear clothes without metal (snaps, zippers). A sweatsuit works well, or we may give you a hospital gown.  Please remove any body piercings and hair extensions before you arrive. You will also remove watches, jewelry, hairpins, wallets, dentures, partial dental plates and hearing aids. You may wear contact lenses, and you may be able to wear your rings. We have a safe place to keep your personal items, but it is safer to leave them at home.  **IMPORTANT** THE INSTRUCTIONS BELOW ARE ONLY FOR THOSE PATIENTS WHO HAVE BEEN PRESCRIBED SEDATION OR GENERAL ANESTHESIA DURING THEIR MRI PROCEDURE:  IF YOUR DOCTOR PRESCRIBED ORAL SEDATION (take medicine to help you relax during your exam):   You must get the medicine from your doctor (oral medication) before you arrive. Bring the medicine to the exam. Do not take it at home. You ll be told when to  take it upon arriving for your exam.   Arrive one hour early. Bring someone who can take you home after the test. Your medicine will make you sleepy. After the exam, you may not drive, take a bus or take a taxi by yourself.  IF YOUR DOCTOR PRESCRIBED IV SEDATION:   Arrive one hour early. Bring someone who can take you home after the test. Your medicine will make you sleepy. After the exam, you may not drive, take a bus or take a taxi by yourself.   No eating 6 hours before your exam. You may have clear liquids up until 4 hours before your exam. (Clear liquids include water, clear tea, black coffee and fruit juice without pulp.)  IF YOUR DOCTOR PRESCRIBED ANESTHESIA (be asleep for your exam):   Arrive 1 1/2 hours early. Bring someone who can take you home after the test. You may not drive, take a bus or take a taxi by yourself.   No eating 8 hours before your exam. You may have clear liquids up until 4 hours before your exam. (Clear liquids include water, clear tea, black coffee and fruit juice without pulp.)   You will spend four to five hours in the recovery room.  Please call the Imaging Department at your exam site with any questions.            Jul 10, 2018  3:00 PM CDT   [facility] Visit with Gil Wilson MD   [facility]    [address] [place] [address]   [phone]            Oct 04, 2018 10:00 AM CDT   Return Visit with Abdi Steward MD   [facility]    [address] [place] [address]   [phone]              Future tests that were ordered for you today     Open Future Orders        Priority Expected Expires Ordered    MR Cervical Spine w/o & w Contrast Routine  6/12/2019 6/12/2018            Who to contact     If you have questions or need follow up information about today's clinic visit or your schedule please contact [facility] directly at  "[phone].  Normal or non-critical lab and imaging results will be communicated to you by Stickybitshart, letter or phone within 4 business days after the clinic has received the results. If you do not hear from us within 7 days, please contact the clinic through Yapertt or phone. If you have a critical or abnormal lab result, we will notify you by phone as soon as possible.  Submit refill requests through SeerGate or call your pharmacy and they will forward the refill request to us. Please allow 3 business days for your refill to be completed.          Additional Information About Your Visit        SeerGate Information     SeerGate gives you secure access to your electronic health record. If you see a primary care provider, you can also send messages to your care team and make appointments. If you have questions, please call your primary care clinic.  If you do not have a primary care provider, please call [phone] and they will assist you.      SeerGate is an electronic gateway that provides easy, online access to your medical records. With SeerGate, you can request a clinic appointment, read your test results, renew a prescription or communicate with your care team.     To access your existing account, please contact your [facility] or call [phone] for assistance.        Care EveryWhere ID     This is your Care EveryWhere ID. This could be used by other organizations to access your [facility] medical records  [id number]        Your Vitals Were     Pulse Height Pulse Oximetry BMI (Body Mass Index)          90 1.626 m (5' 4\") 99% 32.34 kg/m2         Blood Pressure from Last 3 Encounters:   06/12/18 120/80   05/03/18 122/86   04/16/18 123/82    Weight from Last 3 Encounters:   06/12/18 85.5 kg (188 lb 6.4 oz)   05/03/18 85.1 kg (187 lb 9.6 oz)   04/16/18 88.2 kg (194 lb 6.4 oz)                 Today's Medication Changes          These changes are accurate as of 6/12/18  2:06 PM.  If " you have any questions, ask your nurse or doctor.               Start taking these medicines.        Dose/Directions    diazepam 5 MG tablet   Commonly known as:  VALIUM   Used for:  Sensory disorder   Started by:  Gil Wilson MD        Dose:  5 mg   Take 1 tablet (5 mg) by mouth See Admin Instructions One po 30 minutes before MRI.  May repeat dose at time of MRI.   Quantity:  2 tablet   Refills:  0            Where to get your medicines      Some of these will need a paper prescription and others can be bought over the counter.  Ask your nurse if you have questions.     Bring a paper prescription for each of these medications     diazepam 5 MG tablet                Primary Care Provider Office Phone # Fax #    Nathan Matt Mancera [phone] [address] [facility] [address]        Equal Access to Services     [facility] : Hadii salma wang hadasho Soomaali, waaxda luqadaha, qaybta kaalmada adeegyada, waxsriram nuñez . So [facility] [phone].    ATENCIÓN: Si habla español, tiene a patterson disposición servicios gratuitos de asistencia lingüística. [facility] [phone].    We comply with applicable federal civil rights laws and [place] laws. We do not discriminate on the basis of race, color, national origin, age, disability, sex, sexual orientation, or gender identity.            Thank you!     Thank you for choosing [facility]  for your care. Our goal is always to provide you with excellent care. Hearing back from our patients is one way we can continue to improve our services. Please take a few minutes to complete the written survey that you may receive in the mail after your visit with us. Thank you!             Your Updated Medication List - Protect others around you: Learn how to safely use, store and throw away your medicines at www.disposemymeds.org.          This list is accurate as of 6/12/18  2:06 PM.  Always use your most recent med  list.                   Brand Name Dispense Instructions for use Diagnosis    albuterol 108 (90 Base) MCG/ACT Inhaler    PROAIR HFA/PROVENTIL HFA/VENTOLIN HFA    1 Inhaler    Inhale 2 puffs into the lungs every 6 hours    SOB (shortness of breath)       CLARITIN PO      Take 10 mg by mouth daily Reported on 4/26/2017        diazepam 5 MG tablet    VALIUM    2 tablet    Take 1 tablet (5 mg) by mouth See Admin Instructions One po 30 minutes before MRI.  May repeat dose at time of MRI.    Sensory disorder       fluticasone 50 MCG/ACT spray    FLONASE    1 Bottle    Spray 1-2 sprays into both nostrils daily    Chronic rhinitis       ibuprofen 600 MG tablet    ADVIL/MOTRIN     Take 600 mg by mouth 1-2 daily        levothyroxine 150 MCG tablet    SYNTHROID/LEVOTHROID    90 tablet    TAKE 1 TABLET BY MOUTH ONCE DAILY    Hypothyroidism, unspecified type       norgestimate-ethinyl estradiol 0.25-35 MG-MCG per tablet    ORTHO-CYCLEN, SPRINTEC    90 tablet    Take 1 tablet by mouth daily    Encounter for initial prescription of contraceptive pills       nortriptyline 10 MG capsule    PAMELOR    270 capsule    3 CAPS at bedtime    Migraine without aura and without status migrainosus, not intractable       rizatriptan 10 MG ODT tab    MAXALT-MLT    12 tablet    Take 1 tablet (10 mg) by mouth at onset of headache for migraine May repeat in 2 hours. Max 3 tablets/24 hours.    Migraine without aura and without status migrainosus, not intractable

## 2018-06-14 ENCOUNTER — RADIANT APPOINTMENT (OUTPATIENT)
Dept: MRI IMAGING | Facility: CLINIC | Age: 28
End: 2018-06-14
Attending: PSYCHIATRY & NEUROLOGY
Payer: COMMERCIAL

## 2018-06-14 ENCOUNTER — TRANSFERRED RECORDS (OUTPATIENT)
Dept: HEALTH INFORMATION MANAGEMENT | Facility: CLINIC | Age: 28
End: 2018-06-14

## 2018-06-14 DIAGNOSIS — R20.9 SENSORY DISORDER: ICD-10-CM

## 2018-06-14 PROCEDURE — A9585 GADOBUTROL INJECTION: HCPCS | Mod: JW

## 2018-06-14 PROCEDURE — 72156 MRI NECK SPINE W/O & W/DYE: CPT | Mod: TC

## 2018-06-14 RX ORDER — GADOBUTROL 604.72 MG/ML
10 INJECTION INTRAVENOUS ONCE
Status: COMPLETED | OUTPATIENT
Start: 2018-06-14 | End: 2018-06-14

## 2018-06-14 RX ADMIN — GADOBUTROL 8.5 ML: 604.72 INJECTION INTRAVENOUS at 13:45

## 2018-06-29 ENCOUNTER — OFFICE VISIT (OUTPATIENT)
Dept: FAMILY MEDICINE | Facility: CLINIC | Age: 28
End: 2018-06-29
Payer: COMMERCIAL

## 2018-06-29 VITALS
SYSTOLIC BLOOD PRESSURE: 127 MMHG | OXYGEN SATURATION: 100 % | BODY MASS INDEX: 32.2 KG/M2 | HEART RATE: 99 BPM | TEMPERATURE: 97.5 F | WEIGHT: 188.6 LBS | HEIGHT: 64 IN | DIASTOLIC BLOOD PRESSURE: 82 MMHG

## 2018-06-29 DIAGNOSIS — M79.672 LEFT FOOT PAIN: Primary | ICD-10-CM

## 2018-06-29 DIAGNOSIS — J30.2 CHRONIC SEASONAL ALLERGIC RHINITIS, UNSPECIFIED TRIGGER: ICD-10-CM

## 2018-06-29 PROCEDURE — 99214 OFFICE O/P EST MOD 30 MIN: CPT | Performed by: FAMILY MEDICINE

## 2018-06-29 RX ORDER — FLUTICASONE PROPIONATE 50 MCG
1-2 SPRAY, SUSPENSION (ML) NASAL DAILY
Qty: 1 BOTTLE | Refills: 11 | Status: SHIPPED | OUTPATIENT
Start: 2018-06-29 | End: 2019-07-30

## 2018-06-29 NOTE — MR AVS SNAPSHOT
After Visit Summary   6/29/2018    Jennifer Pastrana    MRN: [id number]           Patient Information     Date Of Birth          1990        Visit Information        Provider Department      6/29/2018 2:00 PM Ashley Fish MD [facility]        Today's Diagnoses     Left foot pain    -  1    Chronic seasonal allergic rhinitis, unspecified trigger           Follow-ups after your visit        Additional Services     PODIATRY/FOOT & ANKLE SURGERY REFERRAL       Your provider has referred you to: G: [facility] [phone]   [email or web address]    Please be aware that coverage of these services is subject to the terms and limitations of your health insurance plan.  Call member services at your health plan with any benefit or coverage questions.      Please bring the following to your appointment:  >>   Any x-rays, CTs or MRIs which have been performed.  Contact the facility where they were done to arrange for  prior to your scheduled appointment.    >>   List of current medications   >>   This referral request   >>   Any documents/labs given to you for this referral                  Follow-up notes from your care team     Return if symptoms worsen or fail to improve.      Your next 10 appointments already scheduled     Jul 10, 2018  3:00 PM CDT   Return Visit with Gil Wilson MD   [facility])    [address] [place] [id number] [phone]            Oct 04, 2018 10:00 AM CDT   Return Visit with Abdi Steward MD   [facility])    [address] [place] [id number] [phone]              Who to contact     Normal or non-critical lab and imaging results will be communicated to you by MyChart, letter or phone within 4 business days after the clinic has received the results. If you do not hear from  "us within 7 days, please contact the clinic through CheckInPage or phone. If you have a critical or abnormal lab result, we will notify you by phone as soon as possible.  Submit refill requests through CheckInPage or call your pharmacy and they will forward the refill request to us. Please allow 3 business days for your refill to be completed.          If you need to speak with a  for additional information , please call: [phone]             Additional Information About Your Visit        CheckInPage Information     CheckInPage gives you secure access to your electronic health record. If you see a primary care provider, you can also send messages to your care team and make appointments. If you have questions, please call your primary care clinic.  If you do not have a primary care provider, please call [phone] and they will assist you.        Care EveryWhere ID     This is your Care EveryWhere ID. This could be used by other organizations to access your [place] medical records  [id number]        Your Vitals Were     Pulse Temperature Height Last Period Pulse Oximetry Breastfeeding?    99 97.5  F (36.4  C) (Tympanic) 5' 4\" (1.626 m) 06/16/2018 100% No    BMI (Body Mass Index)                   32.37 kg/m2            Blood Pressure from Last 3 Encounters:   06/29/18 127/82   06/12/18 120/80   05/03/18 122/86    Weight from Last 3 Encounters:   06/29/18 188 lb 9.6 oz (85.5 kg)   06/12/18 188 lb 6.4 oz (85.5 kg)   05/03/18 187 lb 9.6 oz (85.1 kg)              We Performed the Following     PODIATRY/FOOT & ANKLE SURGERY REFERRAL          Today's Medication Changes          These changes are accurate as of 6/29/18  2:47 PM.  If you have any questions, ask your nurse or doctor.               Start taking these medicines.        Dose/Directions    diclofenac 1 % Gel topical gel   Commonly known as:  VOLTAREN   Used for:  Left foot pain   Started by:  Ashley Fish MD        Apply  2 grams to hands four " times daily using enclosed dosing card.   Quantity:  100 g   Refills:  0            Where to get your medicines      These medications were sent to [id number] - [place] - [address] [address] [facility]  [address] [address] [place] [address]    Hours:  24-hours Phone:  [phone]     diclofenac 1 % Gel topical gel    fluticasone 50 MCG/ACT spray                Primary Care Provider Office Phone # Fax #    Nathan Matt Mancera [phone] [address] [facility] [address]        Equal Access to Services     [facility]: Hadii aad ku hadasho Soomaali, waaxda luqadaha, qaybta kaalmada adeegyada, alverto rodriguez hayfrankie nuñez . So [facility] [phone].    ATENCIÓN: Si habla español, tiene a patterson disposición servicios gratuitos de asistencia lingüística. Ll[facility] [phone].    We comply with applicable federal civil rights laws and [place] laws. We do not discriminate on the basis of race, color, national origin, age, disability, sex, sexual orientation, or gender identity.            Thank you!     Thank you for choosing [facility]  for your care. Our goal is always to provide you with excellent care. Hearing back from our patients is one way we can continue to improve our services. Please take a few minutes to complete the written survey that you may receive in the mail after your visit with us. Thank you!             Your Updated Medication List - Protect others around you: Learn how to safely use, store and throw away your medicines at www.disposemymeds.org.          This list is accurate as of 6/29/18  2:47 PM.  Always use your most recent med list.                   Brand Name Dispense Instructions for use Diagnosis    albuterol 108 (90 Base) MCG/ACT Inhaler    PROAIR HFA/PROVENTIL HFA/VENTOLIN HFA    1 Inhaler    Inhale 2 puffs into the lungs every 6 hours    SOB (shortness of breath)       CLARITIN PO       Take 10 mg by mouth daily Reported on 4/26/2017        diclofenac 1 % Gel topical gel    VOLTAREN    100 g    Apply  2 grams to hands four times daily using enclosed dosing card.    Left foot pain       fluticasone 50 MCG/ACT spray    FLONASE    1 Bottle    Spray 1-2 sprays into both nostrils daily    Chronic seasonal allergic rhinitis, unspecified trigger       levothyroxine 150 MCG tablet    SYNTHROID/LEVOTHROID    90 tablet    TAKE 1 TABLET BY MOUTH ONCE DAILY    Hypothyroidism, unspecified type       norgestimate-ethinyl estradiol 0.25-35 MG-MCG per tablet    ORTHO-CYCLEN, SPRINTEC    90 tablet    Take 1 tablet by mouth daily    Encounter for initial prescription of contraceptive pills       nortriptyline 10 MG capsule    PAMELOR    270 capsule    3 CAPS at bedtime    Migraine without aura and without status migrainosus, not intractable       rizatriptan 10 MG ODT tab    MAXALT-MLT    12 tablet    Take 1 tablet (10 mg) by mouth at onset of headache for migraine May repeat in 2 hours. Max 3 tablets/24 hours.    Migraine without aura and without status migrainosus, not intractable

## 2018-06-29 NOTE — PROGRESS NOTES
SUBJECTIVE:   Jennifer Pastrana is a 27 year old female who presents to clinic today for the following health issues:      Joint Pain    Onset: x1-2 weeks     Description:   Location: left foot, medial aspect   Character: dull, burning    Intensity: 7-8/10    Progression of Symptoms: same    Accompanying Signs & Symptoms:  Other symptoms: none    History:   Previous similar pain: YES- April 2016 dx with tendonitis and enlarged bone in left foot.  Prior imaging done podiatrist in [place]- LINDSAY was faxed for reports.     Precipitating factors:   Trauma or overuse: no     Alleviating factors:  Improved by: ice    Therapies Tried and outcome: Previous treatments with initial ankle pain were as follows: Voltaren gel, Celebrex (home use)  Had 2 depo medrol injections about x1 month apart- no flares until now since August 2016 after 2nd injection.         Requesting a refill on Flonase for her chronic allergic rhinitis symptoms.     Problem list and histories reviewed & adjusted, as indicated.  Additional history: as documented    Patient Active Problem List   Diagnosis     CARDIOVASCULAR SCREENING; LDL GOAL LESS THAN 160     Hypothyroidism     Dry eye syndrome- mild     Dysplasia of cervix, low grade (MILO 1)     Generalized anxiety disorder     Supervision of normal first pregnancy     Headache in pregnancy-NEURO referral     Migraine without aura and without status migrainosus, not intractable     Past Surgical History:   Procedure Laterality Date     NO HISTORY OF SURGERY         Social History   Substance Use Topics     Smoking status: Never Smoker     Smokeless tobacco: Never Used      Comment: Nonsmoking household     Alcohol use No     Family History   Problem Relation Age of Onset     Breast Cancer Maternal Grandmother      Cancer Maternal Grandmother      breast     HEART DISEASE Maternal Grandfather      50s     Myocardial Infarction Maternal Grandfather      HEART DISEASE Paternal Grandfather      50s      "Myocardial Infarction Paternal Grandfather      Thyroid Disease Mother      graves-decompression and strabismus     Cancer Paternal Grandmother      cervical     Thyroid Disease Paternal Aunt      nine affected in family     Glaucoma No family hx of      Macular Degeneration No family hx of      Cerebrovascular Disease No family hx of      Hypertension No family hx of      Diabetes No family hx of          Current Outpatient Prescriptions   Medication Sig Dispense Refill     albuterol (PROAIR HFA/PROVENTIL HFA/VENTOLIN HFA) 108 (90 BASE) MCG/ACT Inhaler Inhale 2 puffs into the lungs every 6 hours 1 Inhaler 2     diclofenac (VOLTAREN) 1 % GEL topical gel Apply  2 grams to hands four times daily using enclosed dosing card. 100 g 0     fluticasone (FLONASE) 50 MCG/ACT spray Spray 1-2 sprays into both nostrils daily 1 Bottle 11     levothyroxine (SYNTHROID/LEVOTHROID) 150 MCG tablet TAKE 1 TABLET BY MOUTH ONCE DAILY 90 tablet 2     Loratadine (CLARITIN PO) Take 10 mg by mouth daily Reported on 4/26/2017       norgestimate-ethinyl estradiol (ORTHO-CYCLEN, SPRINTEC) 0.25-35 MG-MCG per tablet Take 1 tablet by mouth daily 90 tablet 3     nortriptyline (PAMELOR) 10 MG capsule 3 CAPS at bedtime 270 capsule 2     rizatriptan (MAXALT-MLT) 10 MG ODT tab Take 1 tablet (10 mg) by mouth at onset of headache for migraine May repeat in 2 hours. Max 3 tablets/24 hours. 12 tablet 1     No Known Allergies    Reviewed and updated as needed this visit by clinical staff       Reviewed and updated as needed this visit by Provider         ROS:  Constitutional, HEENT, cardiovascular, pulmonary, gi and gu systems are negative, except as otherwise noted.    OBJECTIVE:     /82  Pulse 99  Temp 97.5  F (36.4  C) (Tympanic)  Ht 5' 4\" (1.626 m)  Wt 188 lb 9.6 oz (85.5 kg)  LMP 06/16/2018  SpO2 100%  Breastfeeding? No  BMI 32.37 kg/m2  Body mass index is 32.37 kg/(m^2).  GENERAL: healthy, alert and no distress  MS: no gross " musculoskeletal defects noted, no edema.   Left Foot: Tenderness on the medial aspect, no overlying skin changes. No cyanosis. Pedal pulse present. FROM.     Diagnostic Test Results:  none     ASSESSMENT/PLAN:     Jennifer was seen today for ankle pain.    Diagnoses and all orders for this visit:    Left foot pain  -     diclofenac (VOLTAREN) 1 % GEL topical gel; Apply  2 grams to hands four times daily using enclosed dosing card.  -     PODIATRY/FOOT & ANKLE SURGERY REFERRAL    Chronic seasonal allergic rhinitis, unspecified trigger  -  Refill:  fluticasone (FLONASE) 50 MCG/ACT spray; Spray 1-2 sprays into both nostrils daily         Follow up if symptoms fail to improve or worsen.      The patient was in agreement with the plan today and had no questions or concerns prior to leaving the clinic.        Ashley Fish MD  [facility]

## 2018-07-10 ENCOUNTER — OFFICE VISIT (OUTPATIENT)
Dept: NEUROLOGY | Facility: CLINIC | Age: 28
End: 2018-07-10
Payer: COMMERCIAL

## 2018-07-10 VITALS
SYSTOLIC BLOOD PRESSURE: 120 MMHG | DIASTOLIC BLOOD PRESSURE: 97 MMHG | HEIGHT: 64 IN | HEART RATE: 94 BPM | WEIGHT: 190 LBS | BODY MASS INDEX: 32.44 KG/M2 | OXYGEN SATURATION: 95 %

## 2018-07-10 DIAGNOSIS — R20.9 SENSORY DISORDER: Primary | ICD-10-CM

## 2018-07-10 PROCEDURE — 99213 OFFICE O/P EST LOW 20 MIN: CPT | Performed by: PSYCHIATRY & NEUROLOGY

## 2018-07-10 ASSESSMENT — PAIN SCALES - GENERAL: PAINLEVEL: MODERATE PAIN (4)

## 2018-07-10 NOTE — LETTER
7/10/2018         RE: Jennifer Pastrana  [address] [facility] [address]        Dear Colleague,    Thank you for referring your patient, Jennifer Pastrana, to the [facility]. Please see a copy of my visit note below.    Visit Date:   07/10/2018      NEUROLOGY CONSULTATION      PRIMARY CARE PHYSICIAN:  Dr. Ashley Fish      HISTORY OF PRESENT ILLNESS:  This patient is seen in followup with sensory disturbance in the left shoulder blade.  I initially saw this patient about a month ago.  She now has some aching in the right shoulder as well, but that is only in the last few days.  The symptoms in the left shoulder began about January and escalated thereafter.  She does have some issues with lifting with her shoulders.  She does not have much in the way of neck pain, but more the sensory disturbance in the left shoulder blade.      I did review her medications with her.  She is on nortriptyline 30 mg at night for migraine prophylaxis and rizatriptan as needed.  She has a prescription for diclofenac gel for foot pain but uses it only as needed.      PHYSICAL EXAMINATION:   GENERAL:  The patient is cooperative and in no distress.   VITAL SIGNS:  Her blood pressure is 120/97.   MUSCULOSKELETAL:  She has good cervical range of motion.  She has no pronator drift.  Finger tapping is normal.  She has well-preserved strength in the arms and hands.   NEUROLOGIC:  There is no scapular winging.  Muscle stretch reflexes in the arms are low amplitude and symmetric.      She did have an MRI of the cervical spine performed.  I have reviewed the images with her.  There is disk bulging at C3-C4 without compromise of neural structures.  There is no evidence of demyelination.      ASSESSMENT:   1.  Sensory disturbance, left shoulder blade.      DISCUSSION:  This patient is seen in follow-up for sensory disturbance in the left shoulder  region.  Her exam is unremarkable.  I do not have a good explanation for this  symptom.  It is sometimes symptomatic to the point where it might affect some of her activities, such as lifting.  In terms of further intervention, I am going to have her evaluated by physical therapy to see if they may have any recommendations or treatments or exercises for her.  I will keep her neurologic status monitored for now and see her in followup on an as needed basis.         KALE GARCIA MD             D: 07/10/2018   T: 07/10/2018   MT: NTS      Name:     KYRA MELENDEZ   MRN:      [id number]        Account:      [id number]   :      1990           Visit Date:   07/10/2018      Document: [id number]       Again, thank you for allowing me to participate in the care of your patient.        Sincerely,        Kale Garcia MD

## 2018-07-10 NOTE — MR AVS SNAPSHOT
"              After Visit Summary   7/10/2018    Jennifer Pastrana    MRN: [id number]           Patient Information     Date Of Birth          1990        Visit Information        Provider Department      7/10/2018 3:00 PM Gil Wilson MD [facility]        Today's Diagnoses     Sensory disorder    -  1       Follow-ups after your visit        Additional Services     PHYSICAL THERAPY REFERRAL       *This therapy referral will be filtered to a centralized scheduling office at [facility] and the patient will receive a call to schedule an appointment at a [facility] location most convenient for them. *     [facility] provides Physical Therapy evaluation and treatment and many specialty services across the [facility] system.  If requesting a specialty program, please choose from the list below.    If you have not heard from the scheduling office within 2 business days, please call [phone] for all locations, with the exception of [place], please call [phone] and [facility], please call [phone]  Treatment: Evaluation & Treatment  Special Instructions/Modalities: none  Special Programs: None  Left shoulder blade numbness, disc bulge C3-4    Please be aware that coverage of these services is subject to the terms and limitations of your health insurance plan.  Call member services at your health plan with any benefit or coverage questions.      **Note to Provider:  If you are referring outside of [facility] for the therapy appointment, please list the name of the location in the \"special instructions\" above, print the referral and give to the patient to schedule the appointment.                  Follow-up notes from your care team     Return if symptoms worsen or fail to improve.      Your next 10 appointments already scheduled     Jul 19, 2018 11:00 AM CDT   New Visit with José Taylor DPM   [facility] [place] ([facility] [place]    [address]  [place] [address]   [phone]            Oct 04, 2018 10:00 AM CDT   [facility]    [address] [place] [address] [phone]              Who to contact     If you have questions or need follow up information about today's clinic visit or your schedule please contact [facility] directly at [phone].  Normal or non-critical lab and imaging results will be communicated to you by 2smshart, letter or phone within 4 business days after the clinic has received the results. If you do not hear from us within 7 days, please contact the clinic through 2smshart or phone. If you have a critical or abnormal lab result, we will notify you by phone as soon as possible.  Submit refill requests through cinvolve or call your pharmacy and they will forward the refill request to us. Please allow 3 business days for your refill to be completed.          Additional Information About Your Visit        MyChart Information     cinvolve gives you secure access to your electronic health record. If you see a primary care provider, you can also send messages to your care team and make appointments. If you have questions, please call your primary care clinic.  If you do not have a primary care provider, please call [phone] and they will assist you.      cinvolve is an electronic gateway that provides easy, online access to your medical records. With cinvolve, you can request a clinic appointment, read your test results, renew a prescription or communicate with your care team.     To access your existing account, please contact your [facility] or call [phone] for assistance.        Care EveryWhere ID     This is your Care EveryWhere ID. This could be used by other organizations to access your [facility] medical records  [id number]        Your  "Vitals Were     Pulse Height Last Period Pulse Oximetry BMI (Body Mass Index)       94 1.626 m (5' 4\") 06/16/2018 95% 32.61 kg/m2        Blood Pressure from Last 3 Encounters:   07/10/18 (!) 120/97   06/29/18 127/82   06/12/18 120/80    Weight from Last 3 Encounters:   07/10/18 86.2 kg (190 lb)   06/29/18 85.5 kg (188 lb 9.6 oz)   06/12/18 85.5 kg (188 lb 6.4 oz)              We Performed the Following     PHYSICAL THERAPY REFERRAL        Primary Care Provider Office Phone # Fax #    Ashley Fish [phone]       [address] [place] [address]        Equal Access to Services     [facility]: Hadii salma wang hadasho Soolesya, waaxda luqadaha, qaybta kaalmada adeegyada, alverto nuñez . So [facility] [phone].    ATENCIÓN: Si habla español, tiene a patterson disposición servicios gratuitos de asistencia lingüística. [facility] [phone].    We comply with applicable federal civil rights laws and [place] laws. We do not discriminate on the basis of race, color, national origin, age, disability, sex, sexual orientation, or gender identity.            Thank you!     Thank you for choosing [facility]  for your care. Our goal is always to provide you with excellent care. Hearing back from our patients is one way we can continue to improve our services. Please take a few minutes to complete the written survey that you may receive in the mail after your visit with us. Thank you!             Your Updated Medication List - Protect others around you: Learn how to safely use, store and throw away your medicines at www.disposemymeds.org.          This list is accurate as of 7/10/18  3:12 PM.  Always use your most recent med list.                   Brand Name Dispense Instructions for use Diagnosis    albuterol 108 (90 Base) MCG/ACT Inhaler    PROAIR HFA/PROVENTIL HFA/VENTOLIN HFA    1 Inhaler    Inhale 2 puffs into the lungs every 6 hours    SOB (shortness of " breath)       CLARITIN PO      Take 10 mg by mouth daily Reported on 4/26/2017        diclofenac 1 % Gel topical gel    VOLTAREN    100 g    Apply  2 grams to hands four times daily using enclosed dosing card.    Left foot pain       fluticasone 50 MCG/ACT spray    FLONASE    1 Bottle    Spray 1-2 sprays into both nostrils daily    Chronic seasonal allergic rhinitis, unspecified trigger       levothyroxine 150 MCG tablet    SYNTHROID/LEVOTHROID    90 tablet    TAKE 1 TABLET BY MOUTH ONCE DAILY    Hypothyroidism, unspecified type       norgestimate-ethinyl estradiol 0.25-35 MG-MCG per tablet    ORTHO-CYCLEN, SPRINTEC    90 tablet    Take 1 tablet by mouth daily    Encounter for initial prescription of contraceptive pills       nortriptyline 10 MG capsule    PAMELOR    270 capsule    3 CAPS at bedtime    Migraine without aura and without status migrainosus, not intractable       rizatriptan 10 MG ODT tab    MAXALT-MLT    12 tablet    Take 1 tablet (10 mg) by mouth at onset of headache for migraine May repeat in 2 hours. Max 3 tablets/24 hours.    Migraine without aura and without status migrainosus, not intractable

## 2018-07-10 NOTE — PROGRESS NOTES
Visit Date:   07/10/2018      NEUROLOGY CONSULTATION      PRIMARY CARE PHYSICIAN:  Dr. Ashley Fish      HISTORY OF PRESENT ILLNESS:  This patient is seen in followup with sensory disturbance in the left shoulder blade.  I initially saw this patient about a month ago.  She now has some aching in the right shoulder as well, but that is only in the last few days.  The symptoms in the left shoulder began about January and escalated thereafter.  She does have some issues with lifting with her shoulders.  She does not have much in the way of neck pain, but more the sensory disturbance in the left shoulder blade.      I did review her medications with her.  She is on nortriptyline 30 mg at night for migraine prophylaxis and rizatriptan as needed.  She has a prescription for diclofenac gel for foot pain but uses it only as needed.      PHYSICAL EXAMINATION:   GENERAL:  The patient is cooperative and in no distress.   VITAL SIGNS:  Her blood pressure is 120/97.   MUSCULOSKELETAL:  She has good cervical range of motion.  She has no pronator drift.  Finger tapping is normal.  She has well-preserved strength in the arms and hands.   NEUROLOGIC:  There is no scapular winging.  Muscle stretch reflexes in the arms are low amplitude and symmetric.      She did have an MRI of the cervical spine performed.  I have reviewed the images with her.  There is disk bulging at C3-C4 without compromise of neural structures.  There is no evidence of demyelination.      ASSESSMENT:   1.  Sensory disturbance, left shoulder blade.      DISCUSSION:  This patient is seen in follow-up for sensory disturbance in the left shoulder  region.  Her exam is unremarkable.  I do not have a good explanation for this symptom.  It is sometimes symptomatic to the point where it might affect some of her activities, such as lifting.  In terms of further intervention, I am going to have her evaluated by physical therapy to see if they may have any  recommendations or treatments or exercises for her.  I will keep her neurologic status monitored for now and see her in followup on an as needed basis.         KALE GARCIA MD             D: 07/10/2018   T: 07/10/2018   MT: DILIA      Name:     KYRA MELENDEZ   MRN:      [id number]        Account:      [id number]   :      1990           Visit Date:   07/10/2018      Document: [id number]

## 2018-07-10 NOTE — NURSING NOTE
"Jennifer Pastrana's goals for this visit include:   Chief Complaint   Patient presents with     RECHECK     following up on MRI     She requests these members of her care team be copied on today's visit information:  PCP    PCP: Ashley Fish    Referring Provider:  No referring provider defined for this encounter.    BP (!) 120/97 (BP Location: Left arm, Patient Position: Sitting, Cuff Size: Adult Regular)  Pulse 94  Ht 1.626 m (5' 4\")  Wt 86.2 kg (190 lb)  LMP 06/16/2018  SpO2 95%  BMI 32.61 kg/m2    Do you need any medication refills at today's visit? n  "

## 2018-07-11 DIAGNOSIS — E03.9 HYPOTHYROIDISM, UNSPECIFIED TYPE: ICD-10-CM

## 2018-07-11 NOTE — LETTER
[facility]  [address] [facility] [address]  Phone: [phone]    07/13/18    Jennifer Pastrana  [address] [facility] [address]      Dear Jennifer-    Regarding a recent refill request we received- a six month refill was sent to the pharmacy for you.  Please plan to follow up with a family practice provider for future refill's.  Please contact our office if you have any questions.     Sincerely,      GAYATHRI Lee CNP

## 2018-07-13 RX ORDER — LEVOTHYROXINE SODIUM 150 UG/1
TABLET ORAL
Qty: 90 TABLET | Refills: 1 | Status: SHIPPED | OUTPATIENT
Start: 2018-07-13 | End: 2018-09-21

## 2018-07-13 NOTE — TELEPHONE ENCOUNTER
Will refill x 6 months as recent TSH was normal. However, as she is no longer pregnant, should plan further refills from primary care provider. Estelle TRINH CNP

## 2018-07-13 NOTE — TELEPHONE ENCOUNTER
Patient seen by Estelle Pacheco on 10/26/17 for PPE. Last prescribed by Estelle on 10/30/17.  Forwarded to provider to advise on refill.

## 2018-07-19 ENCOUNTER — RADIANT APPOINTMENT (OUTPATIENT)
Dept: GENERAL RADIOLOGY | Facility: CLINIC | Age: 28
End: 2018-07-19
Attending: PODIATRIST
Payer: COMMERCIAL

## 2018-07-19 ENCOUNTER — OFFICE VISIT (OUTPATIENT)
Dept: PODIATRY | Facility: CLINIC | Age: 28
End: 2018-07-19
Payer: COMMERCIAL

## 2018-07-19 VITALS
DIASTOLIC BLOOD PRESSURE: 74 MMHG | WEIGHT: 190 LBS | HEART RATE: 96 BPM | SYSTOLIC BLOOD PRESSURE: 124 MMHG | BODY MASS INDEX: 32.61 KG/M2

## 2018-07-19 DIAGNOSIS — M21.6X2 PRONATION OF BOTH FEET: ICD-10-CM

## 2018-07-19 DIAGNOSIS — M77.50 TENDONITIS OF FOOT: ICD-10-CM

## 2018-07-19 DIAGNOSIS — M21.6X1 PRONATION OF BOTH FEET: Primary | ICD-10-CM

## 2018-07-19 DIAGNOSIS — M21.6X2 PRONATION OF BOTH FEET: Primary | ICD-10-CM

## 2018-07-19 DIAGNOSIS — M21.6X1 PRONATION OF BOTH FEET: ICD-10-CM

## 2018-07-19 PROCEDURE — 99243 OFF/OP CNSLTJ NEW/EST LOW 30: CPT | Performed by: PODIATRIST

## 2018-07-19 PROCEDURE — 73630 X-RAY EXAM OF FOOT: CPT | Mod: LT

## 2018-07-19 NOTE — PROGRESS NOTES
S: Patient seen in consult from Dr. Fish and complains of left foot pain.  Points to posterior tibial tendon where it inserts into medial navicular.  Has had this for 2.5 years.  Describes it as a burning pain.  Aggrevated by activity and relieved by rest.   Standing at work.  2 years ago had Tri-Lock brace as pain worse and into ankle.  After wearing this 6 months pain almost resolved.  Pain somewhat minimal now but still present.  Slippers or barefoot around the house.  denies weakness.  denies drop in arch.  deniesedema.  denies ecchymosis.    ROS:  A 10-point review of systems was performed and is positive for that noted in the HPI and as seen below.  All other areas are negative.      No Known Allergies    Current Outpatient Prescriptions   Medication Sig Dispense Refill     albuterol (PROAIR HFA/PROVENTIL HFA/VENTOLIN HFA) 108 (90 BASE) MCG/ACT Inhaler Inhale 2 puffs into the lungs every 6 hours 1 Inhaler 2     diclofenac (VOLTAREN) 1 % GEL topical gel Apply  2 grams to hands four times daily using enclosed dosing card. 100 g 0     fluticasone (FLONASE) 50 MCG/ACT spray Spray 1-2 sprays into both nostrils daily 1 Bottle 11     levothyroxine (SYNTHROID/LEVOTHROID) 150 MCG tablet TAKE 1 TABLET BY MOUTH ONCE DAILY 90 tablet 1     levothyroxine (SYNTHROID/LEVOTHROID) 150 MCG tablet TAKE 1 TABLET BY MOUTH ONCE DAILY 90 tablet 2     Loratadine (CLARITIN PO) Take 10 mg by mouth daily Reported on 4/26/2017       norgestimate-ethinyl estradiol (ORTHO-CYCLEN, SPRINTEC) 0.25-35 MG-MCG per tablet Take 1 tablet by mouth daily 90 tablet 3     nortriptyline (PAMELOR) 10 MG capsule 3 CAPS at bedtime 270 capsule 2     rizatriptan (MAXALT-MLT) 10 MG ODT tab Take 1 tablet (10 mg) by mouth at onset of headache for migraine May repeat in 2 hours. Max 3 tablets/24 hours. 12 tablet 1       Patient Active Problem List   Diagnosis     CARDIOVASCULAR SCREENING; LDL GOAL LESS THAN 160     Hypothyroidism     Dry eye syndrome- mild      Dysplasia of cervix, low grade (MILO 1)     Generalized anxiety disorder     Supervision of normal first pregnancy     Headache in pregnancy-NEURO referral     Migraine without aura and without status migrainosus, not intractable       Past Medical History:   Diagnosis Date     AR (allergic rhinitis)      ASCUS with positive high risk HPV 2/2013    type 53     Cervical high risk HPV (human papillomavirus) test positive 2/25/15     Dysplasia of cervix, low grade (MILO 1)      Hypothyroidism      Mood disorder (H)        Past Surgical History:   Procedure Laterality Date     NO HISTORY OF SURGERY         Family History   Problem Relation Age of Onset     Breast Cancer Maternal Grandmother      Cancer Maternal Grandmother      breast     HEART DISEASE Maternal Grandfather      50s     Myocardial Infarction Maternal Grandfather      HEART DISEASE Paternal Grandfather      50s     Myocardial Infarction Paternal Grandfather      Thyroid Disease Mother      graves-decompression and strabismus     Cancer Paternal Grandmother      cervical     Thyroid Disease Paternal Aunt      nine affected in family     Glaucoma No family hx of      Macular Degeneration No family hx of      Cerebrovascular Disease No family hx of      Hypertension No family hx of      Diabetes No family hx of        Social History   Substance Use Topics     Smoking status: Never Smoker     Smokeless tobacco: Never Used      Comment: Nonsmoking household     Alcohol use No         Exam:  Vitals: /74 (BP Location: Right arm)  Pulse 96  Wt 190 lb (86.2 kg)  BMI 32.61 kg/m2  BMI: Body mass index is 32.61 kg/(m^2).  Height: Data Unavailable    Constitutional/ general:  Pt is in no apparent distress, appears well-nourished.  Cooperative with history and physical exam.     Psych:  The patient answered questions appropriately.  Normal affect.  Seems to have reasonable expectations, in terms of treatment.     Eyes:  Visual scanning/ tracking without deficit.      Ears:  Response to auditory stimuli is normal.  No  hearing aid devices.  Auricles in proper alignment.     Lymphatic:  Popliteal lymph nodes not enlarged.     Lungs:  Non labored breathing, non labored speech. No cough.  No audible wheezing. Even, quiet breathing.       Vascular:  Pedal pulses are palpable bilaterally for both the DP and PT arteries.  CFT < 3 sec.  No edema.  No varicosities.  Pedal hair growth noted.     Neuro:  Alert and oriented x 3. Coordinated gait.  Light touch sensation is intact to the L4, L5, S1 distributions. No obvious deficits.  No evidence of neurological-based weakness, spasticity, or contracture in the lower extremities.    Derm: Normal texture and turgor.  No erythema, ecchymosis, or cyanosis.  No open lesions.     Musculoskeletal:    Lower extremity muscle strength is normal.  Patient is ambulatory without an assistive device or brace.  No gross deformities.  Normal ROM all fore foot and rearfoot joints.  No equinus.  With weightbearing patient has bilateral pronation.   Pain with palpation medial navicular left foot.  No pain spring ligament.  No pain tibialis anterior or dorsum of navicular..  Good calcaneal iversion with foot flexion and no pain when doing this moment.  negative erythema.  negative edema.  negative ecchymosis.   negative masses noted.      Radiographic:  X-rays normal.    A:  Pronation and medial navicular pain from insertional posterior tibial tendonitis    P:  X-rays taken today.  Discussed the cause of this with the patient.   Ice bid.  Explained must have good support for feet at all times or could develop tear in tendon and may need surgery.   He should not will get better tennis shoes and I made suggestions.  Will get good house shoes and made suggestions.  Suggested good sandals.  Prescription for orthotics.  Discussed importance of wearing these in a good shoe at all times to prevent future problems.   RETURN TO CLINIC PRN.  Thank you for allowing me  participate in the care of this patient.        José Taylor DPM, FACFAS

## 2018-07-19 NOTE — MR AVS SNAPSHOT
After Visit Summary   7/19/2018    Jennifer Pastrana    MRN: [id number]           Patient Information     Date Of Birth          1990        Visit Information        Provider Department      7/19/2018 11:00 AM José Taylor, DPJEFFY [facility]        Today's Diagnoses     Pronation of both feet    -  1    Tendonitis of foot          Care Instructions    We wish you continued good healing. If you have any questions or concerns, please do not hesitate to contact us at [phone]    Please remember to call and schedule a follow up appointment if one was recommended at your earliest convenience.   PODIATRY CLINIC HOURS  TELEPHONE NUMBER    Dr. José ALEJANDROPLASHON FAC FAS    Clinics:  [facility]    [facility]   Tuesday 1PM-6PM  [place]  Wednesday 7AM-2PM  [place]  Thursday 10AM-6PM  [place]  Friday 7AM-3PM  [place]  Specialty schedulers:   [phone] to make an appointment with any Specialty Provider.        Urgent Care locations:    [facility] Monday-Friday 5 pm - 9 pm. Saturday-Sunday 9 am -5pm    Monday-Friday 11 am - 9 pm Saturday 9 am - 5 pm     Monday-Sunday 12 noon-8PM [phone]     If you need a medication refill, please contact us you may need lab work and/or a follow up visit prior to your refill (i.e. Antifungal medications).    [employer] (secure e-mail communication and access to your chart) to send a message or to make an appointment.    If MRI needed please call [facility] at [phone]        Weight management plan: Patient was referred to their PCP to discuss a diet and exercise plan.            Follow-ups after your visit        Your next 10 appointments already scheduled     Jul 25, 2018  5:20 PM CDT   (Arrive by 5:05 PM)   MOISÉS Extremity with Frank Abreu PT   [facility] [place] [facility]  ([facility] [place])    [address]  [address]  [place] [address] [phone]            Oct 04, 2018 10:00 AM CDT   Return Visit with Abdi Steward MD   [facility]    [address] [place] [address] [phone]              Who to contact     If you have questions or need follow up information about today's clinic visit or your schedule please contact [facility] directly at [phone].  Normal or non-critical lab and imaging results will be communicated to you by Vidave[employer]art, letter or phone within 4 business days after the clinic has received the results. If you do not hear from us within 7 days, please contact the clinic through Vidaveehart or phone. If you have a critical or abnormal lab result, we will notify you by phone as soon as possible.  Submit refill requests through Thing5 or call your pharmacy and they will forward the refill request to us. Please allow 3 business days for your refill to be completed.          Additional Information About Your Visit        MyChart Information     Thing[employer] gives you secure access to your electronic health record. If you see a primary care provider, you can also send messages to your care team and make appointments. If you have questions, please call your primary care clinic.  If you do not have a primary care provider, please call [phone] and they will assist you.        Care EveryWhere ID     This is your Care EveryWhere ID. This could be used by other organizations to access your [facility] medical records  [id number]        Your Vitals Were     Pulse BMI (Body Mass Index)                96 32.61 kg/m2           Blood Pressure from Last 3 Encounters:   07/19/18 124/74   07/10/18 (!) 120/97   06/29/18 127/82    Weight from Last 3 Encounters:   07/19/18 190 lb (86.2 kg)   07/10/18 190 lb (86.2 kg)   06/29/18 188 lb 9.6 oz (85.5 kg)               Primary Care Provider  Office Phone # Fax #    Ashley Fish [phone]       [address]  [place] [address]        Equal Access to Services     ARMONDASHA CHI : Hadii salma ku kareno Diane, waaxda luqadaha, qaybta kaalmada janet, alverto gabriellara rigo. So [facility] [phone].    ATENCIÓN: Si habla español, tiene a ptaterson disposición servicios gratuitos de asistencia lingüística. Llame al [phone].    We comply with applicable federal civil rights laws and [place] laws. We do not discriminate on the basis of race, color, national origin, age, disability, sex, sexual orientation, or gender identity.            Thank you!     Thank you for choosing [facility]  for your care. Our goal is always to provide you with excellent care. Hearing back from our patients is one way we can continue to improve our services. Please take a few minutes to complete the written survey that you may receive in the mail after your visit with us. Thank you!             Your Updated Medication List - Protect others around you: Learn how to safely use, store and throw away your medicines at www.disposemymeds.org.          This list is accurate as of 7/19/18 11:22 AM.  Always use your most recent med list.                   Brand Name Dispense Instructions for use Diagnosis    albuterol 108 (90 Base) MCG/ACT Inhaler    PROAIR HFA/PROVENTIL HFA/VENTOLIN HFA    1 Inhaler    Inhale 2 puffs into the lungs every 6 hours    SOB (shortness of breath)       CLARITIN PO      Take 10 mg by mouth daily Reported on 4/26/2017        diclofenac 1 % Gel topical gel    VOLTAREN    100 g    Apply  2 grams to hands four times daily using enclosed dosing card.    Left foot pain       fluticasone 50 MCG/ACT spray    FLONASE    1 Bottle    Spray 1-2 sprays into both nostrils daily    Chronic seasonal allergic rhinitis, unspecified trigger       * levothyroxine 150 MCG tablet    SYNTHROID/LEVOTHROID    90 tablet    TAKE 1  TABLET BY MOUTH ONCE DAILY    Hypothyroidism, unspecified type       * levothyroxine 150 MCG tablet    SYNTHROID/LEVOTHROID    90 tablet    TAKE 1 TABLET BY MOUTH ONCE DAILY    Hypothyroidism, unspecified type       norgestimate-ethinyl estradiol 0.25-35 MG-MCG per tablet    ORTHO-CYCLEN, SPRINTEC    90 tablet    Take 1 tablet by mouth daily    Encounter for initial prescription of contraceptive pills       nortriptyline 10 MG capsule    PAMELOR    270 capsule    3 CAPS at bedtime    Migraine without aura and without status migrainosus, not intractable       rizatriptan 10 MG ODT tab    MAXALT-MLT    12 tablet    Take 1 tablet (10 mg) by mouth at onset of headache for migraine May repeat in 2 hours. Max 3 tablets/24 hours.    Migraine without aura and without status migrainosus, not intractable       * Notice:  This list has 2 medication(s) that are the same as other medications prescribed for you. Read the directions carefully, and ask your doctor or other care provider to review them with you.

## 2018-07-19 NOTE — LETTER
7/19/2018         RE: Jennifer Pastrana  [address] [facility] [address]        Dear Colleague,    Thank you for referring your patient, Jennifer Pastrana, to the [facility]. Please see a copy of my visit note below.    S: Patient seen in consult from Dr. Fish and complains of left foot pain.  Points to posterior tibial tendon where it inserts into medial navicular.  Has had this for 2.5 years.  Describes it as a burning pain.  Aggrevated by activity and relieved by rest.   Standing at work.  2 years ago had Tri-Lock brace as pain worse and into ankle.  After wearing this 6 months pain almost resolved.  Pain somewhat minimal now but still present.  Slippers or barefoot around the house.  denies weakness.  denies drop in arch.  deniesedema.  denies ecchymosis.    ROS:  A 10-point review of systems was performed and is positive for that noted in the HPI and as seen below.  All other areas are negative.      No Known Allergies    Current Outpatient Prescriptions   Medication Sig Dispense Refill     albuterol (PROAIR HFA/PROVENTIL HFA/VENTOLIN HFA) 108 (90 BASE) MCG/ACT Inhaler Inhale 2 puffs into the lungs every 6 hours 1 Inhaler 2     diclofenac (VOLTAREN) 1 % GEL topical gel Apply  2 grams to hands four times daily using enclosed dosing card. 100 g 0     fluticasone (FLONASE) 50 MCG/ACT spray Spray 1-2 sprays into both nostrils daily 1 Bottle 11     levothyroxine (SYNTHROID/LEVOTHROID) 150 MCG tablet TAKE 1 TABLET BY MOUTH ONCE DAILY 90 tablet 1     levothyroxine (SYNTHROID/LEVOTHROID) 150 MCG tablet TAKE 1 TABLET BY MOUTH ONCE DAILY 90 tablet 2     Loratadine (CLARITIN PO) Take 10 mg by mouth daily Reported on 4/26/2017       norgestimate-ethinyl estradiol (ORTHO-CYCLEN, SPRINTEC) 0.25-35 MG-MCG per tablet Take 1 tablet by mouth daily 90 tablet 3     nortriptyline (PAMELOR) 10 MG capsule 3 CAPS at bedtime 270 capsule 2     rizatriptan (MAXALT-MLT) 10 MG ODT tab Take 1 tablet (10 mg) by mouth at  onset of headache for migraine May repeat in 2 hours. Max 3 tablets/24 hours. 12 tablet 1       Patient Active Problem List   Diagnosis     CARDIOVASCULAR SCREENING; LDL GOAL LESS THAN 160     Hypothyroidism     Dry eye syndrome- mild     Dysplasia of cervix, low grade (MILO 1)     Generalized anxiety disorder     Supervision of normal first pregnancy     Headache in pregnancy-NEURO referral     Migraine without aura and without status migrainosus, not intractable       Past Medical History:   Diagnosis Date     AR (allergic rhinitis)      ASCUS with positive high risk HPV 2/2013    type 53     Cervical high risk HPV (human papillomavirus) test positive 2/25/15     Dysplasia of cervix, low grade (MILO 1)      Hypothyroidism      Mood disorder (H)        Past Surgical History:   Procedure Laterality Date     NO HISTORY OF SURGERY         Family History   Problem Relation Age of Onset     Breast Cancer Maternal Grandmother      Cancer Maternal Grandmother      breast     HEART DISEASE Maternal Grandfather      50s     Myocardial Infarction Maternal Grandfather      HEART DISEASE Paternal Grandfather      50s     Myocardial Infarction Paternal Grandfather      Thyroid Disease Mother      graves-decompression and strabismus     Cancer Paternal Grandmother      cervical     Thyroid Disease Paternal Aunt      nine affected in family     Glaucoma No family hx of      Macular Degeneration No family hx of      Cerebrovascular Disease No family hx of      Hypertension No family hx of      Diabetes No family hx of        Social History   Substance Use Topics     Smoking status: Never Smoker     Smokeless tobacco: Never Used      Comment: Nonsmoking household     Alcohol use No         Exam:  Vitals: /74 (BP Location: Right arm)  Pulse 96  Wt 190 lb (86.2 kg)  BMI 32.61 kg/m2  BMI: Body mass index is 32.61 kg/(m^2).  Height: Data Unavailable    Constitutional/ general:  Pt is in no apparent distress, appears  well-nourished.  Cooperative with history and physical exam.     Psych:  The patient answered questions appropriately.  Normal affect.  Seems to have reasonable expectations, in terms of treatment.     Eyes:  Visual scanning/ tracking without deficit.     Ears:  Response to auditory stimuli is normal.  No  hearing aid devices.  Auricles in proper alignment.     Lymphatic:  Popliteal lymph nodes not enlarged.     Lungs:  Non labored breathing, non labored speech. No cough.  No audible wheezing. Even, quiet breathing.       Vascular:  Pedal pulses are palpable bilaterally for both the DP and PT arteries.  CFT < 3 sec.  No edema.  No varicosities.  Pedal hair growth noted.     Neuro:  Alert and oriented x 3. Coordinated gait.  Light touch sensation is intact to the L4, L5, S1 distributions. No obvious deficits.  No evidence of neurological-based weakness, spasticity, or contracture in the lower extremities.    Derm: Normal texture and turgor.  No erythema, ecchymosis, or cyanosis.  No open lesions.     Musculoskeletal:    Lower extremity muscle strength is normal.  Patient is ambulatory without an assistive device or brace.  No gross deformities.  Normal ROM all fore foot and rearfoot joints.  No equinus.  With weightbearing patient has bilateral pronation.   Pain with palpation medial navicular left foot.  No pain spring ligament.  No pain tibialis anterior or dorsum of navicular..  Good calcaneal iversion with foot flexion and no pain when doing this moment.  negative erythema.  negative edema.  negative ecchymosis.   negative masses noted.      Radiographic:  X-rays normal.    A:  Pronation and medial navicular pain from insertional posterior tibial tendonitis    P:  X-rays taken today.  Discussed the cause of this with the patient.   Ice bid.  Explained must have good support for feet at all times or could develop tear in tendon and may need surgery.   He should not will get better tennis shoes and I made  suggestions.  Will get good house shoes and made suggestions.  Suggested good sandals.  Prescription for orthotics.  Discussed importance of wearing these in a good shoe at all times to prevent future problems.   RETURN TO CLINIC PRN.  Thank you for allowing me participate in the care of this patient.        José Taylor DPM, FACFAS                Again, thank you for allowing me to participate in the care of your patient.        Sincerely,        José Taylor DPM

## 2018-07-19 NOTE — PATIENT INSTRUCTIONS
We wish you continued good healing. If you have any questions or concerns, please do not hesitate to contact us at [phone]    Please remember to call and schedule a follow up appointment if one was recommended at your earliest convenience.   PODIATRY CLINIC HOURS  TELEPHONE NUMBER    Dr. José Taylor D.P.M [facility]    Clinics:  [facility]    [facility]   Tuesday 1PM-6PM  [place]  Wednesday 7AM-2PM  [facility]  Thursday 10AM-6PM  [place]  Friday 7AM-3PM  [place]  Specialty schedulers:   [phone] to make an appointment with any Specialty Provider.        Urgent Care locations:    [facility] Monday-Friday 5 pm - 9 pm. Saturday-Sunday 9 am -5pm    Monday-Friday 11 am - 9 pm Saturday 9 am - 5 pm     Monday-Sunday 12 noon-8PM [phone]     If you need a medication refill, please contact us you may need lab work and/or a follow up visit prior to your refill (i.e. Antifungal medications).    [employer] (secure e-mail communication and access to your chart) to send a message or to make an appointment.    If MRI needed please call [facility] at [phone]        Weight management plan: Patient was referred to their PCP to discuss a diet and exercise plan.

## 2018-07-25 ENCOUNTER — THERAPY VISIT (OUTPATIENT)
Dept: PHYSICAL THERAPY | Facility: CLINIC | Age: 28
End: 2018-07-25
Payer: COMMERCIAL

## 2018-07-25 DIAGNOSIS — G89.29 CHRONIC PAIN OF BOTH SHOULDERS: Primary | ICD-10-CM

## 2018-07-25 DIAGNOSIS — M25.512 CHRONIC PAIN OF BOTH SHOULDERS: Primary | ICD-10-CM

## 2018-07-25 DIAGNOSIS — M25.511 CHRONIC PAIN OF BOTH SHOULDERS: Primary | ICD-10-CM

## 2018-07-25 PROCEDURE — 97161 PT EVAL LOW COMPLEX 20 MIN: CPT | Mod: GP | Performed by: PHYSICAL THERAPIST

## 2018-07-25 PROCEDURE — 97110 THERAPEUTIC EXERCISES: CPT | Mod: GP | Performed by: PHYSICAL THERAPIST

## 2018-07-25 NOTE — PROGRESS NOTES
"[facility] Initial Evaluation  Subjective:  Patient is a 27 year old female presenting with rehab cervical spine hpi. The history is provided by the patient. No  was used.   Jennifer Pastrana is a 27 year old female with a cervical spine condition.  Condition occurred with:  Insidious onset.  Condition occurred: for unknown reasons.  This is a chronic condition  Pt reports tingling started in L shoulder blade area without specific incident.  Started just a few times a week and now is happening several times a day.  Can last between five minutes and an hour.  Now happens in both shoulder blades over the past month.  Pain started at L shoulder blade \"like a muscle tightness\" over the past couple weeks.  See chart for diagnostic workup including primary care and neurology.  Referred to PT 07/10/2018.    Site of Pain: tingling in B scapular areas but pain in L scapular area.    Pain is described as aching and is intermittent and reported as 8/10.   Worse during: \"sporadic throughout the day\"  Exacerbated by: no particular trigger for symptoms but when present the pain limits lifiting the arm,  19 pound son. Relieved by: \"I haven't really found anything that makes it feel better;\" pt tried \"rubbing it\" a while ago and the tingling was much worse.  Since onset symptoms are gradually worsening.  Special testing: see xray and MRI in chart.  Previous treatment: pt states only treatment to date is vitamin B12 supplement.      Pertinent medical history includes:  Asthma and thyroid problems.  Medical allergies: no.  Other surgeries include:  No.  Current medications:  Thyroid medication and sleep medication.  Current occupation is pharmacy tech.                Pt is R dominant.  Pt states her goals are \"to at least have the tingling decrease and the pain to go away.\"  Pt states she had a \"trigger point\" in L scapular area about three years ago; was helpful with PT including dry " "needling.  She states that current symptoms feel markedly different than that.                    Objective:  System              Cervical/Thoracic Evaluation              Cervical Palpation:    Tenderness present at Left:    Rhomboids; Upper Trap and Levator  Tenderness not present at Left:   Erector Spinae or Suboccipitals    Tenderness not present at Right:      Rhomboids; Upper Trap; Levator; Erector Spinae or Suboccipitals    Cervical Stability/Joint Clearing:        Negative:ALAR Ligament  Spinal Segmental Conclusions:  Negative Spurling in neutral and extension.  Supine manual axial distraction reported to not change symptoms.               Shoulder Evaluation:  ROM:  AROM:    Flexion:  Left:  123 \"tightness\"    Right:  150  Extension: Left: 50 negativeRight: 53  Abduction:  Left: 110 \"doesn't want to go\"   Right:  140 \"tightness\"    Internal Rotation:  Left:  T7 negative    Right:  T7  External Rotation:  Left:  75 negative    Right:  49 \"sharp\"                PROM:    Flexion:  Left:  150 \"no different\"          Abduction:  Left:  155 negative                              Strength:    Flexion: Left:4+/5    Pain: -    Right: 5/5      Pain:  -  Extension:  Left: 5-/5     Pain:-    Right: 5/5     Pain:-  Abduction:  Left: 4+/5   Pain:-    Right: 5/5      Pain:-    Internal Rotation:  Left:5-/5      Pain:+    Right: 5-/5      Pain:-  External Rotation:   Left:5-/5      Pain:+   Right:5-/5      Pain:-            Stability Testing:      Left shoulder stability negative testing:  Apprehension; Relocation; Load and shift anterior and Load and shift posterior    Right shoulder stability negative testing:  Apprehension; Relocation; Load and shift anterior and Load and shift posterior    Palpation:        Right shoulder tenderness not present at:Supraspinatus; Infraspinatus; Teres Minor or Subscapularis  Mobility Tests:    Glenohumeral anterior left:  Hypermobile  Glenohumeral anterior right:  " Hypermobile  Glenohumeral posterior left:  Hypermobile  Glenohumeral posterior right:  Hypermobile  Glenohumeral inferior left:  Hypermobile  Glenohumeral inferior right:  Hypermobile                                             Anil Cervical Evaluation    Posture:  Sitting: fair  Standing: fair  Protruding Head: yes  Wry Neck: no  Correction of Posture: no effect    Movement Loss:  Protrusion (PRO): nil  Flexion (Flex): nil and pain  Retraction (RET): nil  Extension (EXT): nil  Lateral Flexion Right (LF R): nil  Lateral Flexion Left (LF L): nil  Rotation Right (ROT R): nil  Rotation Left (ROT L): nil  Test Movements:  Pretest Pain Sitting: L scapula  PRO: During: no effect  After: no effect    Repeat PRO: During: no effect  After: no effect    RET: During: no effect  After: no effect    Repeat RET: During: no effect  After: no effect    RET EXT: During: no effect  After: no effect    Repeat RET EXT: During: no effect  After: no effect            LF R: During: no effect  After: no effect    Repeat LF R: During: no effect  After: no effect    LF L: During: no effect  After: no effect    Repeat LF L: During: no effect  After: no effect    Rot R: During: no effect  After: no effect    Repeat Rot R: During: no effect  After: no effect    Rot L: During: no effect  After: no effect    Repeat Rot L: During: no effect  After: no effect    Flex: During: peripheralizing  After: peripheralizing    Repeat Flex: During: peripheralizing   After: peripheralizing         Conclusion: other                                       Following two sets of ten reps of L shoulder extension, pt noted no discomfort AROM L shoulder flexion to 152 degrees.    ROS    Assessment/Plan:    Patient is a 27 year old female with both sides shoulder complaints.    Patient has the following significant findings with corresponding treatment plan.                Diagnosis 1:  B scapular area pain  Pain -  hot/cold therapy  Decreased ROM/flexibility -  manual therapy and therapeutic exercise  Decreased strength - therapeutic exercise and therapeutic activities  Decreased function - therapeutic activities  Impaired posture - neuro re-education  Pt presents with symptoms largely unexplained to date.  Did get change in symptoms with repeated shoulder extension so will start there and anticipate could be candidate for further strengthening moving forward.    Therapy Evaluation Codes:   1) History comprised of:   Personal factors that impact the plan of care:      Time since onset of symptoms.    Comorbidity factors that impact the plan of care are:      None.     Medications impacting care: None.  2) Examination of Body Systems comprised of:   Body structures and functions that impact the plan of care:      Cervical spine and Shoulder.   Activity limitations that impact the plan of care are:      Lifting.  3) Clinical presentation characteristics are:   Unstable/Unpredictable.  4) Decision-Making    Low complexity using standardized patient assessment instrument and/or measureable assessment of functional outcome.  Cumulative Therapy Evaluation is: Low complexity.    Previous and current functional limitations:  (See Goal Flow Sheet for this information)    Short term and Long term goals: (See Goal Flow Sheet for this information)     Communication ability:  Patient appears to be able to clearly communicate and understand verbal and written communication and follow directions correctly.  Treatment Explanation - The following has been discussed with the patient:   RX ordered/plan of care  Anticipated outcomes  Possible risks and side effects  This patient would benefit from PT intervention to resume normal activities.   Rehab potential is fair to good.    Frequency:  2 X week, once daily  Duration:  for 1 weeks tapering to 1 X a week over 4 weeks  Discharge Plan:  Achieve all LTG.  Independent in home treatment program.  Reach maximal therapeutic benefit.    Please  refer to the daily flowsheet for treatment today, total treatment time and time spent performing 1:1 timed codes.

## 2018-07-25 NOTE — MR AVS SNAPSHOT
After Visit Summary   7/25/2018    Jennifer Pastrana    MRN: [id number]           Patient Information     Date Of Birth          1990        Visit Information        Provider Department      7/25/2018 5:20 PM Frank Abreu, PT [facility] [place]        Today's Diagnoses     Chronic pain of both shoulders    -  1       Follow-ups after your visit        Your next 10 appointments already scheduled     Jul 27, 2018  1:30 PM CDT   [place] Extremity with Frank Abreu PT   [facility] [place] [facility] ([facility] [place])    [address]  [address]  [place] [address] [phone]            Oct 04, 2018 10:00 AM CDT   Return Visit with Abdi Steward MD   [facility]    [id number] [place] [id number] [phone]              Who to contact     If you have questions or need follow up information about today's clinic visit or your schedule please contact [facility] [place] [facility] directly at [phone].  Normal or non-critical lab and imaging results will be communicated to you by MyChart, letter or phone within 4 business days after the clinic has received the results. If you do not hear from us within 7 days, please contact the clinic through Envivio[employer] or phone. If you have a critical or abnormal lab result, we will notify you by phone as soon as possible.  Submit refill requests through VEASYT or call your pharmacy and they will forward the refill request to us. Please allow 3 business days for your refill to be completed.          Additional Information About Your Visit        MyChart Information     [employer]T gives you secure access to your electronic health record. If you see a primary care provider, you can also send messages to your care team and make appointments. If you have questions, please call your primary care clinic.  If you do not have a primary care  provider, please call [phone] and they will assist you.        Care EveryWhere ID     This is your Care EveryWhere ID. This could be used by other organizations to access your [facility] medical records  [id number]         Blood Pressure from Last 3 Encounters:   07/19/18 124/74   07/10/18 (!) 120/97   06/29/18 127/82    Weight from Last 3 Encounters:   07/19/18 86.2 kg (190 lb)   07/10/18 86.2 kg (190 lb)   06/29/18 85.5 kg (188 lb 9.6 oz)              We Performed the Following     PT Eval, Low Complexity (76365)     Therapeutic Exercises        Primary Care Provider Office Phone # Fax #    Ashley Fish [phone] [address] [place] [address]        Equal Access to Services     [facility]: Hadii aad ku hadasho Soomaali, waaxda luqadaha, qaybta kaalmada adeegyada, alverto rodriguez hayfrankie nuñez . So [facility] [phone].    ATENCIÓN: Si habla español, tiene a patterson disposición servicios gratuitos de asistencia lingüística. [facility] [phone].    We comply with applicable federal civil rights laws and [place] laws. We do not discriminate on the basis of race, color, national origin, age, disability, sex, sexual orientation, or gender identity.            Thank you!     Thank you for choosing [facility]  for your care. Our goal is always to provide you with excellent care. Hearing back from our patients is one way we can continue to improve our services. Please take a few minutes to complete the written survey that you may receive in the mail after your visit with us. Thank you!             Your Updated Medication List - Protect others around you: Learn how to safely use, store and throw away your medicines at www.disposemymeds.org.          This list is accurate as of 7/25/18  6:46 PM.  Always use your most recent med list.                   Brand Name Dispense Instructions for use Diagnosis    albuterol 108 (90 Base) MCG/ACT  Inhaler    PROAIR HFA/PROVENTIL HFA/VENTOLIN HFA    1 Inhaler    Inhale 2 puffs into the lungs every 6 hours    SOB (shortness of breath)       CLARITIN PO      Take 10 mg by mouth daily Reported on 4/26/2017        diclofenac 1 % Gel topical gel    VOLTAREN    100 g    Apply  2 grams to hands four times daily using enclosed dosing card.    Left foot pain       fluticasone 50 MCG/ACT spray    FLONASE    1 Bottle    Spray 1-2 sprays into both nostrils daily    Chronic seasonal allergic rhinitis, unspecified trigger       * levothyroxine 150 MCG tablet    SYNTHROID/LEVOTHROID    90 tablet    TAKE 1 TABLET BY MOUTH ONCE DAILY    Hypothyroidism, unspecified type       * levothyroxine 150 MCG tablet    SYNTHROID/LEVOTHROID    90 tablet    TAKE 1 TABLET BY MOUTH ONCE DAILY    Hypothyroidism, unspecified type       norgestimate-ethinyl estradiol 0.25-35 MG-MCG per tablet    ORTHO-CYCLEN, SPRINTEC    90 tablet    Take 1 tablet by mouth daily    Encounter for initial prescription of contraceptive pills       nortriptyline 10 MG capsule    PAMELOR    270 capsule    3 CAPS at bedtime    Migraine without aura and without status migrainosus, not intractable       rizatriptan 10 MG ODT tab    MAXALT-MLT    12 tablet    Take 1 tablet (10 mg) by mouth at onset of headache for migraine May repeat in 2 hours. Max 3 tablets/24 hours.    Migraine without aura and without status migrainosus, not intractable       * Notice:  This list has 2 medication(s) that are the same as other medications prescribed for you. Read the directions carefully, and ask your doctor or other care provider to review them with you.

## 2018-07-25 NOTE — LETTER
"DEPARTMENT OF HEALTH AND HUMAN SERVICES  CENTERS FOR MEDICARE & MEDICAID SERVICES    PLAN/UPDATED PLAN OF PROGRESS FOR OUTPATIENT REHABILITATION    PATIENTS NAME:  Jennifer Pastrana   : 1990  PROVIDER NUMBER:    [id number]  [id number]  PROVIDER NAME: [facility]  MEDICAL RECORD NUMBER: [id number]   START OF CARE DATE:  SOC Date: 18   TYPE:  PT  PRIMARY/TREATMENT DIAGNOSIS: (Pertinent Medical Diagnosis)  Chronic pain of both shoulders  VISITS FROM START OF CARE:  Rxs Used: 1     [facility] Initial Evaluation  Subjective:  Patient is a 27 year old female presenting with rehab cervical spine hpi. The history is provided by the patient. No  was used. Jennifer Pastrana is a 27 year old female with a cervical spine condition.  Condition occurred with:  Insidious onset.  Condition occurred: for unknown reasons.  This is a chronic condition  Pt reports tingling started in L shoulder blade area without specific incident.  Started just a few times a week and now is happening several times a day.  Can last between five minutes and an hour.  Now happens in both shoulder blades over the past month.  Pain started at L shoulder blade \"like a muscle tightness\" over the past couple weeks.  See chart for diagnostic workup including primary care and neurology.  Referred to PT 07/10/2018.    Site of Pain: tingling in B scapular areas but pain in L scapular area.    Pain is described as aching and is intermittent and reported as 8/10.   Worse during: \"sporadic throughout the day\"  Exacerbated by: no particular trigger for symptoms but when present the pain limits lifiting the arm,  19 pound son. Relieved by: \"I haven't really found anything that makes it feel better;\" pt tried \"rubbing it\" a while ago and the tingling was much worse.  Since onset symptoms are gradually worsening.  Special testing: see xray and MRI in chart.  Previous treatment: pt " "states only treatment to date is vitamin B12 supplement.      Pertinent medical history includes:  Asthma and thyroid problems.  Medical allergies: no.  Other surgeries include:  No.  Current medications:  Thyroid medication and sleep medication.  Current occupation is pharmacy tech.      Pt is R dominant.  Pt states her goals are \"to at least have the tingling decrease and the pain to go away.\"  Pt states she had a \"trigger point\" in L scapular area about three years ago; was helpful with PT including dry needling.  She states that current symptoms feel markedly different than that.                  Objective:  Cervical/Thoracic Evaluation  Cervical Palpation:    Tenderness present at Left:    Rhomboids; Upper Trap and Levator  Tenderness not present at Left:   Erector Spinae or Suboccipitals  Tenderness not present at Right:      Rhomboids; Upper Trap; Levator; Erector Spinae or Suboccipitals  Cervical Stability/Joint Clearing:    Negative:ALAR Ligament  Spinal Segmental Conclusions:  Negative Spurling in neutral and extension.  Supine manual axial distraction reported to not change symptoms.  PATIENTS NAME:  Jennifer Pastrana   : 1990      Shoulder Evaluation:  ROM:  AROM:    Flexion:  Left:  123 \"tightness\"    Right:  150  Extension: Left: 50 negativeRight: 53  Abduction:  Left: 110 \"doesn't want to go\"   Right:  140 \"tightness\"  Internal Rotation:  Left:  T7 negative    Right:  T7  External Rotation:  Left:  75 negative    Right:  49 \"sharp\"  PROM:    Flexion:  Left:  150 \"no different\"        Abduction:  Left:  155 negative      Strength:    Flexion: Left:4+/5    Pain: -    Right: 5/5      Pain:  -  Extension:  Left: 5-/5     Pain:-    Right: 5/5     Pain:-  Abduction:  Left: 4+/5   Pain:-    Right: 5/5      Pain:-  Internal Rotation:  Left:5-/5      Pain:+    Right: 5-/5      Pain:-  External Rotation:   Left:5-/5      Pain:+   Right:5-/5      Pain:-    Stability Testing:    Left shoulder stability " negative testing:  Apprehension; Relocation; Load and shift anterior and Load and shift posterior  Right shoulder stability negative testing:  Apprehension; Relocation; Load and shift anterior and Load and shift posterior  Palpation:    Right shoulder tenderness not present at:Supraspinatus; Infraspinatus; Teres Minor or Subscapularis  Mobility Tests:    Glenohumeral anterior left:  Hypermobile  Glenohumeral anterior right:  Hypermobile  Glenohumeral posterior left:  Hypermobile  Glenohumeral posterior right:  Hypermobile  Glenohumeral inferior left:  Hypermobile  Glenohumeral inferior right:  Hypermobile    Anil Cervical Evaluation  Posture:  Sitting: fair  Standing: fair  Protruding Head: yes  Wry Neck: no  Correction of Posture: no effect  Movement Loss:  Protrusion (PRO): nil  Flexion (Flex): nil and pain  Retraction (RET): nil  Extension (EXT): nil  Lateral Flexion Right (LF R): nil  Lateral Flexion Left (LF L): nil  Rotation Right (ROT R): nil  Rotation Left (ROT L): nil  Test Movements:  Pretest Pain Sitting: L scapula  PRO: During: no effect  After: no effect    Repeat PRO: During: no effect  After: no effect    RET: During: no effect  After: no effect    Repeat RET: During: no effect  After: no effect    PATIENTS NAME:  Jennifer Pastrana   : 1990    RET EXT: During: no effect  After: no effect    Repeat RET EXT: During: no effect  After: no effect    LF R: During: no effect  After: no effect    Repeat LF R: During: no effect  After: no effect    LF L: During: no effect  After: no effect    Repeat LF L: During: no effect  After: no effect    Rot R: During: no effect  After: no effect    Repeat Rot R: During: no effect  After: no effect    Rot L: During: no effect  After: no effect    Repeat Rot L: During: no effect  After: no effect    Flex: During: peripheralizing  After: peripheralizing    Repeat Flex: During: peripheralizing   After: peripheralizing       Conclusion: other                                  Following two sets of ten reps of L shoulder extension, pt noted no discomfort AROM L shoulder flexion to 152 degrees.  Assessment/Plan:    Patient is a 27 year old female with both sides shoulder complaints.    Patient has the following significant findings with corresponding treatment plan.                Diagnosis 1:  B scapular area pain  Pain -  hot/cold therapy  Decreased ROM/flexibility - manual therapy and therapeutic exercise  Decreased strength - therapeutic exercise and therapeutic activities  Decreased function - therapeutic activities  Impaired posture - neuro re-education  Pt presents with symptoms largely unexplained to date.  Did get change in symptoms with repeated shoulder extension so will start there and anticipate could be candidate for further strengthening moving forward.    Therapy Evaluation Codes:   1) History comprised of:   Personal factors that impact the plan of care:      Time since onset of symptoms.    Comorbidity factors that impact the plan of care are:      None.     Medications impacting care: None.  2) Examination of Body Systems comprised of:   Body structures and functions that impact the plan of care:      Cervical spine and Shoulder.   Activity limitations that impact the plan of care are:      Lifting.  3) Clinical presentation characteristics are:   Unstable/Unpredictable.  4) Decision-Making    Low complexity using standardized patient assessment instrument and/or measureable assessment of functional outcome.  Cumulative Therapy Evaluation is: Low complexity.  Previous and current functional limitations:  (See Goal Flow Sheet for this information)    Short term and Long term goals: (See Goal Flow Sheet for this information)   Communication ability:  Patient appears to be able to clearly communicate and understand verbal and written communication and follow directions correctly.  PATIENTS NAME:  Jennifer Pastrana   : 1990    Treatment Explanation - The following  "has been discussed with the patient:   RX ordered/plan of care  Anticipated outcomes  Possible risks and side effects  This patient would benefit from PT intervention to resume normal activities.   Rehab potential is fair to good.  Frequency:  2 X week, once daily  Duration:  for 1 weeks tapering to 1 X a week over 4 weeks  Discharge Plan:  Achieve all LTG.  Independent in home treatment program.  Reach maximal therapeutic benefit.  Please refer to the daily flowsheet for treatment today, total treatment time and time spent performing 1:1 timed codes.         Caregiver Signature/Credentials _____________________________ Date ________      Treating Provider: Frank Abreu, PT   I have reviewed and certified the need for these services and plan of treatment while under my care.        PHYSICIAN'S SIGNATURE:   _________________________________________  Date___________   Gil Wilson M.D.    Certification period:  Beginning of Cert date period: 07/25/18 to  End of Cert period date: 10/22/18     Functional Level Progress Report: Please see attached \"Goal Flow sheet for Functional level.\"    ____X____ Continue Services or       ________ DC Services                Service dates: From  SOC Date: 07/25/18 date to present                         "

## 2018-07-27 ENCOUNTER — THERAPY VISIT (OUTPATIENT)
Dept: PHYSICAL THERAPY | Facility: CLINIC | Age: 28
End: 2018-07-27
Payer: COMMERCIAL

## 2018-07-27 DIAGNOSIS — M25.512 CHRONIC PAIN OF BOTH SHOULDERS: ICD-10-CM

## 2018-07-27 DIAGNOSIS — M25.511 CHRONIC PAIN OF BOTH SHOULDERS: ICD-10-CM

## 2018-07-27 DIAGNOSIS — G89.29 CHRONIC PAIN OF BOTH SHOULDERS: ICD-10-CM

## 2018-07-27 PROCEDURE — 97112 NEUROMUSCULAR REEDUCATION: CPT | Mod: GP | Performed by: PHYSICAL THERAPIST

## 2018-07-27 PROCEDURE — 97110 THERAPEUTIC EXERCISES: CPT | Mod: GP | Performed by: PHYSICAL THERAPIST

## 2018-08-02 ENCOUNTER — CARE COORDINATION (OUTPATIENT)
Dept: NEUROLOGY | Facility: CLINIC | Age: 28
End: 2018-08-02

## 2018-08-02 NOTE — PROGRESS NOTES
Faxed Clinical information form and MRI report from 6/14/2018 to [facility].       Chris Ramos CMA

## 2018-08-17 ENCOUNTER — THERAPY VISIT (OUTPATIENT)
Dept: PHYSICAL THERAPY | Facility: CLINIC | Age: 28
End: 2018-08-17
Payer: COMMERCIAL

## 2018-08-17 DIAGNOSIS — G89.29 CHRONIC PAIN OF BOTH SHOULDERS: ICD-10-CM

## 2018-08-17 DIAGNOSIS — M25.511 CHRONIC PAIN OF BOTH SHOULDERS: ICD-10-CM

## 2018-08-17 DIAGNOSIS — M25.512 CHRONIC PAIN OF BOTH SHOULDERS: ICD-10-CM

## 2018-08-17 PROCEDURE — 97112 NEUROMUSCULAR REEDUCATION: CPT | Mod: GP | Performed by: PHYSICAL THERAPIST

## 2018-08-17 PROCEDURE — 97110 THERAPEUTIC EXERCISES: CPT | Mod: GP | Performed by: PHYSICAL THERAPIST

## 2018-08-17 NOTE — PROGRESS NOTES
"Subjective:  HPI                    Objective:  System    Physical Exam    General     ROS    Assessment/Plan:    SUBJECTIVE  Subjective: Pt reports she is feeling a little better.  Doesn't seem quite as bad as it used to be but also doesn't feel like is changing lately.  Feels like strengthening is getting easier, although not necessarily changing symptoms.   Current Pain level:  (not rated numerically)   Changes in function:  Yes (See Goal flowsheet attached for changes in current functional level)     Adverse reaction to treatment or activity:  None    OBJECTIVE  Objective: AROM L shoulder flexion 158, abduction 150; both with \"tightness\" but not pain.  No change in symptoms with repeated shoulder extension.  Peripheralization with cervical flexion and retraction.  Centralization with extension.  Centralization with retraction and extension but not with addition of rotation at end range.     ASSESSMENT  Jennifer continues to require intervention to meet STG and LTG's: PT  Patient is progressing as expected.  Response to therapy has shown an improvement in  function  Progress made towards STG/LTG?  Yes (See Goal flowsheet attached for updates on achievement of STG and LTG)    PLAN  Curious re: response to repeated cervical mov'ts.    PTA/ATC plan:  N/A    Please refer to the daily flowsheet for treatment today, total treatment time and time spent performing 1:1 timed codes.          "

## 2018-08-17 NOTE — MR AVS SNAPSHOT
After Visit Summary   8/17/2018    Jennifer Pastrana    MRN: [id number]           Patient Information     Date Of Birth          1990        Visit Information        Provider Department      8/17/2018 9:00 AM Frank Abreu, PT [facility] [place] [facility]        Today's Diagnoses     Chronic pain of both shoulders           Follow-ups after your visit        Your next 10 appointments already scheduled     Aug 24, 2018  8:20 AM CDT   [facility] [place] [facility] ([facility] [place])    [address] [facility] [address]  [place] [address]   [phone]            Sep 05, 2018  2:40 PM CDT   [facility] Ext[facility]ity with [facility] [place] [facility] ([facility] [place])    [address] [facility] [address]  [place] [address]   [phone]            Oct 04, 2018 10:00 AM CDT   [facility] with Abdi Steward MD   [facility]    [address] [place] [id number] [phone]              Who to contact     If you have questions or need follow up information about today's clinic visit or your schedule please contact [facility] directly at [phone].  Normal or non-critical lab and imaging results will be communicated to you by thereNowhart, letter or phone within 4 business days after the clinic has received the results. If you do not hear from us within 7 days, please contact the clinic through thereNowhart or phone. If you have a critical or abnormal lab result, we will notify you by phone as soon as possible.  Submit refill requests through [employer] or call your pharmacy and they will forward the refill request to us. Please allow 3 business days for your refill to be completed.          Additional Information About Your Visit        thereNowharX-Scan Imaging Information     [employer] gives you secure access to your electronic health  record. If you see a primary care provider, you can also send messages to your care team and make appointments. If you have questions, please call your primary care clinic.  If you do not have a primary care provider, please call [phone] and they will assist you.        Care EveryWhere ID     This is your Care EveryWhere ID. This could be used by other organizations to access your [facility] medical records  [id number]         Blood Pressure from Last 3 Encounters:   07/19/18 124/74   07/10/18 (!) 120/97   06/29/18 127/82    Weight from Last 3 Encounters:   07/19/18 86.2 kg (190 lb)   07/10/18 86.2 kg (190 lb)   06/29/18 85.5 kg (188 lb 9.6 oz)              We Performed the Following     Neuromuscular Re-Education     Therapeutic Exercises        Primary Care Provider Office Phone # Fax #    Ashley Fish [phone]       [address] [place] [address]        Equal Access to Services     [facility]: Hadii aad ku hadasho Soomaali, waaxda luqadaha, qaybta kaalmada adeegyada, waxay idiin hayyannn hanna khmain nuñez . So [facility] [phone].    ATENCIÓN: Si habla español, tiene a patterson disposición servicios gratuitos de asistencia lingüística. [facility] [phone].    We comply with applicable federal civil rights laws and [place] laws. We do not discriminate on the basis of race, color, national origin, age, disability, sex, sexual orientation, or gender identity.            Thank you!     Thank you for choosing [facility]  for your care. Our goal is always to provide you with excellent care. Hearing back from our patients is one way we can continue to improve our services. Please take a few minutes to complete the written survey that you may receive in the mail after your visit with us. Thank you!             Your Updated Medication List - Protect others around you: Learn how to safely use, store and throw away your medicines at www.disposemymeds.org.           This list is accurate as of 8/17/18  9:55 AM.  Always use your most recent med list.                   Brand Name Dispense Instructions for use Diagnosis    albuterol 108 (90 Base) MCG/ACT inhaler    PROAIR HFA/PROVENTIL HFA/VENTOLIN HFA    1 Inhaler    Inhale 2 puffs into the lungs every 6 hours    SOB (shortness of breath)       CLARITIN PO      Take 10 mg by mouth daily Reported on 4/26/2017        diclofenac 1 % Gel topical gel    VOLTAREN    100 g    Apply  2 grams to hands four times daily using enclosed dosing card.    Left foot pain       fluticasone 50 MCG/ACT spray    FLONASE    1 Bottle    Spray 1-2 sprays into both nostrils daily    Chronic seasonal allergic rhinitis, unspecified trigger       * levothyroxine 150 MCG tablet    SYNTHROID/LEVOTHROID    90 tablet    TAKE 1 TABLET BY MOUTH ONCE DAILY    Hypothyroidism, unspecified type       * levothyroxine 150 MCG tablet    SYNTHROID/LEVOTHROID    90 tablet    TAKE 1 TABLET BY MOUTH ONCE DAILY    Hypothyroidism, unspecified type       norgestimate-ethinyl estradiol 0.25-35 MG-MCG per tablet    ORTHO-CYCLEN, SPRINTEC    90 tablet    Take 1 tablet by mouth daily    Encounter for initial prescription of contraceptive pills       nortriptyline 10 MG capsule    PAMELOR    270 capsule    3 CAPS at bedtime    Migraine without aura and without status migrainosus, not intractable       rizatriptan 10 MG ODT tab    MAXALT-MLT    12 tablet    Take 1 tablet (10 mg) by mouth at onset of headache for migraine May repeat in 2 hours. Max 3 tablets/24 hours.    Migraine without aura and without status migrainosus, not intractable       * Notice:  This list has 2 medication(s) that are the same as other medications prescribed for you. Read the directions carefully, and ask your doctor or other care provider to review them with you.

## 2018-08-24 ENCOUNTER — THERAPY VISIT (OUTPATIENT)
Dept: PHYSICAL THERAPY | Facility: CLINIC | Age: 28
End: 2018-08-24
Payer: COMMERCIAL

## 2018-08-24 DIAGNOSIS — G89.29 CHRONIC PAIN OF BOTH SHOULDERS: ICD-10-CM

## 2018-08-24 DIAGNOSIS — M25.511 CHRONIC PAIN OF BOTH SHOULDERS: ICD-10-CM

## 2018-08-24 DIAGNOSIS — M25.512 CHRONIC PAIN OF BOTH SHOULDERS: ICD-10-CM

## 2018-08-24 PROCEDURE — 97110 THERAPEUTIC EXERCISES: CPT | Mod: GP | Performed by: PHYSICAL THERAPIST

## 2018-08-24 PROCEDURE — 97112 NEUROMUSCULAR REEDUCATION: CPT | Mod: GP | Performed by: PHYSICAL THERAPIST

## 2018-08-24 NOTE — MR AVS SNAPSHOT
After Visit Summary   8/24/2018    Jennifer Pastrana    MRN: [id number]           Patient Information     Date Of Birth          1990        Visit Information        Provider Department      8/24/2018 8:20 AM Frank Abreu PT [facility] [place] [facility]        Today's Diagnoses     Chronic pain of both shoulders           Follow-ups after your visit        Your next 10 appointments already scheduled     Sep 05, 2018  2:40 PM CDT   [facility] Ext[facility]ity with [facility] [place] [facility] ([facility] [place])    [address] [facility]  [address]  [place] [id number] [phone]            Oct 04, 2018 10:00 AM CDT   Return Visit with Abdi Steward MD   [facility]    [address] [place] [id number] [phone]              Who to contact     If you have questions or need follow up information about today's clinic visit or your schedule please contact [facility] directly at [phone].  Normal or non-critical lab and imaging results will be communicated to you by [employer], letter or phone within 4 business days after the clinic has received the results. If you do not hear from us within 7 days, please contact the clinic through [employer] or phone. If you have a critical or abnormal lab result, we will notify you by phone as soon as possible.  Submit refill requests through [employer] or call your pharmacy and they will forward the refill request to us. Please allow 3 business days for your refill to be completed.          Additional Information About Your Visit        T[employer] Information     [employer] gives you secure access to your electronic health record. If you see a primary care provider, you can also send messages to your care team and make appointments. If you have questions, please call your primary care clinic.  If you do not have a primary care provider,  please call [phone] and they will assist you.        Care EveryWhere ID     This is your Care EveryWhere ID. This could be used by other organizations to access your [facility] medical records  [id number]         Blood Pressure from Last 3 Encounters:   07/19/18 124/74   07/10/18 (!) 120/97   06/29/18 127/82    Weight from Last 3 Encounters:   07/19/18 86.2 kg (190 lb)   07/10/18 86.2 kg (190 lb)   06/29/18 85.5 kg (188 lb 9.6 oz)              We Performed the Following     Therapeutic Activities     Therapeutic Exercises        Primary Care Provider Office Phone # Fax #    Ashley Fish [phone]       [address] [place] [address]        Equal Access to Services     [facility]: Hadii aad ku hadasho Soomaali, waaxda luqadaha, qaybta kaalmada adeegyada, alverto nuñez . So [facility] [phone].    ATENCIÓN: Si habla español, tiene a patterson disposición servicios gratuitos de asistencia lingüística. [facility] [phone].    We comply with applicable federal civil rights laws and [place] laws. We do not discriminate on the basis of race, color, national origin, age, disability, sex, sexual orientation, or gender identity.            Thank you!     Thank you for choosing [facility]  for your care. Our goal is always to provide you with excellent care. Hearing back from our patients is one way we can continue to improve our services. Please take a few minutes to complete the written survey that you may receive in the mail after your visit with us. Thank you!             Your Updated Medication List - Protect others around you: Learn how to safely use, store and throw away your medicines at www.disposemymeds.org.          This list is accurate as of 8/24/18  8:52 AM.  Always use your most recent med list.                   Brand Name Dispense Instructions for use Diagnosis    albuterol 108 (90 Base) MCG/ACT inhaler    PROAIR  HFA/PROVENTIL HFA/VENTOLIN HFA    1 Inhaler    Inhale 2 puffs into the lungs every 6 hours    SOB (shortness of breath)       CLARITIN PO      Take 10 mg by mouth daily Reported on 4/26/2017        diclofenac 1 % Gel topical gel    VOLTAREN    100 g    Apply  2 grams to hands four times daily using enclosed dosing card.    Left foot pain       fluticasone 50 MCG/ACT spray    FLONASE    1 Bottle    Spray 1-2 sprays into both nostrils daily    Chronic seasonal allergic rhinitis, unspecified trigger       * levothyroxine 150 MCG tablet    SYNTHROID/LEVOTHROID    90 tablet    TAKE 1 TABLET BY MOUTH ONCE DAILY    Hypothyroidism, unspecified type       * levothyroxine 150 MCG tablet    SYNTHROID/LEVOTHROID    90 tablet    TAKE 1 TABLET BY MOUTH ONCE DAILY    Hypothyroidism, unspecified type       norgestimate-ethinyl estradiol 0.25-35 MG-MCG per tablet    ORTHO-CYCLEN, SPRINTEC    90 tablet    Take 1 tablet by mouth daily    Encounter for initial prescription of contraceptive pills       nortriptyline 10 MG capsule    PAMELOR    270 capsule    3 CAPS at bedtime    Migraine without aura and without status migrainosus, not intractable       rizatriptan 10 MG ODT tab    MAXALT-MLT    12 tablet    Take 1 tablet (10 mg) by mouth at onset of headache for migraine May repeat in 2 hours. Max 3 tablets/24 hours.    Migraine without aura and without status migrainosus, not intractable       * Notice:  This list has 2 medication(s) that are the same as other medications prescribed for you. Read the directions carefully, and ask your doctor or other care provider to review them with you.

## 2018-08-24 NOTE — PROGRESS NOTES
Subjective:  HPI                    Objective:  System    Physical Exam    General     ROS    Assessment/Plan:    SUBJECTIVE  Subjective: Pt reports definite progress.  Has found symptoms come on while feeding son but are taken away rather quickly with retraction / extension.  Feels HEP is challenging but not painful.   Current Pain level:  (0-2/10)   Changes in function:  Yes (See Goal flowsheet attached for changes in current functional level)     Adverse reaction to treatment or activity:  None    OBJECTIVE  Objective: Peripheralization with AROM cervical flexion.  Centralization leading to abolished symptoms with retraction / extension.     ASSESSMENT  Jennifer continues to require intervention to meet STG and LTG's: PT  Patient is progressing as expected.  Response to therapy has shown an improvement in  function  Progress made towards STG/LTG?  Yes (See Goal flowsheet attached for updates on achievement of STG and LTG)    PLAN  Good response to directional preference approach.  Plan f/u as scheduled and consider add'l recovery of function vs continue independently.    PTA/ATC plan:  N/A    Please refer to the daily flowsheet for treatment today, total treatment time and time spent performing 1:1 timed codes.

## 2018-09-05 ENCOUNTER — THERAPY VISIT (OUTPATIENT)
Dept: PHYSICAL THERAPY | Facility: CLINIC | Age: 28
End: 2018-09-05
Payer: COMMERCIAL

## 2018-09-05 DIAGNOSIS — G89.29 CHRONIC PAIN OF BOTH SHOULDERS: ICD-10-CM

## 2018-09-05 DIAGNOSIS — M25.512 CHRONIC PAIN OF BOTH SHOULDERS: ICD-10-CM

## 2018-09-05 DIAGNOSIS — M25.511 CHRONIC PAIN OF BOTH SHOULDERS: ICD-10-CM

## 2018-09-05 PROCEDURE — 97112 NEUROMUSCULAR REEDUCATION: CPT | Mod: GP | Performed by: PHYSICAL THERAPIST

## 2018-09-05 PROCEDURE — 97110 THERAPEUTIC EXERCISES: CPT | Mod: GP | Performed by: PHYSICAL THERAPIST

## 2018-09-05 NOTE — PROGRESS NOTES
Subjective:  HPI                    Objective:  System    Physical Exam    General     ROS    Assessment/Plan:    DISCHARGE REPORT    Progress reporting period is from 07/25/2018 to today.       SUBJECTIVE  Subjective: Pt reports continued progress.  Doesn't get symptoms nearly as often and is able to take them away rather quickly with retraction and extension.    Current Pain level: 0/10.     Initial Pain level: 8/10.   Changes in function:  Yes (See Goal flowsheet attached for changes in current functional level)  Adverse reaction to treatment or activity: None    OBJECTIVE  Objective: No symptoms with cervical AROM all directions.  No distal strength asymmetry.     ASSESSMENT/PLAN  Updated problem list and treatment plan: Diagnosis 1:  Cervical radiculopathy -- home program  STG/LTGs have been met or progress has been made towards goals:  Yes (See Goal flow sheet completed today.)  Assessment of Progress: The patient has met all of their long term goals.  Self Management Plans:  Patient is independent in a home treatment program.  I have re-evaluated this patient and find that the nature, scope, duration and intensity of the therapy is appropriate for the medical condition of the patient.  Jennifer continues to require the following intervention to meet STG and LTG's:  PT intervention is no longer required to meet STG/LTG.    Recommendations:  Given progress, pt agrees discharge to [facility] but will let me know if there are further issues.    Please refer to the daily flowsheet for treatment today, total treatment time and time spent performing 1:1 timed codes.

## 2018-09-05 NOTE — MR AVS SNAPSHOT
After Visit Summary   9/5/2018    Jennifer Pastrana    MRN: [id number]           Patient Information     Date Of Birth          1990        Visit Information        Provider Department      9/5/2018 2:40 PM Frank Abreu PT [facility] [place] [facility]        Today's Diagnoses     Chronic pain of both shoulders           Follow-ups after your visit        Your next 10 appointments already scheduled     Oct 04, 2018 10:00 AM CDT   Return Visit with Abdi Steward MD   [facility]    [address] [place] [id number] [phone]              Who to contact     If you have questions or need follow up information about today's clinic visit or your schedule please contact [facility] directly at [phone].  Normal or non-critical lab and imaging results will be communicated to you by [employer], letter or phone within 4 business days after the clinic has received the results. If you do not hear from us within 7 days, please contact the clinic through [employer] or phone. If you have a critical or abnormal lab result, we will notify you by phone as soon as possible.  Submit refill requests through [employer] or call your pharmacy and they will forward the refill request to us. Please allow 3 business days for your refill to be completed.          Additional Information About Your Visit        MyChart Information     [employer] gives you secure access to your electronic health record. If you see a primary care provider, you can also send messages to your care team and make appointments. If you have questions, please call your primary care clinic.  If you do not have a primary care provider, please call [phone] and they will assist you.        Care EveryWhere ID     This is your Care EveryWhere ID. This could be used by other organizations to access your [facility] medical records  [id number]          Blood Pressure from Last 3 Encounters:   07/19/18 124/74   07/10/18 (!) 120/97   06/29/18 127/82    Weight from Last 3 Encounters:   07/19/18 86.2 kg (190 lb)   07/10/18 86.2 kg (190 lb)   06/29/18 85.5 kg (188 lb 9.6 oz)              We Performed the Following     Neuromuscular Re-Education     Therapeutic Exercises        Primary Care Provider Office Phone # Fax #    Ashley Fish [phone] [address] [place] [address]        Equal Access to Services     [facility]: Hadii aad ku hadasho Soomaali, waaxda luqadaha, qaybta kaalmada adeegyada, alverto rodriguez hayaan adeeg [place]ver nuñez . So [facility] [phone].    ATENCIÓN: Si habla español, tiene a patterson disposición servicios gratuitos de asistencia lingüística. Llame al [phone].    We comply with applicable federal civil rights laws and [place] laws. We do not discriminate on the basis of race, color, national origin, age, disability, sex, sexual orientation, or gender identity.            Thank you!     Thank you for choosing [facility]  for your care. Our goal is always to provide you with excellent care. Hearing back from our patients is one way we can continue to improve our services. Please take a few minutes to complete the written survey that you may receive in the mail after your visit with us. Thank you!             Your Updated Medication List - Protect others around you: Learn how to safely use, store and throw away your medicines at www.disposemymeds.org.          This list is accurate as of 9/5/18  3:19 PM.  Always use your most recent med list.                   Brand Name Dispense Instructions for use Diagnosis    albuterol 108 (90 Base) MCG/ACT inhaler    PROAIR HFA/PROVENTIL HFA/VENTOLIN HFA    1 Inhaler    Inhale 2 puffs into the lungs every 6 hours    SOB (shortness of breath)       CLARITIN PO      Take 10 mg by mouth daily Reported on 4/26/2017        diclofenac 1 % Gel  topical gel    VOLTAREN    100 g    Apply  2 grams to hands four times daily using enclosed dosing card.    Left foot pain       fluticasone 50 MCG/ACT spray    FLONASE    1 Bottle    Spray 1-2 sprays into both nostrils daily    Chronic seasonal allergic rhinitis, unspecified trigger       * levothyroxine 150 MCG tablet    SYNTHROID/LEVOTHROID    90 tablet    TAKE 1 TABLET BY MOUTH ONCE DAILY    Hypothyroidism, unspecified type       * levothyroxine 150 MCG tablet    SYNTHROID/LEVOTHROID    90 tablet    TAKE 1 TABLET BY MOUTH ONCE DAILY    Hypothyroidism, unspecified type       norgestimate-ethinyl estradiol 0.25-35 MG-MCG per tablet    ORTHO-CYCLEN, SPRINTEC    90 tablet    Take 1 tablet by mouth daily    Encounter for initial prescription of contraceptive pills       nortriptyline 10 MG capsule    PAMELOR    270 capsule    3 CAPS at bedtime    Migraine without aura and without status migrainosus, not intractable       rizatriptan 10 MG ODT tab    MAXALT-MLT    12 tablet    Take 1 tablet (10 mg) by mouth at onset of headache for migraine May repeat in 2 hours. Max 3 tablets/24 hours.    Migraine without aura and without status migrainosus, not intractable       * Notice:  This list has 2 medication(s) that are the same as other medications prescribed for you. Read the directions carefully, and ask your doctor or other care provider to review them with you.

## 2018-09-20 NOTE — PROGRESS NOTES
SUBJECTIVE:   CC: Jennifer Pastrana is an 27 year old woman who presents for preventive health visit.     Physical   Annual:     Getting at least 3 servings of Calcium per day:  Yes    Bi-annual eye exam:  Yes    Dental care twice a year:  NO    Sleep apnea or symptoms of sleep apnea:  None    Diet:  Regular (no restrictions)    Frequency of exercise:  1 day/week    Duration of exercise:  Less than 15 minutes    Taking medications regularly:  Yes    Medication side effects:  None    Additional concerns today:  No      No additional concerns.     Requesting med refills.     Albuterol: Has a history of reactive airway disease that is not asthma, usually caused by environmental allergies. Uses Albuterol as needed.     OCPs. Uses OCPs regularly, tolerating well, so side effects reported. Pap test is up to date.     Levothyroxine.   HYPOTHYROIDISM - Patient has a longstanding history of chronic Hypothyroidism. Patient has been doing well, noting no tremor, insomnia, hair loss or changes in skin texture. Continues to take medications as directed, without adverse reactions or side effects. Last TSH   Lab Results   Component Value Date    TSH 0.56 04/16/2018                                                                                                                                                                                                                            Patient informed that anything we discuss that is not related to preventative medicine, may be billed for; patient verbalizes understanding.      Today's PHQ-2 Score:   PHQ-2 ( 1999 [employer]) 9/21/2018   Q1: Little interest or pleasure in doing things 0   Q2: Feeling down, depressed or hopeless 0   PHQ-2 Score 0   Q1: Little interest or pleasure in doing things -   Q2: Feeling down, depressed or hopeless -   PHQ-2 Score -       Abuse: Current or Past(Physical, Sexual or Emotional)- No  Do you feel safe in your environment - Yes    Social History   Substance  Use Topics     Smoking status: Never Smoker     Smokeless tobacco: Never Used      Comment: Nonsmoking household     Alcohol use No     Alcohol Use 9/18/2018   If you drink alcohol do you typically have greater than 3 drinks per day OR greater than 7 drinks per week? No   No flowsheet data found.    Reviewed orders with patient.  Reviewed health maintenance and updated orders accordingly - Yes  Patient Active Problem List   Diagnosis     CARDIOVASCULAR SCREENING; LDL GOAL LESS THAN 160     Hypothyroidism     Dry eye syndrome- mild     Dysplasia of cervix, low grade (MILO 1)     Generalized anxiety disorder     Supervision of normal first pregnancy     Headache in pregnancy-NEURO referral     Migraine without aura and without status migrainosus, not intractable     Reactive airway disease that is not asthma     Oral contraceptive pill surveillance     Past Surgical History:   Procedure Laterality Date     NO HISTORY OF SURGERY         Social History   Substance Use Topics     Smoking status: Never Smoker     Smokeless tobacco: Never Used      Comment: Nonsmoking household     Alcohol use No     Family History   Problem Relation Age of Onset     Breast Cancer Maternal Grandmother      in her 50s     HEART DISEASE Maternal Grandfather      50s     Myocardial Infarction Maternal Grandfather      HEART DISEASE Paternal Grandfather      50s     Myocardial Infarction Paternal Grandfather      Thyroid Disease Mother      graves-decompression and strabismus     Cancer Paternal Grandmother      cervical     Thyroid Disease Paternal Aunt      nine affected in family     Glaucoma No family hx of      Macular Degeneration No family hx of      Cerebrovascular Disease No family hx of      Hypertension No family hx of      Diabetes No family hx of          Current Outpatient Prescriptions   Medication Sig Dispense Refill     albuterol (PROAIR HFA/PROVENTIL HFA/VENTOLIN HFA) 108 (90 Base) MCG/ACT inhaler Inhale 2 puffs into the lungs  every 6 hours 1 Inhaler 11     cyanocobalamin (VITAMIN  B-12) 1000 MCG tablet Take by mouth daily       fluticasone (FLONASE) 50 MCG/ACT spray Spray 1-2 sprays into both nostrils daily 1 Bottle 11     levothyroxine (SYNTHROID/LEVOTHROID) 150 MCG tablet Take 1 tablet (150 mcg) by mouth daily 90 tablet 3     Loratadine (CLARITIN PO) Take 10 mg by mouth daily Reported on 4/26/2017       norgestimate-ethinyl estradiol (ORTHO-CYCLEN, SPRINTEC) 0.25-35 MG-MCG per tablet Take 1 tablet by mouth daily 90 tablet 3     nortriptyline (PAMELOR) 10 MG capsule 3 CAPS at bedtime 270 capsule 2     rizatriptan (MAXALT-MLT) 10 MG ODT tab Take 1 tablet (10 mg) by mouth at onset of headache for migraine May repeat in 2 hours. Max 3 tablets/24 hours. 12 tablet 1     VITAMIN D, CHOLECALCIFEROL, PO Take 2,000 Units by mouth daily       [DISCONTINUED] albuterol (PROAIR HFA/PROVENTIL HFA/VENTOLIN HFA) 108 (90 BASE) MCG/ACT Inhaler Inhale 2 puffs into the lungs every 6 hours 1 Inhaler 2     [DISCONTINUED] levothyroxine (SYNTHROID/LEVOTHROID) 150 MCG tablet TAKE 1 TABLET BY MOUTH ONCE DAILY 90 tablet 1     [DISCONTINUED] levothyroxine (SYNTHROID/LEVOTHROID) 150 MCG tablet TAKE 1 TABLET BY MOUTH ONCE DAILY 90 tablet 2     [DISCONTINUED] norgestimate-ethinyl estradiol (ORTHO-CYCLEN, SPRINTEC) 0.25-35 MG-MCG per tablet Take 1 tablet by mouth daily 90 tablet 3     No Known Allergies    Mammogram not appropriate for this patient based on age.    Pertinent mammograms are reviewed under the imaging tab.  History of abnormal Pap smear: NO - age 21-29 PAP every 3 years recommended  PAP / HPV 10/26/2017 2/25/2015 2/24/2014   PAP NIL NIL NIL     Reviewed and updated as needed this visit by clinical staff  Tobacco  Allergies  Meds  Med Hx  Soc Hx        Reviewed and updated as needed this visit by Provider            Review of Systems  CONSTITUTIONAL: NEGATIVE for fever, chills, change in weight  INTEGUMENTARU/SKIN: NEGATIVE for worrisome rashes,  "moles or lesions  EYES: NEGATIVE for vision changes or irritation  ENT: NEGATIVE for ear, mouth and throat problems  RESP: NEGATIVE for significant cough or SOB  BREAST: NEGATIVE for masses, tenderness or discharge  CV: NEGATIVE for chest pain, palpitations or peripheral edema  GI: NEGATIVE for nausea, abdominal pain, heartburn, or change in bowel habits  : NEGATIVE for unusual urinary or vaginal symptoms. Periods are regular.  MUSCULOSKELETAL: NEGATIVE for significant arthralgias or myalgia  NEURO: NEGATIVE for weakness, dizziness or paresthesias  PSYCHIATRIC: NEGATIVE for changes in mood or affect     OBJECTIVE:   /84  Pulse 92  Temp 97.8  F (36.6  C) (Tympanic)  Ht 5' 2.75\" (1.594 m)  Wt 190 lb (86.2 kg)  LMP 09/07/2018  SpO2 100%  Breastfeeding? No  BMI 33.93 kg/m2  Physical Exam  GENERAL: healthy, alert and no distress  EYES: Eyes grossly normal to inspection, PERRL and conjunctivae and sclerae normal  HENT: ear canals and TM's normal, nose and mouth without ulcers or lesions  NECK: no adenopathy, no asymmetry, masses, or scars and thyroid normal to palpation  RESP: lungs clear to auscultation - no rales, rhonchi or wheezes  BREAST: normal without masses, tenderness or nipple discharge and no palpable axillary masses or adenopathy  CV: regular rate and rhythm, normal S1 S2, no S3 or S4, no murmur, click or rub, no peripheral edema and peripheral pulses strong  ABDOMEN: soft, nontender, no hepatosplenomegaly, no masses and bowel sounds normal  MS: no gross musculoskeletal defects noted, no edema  SKIN: no suspicious lesions or rashes  NEURO: Normal strength and tone, mentation intact and speech normal  PSYCH: mentation appears normal, affect normal/bright    Diagnostic Test Results:  Labs drawn and still in process    ASSESSMENT/PLAN:   Jennifer was seen today for physical.    Diagnoses and all orders for this visit:    Routine general medical examination at a health care facility    Lipid " "screening  -     Lipid Profile    Screening for diabetes mellitus  -     Glucose    Encounter for screening for HIV  -     HIV Antigen Antibody Combo    Reactive airway disease that is not asthma  -     Refill: albuterol (PROAIR HFA/PROVENTIL HFA/VENTOLIN HFA) 108 (90 Base) MCG/ACT inhaler; Inhale 2 puffs into the lungs every 6 hours    Hypothyroidism, unspecified type, controlled  -   Refill:   levothyroxine (SYNTHROID/LEVOTHROID) 150 MCG tablet; Take 1 tablet (150 mcg) by mouth daily    Oral contraceptive pill surveillance  -    Refill:  norgestimate-ethinyl estradiol (ORTHO-CYCLEN, SPRINTEC) 0.25-35 MG-MCG per tablet; Take 1 tablet by mouth daily              COUNSELING:  Reviewed preventive health counseling, as reflected in patient instructions       Regular exercise       Healthy diet/nutrition    BP Readings from Last 1 Encounters:   09/21/18 126/84     Estimated body mass index is 33.93 kg/(m^2) as calculated from the following:    Height as of this encounter: 5' 2.75\" (1.594 m).    Weight as of this encounter: 190 lb (86.2 kg).    BP Screening:   Last 3 BP Readings:    BP Readings from Last 3 Encounters:   09/21/18 126/84   07/19/18 124/74   07/10/18 (!) 120/97       The following was recommended to the patient:  Re-screen BP within a year and recommended lifestyle modifications  Weight management plan: Discussed healthy diet and exercise guidelines and patient will follow up in 12 months in clinic to re-evaluate.     reports that she has never smoked. She has never used smokeless tobacco.      Counseling Resources:  ATP IV Guidelines  Pooled Cohorts Equation Calculator  Breast Cancer Risk Calculator  FRAX Risk Assessment  ICSI Preventive Guidelines  Dietary Guidelines for Americans, 2010  USDA's MyPlate  ASA Prophylaxis  Lung CA Screening    Follow up annually and as needed thoughout the year.    Ashley Fish MD  [facility]  "

## 2018-09-21 ENCOUNTER — OFFICE VISIT (OUTPATIENT)
Dept: FAMILY MEDICINE | Facility: CLINIC | Age: 28
End: 2018-09-21
Payer: COMMERCIAL

## 2018-09-21 VITALS
HEIGHT: 63 IN | TEMPERATURE: 97.8 F | OXYGEN SATURATION: 100 % | WEIGHT: 190 LBS | BODY MASS INDEX: 33.66 KG/M2 | SYSTOLIC BLOOD PRESSURE: 126 MMHG | HEART RATE: 92 BPM | DIASTOLIC BLOOD PRESSURE: 84 MMHG

## 2018-09-21 DIAGNOSIS — Z11.4 ENCOUNTER FOR SCREENING FOR HIV: ICD-10-CM

## 2018-09-21 DIAGNOSIS — J98.9 REACTIVE AIRWAY DISEASE THAT IS NOT ASTHMA: ICD-10-CM

## 2018-09-21 DIAGNOSIS — Z30.41 ORAL CONTRACEPTIVE PILL SURVEILLANCE: ICD-10-CM

## 2018-09-21 DIAGNOSIS — Z13.1 SCREENING FOR DIABETES MELLITUS: ICD-10-CM

## 2018-09-21 DIAGNOSIS — Z13.220 LIPID SCREENING: ICD-10-CM

## 2018-09-21 DIAGNOSIS — Z00.00 ROUTINE GENERAL MEDICAL EXAMINATION AT A HEALTH CARE FACILITY: Primary | ICD-10-CM

## 2018-09-21 DIAGNOSIS — E03.9 HYPOTHYROIDISM, UNSPECIFIED TYPE: ICD-10-CM

## 2018-09-21 LAB
CHOLEST SERPL-MCNC: 165 MG/DL
GLUCOSE SERPL-MCNC: 86 MG/DL (ref 70–99)
HDLC SERPL-MCNC: 57 MG/DL
LDLC SERPL CALC-MCNC: 81 MG/DL
NONHDLC SERPL-MCNC: 108 MG/DL
TRIGL SERPL-MCNC: 135 MG/DL

## 2018-09-21 PROCEDURE — 99213 OFFICE O/P EST LOW 20 MIN: CPT | Mod: 25 | Performed by: FAMILY MEDICINE

## 2018-09-21 PROCEDURE — 87389 HIV-1 AG W/HIV-1&-2 AB AG IA: CPT | Performed by: FAMILY MEDICINE

## 2018-09-21 PROCEDURE — 36415 COLL VENOUS BLD VENIPUNCTURE: CPT | Performed by: FAMILY MEDICINE

## 2018-09-21 PROCEDURE — 80061 LIPID PANEL: CPT | Performed by: FAMILY MEDICINE

## 2018-09-21 PROCEDURE — 99395 PREV VISIT EST AGE 18-39: CPT | Performed by: FAMILY MEDICINE

## 2018-09-21 PROCEDURE — 82947 ASSAY GLUCOSE BLOOD QUANT: CPT | Performed by: FAMILY MEDICINE

## 2018-09-21 RX ORDER — LEVOTHYROXINE SODIUM 150 UG/1
150 TABLET ORAL DAILY
Qty: 90 TABLET | Refills: 3 | Status: SHIPPED | OUTPATIENT
Start: 2018-09-21 | End: 2019-07-30

## 2018-09-21 RX ORDER — NORGESTIMATE AND ETHINYL ESTRADIOL 0.25-0.035
1 KIT ORAL DAILY
Qty: 90 TABLET | Refills: 3 | Status: SHIPPED | OUTPATIENT
Start: 2018-09-21 | End: 2020-03-03

## 2018-09-21 RX ORDER — ALBUTEROL SULFATE 90 UG/1
2 AEROSOL, METERED RESPIRATORY (INHALATION) EVERY 6 HOURS
Qty: 1 INHALER | Refills: 11 | Status: SHIPPED | OUTPATIENT
Start: 2018-09-21 | End: 2019-07-30

## 2018-09-21 RX ORDER — LANOLIN ALCOHOL/MO/W.PET/CERES
CREAM (GRAM) TOPICAL DAILY
COMMUNITY
End: 2020-03-03

## 2018-09-21 ASSESSMENT — ANXIETY QUESTIONNAIRES
5. BEING SO RESTLESS THAT IT IS HARD TO SIT STILL: NOT AT ALL
GAD7 TOTAL SCORE: 2
1. FEELING NERVOUS, ANXIOUS, OR ON EDGE: NOT AT ALL
2. NOT BEING ABLE TO STOP OR CONTROL WORRYING: NOT AT ALL
3. WORRYING TOO MUCH ABOUT DIFFERENT THINGS: NOT AT ALL
7. FEELING AFRAID AS IF SOMETHING AWFUL MIGHT HAPPEN: NOT AT ALL
6. BECOMING EASILY ANNOYED OR IRRITABLE: SEVERAL DAYS
IF YOU CHECKED OFF ANY PROBLEMS ON THIS QUESTIONNAIRE, HOW DIFFICULT HAVE THESE PROBLEMS MADE IT FOR YOU TO DO YOUR WORK, TAKE CARE OF THINGS AT HOME, OR GET ALONG WITH OTHER PEOPLE: NOT DIFFICULT AT ALL

## 2018-09-21 ASSESSMENT — PATIENT HEALTH QUESTIONNAIRE - PHQ9: 5. POOR APPETITE OR OVEREATING: SEVERAL DAYS

## 2018-09-21 NOTE — MR AVS SNAPSHOT
After Visit Summary   9/21/2018    Jennifer Pastrana    MRN: [id number]           Patient Information     Date Of Birth          1990        Visit Information        Provider Department      9/21/2018 11:30 AM Ashley Fish MD [facility]        Today's Diagnoses     Routine general medical examination at a health care facility    -  1    Reactive airway disease that is not asthma        Encounter for initial prescription of contraceptive pills        Hypothyroidism, unspecified type        Lipid screening        Screening for diabetes mellitus        Encounter for screening for HIV           Follow-ups after your visit        Follow-up notes from your care team     Return in about 1 year (around 9/21/2019) for Physical Exam and as needed throughout the year.      Your next 10 appointments already scheduled     Oct 04, 2018 10:00 AM CDT   Return Visit with Abdi Steward MD   [facility]    [address] [place] [id number] [phone]              Who to contact     Normal or non-critical lab and imaging results will be communicated to you by [employer], letter or phone within 4 business days after the clinic has received the results. If you do not hear from us within 7 days, please contact the clinic through [employer] or phone. If you have a critical or abnormal lab result, we will notify you by phone as soon as possible.  Submit refill requests through [employer] or call your pharmacy and they will forward the refill request to us. Please allow 3 business days for your refill to be completed.          If you need to speak with a  for additional information , please call: [phone]             Additional Information About Your Visit        [employer] Information     [employer] gives you secure access to your electronic health record. If you see a primary care provider, you can also send messages to your care  "team and make appointments. If you have questions, please call your primary care clinic.  If you do not have a primary care provider, please call [phone] and they will assist you.        Care EveryWhere ID     This is your Care EveryWhere ID. This could be used by other organizations to access your [facility] medical records  [id number]        Your Vitals Were     Pulse Temperature Height Last Period Pulse Oximetry Breastfeeding?    92 97.8  F (36.6  C) (Tympanic) 5' 2.75\" (1.594 m) 09/07/2018 100% No    BMI (Body Mass Index)                   33.93 kg/m2            Blood Pressure from Last 3 Encounters:   09/21/18 126/84   07/19/18 124/74   07/10/18 (!) 120/97    Weight from Last 3 Encounters:   09/21/18 190 lb (86.2 kg)   07/19/18 190 lb (86.2 kg)   07/10/18 190 lb (86.2 kg)              We Performed the Following     Glucose     HIV Antigen Antibody Combo     Lipid Profile          Today's Medication Changes          These changes are accurate as of 9/21/18 11:54 AM.  If you have any questions, ask your nurse or doctor.               These medicines have changed or have updated prescriptions.        Dose/Directions    levothyroxine 150 MCG tablet   Commonly known as:  SYNTHROID/LEVOTHROID   This may have changed:  See the new instructions.   Used for:  Hypothyroidism, unspecified type   Changed by:  Ashley Fish MD        Dose:  150 mcg   Take 1 tablet (150 mcg) by mouth daily   Quantity:  90 tablet   Refills:  3            Where to get your medicines      These medications were sent to [id number] - [place], [place] - [address] [facility]  [address] [facility] [place] [address]    Hours:  24-hours Phone:  [phone]     albuterol 108 (90 Base) MCG/ACT inhaler    levothyroxine 150 MCG tablet    norgestimate-ethinyl estradiol 0.25-35 MG-MCG per tablet                Primary Care Provider Office Phone # Fax #    Ashley Fish [phone] " [phone]       [address]  [place] [address]        Equal Access to Services     LLOYD MIRELES : Hadii aad ku hadmerao Soirvinali, waaxda luqadaha, qaybta kaalmada janet, alverto marcelloin hayaalara gomeslazara goff savannah sierra. So [facility] [phone].    ATENCIÓN: Si habla español, tiene a patterson disposición servicios gratuitos de asistencia lingüística. Llame al [phone].    We comply with applicable federal civil rights laws and [place] laws. We do not discriminate on the basis of race, color, national origin, age, disability, sex, sexual orientation, or gender identity.            Thank you!     Thank you for choosing [facility]  for your care. Our goal is always to provide you with excellent care. Hearing back from our patients is one way we can continue to improve our services. Please take a few minutes to complete the written survey that you may receive in the mail after your visit with us. Thank you!             Your Updated Medication List - Protect others around you: Learn how to safely use, store and throw away your medicines at www.disposemymeds.org.          This list is accurate as of 9/21/18 11:54 AM.  Always use your most recent med list.                   Brand Name Dispense Instructions for use Diagnosis    albuterol 108 (90 Base) MCG/ACT inhaler    PROAIR HFA/PROVENTIL HFA/VENTOLIN HFA    1 Inhaler    Inhale 2 puffs into the lungs every 6 hours    Reactive airway disease that is not asthma       CLARITIN PO      Take 10 mg by mouth daily Reported on 4/26/2017        cyanocobalamin 1000 MCG tablet    vitamin  B-12     Take by mouth daily        fluticasone 50 MCG/ACT spray    FLONASE    1 Bottle    Spray 1-2 sprays into both nostrils daily    Chronic seasonal allergic rhinitis, unspecified trigger       levothyroxine 150 MCG tablet    SYNTHROID/LEVOTHROID    90 tablet    Take 1 tablet (150 mcg) by mouth daily    Hypothyroidism, unspecified type       norgestimate-ethinyl estradiol 0.25-35  MG-MCG per tablet    ORTHO-CYCLEN, SPRINTEC    90 tablet    Take 1 tablet by mouth daily    Encounter for initial prescription of contraceptive pills       nortriptyline 10 MG capsule    PAMELOR    270 capsule    3 CAPS at bedtime    Migraine without aura and without status migrainosus, not intractable       rizatriptan 10 MG ODT tab    MAXALT-MLT    12 tablet    Take 1 tablet (10 mg) by mouth at onset of headache for migraine May repeat in 2 hours. Max 3 tablets/24 hours.    Migraine without aura and without status migrainosus, not intractable       VITAMIN D (CHOLECALCIFEROL) PO      Take 2,000 Units by mouth daily

## 2018-09-22 ASSESSMENT — PATIENT HEALTH QUESTIONNAIRE - PHQ9: SUM OF ALL RESPONSES TO PHQ QUESTIONS 1-9: 3

## 2018-09-22 ASSESSMENT — ANXIETY QUESTIONNAIRES: GAD7 TOTAL SCORE: 2

## 2018-09-24 LAB — HIV 1+2 AB+HIV1 P24 AG SERPL QL IA: NONREACTIVE

## 2018-10-04 ENCOUNTER — OFFICE VISIT (OUTPATIENT)
Dept: NEUROLOGY | Facility: CLINIC | Age: 28
End: 2018-10-04
Payer: COMMERCIAL

## 2018-10-04 VITALS
DIASTOLIC BLOOD PRESSURE: 87 MMHG | WEIGHT: 193 LBS | BODY MASS INDEX: 34.2 KG/M2 | SYSTOLIC BLOOD PRESSURE: 131 MMHG | HEIGHT: 63 IN | OXYGEN SATURATION: 99 % | HEART RATE: 91 BPM

## 2018-10-04 DIAGNOSIS — R20.9 SENSORY DISTURBANCE: Primary | ICD-10-CM

## 2018-10-04 DIAGNOSIS — G43.009 MIGRAINE WITHOUT AURA AND WITHOUT STATUS MIGRAINOSUS, NOT INTRACTABLE: ICD-10-CM

## 2018-10-04 PROCEDURE — 99213 OFFICE O/P EST LOW 20 MIN: CPT | Performed by: PSYCHIATRY & NEUROLOGY

## 2018-10-04 RX ORDER — NORTRIPTYLINE HCL 10 MG
CAPSULE ORAL
Qty: 270 CAPSULE | Refills: 2 | Status: SHIPPED | OUTPATIENT
Start: 2018-10-04 | End: 2019-04-04

## 2018-10-04 RX ORDER — RIZATRIPTAN BENZOATE 10 MG/1
10 TABLET, ORALLY DISINTEGRATING ORAL
Qty: 12 TABLET | Refills: 1 | Status: SHIPPED | OUTPATIENT
Start: 2018-10-04 | End: 2019-04-04

## 2018-10-04 NOTE — PROGRESS NOTES
Visit Date:   10/04/2018      SUBJECTIVE:  Jennifer Melendez returns for followup of migraine.      She is doing very well in terms of her headaches.  She is on nortriptyline 30 mg and is tolerating it well.  She has very infrequent headaches.  She does have Maxalt 10 mg that she can use for abortive therapy, but does not use it very frequently.  Overall, she is satisfied with her headache control.      She did not tolerate Topamax due to paresthesias.      She did see my partner, Gil Wilson this summer for a sensory disturbance over her left shoulder blade.  A cervical MRI scan was done that revealed some disk bulge at C3-4 without any compromise of neural structures.  She did get some physical therapy and feels that the sensory symptoms were related to her neck.  They are intermittent now, and she has just learned to avoid postures of her head and neck that might provoke it.      OBJECTIVE:  Examination reveals her heart rate is 91.  Blood pressure 131/87.  Fundi are unremarkable.  Cranial nerves II through XII are intact.  Motor examination reveals normal strength.  There is no sensory loss involving the left arm or along the scapula or trapezius muscle.  Gait is normal.  Reflexes are 2+.  Plantar responses are flexor.      ASSESSMENT:   1.  Migraine without aura -- well controlled currently.   2.  Left shoulder sensory disturbance.      PLAN:  She is going to continue on nortriptyline and Maxalt.  I told her if her headaches become more frequent, we could increase the nortriptyline dose further.      She currently is not contemplating another pregnancy, but if she decides to move forward with that, she will let me know before conceiving, and I will taper her off the nortriptyline.      I plan to see her back in 6 months.         AZALIA PARRISH MD             D: 10/04/2018   T: 10/04/2018   MT: WAYNE      Name:     JENNIFER MELENDEZ   MRN:      [id number]        Account:      [id number]   :      1990            Visit Date:   10/04/2018      Document: [id number]

## 2018-10-04 NOTE — LETTER
10/4/2018         RE: Jennifer Pastrana  [address] [place] [address]        Dear Colleague,    Thank you for referring your patient, Jennifer Pastrana, to the [facility]. Please see a copy of my visit note below.    Visit Date:   10/04/2018      SUBJECTIVE:  Jennifer Pastrana returns for followup of migraine.      She is doing very well in terms of her headaches.  She is on nortriptyline 30 mg and is tolerating it well.  She has very infrequent headaches.  She does have Maxalt 10 mg that she can use for abortive therapy, but does not use it very frequently.  Overall, she is satisfied with her headache control.      She did not tolerate Topamax due to paresthesias.      She did see my partner, Gil Wilson this summer for a sensory disturbance over her left shoulder blade.  A cervical MRI scan was done that revealed some disk bulge at C3-4 without any compromise of neural structures.  She did get some physical therapy and feels that the sensory symptoms were related to her neck.  They are intermittent now, and she has just learned to avoid postures of her head and neck that might provoke it.      OBJECTIVE:  Examination reveals her heart rate is 91.  Blood pressure 131/87.  Fundi are unremarkable.  Cranial nerves II through XII are intact.  Motor examination reveals normal strength.  There is no sensory loss involving the left arm or along the scapula or trapezius muscle.  Gait is normal.  Reflexes are 2+.  Plantar responses are flexor.      ASSESSMENT:   1.  Migraine without aura -- well controlled currently.   2.  Left shoulder sensory disturbance.      PLAN:  She is going to continue on nortriptyline and Maxalt.  I told her if her headaches become more frequent, we could increase the nortriptyline dose further.      She currently is not contemplating another pregnancy, but if she decides to move forward with that, she will let me know before conceiving, and I will taper her off the  nortriptyline.      I plan to see her back in 6 months.         ABDI STEWARD MD             D: 10/04/2018   T: 10/04/2018   MT: WAYNE      Name:     KYRA MELENDEZ   MRN:      [id number]        Account:      [id number]   :      1990           Visit Date:   10/04/2018      Document: [id number]        Again, thank you for allowing me to participate in the care of your patient.        Sincerely,        Abdi Steward MD

## 2018-10-04 NOTE — NURSING NOTE
"Jennifer Pastrana's goals for this visit include:   Chief Complaint   Patient presents with     RECHECK     headaches are well controlled pt states      She requests these members of her care team be copied on today's visit information: PCP    PCP: Ashley Fish    Referring Provider:  No referring provider defined for this encounter.    /87 (BP Location: Left arm, Patient Position: Sitting, Cuff Size: Adult Regular)  Pulse 91  Ht 1.594 m (5' 2.75\")  Wt 87.5 kg (193 lb)  LMP 09/07/2018  SpO2 99%  BMI 34.46 kg/m2    Do you need any medication refills at today's visit?  n  "

## 2018-10-04 NOTE — MR AVS SNAPSHOT
After Visit Summary   10/4/2018    Jennifer Pastrana    MRN: [id number]           Patient Information     Date Of Birth          1990        Visit Information        Provider Department      10/4/2018 10:00 AM Abdi Steward MD [facility]        Today's Diagnoses     Sensory disturbance    -  1    Migraine without aura and without status migrainosus, not intractable           Follow-ups after your visit        Follow-up notes from your care team     Discussed this visit Return in about 6 months (around 4/4/2019).      Your next 10 appointments already scheduled     Apr 04, 2019  1:00 PM CDT   Return Visit with Abdi Steward MD   [facility] ([facility])    [address] [place] [id number] [phone]              Who to contact     If you have questions or need follow up information about today's clinic visit or your schedule please contact [facility] directly at [phone].  Normal or non-critical lab and imaging results will be communicated to you by wmblyhart, letter or phone within 4 business days after the clinic has received the results. If you do not hear from us within 7 days, please contact the clinic through [email or web address] or phone. If you have a critical or abnormal lab result, we will notify you by phone as soon as possible.  Submit refill requests through [employer] or call your pharmacy and they will forward the refill request to us. Please allow 3 business days for your refill to be completed.          Additional Information About Your Visit        MyChart Information     [employer] gives you secure access to your electronic health record. If you see a primary care provider, you can also send messages to your care team and make appointments. If you have questions, please call your primary care clinic.  If you do not have a primary care provider, please call [phone] and they will assist you.       "[employer] is an electronic gateway that provides easy, online access to your medical records. With [employer], you can request a clinic appointment, read your test results, renew a prescription or communicate with your care team.     To access your existing account, please contact your [facility] or call [phone] for assistance.        Care EveryWhere ID     This is your Care EveryWhere ID. This could be used by other organizations to access your [facility] records  [id number]        Your Vitals Were     Pulse Height Last Period Pulse Oximetry BMI (Body Mass Index)       91 1.594 m (5' 2.75\") 09/07/2018 99% 34.46 kg/m2        Blood Pressure from Last 3 Encounters:   10/04/18 131/87   09/21/18 126/84   07/19/18 124/74    Weight from Last 3 Encounters:   10/04/18 87.5 kg (193 lb)   09/21/18 86.2 kg (190 lb)   07/19/18 86.2 kg (190 lb)              Today, you had the following     No orders found for display         Where to get your medicines      These medications [id number] [address] [facility] [place]  [address] [employer] , [facility] [address]    Hours:  24-hours Phone:  [phone]     nortriptyline 10 MG capsule    rizatriptan 10 MG ODT tab          Primary Care Provider Office Phone # Fax #    Ashley Fish [phone] [address] [address]        Equal Access to Services     [facility] AH: Hadii aad ku hadasho Soomaali, waaxda luqadaha, qaybta kaalmada alverto gonzales . So [facility] [phone].    ATENCIÓN: Si habla español, tiene a patterson disposición servicios gratuitos de asistencia lingüística. Llame al [phone].    We comply with applicable federal civil rights laws and [place] laws. We do not discriminate on the basis of race, color, national origin, age, disability, sex, sexual orientation, or gender " identity.            Thank you!     Thank you for choosing [facility]  for your care. Our goal is always to provide you with excellent care. Hearing back from our patients is one way we can continue to improve our services. Please take a few minutes to complete the written survey that you may receive in the mail after your visit with us. Thank you!             Your Updated Medication List - Protect others around you: Learn how to safely use, store and throw away your medicines at www.disposemymeds.org.          This list is accurate as of 10/4/18 10:13 AM.  Always use your most recent med list.                   Brand Name Dispense Instructions for use Diagnosis    albuterol 108 (90 Base) MCG/ACT inhaler    PROAIR HFA/PROVENTIL HFA/VENTOLIN HFA    1 Inhaler    Inhale 2 puffs into the lungs every 6 hours    Reactive airway disease that is not asthma       CLARITIN PO      Take 10 mg by mouth daily Reported on 4/26/2017        cyanocobalamin 1000 MCG tablet    vitamin  B-12     Take by mouth daily        fluticasone 50 MCG/ACT spray    FLONASE    1 Bottle    Spray 1-2 sprays into both nostrils daily    Chronic seasonal allergic rhinitis, unspecified trigger       levothyroxine 150 MCG tablet    SYNTHROID/LEVOTHROID    90 tablet    Take 1 tablet (150 mcg) by mouth daily    Hypothyroidism, unspecified type       norgestimate-ethinyl estradiol 0.25-35 MG-MCG per tablet    ORTHO-CYCLEN, SPRINTEC    90 tablet    Take 1 tablet by mouth daily    Oral contraceptive pill surveillance       nortriptyline 10 MG capsule    PAMELOR    270 capsule    3 CAPS at bedtime    Migraine without aura and without status migrainosus, not intractable       rizatriptan 10 MG ODT tab    MAXALT-MLT    12 tablet    Take 1 tablet (10 mg) by mouth at onset of headache for migraine May repeat in 2 hours. Max 3 tablets/24 hours.    Migraine without aura and without status migrainosus, not intractable       VITAMIN D  (CHOLECALCIFEROL) PO      Take 2,000 Units by mouth daily

## 2018-10-18 ENCOUNTER — MYC MEDICAL ADVICE (OUTPATIENT)
Dept: FAMILY MEDICINE | Facility: CLINIC | Age: 28
End: 2018-10-18

## 2019-01-31 ENCOUNTER — OFFICE VISIT (OUTPATIENT)
Dept: FAMILY MEDICINE | Facility: CLINIC | Age: 29
End: 2019-01-31
Payer: COMMERCIAL

## 2019-01-31 VITALS
SYSTOLIC BLOOD PRESSURE: 132 MMHG | OXYGEN SATURATION: 98 % | DIASTOLIC BLOOD PRESSURE: 86 MMHG | HEART RATE: 105 BPM | BODY MASS INDEX: 34.16 KG/M2 | HEIGHT: 63 IN | WEIGHT: 192.8 LBS | TEMPERATURE: 97.9 F

## 2019-01-31 DIAGNOSIS — Z86.59 HISTORY OF CLAUSTROPHOBIA: ICD-10-CM

## 2019-01-31 DIAGNOSIS — R42 DIZZINESS: Primary | ICD-10-CM

## 2019-01-31 DIAGNOSIS — Z82.49 FAMILY HISTORY OF ANEURYSM OF BLOOD VESSEL OF BRAIN: ICD-10-CM

## 2019-01-31 LAB
ANION GAP SERPL CALCULATED.3IONS-SCNC: 8 MMOL/L (ref 3–14)
BUN SERPL-MCNC: 9 MG/DL (ref 7–30)
CALCIUM SERPL-MCNC: 9.3 MG/DL (ref 8.5–10.1)
CHLORIDE SERPL-SCNC: 105 MMOL/L (ref 94–109)
CO2 SERPL-SCNC: 26 MMOL/L (ref 20–32)
CREAT SERPL-MCNC: 0.69 MG/DL (ref 0.52–1.04)
ERYTHROCYTE [DISTWIDTH] IN BLOOD BY AUTOMATED COUNT: 12.6 % (ref 10–15)
FOLATE SERPL-MCNC: 15.2 NG/ML
GFR SERPL CREATININE-BSD FRML MDRD: >90 ML/MIN/{1.73_M2}
GLUCOSE SERPL-MCNC: 79 MG/DL (ref 70–99)
HCT VFR BLD AUTO: 42.5 % (ref 35–47)
HGB BLD-MCNC: 14.2 G/DL (ref 11.7–15.7)
MCH RBC QN AUTO: 29.6 PG (ref 26.5–33)
MCHC RBC AUTO-ENTMCNC: 33.4 G/DL (ref 31.5–36.5)
MCV RBC AUTO: 89 FL (ref 78–100)
PLATELET # BLD AUTO: 279 10E9/L (ref 150–450)
POTASSIUM SERPL-SCNC: 4.5 MMOL/L (ref 3.4–5.3)
RBC # BLD AUTO: 4.79 10E12/L (ref 3.8–5.2)
SODIUM SERPL-SCNC: 139 MMOL/L (ref 133–144)
TSH SERPL DL<=0.005 MIU/L-ACNC: 1.13 MU/L (ref 0.4–4)
VIT B12 SERPL-MCNC: 1162 PG/ML (ref 193–986)
WBC # BLD AUTO: 5.4 10E9/L (ref 4–11)

## 2019-01-31 PROCEDURE — 84443 ASSAY THYROID STIM HORMONE: CPT | Performed by: FAMILY MEDICINE

## 2019-01-31 PROCEDURE — 80048 BASIC METABOLIC PNL TOTAL CA: CPT | Performed by: FAMILY MEDICINE

## 2019-01-31 PROCEDURE — 93000 ELECTROCARDIOGRAM COMPLETE: CPT | Performed by: FAMILY MEDICINE

## 2019-01-31 PROCEDURE — 82746 ASSAY OF FOLIC ACID SERUM: CPT | Performed by: FAMILY MEDICINE

## 2019-01-31 PROCEDURE — 36415 COLL VENOUS BLD VENIPUNCTURE: CPT | Performed by: FAMILY MEDICINE

## 2019-01-31 PROCEDURE — 99214 OFFICE O/P EST MOD 30 MIN: CPT | Performed by: FAMILY MEDICINE

## 2019-01-31 PROCEDURE — 85027 COMPLETE CBC AUTOMATED: CPT | Performed by: FAMILY MEDICINE

## 2019-01-31 PROCEDURE — 82607 VITAMIN B-12: CPT | Performed by: FAMILY MEDICINE

## 2019-01-31 RX ORDER — DIAZEPAM 5 MG
TABLET ORAL
Qty: 2 TABLET | Refills: 0 | Status: SHIPPED | OUTPATIENT
Start: 2019-01-31 | End: 2019-04-04

## 2019-01-31 ASSESSMENT — ANXIETY QUESTIONNAIRES
GAD7 TOTAL SCORE: 1
2. NOT BEING ABLE TO STOP OR CONTROL WORRYING: NOT AT ALL
3. WORRYING TOO MUCH ABOUT DIFFERENT THINGS: NOT AT ALL
IF YOU CHECKED OFF ANY PROBLEMS ON THIS QUESTIONNAIRE, HOW DIFFICULT HAVE THESE PROBLEMS MADE IT FOR YOU TO DO YOUR WORK, TAKE CARE OF THINGS AT HOME, OR GET ALONG WITH OTHER PEOPLE: NOT DIFFICULT AT ALL
7. FEELING AFRAID AS IF SOMETHING AWFUL MIGHT HAPPEN: NOT AT ALL
5. BEING SO RESTLESS THAT IT IS HARD TO SIT STILL: NOT AT ALL
6. BECOMING EASILY ANNOYED OR IRRITABLE: NOT AT ALL
1. FEELING NERVOUS, ANXIOUS, OR ON EDGE: NOT AT ALL

## 2019-01-31 ASSESSMENT — MIFFLIN-ST. JEOR: SCORE: 1559.7

## 2019-01-31 ASSESSMENT — PATIENT HEALTH QUESTIONNAIRE - PHQ9
5. POOR APPETITE OR OVEREATING: SEVERAL DAYS
SUM OF ALL RESPONSES TO PHQ QUESTIONS 1-9: 2

## 2019-01-31 NOTE — PATIENT INSTRUCTIONS
Will notify you with results.     Patient Education     Dizziness (Uncertain Cause)  Dizziness is a common symptom. It may be described as lightheadedness, spinning, or feeling like you are going to faint. Dizziness can have many causes.  Be sure to tell the healthcare provider about:    All medicines you take, including prescription, over-the-counter, herbs, and supplements    Any other symptoms you have    Any health problems you are being treated for    Any past major health problems you've had, such as a heart attack, balance issues, hearing problems, or blood pressure problems    Anything that causes the dizziness to get worse or better  Today's exam did not show an exact cause for your dizziness. Other tests may be needed. Follow up with your healthcare provider.  Home care    Dizziness that occurs with sudden standing may be a sign of mild dehydration. Drink extra fluids for the next few days.    If you recently started a new medicine, stopped a medicine, or had the dose of a current medicine changed, talk with the prescribing healthcare provider. Your medicine plan may need adjustment.    If dizziness lasts more than a few seconds, sit or lie down until it passes. This may help prevent injury in case you pass out. Get up slowly when you feel better.    Don't drive or use power tools or dangerous equipment until you have had no dizziness for at least 48 hours.  Follow-up care  Follow up with your healthcare provider for further evaluation within the next 7 days or as advised.  When to seek medical advice  Call your healthcare provider for any of the following:    Worsening of symptoms or new symptoms    Passing out or seizure    Repeated vomiting    Headache    Palpitations (the sense that your heart is fluttering or beating fast or hard)    Shortness of breath    Blood in vomit or stool (black or red color)    Weakness of an arm or leg or 1 side of the face    Vision or hearing changes    Trouble walking or  speaking    Chest, arm, neck, back, or jaw pain  Date Last Reviewed: 11/1/2017 2000-2018 [employer]. [address] [place] [address], [place] [address]. All rights reserved. This information is not intended as a substitute for professional medical care. Always follow your healthcare professional's instructions.

## 2019-02-01 ASSESSMENT — ANXIETY QUESTIONNAIRES: GAD7 TOTAL SCORE: 1

## 2019-02-07 ENCOUNTER — ANCILLARY PROCEDURE (OUTPATIENT)
Dept: MRI IMAGING | Facility: CLINIC | Age: 29
End: 2019-02-07
Payer: COMMERCIAL

## 2019-02-07 DIAGNOSIS — Z82.49 FAMILY HISTORY OF ANEURYSM OF BLOOD VESSEL OF BRAIN: ICD-10-CM

## 2019-02-07 PROCEDURE — 70544 MR ANGIOGRAPHY HEAD W/O DYE: CPT | Mod: TC

## 2019-04-04 ENCOUNTER — OFFICE VISIT (OUTPATIENT)
Dept: NEUROLOGY | Facility: CLINIC | Age: 29
End: 2019-04-04
Payer: COMMERCIAL

## 2019-04-04 VITALS
HEART RATE: 94 BPM | DIASTOLIC BLOOD PRESSURE: 90 MMHG | WEIGHT: 190 LBS | BODY MASS INDEX: 33.93 KG/M2 | SYSTOLIC BLOOD PRESSURE: 124 MMHG | OXYGEN SATURATION: 100 %

## 2019-04-04 DIAGNOSIS — G43.009 MIGRAINE WITHOUT AURA AND WITHOUT STATUS MIGRAINOSUS, NOT INTRACTABLE: Primary | ICD-10-CM

## 2019-04-04 PROCEDURE — 99213 OFFICE O/P EST LOW 20 MIN: CPT | Performed by: PSYCHIATRY & NEUROLOGY

## 2019-04-04 RX ORDER — NORTRIPTYLINE HCL 10 MG
CAPSULE ORAL
Qty: 360 CAPSULE | Refills: 2 | Status: SHIPPED | OUTPATIENT
Start: 2019-04-04 | End: 2019-10-03 | Stop reason: DRUGHIGH

## 2019-04-04 RX ORDER — RIZATRIPTAN BENZOATE 10 MG/1
10 TABLET, ORALLY DISINTEGRATING ORAL
Qty: 12 TABLET | Refills: 1 | Status: SHIPPED | OUTPATIENT
Start: 2019-04-04 | End: 2019-10-03

## 2019-04-04 ASSESSMENT — PAIN SCALES - GENERAL: PAINLEVEL: NO PAIN (0)

## 2019-04-04 NOTE — NURSING NOTE
Jennifer Pastrana's goals for this visit include:   Chief Complaint   Patient presents with     RECHECK     6mo recheck headaches       She requests these members of her care team be copied on today's visit information: no    PCP: Ashley Fish    Referring Provider:  No referring provider defined for this encounter.    /90 (BP Location: Left arm, Patient Position: Sitting, Cuff Size: Adult Regular)   Pulse 94   Wt 86.2 kg (190 lb)   SpO2 100%   BMI 33.93 kg/m      Do you need any medication refills at today's visit? Yes    Katrin Corcoran LPN

## 2019-04-04 NOTE — LETTER
2019         RE: Jennifer Melendez  [address] [facility] [address]        Dear Colleague,    Thank you for referring your patient, Jennifer Melendez, to the [facility] [place]. Please see a copy of my visit note below.    Visit Date:   2019      HISTORY OF PRESENT ILLNESS:  Jennifer Melendez returns for followup of common migraine.      She tells me her headaches are pretty well controlled.  She is still, however, having about 1 headache a week.  She will get relief with Maxalt ODT 10 mg or an over-the-counter medication.      She continues on nortriptyline 30 mg and is tolerating it well.      She did not tolerate Topamax due to paresthesias.      PHYSICAL EXAMINATION:   VITAL SIGNS:  Heart rate is 94.  Blood pressure 124/90.   HEENT:  Funduscopic examination reveals sharp disc margins.  Pupils are equal, round and react well to light.  Visual fields are intact.   CRANIAL NERVES:  Cranial nerves II-XII are intact.  Motor, sensory, cerebellar and gait testing are normal.   REFLEXES:  2+.  Plantar responses are flexor.      IMPRESSION:  Common migraine.      PLAN:  She is going to push her nortriptyline dose up to 40 mg to see if she can achieve even better control.  She can continue to utilize Maxalt for abortive therapy.      I will be seeing her back in 6 months.        ADDENDUM:  She is not planning pregnancy.         ABDI SETWARD MD             D: 2019   T: 2019   MT: JASPER      Name:     JENNIFER MELENDEZ   MRN:      [id number]        Account:      [id number]   :      1990           Visit Date:   2019      Document: [id number]        Again, thank you for allowing me to participate in the care of your patient.        Sincerely,        Abdi Steward MD

## 2019-04-04 NOTE — PROGRESS NOTES
Visit Date:   2019      HISTORY OF PRESENT ILLNESS:  Jennifer Melendez returns for followup of common migraine.      She tells me her headaches are pretty well controlled.  She is still, however, having about 1 headache a week.  She will get relief with Maxalt ODT 10 mg or an over-the-counter medication.      She continues on nortriptyline 30 mg and is tolerating it well.      She did not tolerate Topamax due to paresthesias.      PHYSICAL EXAMINATION:   VITAL SIGNS:  Heart rate is 94.  Blood pressure 124/90.   HEENT:  Funduscopic examination reveals sharp disc margins.  Pupils are equal, round and react well to light.  Visual fields are intact.   CRANIAL NERVES:  Cranial nerves II-XII are intact.  Motor, sensory, cerebellar and gait testing are normal.   REFLEXES:  2+.  Plantar responses are flexor.      IMPRESSION:  Common migraine.      PLAN:  She is going to push her nortriptyline dose up to 40 mg to see if she can achieve even better control.  She can continue to utilize Maxalt for abortive therapy.      I will be seeing her back in 6 months.        ADDENDUM:  She is not planning pregnancy.         AZALIA PARRISH MD             D: 2019   T: 2019   MT: JASPER      Name:     JENNIFER MELENDEZ   MRN:      [id number]        Account:      [id number]   :      1990           Visit Date:   2019      Document: [id number]

## 2019-06-14 ENCOUNTER — VIRTUAL VISIT (OUTPATIENT)
Dept: FAMILY MEDICINE | Facility: OTHER | Age: 29
End: 2019-06-14

## 2019-06-14 NOTE — PROGRESS NOTES
"Date:   Clinician: Sapna Ojeda  Clinician NPI: [id number]  Patient: Jennifer Pastrana  Patient : 1990  Patient Address: [address] [place] [address], [place] [address]  Patient Phone: [phone]  Visit Protocol: UTI  Patient Summary:  Jennifer is a 28 year old ( : 1990 ) female who initiated a Visit for a presumed bladder infection. When asked the question \"Please sign me up to receive news, health information and promotions from [employer].\", Jennifer responded \"No\".   Her symptoms started 1-3 days ago and consist of feeling as if the bladder is never empty, abdominal pain, urinary frequency, urgency, and vaginal itching.   Symptom details     Urine color: The color of her urine is yellow.     Abdominal pain: The pain is mild (1-3 on a 10 point pain scale).      Denied symptoms include chills, vaginal discharge, dysuria, foul-smelling urine, nausea, flank pain, urinary incontinence, and vomiting. She does not feel feverish.   Jennifer has not used any over-the-counter medications or home remedies to relieve her current symptoms.  Precipitating events  Jennifer denies having a sexually transmitted disease.  Pertinent medical history  Jennifer has had a bladder infection before but has not had any in the past 12 months. Her current symptoms are similar to her previous bladder infection symptoms.   Sulfamethoxazole-trimethoprim (Bactrim DS) and nitrofurantoin (Macrobid) has been effective in treating her past bladder infections.   She has experienced problems or side effects with the following antibiotics in the past: sulfamethoxazole-trimethoprim (Bactrim DS) and nitrofurantoin (Macrobid).  Problems or side effects as reported by the patient (free text): Vaginal yeast infection   Jennifer typically gets a yeast infection when she takes antibiotics. She has used fluconazole (Diflucan) to treat previous yeast infections. 2 doses of fluconazole (Diflucan) has typically been needed for symptoms to resolve in " the past.  Jennifer has not been prescribed antibiotics to prevent frequent or repeated bladder infections in the past.   Jennifer does not have a history of kidney stones. She has not used a catheter or been a patient in a hospital or nursing home in the past 2 weeks.   Jennifer does not smoke or use smokeless tobacco.   She denies pregnancy and denies breastfeeding. She has menstruated in the past month.     MEDICATIONS: nortriptyline oral, Mononessa (28) oral, Zyrtec oral, levothyroxine oral, ALLERGIES: NKDA  Clinician Response:  Dear Jennifer,   I am sorry you are not feeling well. To determine the most appropriate care for you, I would like you to be seen in person to further discuss your health history and symptoms.  You will not be charged for this Visit. Thank you for trusting us with your care.   Diagnosis: Refer for additional evaluation  Diagnosis ICD: R69  Diagnosis ICD: 462.0

## 2019-07-29 DIAGNOSIS — J30.2 CHRONIC SEASONAL ALLERGIC RHINITIS: ICD-10-CM

## 2019-07-29 DIAGNOSIS — J98.9 REACTIVE AIRWAY DISEASE THAT IS NOT ASTHMA: ICD-10-CM

## 2019-07-29 DIAGNOSIS — E03.9 HYPOTHYROIDISM, UNSPECIFIED TYPE: ICD-10-CM

## 2019-07-29 NOTE — TELEPHONE ENCOUNTER
Requested Prescriptions   Pending Prescriptions Disp Refills     fluticasone (FLONASE) 50 MCG/ACT nasal spray  Last Written Prescription Date:  06/29/18  Last Fill Quantity: 1,  # refills: 11   Last office visit: 1/31/2019 with prescribing provider:  DONNIE Robbins Office Visit:   Next 5 appointments (look out 90 days)    Oct 03, 2019  1:00 PM CDT  Return Visit with Abdi Steward MD  [facility]) [address] [place] [id number]  [phone]                Sig: Spray 1-2 sprays into both nostrils daily       There is no refill protocol information for this order        albuterol (PROAIR HFA/PROVENTIL HFA/VENTOLIN HFA) 108 (90 Base) MCG/ACT inhaler  Last Written Prescription Date:  09/21/18  Last Fill Quantity: 1,  # refills: 11   Last office visit: 1/31/2019 with prescribing provider:  DONNIE Robbins Office Visit:   Next 5 appointments (look out 90 days)    Oct 03, 2019  1:00 PM CDT  Return Visit with Abdi Steward MD  [facility]) [id number] [place] [id number] [phone]          1 Inhaler 11     Sig: Inhale 2 puffs into the lungs every 6 hours       There is no refill protocol information for this order        levothyroxine (SYNTHROID/LEVOTHROID) 150 MCG tablet  Last Written Prescription Date:  09/21/18  Last Fill Quantity: 90,  # refills: 3   Last office visit: 1/31/2019 with prescribing provider:  DONNIE Robbins Office Visit:   Next 5 appointments (look out 90 days)    Oct 03, 2019  1:00 PM CDT  Return Visit with Abdi Steward MD  [facility]) [address] [place] [id number] [phone]          90 tablet 3     Sig: Take 1 tablet (150 mcg) by mouth daily       There is no refill protocol information for this order

## 2019-07-30 RX ORDER — ALBUTEROL SULFATE 90 UG/1
2 AEROSOL, METERED RESPIRATORY (INHALATION) EVERY 6 HOURS
Qty: 1 INHALER | Refills: 3 | Status: SHIPPED | OUTPATIENT
Start: 2019-07-30 | End: 2021-10-01

## 2019-07-30 RX ORDER — FLUTICASONE PROPIONATE 50 MCG
1-2 SPRAY, SUSPENSION (ML) NASAL DAILY
Qty: 9.9 ML | Refills: 3 | Status: SHIPPED | OUTPATIENT
Start: 2019-07-30 | End: 2020-09-25

## 2019-07-30 RX ORDER — LEVOTHYROXINE SODIUM 150 UG/1
150 TABLET ORAL DAILY
Qty: 90 TABLET | Refills: 0 | Status: SHIPPED | OUTPATIENT
Start: 2019-07-30 | End: 2019-09-20

## 2019-07-30 NOTE — TELEPHONE ENCOUNTER
Patient is due for physical in September.   30 day george period fill pended for provider approval with reminder to patient to schedule appointment for further refills.   Please advise on george refill.     Valerie Andrews RN, BSN, PHN

## 2019-09-17 ASSESSMENT — ENCOUNTER SYMPTOMS
SORE THROAT: 0
HEARTBURN: 0
JOINT SWELLING: 0
HEMATURIA: 0
FREQUENCY: 0
ARTHRALGIAS: 0
EYE PAIN: 0
DIZZINESS: 0
FEVER: 0
HEADACHES: 1
NERVOUS/ANXIOUS: 0
DIARRHEA: 0
CONSTIPATION: 0
SHORTNESS OF BREATH: 0
PARESTHESIAS: 0
NAUSEA: 0
ABDOMINAL PAIN: 0
COUGH: 0
WEAKNESS: 0
HEMATOCHEZIA: 0
DYSURIA: 0
PALPITATIONS: 0
MYALGIAS: 1
CHILLS: 0
BREAST MASS: 0

## 2019-09-20 ENCOUNTER — OFFICE VISIT (OUTPATIENT)
Dept: FAMILY MEDICINE | Facility: CLINIC | Age: 29
End: 2019-09-20
Payer: COMMERCIAL

## 2019-09-20 VITALS
WEIGHT: 202.8 LBS | OXYGEN SATURATION: 98 % | BODY MASS INDEX: 35.93 KG/M2 | TEMPERATURE: 98.6 F | RESPIRATION RATE: 18 BRPM | HEIGHT: 63 IN | DIASTOLIC BLOOD PRESSURE: 88 MMHG | HEART RATE: 91 BPM | SYSTOLIC BLOOD PRESSURE: 134 MMHG

## 2019-09-20 DIAGNOSIS — E66.9 OBESITY (BMI 35.0-39.9 WITHOUT COMORBIDITY): ICD-10-CM

## 2019-09-20 DIAGNOSIS — Z97.5 IUD CONTRACEPTION: ICD-10-CM

## 2019-09-20 DIAGNOSIS — E03.9 HYPOTHYROIDISM, UNSPECIFIED TYPE: ICD-10-CM

## 2019-09-20 DIAGNOSIS — Z00.00 ROUTINE GENERAL MEDICAL EXAMINATION AT A HEALTH CARE FACILITY: Primary | ICD-10-CM

## 2019-09-20 DIAGNOSIS — Z13.220 LIPID SCREENING: ICD-10-CM

## 2019-09-20 DIAGNOSIS — Z13.1 SCREENING FOR DIABETES MELLITUS: ICD-10-CM

## 2019-09-20 LAB
ALBUMIN SERPL-MCNC: 3.8 G/DL (ref 3.4–5)
ALP SERPL-CCNC: 98 U/L (ref 40–150)
ALT SERPL W P-5'-P-CCNC: 30 U/L (ref 0–50)
ANION GAP SERPL CALCULATED.3IONS-SCNC: 8 MMOL/L (ref 3–14)
AST SERPL W P-5'-P-CCNC: 17 U/L (ref 0–45)
BILIRUB SERPL-MCNC: 0.7 MG/DL (ref 0.2–1.3)
BUN SERPL-MCNC: 10 MG/DL (ref 7–30)
CALCIUM SERPL-MCNC: 9.5 MG/DL (ref 8.5–10.1)
CHLORIDE SERPL-SCNC: 105 MMOL/L (ref 94–109)
CO2 SERPL-SCNC: 25 MMOL/L (ref 20–32)
CREAT SERPL-MCNC: 0.64 MG/DL (ref 0.52–1.04)
ERYTHROCYTE [DISTWIDTH] IN BLOOD BY AUTOMATED COUNT: 12.3 % (ref 10–15)
GFR SERPL CREATININE-BSD FRML MDRD: >90 ML/MIN/{1.73_M2}
GLUCOSE SERPL-MCNC: 88 MG/DL (ref 70–99)
HCT VFR BLD AUTO: 40.9 % (ref 35–47)
HGB BLD-MCNC: 13.4 G/DL (ref 11.7–15.7)
MCH RBC QN AUTO: 29.3 PG (ref 26.5–33)
MCHC RBC AUTO-ENTMCNC: 32.8 G/DL (ref 31.5–36.5)
MCV RBC AUTO: 90 FL (ref 78–100)
PLATELET # BLD AUTO: 328 10E9/L (ref 150–450)
POTASSIUM SERPL-SCNC: 4.4 MMOL/L (ref 3.4–5.3)
PROT SERPL-MCNC: 7.9 G/DL (ref 6.8–8.8)
RBC # BLD AUTO: 4.57 10E12/L (ref 3.8–5.2)
SODIUM SERPL-SCNC: 138 MMOL/L (ref 133–144)
TSH SERPL DL<=0.005 MIU/L-ACNC: 1.48 MU/L (ref 0.4–4)
WBC # BLD AUTO: 11.1 10E9/L (ref 4–11)

## 2019-09-20 PROCEDURE — 80053 COMPREHEN METABOLIC PANEL: CPT | Performed by: FAMILY MEDICINE

## 2019-09-20 PROCEDURE — 99395 PREV VISIT EST AGE 18-39: CPT | Performed by: FAMILY MEDICINE

## 2019-09-20 PROCEDURE — 84443 ASSAY THYROID STIM HORMONE: CPT | Performed by: FAMILY MEDICINE

## 2019-09-20 PROCEDURE — 99213 OFFICE O/P EST LOW 20 MIN: CPT | Mod: 25 | Performed by: FAMILY MEDICINE

## 2019-09-20 PROCEDURE — 85027 COMPLETE CBC AUTOMATED: CPT | Performed by: FAMILY MEDICINE

## 2019-09-20 PROCEDURE — 36415 COLL VENOUS BLD VENIPUNCTURE: CPT | Performed by: FAMILY MEDICINE

## 2019-09-20 RX ORDER — LEVOTHYROXINE SODIUM 150 UG/1
150 TABLET ORAL DAILY
Qty: 90 TABLET | Refills: 3 | Status: SHIPPED | OUTPATIENT
Start: 2019-09-20 | End: 2020-09-28

## 2019-09-20 ASSESSMENT — ENCOUNTER SYMPTOMS
FEVER: 0
SHORTNESS OF BREATH: 0
DYSURIA: 0
SORE THROAT: 0
HEARTBURN: 0
ARTHRALGIAS: 0
CHILLS: 0
DIZZINESS: 0
DIARRHEA: 0
FREQUENCY: 0
PALPITATIONS: 0
NAUSEA: 0
HEADACHES: 1
NERVOUS/ANXIOUS: 0
PARESTHESIAS: 0
HEMATURIA: 0
JOINT SWELLING: 0
MYALGIAS: 1
CONSTIPATION: 0
BREAST MASS: 0
HEMATOCHEZIA: 0
WEAKNESS: 0
EYE PAIN: 0
COUGH: 0
ABDOMINAL PAIN: 0

## 2019-09-20 ASSESSMENT — MIFFLIN-ST. JEOR: SCORE: 1603.27

## 2019-09-20 NOTE — PROGRESS NOTES
SUBJECTIVE:   CC: Jennifer Pastrana is an 28 year old woman who presents for preventive health visit.     Healthy Habits:     Getting at least 3 servings of Calcium per day:  Yes    Bi-annual eye exam:  Yes    Dental care twice a year:  NO    Sleep apnea or symptoms of sleep apnea:  None    Diet:  Regular (no restrictions)    Frequency of exercise:  None    Taking medications regularly:  Yes    Medication side effects:  None    PHQ-2 Total Score: 0    Additional concerns today:  Yes      PROBLEMS TO ADD ON...  Patient informed that anything we discuss that is not related to preventative medicine, may be billed for; patient verbalizes understanding.    Weight management-BMI=36.34    HYPOTHYROIDISM - Patient has a longstanding history of chronic Hypothyroidism. Patient has been doing well, noting no tremor, insomnia, hair loss or changes in skin texture. Continues to take medications as directed, without adverse reactions or side effects. Last TSH   Lab Results   Component Value Date    TSH 1.13 01/31/2019   .      Request to change birth control.  States that she is having a hard time taking her OCPs on a daily basis, often forgets.  Contemplating an IUD.  Not planning to conceive in the near future.    -------------------------------------    Today's PHQ-2 Score:   PHQ-2 ( 1999 Pfizer) 9/17/2019   Q1: Little interest or pleasure in doing things 0   Q2: Feeling down, depressed or hopeless 0   PHQ-2 Score 0   Q1: Little interest or pleasure in doing things Not at all   Q2: Feeling down, depressed or hopeless Not at all   PHQ-2 Score 0       Abuse: Current or Past(Physical, Sexual or Emotional)- No  Do you feel safe in your environment? Yes    Social History     Tobacco Use     Smoking status: Never Smoker     Smokeless tobacco: Never Used     Tobacco comment: Nonsmoking household   Substance Use Topics     Alcohol use: No         No flowsheet data found.    Reviewed orders with patient.  Reviewed health maintenance  and updated orders accordingly - Yes  Lab work is in process  BP Readings from Last 3 Encounters:   09/20/19 134/88   04/04/19 124/90   01/31/19 132/86    Wt Readings from Last 3 Encounters:   09/20/19 92 kg (202 lb 12.8 oz)   04/04/19 86.2 kg (190 lb)   01/31/19 87.5 kg (192 lb 12.8 oz)                  Patient Active Problem List   Diagnosis     CARDIOVASCULAR SCREENING; LDL GOAL LESS THAN 160     Hypothyroidism     Dry eye syndrome- mild     Dysplasia of cervix, low grade (MILO 1)     Generalized anxiety disorder     Supervision of normal first pregnancy     Headache in pregnancy-NEURO referral     Migraine without aura and without status migrainosus, not intractable     Reactive airway disease that is not asthma     Oral contraceptive pill surveillance     Past Surgical History:   Procedure Laterality Date     NO HISTORY OF SURGERY         Social History     Tobacco Use     Smoking status: Never Smoker     Smokeless tobacco: Never Used     Tobacco comment: Nonsmoking household   Substance Use Topics     Alcohol use: No     Family History   Problem Relation Age of Onset     Breast Cancer Maternal Grandmother         in her 50s     Heart Disease Maternal Grandfather         50s     Myocardial Infarction Maternal Grandfather      Heart Disease Paternal Grandfather         50s     Myocardial Infarction Paternal Grandfather      Thyroid Disease Mother         graves-decompression and strabismus     Cancer Paternal Grandmother         cervical     Thyroid Disease Paternal Aunt         nine affected in family     Glaucoma No family hx of      Macular Degeneration No family hx of      Cerebrovascular Disease No family hx of      Hypertension No family hx of      Diabetes No family hx of          Current Outpatient Medications   Medication Sig Dispense Refill     albuterol (PROAIR HFA/PROVENTIL HFA/VENTOLIN HFA) 108 (90 Base) MCG/ACT inhaler Inhale 2 puffs into the lungs every 6 hours 1 Inhaler 3     fluticasone (FLONASE)  50 MCG/ACT nasal spray Spray 1-2 sprays into both nostrils daily 9.9 mL 3     levothyroxine (SYNTHROID/LEVOTHROID) 150 MCG tablet Take 1 tablet (150 mcg) by mouth daily 90 tablet 3     Loratadine (CLARITIN PO) Take 10 mg by mouth daily Reported on 4/26/2017       norgestimate-ethinyl estradiol (ORTHO-CYCLEN, SPRINTEC) 0.25-35 MG-MCG per tablet Take 1 tablet by mouth daily 90 tablet 3     nortriptyline (PAMELOR) 10 MG capsule 4 CAPS at bedtime 360 capsule 2     rizatriptan (MAXALT-MLT) 10 MG ODT Take 1 tablet (10 mg) by mouth at onset of headache for migraine May repeat in 2 hours. Max 3 tablets/24 hours. 12 tablet 1     cyanocobalamin (VITAMIN  B-12) 1000 MCG tablet Take by mouth daily       VITAMIN D, CHOLECALCIFEROL, PO Take 2,000 Units by mouth daily       No Known Allergies    Mammogram not appropriate for this patient based on age.    Pertinent mammograms are reviewed under the imaging tab.  History of abnormal Pap smear: NO - age 30-65 PAP every 5 years with negative HPV co-testing recommended  PAP / HPV 10/26/2017 2/25/2015 2/24/2014   PAP NIL NIL NIL     Reviewed and updated as needed this visit by clinical staff  Tobacco  Allergies  Meds  Med Hx  Surg Hx  Fam Hx  Soc Hx        Reviewed and updated as needed this visit by Provider  Tobacco  Med Hx  Surg Hx  Fam Hx  Soc Hx           Review of Systems   Constitutional: Negative for chills and fever.   HENT: Negative for congestion, ear pain, hearing loss and sore throat.    Eyes: Negative for pain and visual disturbance.   Respiratory: Negative for cough and shortness of breath.    Cardiovascular: Negative for chest pain, palpitations and peripheral edema.   Gastrointestinal: Negative for abdominal pain, constipation, diarrhea, heartburn, hematochezia and nausea.   Breasts:  Negative for tenderness, breast mass and discharge.   Genitourinary: Negative for dysuria, frequency, genital sores, hematuria, pelvic pain, urgency, vaginal bleeding and vaginal  "discharge.   Musculoskeletal: Positive for myalgias. Negative for arthralgias and joint swelling.   Skin: Negative for rash.   Neurological: Positive for headaches. Negative for dizziness, weakness and paresthesias.   Psychiatric/Behavioral: Negative for mood changes. The patient is not nervous/anxious.           OBJECTIVE:   /88   Pulse 91   Temp 98.6  F (37  C) (Tympanic)   Resp 18   Ht 1.591 m (5' 2.64\")   Wt 92 kg (202 lb 12.8 oz)   LMP 09/15/2019   SpO2 98%   BMI 36.34 kg/m    Physical Exam  GENERAL: healthy, alert and no distress  EYES: Eyes grossly normal to inspection, PERRL and conjunctivae and sclerae normal  HENT: ear canals and TM's normal, nose and mouth without ulcers or lesions  NECK: no adenopathy, no asymmetry, masses, or scars and thyroid normal to palpation  RESP: lungs clear to auscultation - no rales, rhonchi or wheezes  BREAST: normal without masses, tenderness or nipple discharge and no palpable axillary masses or adenopathy  CV: regular rate and rhythm, normal S1 S2, no S3 or S4, no murmur, click or rub, no peripheral edema and peripheral pulses strong  ABDOMEN: soft, nontender, no hepatosplenomegaly, no masses and bowel sounds normal  MS: no gross musculoskeletal defects noted, no edema  SKIN: no suspicious lesions or rashes  NEURO: Normal strength and tone, mentation intact and speech normal  PSYCH: mentation appears normal, affect normal/bright    Diagnostic Test Results:  Labs drawn and in process.      ASSESSMENT/PLAN:   Jennifer was seen today for physical.    Diagnoses and all orders for this visit:    Routine general medical examination at a health care facility  -     CBC with platelets  -     Comprehensive metabolic panel    Hypothyroidism, unspecified type, compensated on levothyroxine.  -   Check TSH with reflex T4  - levothyroxine (SYNTHROID/LEVOTHROID) 150 MCG tablet; Take 1 tablet (150 mcg) by mouth daily    IUD contraception  -     OB/GYN REFERRAL-would like an " "IUD placed    Lipid screening  -     Lipid Profile; Future    Screening for diabetes mellitus  -     Glucose; Future    Obesity (BMI 35.0-39.9 without comorbidity)  -     NUTRITION REFERRAL  -    Weight management plan: Discussed healthy diet and exercise guidelines  -     Initial weight loss goals:   1. Prevent further weight gain  2. Initial weight loss goal of about 5-7 % of body weight   3. Aim to lose at least 2 lbs/week.  4.  Eat a well balanced heart healthy diet, cut out soda/sugary drinks and control portion sizes.   5. Avoid missing breakfast.  6. Exercise regularly; increase exercise gradually as tolerated to a goal of  30-45 minutes/5 days a week.        COUNSELING:  Reviewed preventive health counseling, as reflected in patient instructions       Regular exercise       Healthy diet/nutrition    Estimated body mass index is 36.34 kg/m  as calculated from the following:    Height as of this encounter: 1.591 m (5' 2.64\").    Weight as of this encounter: 92 kg (202 lb 12.8 oz).    Weight management plan: Discussed healthy diet and exercise guidelines     reports that she has never smoked. She has never used smokeless tobacco.      Counseling Resources:  ATP IV Guidelines  Pooled Cohorts Equation Calculator  Breast Cancer Risk Calculator  FRAX Risk Assessment  ICSI Preventive Guidelines  Dietary Guidelines for Americans, 2010  USDA's MyPlate  ASA Prophylaxis  Lung CA Screening    Follow up annually and as needed thoughout the year.      Ashley Fish MD  [facility]  "

## 2019-10-03 ENCOUNTER — OFFICE VISIT (OUTPATIENT)
Dept: NEUROLOGY | Facility: CLINIC | Age: 29
End: 2019-10-03
Payer: COMMERCIAL

## 2019-10-03 VITALS
SYSTOLIC BLOOD PRESSURE: 134 MMHG | DIASTOLIC BLOOD PRESSURE: 85 MMHG | OXYGEN SATURATION: 99 % | WEIGHT: 200 LBS | HEART RATE: 94 BPM | BODY MASS INDEX: 35.84 KG/M2

## 2019-10-03 DIAGNOSIS — Z13.220 LIPID SCREENING: ICD-10-CM

## 2019-10-03 DIAGNOSIS — G43.009 MIGRAINE WITHOUT AURA AND WITHOUT STATUS MIGRAINOSUS, NOT INTRACTABLE: Primary | ICD-10-CM

## 2019-10-03 DIAGNOSIS — Z13.1 SCREENING FOR DIABETES MELLITUS: ICD-10-CM

## 2019-10-03 PROBLEM — R51.9 HEADACHE IN PREGNANCY: Status: RESOLVED | Noted: 2017-04-26 | Resolved: 2019-10-03

## 2019-10-03 PROBLEM — O26.899 HEADACHE IN PREGNANCY: Status: RESOLVED | Noted: 2017-04-26 | Resolved: 2019-10-03

## 2019-10-03 LAB
CHOLEST SERPL-MCNC: 188 MG/DL
GLUCOSE SERPL-MCNC: 91 MG/DL (ref 70–99)
HDLC SERPL-MCNC: 51 MG/DL
LDLC SERPL CALC-MCNC: 109 MG/DL
NONHDLC SERPL-MCNC: 137 MG/DL
TRIGL SERPL-MCNC: 139 MG/DL

## 2019-10-03 PROCEDURE — 99213 OFFICE O/P EST LOW 20 MIN: CPT | Performed by: PSYCHIATRY & NEUROLOGY

## 2019-10-03 PROCEDURE — 80061 LIPID PANEL: CPT | Performed by: FAMILY MEDICINE

## 2019-10-03 PROCEDURE — 36415 COLL VENOUS BLD VENIPUNCTURE: CPT | Performed by: FAMILY MEDICINE

## 2019-10-03 PROCEDURE — 82947 ASSAY GLUCOSE BLOOD QUANT: CPT | Performed by: FAMILY MEDICINE

## 2019-10-03 RX ORDER — RIZATRIPTAN BENZOATE 10 MG/1
10 TABLET, ORALLY DISINTEGRATING ORAL
Qty: 12 TABLET | Refills: 3 | Status: SHIPPED | OUTPATIENT
Start: 2019-10-03 | End: 2020-04-09

## 2019-10-03 RX ORDER — NORTRIPTYLINE HCL 25 MG
50 CAPSULE ORAL AT BEDTIME
Qty: 180 CAPSULE | Refills: 2 | Status: SHIPPED | OUTPATIENT
Start: 2019-10-03 | End: 2020-04-09

## 2019-10-03 ASSESSMENT — PAIN SCALES - GENERAL: PAINLEVEL: NO PAIN (0)

## 2019-10-03 NOTE — LETTER
10/3/2019         RE: Jennifer Pastrana  [address] [facility] [address]        Dear Colleague,    Thank you for referring your patient, Jennifer Pastrana, to the [facility]. Please see a copy of my visit note below.    Visit Date:   10/03/2019      HISTORY OF PRESENT ILLNESS:  Jennifer Robledo returns for followup of common migraine.      Over the past 6 months.  She tells me she is doing pretty well.  She did have a couple bouts of headaches lasting an entire week.  The first was in August without any clear trigger, and the second was in September after she had wisdom teeth out.  Otherwise, she is averaging about 1 headache a week.  Some of these have qualities of tension headaches.  Her migraine headaches are occurring about once a month.      She continues on nortriptyline 40 mg and is tolerating it well.  It has worked well to control her headaches.  Previous trial of Topamax produced intolerable paresthesias and was not particularly effective.      For acute management, she utilizes Maxalt ODT and finds it effective.      OTHER CURRENT MEDICATIONS:   1.  Albuterol.   2.  Flonase.   3.  Levothyroxine.   4.  Claritin.   5.  Ortho-Cyclen, Sprintec.   6.  Vitamin B12.   7.  Vitamin D.      PHYSICAL EXAMINATION:   GENERAL:  Reveals she is alert and cooperative.   VITAL SIGNS:  Heart rate 94.  Blood pressure 134/85.   NEUROLOGIC:  Funduscopic examination reveals sharp disc margins.  Pupils are equal, round and react well to light.  Visual fields are intact and otherwise cranial nerves II-XII are intact.  Motor, sensory, cerebellar and gait testing are normal.  Reflexes are 2+.  Plantar responses are flexor.      IMPRESSION:  Common migraine.      PLAN:  She would be interested in pushing her nortriptyline dose up a bit higher to see if she could achieve better control of her headaches.  She is going to increase to 50 mg.      She will continue to utilize Maxalt for abortive therapy.      I pointed  out her blood pressure is a bit elevated today and recommended that she follow up with Dr. Alexander.      I will be seeing her back in 6 months.         ABDI PARRISH MD             D: 10/03/2019   T: 10/03/2019   MT: WT      Name:     KYRA MELENDEZ   MRN:      [id number]        Account:      [id number]   :      1990           Visit Date:   10/03/2019      Document: [id number]        Again, thank you for allowing me to participate in the care of your patient.        Sincerely,        Abdi Parrish MD

## 2019-10-03 NOTE — PROGRESS NOTES
Visit Date:   10/03/2019      HISTORY OF PRESENT ILLNESS:  Jennifer Robledo returns for followup of common migraine.      Over the past 6 months.  She tells me she is doing pretty well.  She did have a couple bouts of headaches lasting an entire week.  The first was in August without any clear trigger, and the second was in September after she had wisdom teeth out.  Otherwise, she is averaging about 1 headache a week.  Some of these have qualities of tension headaches.  Her migraine headaches are occurring about once a month.      She continues on nortriptyline 40 mg and is tolerating it well.  It has worked well to control her headaches.  Previous trial of Topamax produced intolerable paresthesias and was not particularly effective.      For acute management, she utilizes Maxalt ODT and finds it effective.      OTHER CURRENT MEDICATIONS:   1.  Albuterol.   2.  Flonase.   3.  Levothyroxine.   4.  Claritin.   5.  Ortho-Cyclen, Sprintec.   6.  Vitamin B12.   7.  Vitamin D.      PHYSICAL EXAMINATION:   GENERAL:  Reveals she is alert and cooperative.   VITAL SIGNS:  Heart rate 94.  Blood pressure 134/85.   NEUROLOGIC:  Funduscopic examination reveals sharp disc margins.  Pupils are equal, round and react well to light.  Visual fields are intact and otherwise cranial nerves II-XII are intact.  Motor, sensory, cerebellar and gait testing are normal.  Reflexes are 2+.  Plantar responses are flexor.      IMPRESSION:  Common migraine.      PLAN:  She would be interested in pushing her nortriptyline dose up a bit higher to see if she could achieve better control of her headaches.  She is going to increase to 50 mg.      She will continue to utilize Maxalt for abortive therapy.      I pointed out her blood pressure is a bit elevated today and recommended that she follow up with Dr. Alexander.      I will be seeing her back in 6 months.         AZALIA PARRISH MD             D: 10/03/2019   T: 10/03/2019   MT: WT      Name:      KYRA MELENDEZ   MRN:      [id number]        Account:      [id number]   :      1990           Visit Date:   10/03/2019      Document: [id number]

## 2019-10-03 NOTE — NURSING NOTE
Jennifer Pastrana's goals for this visit include:   Chief Complaint   Patient presents with     RECHECK     6mo recheck headaches       She requests these members of her care team be copied on today's visit information: no    PCP: Ashley Fish    Referring Provider:  No referring provider defined for this encounter.    /85 (BP Location: Left arm, Patient Position: Sitting, Cuff Size: Adult Regular)   Pulse 94   Wt 90.7 kg (200 lb)   LMP 09/15/2019   SpO2 99%   BMI 35.84 kg/m      Do you need any medication refills at today's visit? No    Katrin Corcoran LPN

## 2019-10-11 ENCOUNTER — OFFICE VISIT (OUTPATIENT)
Dept: OBGYN | Facility: CLINIC | Age: 29
End: 2019-10-11
Payer: COMMERCIAL

## 2019-10-11 VITALS
WEIGHT: 203 LBS | HEART RATE: 103 BPM | BODY MASS INDEX: 36.38 KG/M2 | DIASTOLIC BLOOD PRESSURE: 81 MMHG | SYSTOLIC BLOOD PRESSURE: 134 MMHG

## 2019-10-11 DIAGNOSIS — Z32.00 PREGNANCY EXAMINATION OR TEST, PREGNANCY UNCONFIRMED: Primary | ICD-10-CM

## 2019-10-11 DIAGNOSIS — Z30.430 ENCOUNTER FOR IUD INSERTION: ICD-10-CM

## 2019-10-11 PROCEDURE — 58300 INSERT INTRAUTERINE DEVICE: CPT | Performed by: OBSTETRICS & GYNECOLOGY

## 2019-10-11 NOTE — PATIENT INSTRUCTIONS
If you have any questions regarding your visit, Please contact your care team.  [facility]: [phone]  [facility] CLINIC HOURS TELEPHONE NUMBER   Cephas Agbeh, M.D. Lisa -       Maureen Yin         Monday-[place]    8:00a.m-4:45 p.m    Tuesday--[place]     8:00a.m-4:45 p.m.    Thursday-[place]    8:00a.m-4:45 p.m.    Friday-[place]    8:00a.m-4:45 p.m    [facility]   [address] [place] [address]   [phone]-Ask for [facility]   Fax [phone]   Yburnsl-[phone]     [facility]   [address] [place] [address] [place] [address]   [phone]    [facility]  [address] [facility] [address]  [phone]  Taptiap-[phone]   Urgent Care locations:    [facility] Monday-Friday  5 pm - 9 pm  Saturday and Sunday   9 am - 5 pm   Monday-Friday   5 pm - 9 pm  Saturday and Sunday  9 am - 5 pm    [phone]   If you need a medication refill, please contact your pharmacy. Please allow 3 business days for your refill to be completed.  As always, Thank you for trusting us with your healthcare needs!

## 2019-10-11 NOTE — PROGRESS NOTES
Jennifer Pastrana is a 28 year old  who presents today requesting placement of an Mirena iud.    The patient meets and is agreeable to the following conditions:  She is not interested in conception in the near future.   She currently is in a stable, monogamous relationship.   There is no previous history of pelvic inflammatory disease.   There is no previous history of ectopic pregnancy.   She is willing to check monthly for the IUD string.   There is no history of unresolved abnormal uterine bleeding.   There is no history of an unresolved abnormal PAP smear.   She is willing to return annually for PAP smears.     We discussed risks, benefits, and alternatives including but not limited to:   Possibility of pregnancy and ectopic pregnancy.  Possibility of pelvic inflammatory disease, particularly with new partners.  Risk of uterine perforation or IUD expulsion.  Possibility of difficult removal.  Spotting or heavy bleeding.  Cramping, pain or infection during or after insertion.    The patient was given patient information on the IUD and the patient education brochure from the .  The patient has given consent to proceed with placement of the IUD.  She wishes to proceed.  All questions answered.      PROCEDURE:  Type of IUD: Mirena    The patient has taken 600-800mg of Ibuprofen prior to the procedure.   She is placed in a dorsal lithotomy potion and a pelvic exam is performed to determine the position of the uterus.  The cervix is identified and cleaned with betadine.  A single tooth tenaculum is applied to the anterior lip of the cervix for stabilization.   The uterus sounded to 8.0 cm. (Target sound depth is 6.5 cm to 8.5 cm.)  The IUD insertion tube is prepared to manufacturers recommendations and inserted into the uterus under sterile conditions in the usual fashion.  The IUD string is then cut to 3.5 cm.    The patient tolerated this procedure without immediate complication.  The patient is to  return or call immediately for any unexplained fever, abdominal or pelvic pain, excessive bleeding, possibility of pregnancy, foul-smelling discharge, sense that the IUD has been expelled.  All questions were answered.    ICD-10-CM    1. Pregnancy examination or test, pregnancy unconfirmed Z32.00 INSERTION INTRAUTERINE DEVICE     HC LEVONORGESTREL IU 52MG 5 YR   2. Encounter for IUD insertion Z30.430 INSERTION INTRAUTERINE DEVICE     HC LEVONORGESTREL IU 52MG 5 YR       Return to clinic in 1 month for IUD check    Cephas Mawuena Agbeh, MD

## 2019-11-07 ENCOUNTER — OFFICE VISIT (OUTPATIENT)
Dept: NUTRITION | Facility: CLINIC | Age: 29
End: 2019-11-07
Payer: COMMERCIAL

## 2019-11-07 DIAGNOSIS — E66.9 OBESITY: Primary | ICD-10-CM

## 2019-11-07 PROCEDURE — 97802 MEDICAL NUTRITION INDIV IN: CPT

## 2019-11-07 NOTE — PROGRESS NOTES
MEDICAL NUTRITION THERAPY  Visit Type:Initial assessment and intervention    SUBJECTIVE:   Jennifer Pastrana presents today for MNT and education related to weight management.   She is accompanied by self.     PATIENT STATED GOAL(S) FOR THIS VISIT: I'd like to lose weight, but I don't like vegetables    EATING HABITS:         Vegetables- doesn't like many, likes lettuce and spinach, potates, not sure if corn is a vegetables  Fruit- likes most, hasn't had any this week    Frozen vegetable purchases- corn, broccoli and caluflower, and mixed veg.   Beverages:  [place] lemonade, and ice tea from [employer] machine, 1 can root beer regular.     Misses meals? Orders food 2 times per week, 2 times per week coffee, (sandwich, turkey, letuce, tomato, apples on honey wheat bread, fruit cup, cookie, and chips)    Eats out:  2 meals/per week     Previous diet education:  No     Food allergies/intolerances: Green beans    EXERCISE: moderate exercise program.  Gym membership 6 weeks ago, aims for 3 days per weeek  Eliptical and cycling-goal is 30-24 minutes  Weight machines- legs and arms- 15 minutes total- 8 different exercises 3 sets of 10-12, last rep is hard.    SOCIO/ECONOMIC:   Lives with: self, spouse and son    OBJECTIVE:   Vitals: Wt 91.2 kg (201 lb)   BMI 36.02 kg/m       Wt Readings from Last 10 Encounters:   10/11/19 92.1 kg (203 lb)   10/03/19 90.7 kg (200 lb)   09/20/19 92 kg (202 lb 12.8 oz)   04/04/19 86.2 kg (190 lb)   01/31/19 87.5 kg (192 lb 12.8 oz)   10/04/18 87.5 kg (193 lb)   09/21/18 86.2 kg (190 lb)   07/19/18 86.2 kg (190 lb)   07/10/18 86.2 kg (190 lb)   06/29/18 85.5 kg (188 lb 9.6 oz)     Weight Change: Down 2 lbs in 1 month.    ASSESSMENT:   Patient's intake is overall high in caloric beverages with an estimated 350-700 kcal from beverages depending on the day.  Lunch and dinner contain typically large portions of only carbohydrate or meat.  She recently started exercising likely promoting the 2 lb  weight loss, but it is not enough to trigger consistent weight loss after initial food changes are made.  She is actively working on ordering lower calorie coffee beverages.    Diet is high in: calories, carbs, fat (saturated), fat (unsaturated) and sodium  Diet is low in: fiber, fruits and vegetables    Estimated Energy Expenditure: 1700 kcal to maintain current weight  Recommended Energy Intake: 3444-6259 kcal for weight loss.    VERBAL AND WRITTEN INFORMATION GIVEN TO SUPPORT:  Discussed: general nutrition guidelines, weight reduction, consistent meals, labeling, exercise, dining out/special occasions, behavior modification, portion control and alternative coffee drinks.  Discussed vegetables she was willing to incorporate into lunch and dinner as initial places to start.  Discussed plate method.     Education Materials Provided: My Plate Planner/Choose My Plate and Weight Loss Tips    PLAN:   Try order a machiatto- then add 1-2 sugar packets- then wean yourself down  Americano-espresso with hot water can ask for a small cup for a more concentrated flavor- then add 1-2 sugar packets     Breakfast: yogurt (pre-made parfait), can do overnight oats with rolled or steel cut oats,   1/2 cup of oatmeal, 1-2 tablespoons of peewee seeds.  [email or web address]/recipe/[email or web address]-oats/    Lunch: add in raw baby carrots working up to 1 cup OR small salad  Snack- 1 pc fruit  Dinner: Add in vegetable to your dinner especially with the sauce.     Exercise: add in 30 minutes on 2 other days of the week.    Track fruit and vegetable intake OR 1-2 things you want to change then re-evaluate and track again in 2 weeks.  PATIENT'S BEHAVIOR CHANGE GOALS:   See Patient Instructions for patient stated behavior change goals. AVS was printed and given to patient at today's appointment.    Goals-small coffee drinks OR no sugar added coffee drink   FOLLOW UP:   Follow-up appointment scheduled on 1/10/2020.     Kyra Jimenez MS, RD, LD,  CDE    Time spent in minutes: 60  Encounter: Individual

## 2019-11-07 NOTE — Clinical Note
Seen for nutrition education.  Will check in in 2 months to see progress.Kyra Jimenez MS, RD, LD, CDE

## 2019-11-07 NOTE — PATIENT INSTRUCTIONS
Try order a machiatto- then add 1-2 sugar packets- then wean yourself down  Americano-espresso with hot water can ask for a small cup for a more concentrated flavor- then add 1-2 sugar packets     Breakfast: yogurt (pre-made parfait), can do overnight oats with rolled or steel cut oats,   1/2 cup of oatmeal, 1-2 tablespoons of peewee seeds.  https://[email or web address]/recipe/overnight-oats/    Lunch: add in raw baby carrots working up to 1 cup OR small salad  Snack- 1 pc fruit  Dinner: Add in vegetable to your dinner especially with the sauce.     Exercise: add in 30 minutes on 2 other days of the week.    Track fruit and vegetable intake OR 1-2 things you want to change then re-evaluate and track again in 2 weeks.    Smoothie: 1 cup greek yogurt, frozen banana, 1/2 cup other fruit, spinach - can add oatmeal    Soups: Crack scrambled eggs (2 into a canned soup)    Cottage cheese

## 2019-11-08 VITALS — WEIGHT: 201 LBS | BODY MASS INDEX: 36.02 KG/M2

## 2019-11-21 ENCOUNTER — APPOINTMENT (OUTPATIENT)
Dept: GENERAL RADIOLOGY | Facility: CLINIC | Age: 29
End: 2019-11-21
Attending: PHYSICIAN ASSISTANT
Payer: COMMERCIAL

## 2019-11-21 ENCOUNTER — HOSPITAL ENCOUNTER (EMERGENCY)
Facility: CLINIC | Age: 29
Discharge: HOME | End: 2019-11-21
Attending: PHYSICIAN ASSISTANT | Admitting: PHYSICIAN ASSISTANT
Payer: COMMERCIAL

## 2019-11-21 VITALS
WEIGHT: 200 LBS | RESPIRATION RATE: 18 BRPM | OXYGEN SATURATION: 100 % | SYSTOLIC BLOOD PRESSURE: 124 MMHG | HEIGHT: 63 IN | DIASTOLIC BLOOD PRESSURE: 81 MMHG | TEMPERATURE: 98.1 F | BODY MASS INDEX: 35.44 KG/M2

## 2019-11-21 DIAGNOSIS — M79.672 LEFT FOOT PAIN: ICD-10-CM

## 2019-11-21 PROCEDURE — 99213 OFFICE O/P EST LOW 20 MIN: CPT | Mod: Z6 | Performed by: PHYSICIAN ASSISTANT

## 2019-11-21 PROCEDURE — G0463 HOSPITAL OUTPT CLINIC VISIT: HCPCS | Performed by: PHYSICIAN ASSISTANT

## 2019-11-21 PROCEDURE — 73630 X-RAY EXAM OF FOOT: CPT | Mod: LT

## 2019-11-21 ASSESSMENT — ENCOUNTER SYMPTOMS: CONSTITUTIONAL NEGATIVE: 1

## 2019-11-21 ASSESSMENT — MIFFLIN-ST. JEOR: SCORE: 1591.32

## 2019-11-21 NOTE — ED AVS SNAPSHOT
[facility]  [address] [facility] [address]  Phone:  [phone]  Fax:  [phone]                                    Jennifer Pastrana   MRN: [id number]    Department:  [facility]   Date of Visit:  11/21/2019           After Visit Summary Signature Page    I have received my discharge instructions, and my questions have been answered. I have discussed any challenges I see with this plan with the nurse or doctor.    ..........................................................................................................................................  Patient/Patient Representative Signature      ..........................................................................................................................................  Patient Representative Print Name and Relationship to Patient    ..................................................               ................................................  Date                                   Time    ..........................................................................................................................................  Reviewed by Signature/Title    ...................................................              ..............................................  Date                                               Time          22EPIC Rev 08/18

## 2019-11-22 ENCOUNTER — OFFICE VISIT (OUTPATIENT)
Dept: OBGYN | Facility: CLINIC | Age: 29
End: 2019-11-22
Payer: COMMERCIAL

## 2019-11-22 VITALS
BODY MASS INDEX: 35.85 KG/M2 | HEART RATE: 92 BPM | SYSTOLIC BLOOD PRESSURE: 132 MMHG | WEIGHT: 202.4 LBS | DIASTOLIC BLOOD PRESSURE: 84 MMHG

## 2019-11-22 DIAGNOSIS — Z30.431 IUD CHECK UP: Primary | ICD-10-CM

## 2019-11-22 PROCEDURE — 99212 OFFICE O/P EST SF 10 MIN: CPT | Performed by: OBSTETRICS & GYNECOLOGY

## 2019-11-22 NOTE — ED PROVIDER NOTES
History     Chief Complaint   Patient presents with     Foot Pain     HPI  Jennifer Pastrana is a 29 year old female who presents with complaints of left lateral foot pain and swelling for the past week.  Pt cannot recall any preceding injury or trauma to her foot.  She states the pain feels similar to when she had a stress fracture in this foot years ago.  Pt has not noticed any overlying skin changes.  Denies fevers or chills.  Pt has tried ice and Ibuprofen at home.      Allergies:  No Known Allergies    Problem List:    Patient Active Problem List    Diagnosis Date Noted     Oral contraceptive pill surveillance 09/21/2018     Priority: Medium     Reactive airway disease that is not asthma      Priority: Medium     on Albuterol prn        Migraine without aura and without status migrainosus, not intractable 02/15/2018     Priority: Medium     Supervision of normal first pregnancy 04/10/2017     Priority: Medium     Generalized anxiety disorder 10/03/2013     Priority: Medium     Diagnosis updated by automated process. Provider to review and confirm.       Dysplasia of cervix, low grade (MILO 1) 01/10/2012     Priority: Medium     1/10/12:Pap - LSIL. Plan colp.  1/27/12:Bellerose - mild dysplasia. Repeat pap 6 months. Reminder in epic.   10/30/12: Patient failed follow-up. Pap tracking file closed.   2/7/13: Pap - ASCUS, + HR HPV 53. Repeat pap in 6 months. Reminder in epic.   8/19/13: Pap - ASCUS, Negative HPV. Repeat pap 6 months with AFE. Reminder in epic.   2/24/14: Pap - NIL, Neg HPV. Repeat pap 1 year. Reminder in epic.   2/25/15: Pap - NIL, + HR HPV. Plan cotest in 1 year. If NIL/Neg then routine screening.  6/1/16: Pap - NIL (abstracted from [place]). Not known if HPV was done. Plan cotest at pp visit. FARHAN 9/10/17  10/26/17 NIL pap, neg HR HPV. Plan pap in 3 years           Dry eye syndrome- mild 04/25/2011     Priority: Medium     Hypothyroidism 12/28/2010     Priority: Medium     CARDIOVASCULAR  SCREENING; LDL GOAL LESS THAN 160 10/31/2010     Priority: Medium        Past Medical History:    Past Medical History:   Diagnosis Date     AR (allergic rhinitis)      ASCUS with positive high risk HPV 2/2013     Cervical high risk HPV (human papillomavirus) test positive 2/25/15     Dysplasia of cervix, low grade (MILO 1)      History of migraine headaches      Hypothyroidism      Mood disorder (H)      Reactive airway disease that is not asthma        Past Surgical History:    Past Surgical History:   Procedure Laterality Date     NO HISTORY OF SURGERY         Family History:    Family History   Problem Relation Age of Onset     Breast Cancer Maternal Grandmother         in her 50s     Heart Disease Maternal Grandfather         50s     Myocardial Infarction Maternal Grandfather      Heart Disease Paternal Grandfather         50s     Myocardial Infarction Paternal Grandfather      Thyroid Disease Mother         graves-decompression and strabismus     Cancer Paternal Grandmother         cervical     Thyroid Disease Paternal Aunt         nine affected in family     Glaucoma No family hx of      Macular Degeneration No family hx of      Cerebrovascular Disease No family hx of      Hypertension No family hx of      Diabetes No family hx of        Social History:  Marital Status:   [2]  Social History     Tobacco Use     Smoking status: Never Smoker     Smokeless tobacco: Never Used     Tobacco comment: Nonsmoking household   Substance Use Topics     Alcohol use: No     Drug use: No        Medications:    albuterol (PROAIR HFA/PROVENTIL HFA/VENTOLIN HFA) 108 (90 Base) MCG/ACT inhaler  cyanocobalamin (VITAMIN  B-12) 1000 MCG tablet  fluticasone (FLONASE) 50 MCG/ACT nasal spray  levothyroxine (SYNTHROID/LEVOTHROID) 150 MCG tablet  Loratadine (CLARITIN PO)  norgestimate-ethinyl estradiol (ORTHO-CYCLEN, SPRINTEC) 0.25-35 MG-MCG per tablet  nortriptyline (PAMELOR) 25 MG capsule  rizatriptan (MAXALT-MLT) 10 MG  "ODT  VITAMIN D, CHOLECALCIFEROL, PO          Review of Systems   Constitutional: Negative.    Musculoskeletal:        Left lateral foot pain   Skin: Negative.    All other systems reviewed and are negative.      Physical Exam   BP: 124/81  Heart Rate: 110  Temp: 98.1  F (36.7  C)  Resp: 18  Height: 160 cm (5' 3\")  Weight: 90.7 kg (200 lb)  SpO2: 100 %      Physical Exam  Constitutional:       General: She is not in acute distress.     Appearance: Normal appearance. She is not ill-appearing or toxic-appearing.   HENT:      Head: Normocephalic and atraumatic.   Eyes:      Conjunctiva/sclera: Conjunctivae normal.   Neck:      Musculoskeletal: Neck supple.   Cardiovascular:      Pulses: Normal pulses.   Pulmonary:      Effort: Pulmonary effort is normal.   Musculoskeletal:      Left ankle: Normal. She exhibits normal range of motion, no swelling and no ecchymosis. No tenderness.      Left foot: Normal range of motion and normal capillary refill. Tenderness (tenderness along left fifth metatarsal; no overlying skin changes) present. No swelling, crepitus, deformity or laceration.   Skin:     General: Skin is warm and dry.      Capillary Refill: Capillary refill takes less than 2 seconds.   Neurological:      General: No focal deficit present.      Mental Status: She is alert.      Sensory: Sensation is intact.      Motor: Motor function is intact.         ED Course        Procedures    Results for orders placed or performed during the hospital encounter of 11/21/19 (from the past 24 hour(s))   Foot XR, G/E 3 views, left    Narrative    EXAM: XR FOOT LT G/E 3 VW  DATE/TIME: 11/21/2019 6:33 PM     INDICATION: Lateral foot pain.   COMPARISON: 7/19/2018.      Impression    IMPRESSION: Normal left foot joint spacing and alignment. No fracture.  No soft tissue abnormality is evident. No change since the comparison.    JARVIS LEI MD       Medications - No data to display    Assessments & Plan (with Medical Decision Making) "     Pt is a 29 year old female who presents with complaints of left lateral foot pain and swelling for the past week.  Pt cannot recall any preceding injury or trauma to her foot.  She states the pain feels similar to when she had a stress fracture in this foot years ago.  Pt is afebrile on arrival.  Exam as above.  X-rays of left foot were negative for fracture or acute pathology.  Discussed results with patient.  Encouraged symptomatic treatments at home.  Return precautions were reviewed.  Hand-outs were provided.    Patient was instructed to follow-up with PCP if no improvement in 5-7 days for continued care and management or sooner if new or worsening symptoms.  She is to return to the ED for persistent and/or worsening symptoms.  Patient expressed understanding of the diagnosis and plan and was discharged home in good condition.    I have reviewed the nursing notes.    I have reviewed the findings, diagnosis, plan and need for follow up with the patient.    Discharge Medication List as of 11/21/2019  6:46 PM          Final diagnoses:   Left foot pain       11/21/2019   [facility] EMERGENCY DEPARTMENT      Disclaimer:  This note consists of symbols derived from keyboarding, dictation and/or voice recognition software.  As a result, there may be errors in the script that have gone undetected.  Please consider this when interpreting information found in this chart.     Eva Eaton PA-C  11/21/19 [id number]

## 2019-11-22 NOTE — PATIENT INSTRUCTIONS
If you have any questions regarding your visit, Please contact your care team.  [facility]: [phone]  [facility] CLINIC HOURS TELEPHONE NUMBER   Cephas Agbeh, M.D.          Maureen Yin         Monday-[place]    8:00a.m-4:45 p.m    Tuesday--[place]     8:00a.m-4:45 p.m.    Thursday-[place]    8:00a.m-4:45 p.m.    Friday-[place]    8:00a.m-4:45 p.m    [facility]   [address]   [place] [address] [phone]-Ask for [facility]   Fax [phone]   Hicurvn-[phone]     [facility]   [address] [place] [address] [place] [address]   [phone]    [facility]  [address] [facility] [address]  [phone]  Nlcxfew-[phone]   Urgent Care locations:    [facility] Monday-Friday  5 pm - 9 pm  Saturday and Sunday   9 am - 5 pm   Monday-Friday   5 pm - 9 pm  Saturday and Sunday  9 am - 5 pm    [phone]   If you need a medication refill, please contact your pharmacy. Please allow 3 business days for your refill to be completed.  As always, Thank you for trusting us with your healthcare needs!

## 2019-11-22 NOTE — PROGRESS NOTES
Jennifer is a 29 year old  here for IUD surveillance.  She had the MIRENA IUD inserted one month ago.    She has not had any problems with the IUD.  No unusual discharge or bleeding.  No cramping.    PMH: Her past medical, surgical, and obstetric histories were reviewed and are documented in their appropriate chart areas.    ALL/Meds: Her medication and allergy histories were reviewed and are documented in their appropriate chart areas.    ROS:4 point ROS including Respiratory, CV, GI and , other than that noted in the HPI,  is negative     EXAM:  /84   Pulse 92   Wt 91.8 kg (202 lb 6.4 oz)   LMP 11/10/2019 (Exact Date)   Breastfeeding No   BMI 35.85 kg/m    General:  WNWD female, NAD  Alert  Oriented x 3  Gait:  Normal  Skin:  Normal skin turgor  HEENT:  NC/AT, EOMI  Abdomen:  Non-tender, non-distended.  Vulva: No external lesions, normal hair distribution, no adenopathy  BUS:  Normal, no masses noted  Urethra:  No hypermobility seen  Urethral meatus:  No masses noted  Vagina: Moist, pink, no abnormal discharge, well rugated, no lesions  Cervix: Smooth, pink, no visible lesions, no CMT. IUD strings visible and appropriate.  Uterus: Normal size, anteverted, non-tender, mobile  Ovaries: No mass, non-tender, mobile  Perianal:  No masses noted  Extremities:  No clubbing, no cyanosis and no edema.        ASSESSMENT:    ICD-10-CM    1. IUD check up Z30.431         PLAN:  She does not have any apparent contraindications for continued use of the IUD.        15 minutes was spent face to face with the patient today discussing her history, diagnosis, and follow-up plan as noted above.  Over 50% of the visit was spent in counseling and coordination of care.    Total Visit Time: 20 minutes.

## 2020-01-02 ENCOUNTER — VIRTUAL VISIT (OUTPATIENT)
Dept: FAMILY MEDICINE | Facility: OTHER | Age: 30
End: 2020-01-02

## 2020-01-02 NOTE — PROGRESS NOTES
"Date: 2020 11:57:43  Clinician: Brant Solorio  Clinician NPI: [id number]  Patient: Jennifer Pastrana  Patient : 1990  Patient Address: [address] [place] [address], [place] [address]  Patient Phone: [phone]  Visit Protocol: URI  Patient Summary:  Jennifer is a 29 year old ( : 1990 ) female who initiated a Visit for cold, sinus infection, or influenza. When asked the question \"Please sign me up to receive news, health information and promotions from [employer].\", Jennifer responded \"No\".   A synchronous visit is necessary because the patient reported the following abnormal symptoms:   Recent facial or sinus surgery (requires clarification)   Jennifer states her symptoms started gradually 2-3 weeks ago. After her symptoms started, they improved and then got worse again.   Her symptoms consist of a cough.   Symptom details   Cough: Jennifer coughs a few times an hour and her cough is more bothersome at night. Phlegm does not come into her throat when she coughs.    Jennifer denies having headache, sore throat, ear pain, chills, nasal congestion, malaise, fever, rhinitis, wheezing, teeth pain, facial pain or pressure, and myalgias. She also denies taking antibiotic medication for the symptoms. She is not experiencing dyspnea.   Precipitating events  She has not recently been exposed to someone with influenza. Jennifer has been in close contact with the following high risk individuals: pregnant women and children under the age of 5.   Pertinent medical history  Jennifer typically gets a yeast infection when she takes antibiotics. She has used fluconazole (Diflucan) to treat previous yeast infections. 2 doses of fluconazole (Diflucan) has typically been needed for symptoms to resolve in the past.  Weight: 195 lbs   Jennifer does not smoke or use smokeless tobacco.   She denies pregnancy and denies breastfeeding. She is currently menstruating.   Additional information as reported by the patient (free text): Symptoms " "started with a itchy eyes and ears, sore throat, stuffy and runny nose, and productive cough. All those symptoms have resolved, except some mucus remains in the throat. Cough turned from a productive cough to a dry cough. Dry cough has lasted for just over 1 week. All 4 wisdom teeth were removed on 12/12/19 ('facial surgery\").   Weight: 195 lbs    MEDICATIONS: nortriptyline oral, Zyrtec oral, levothyroxine oral, ALLERGIES: NKDA  Clinician Response:  Dear Jennifer,  Based on the information provided, you have acute bacterial sinusitis, also known as a sinus infection. Sinus infections are caused by bacteria or a virus and symptoms are almost always identical. The difference between the 2 types of infections is timing.  Sinus infections start as viral infections and symptoms improve on their own in about 7 days. If symptoms have not improved after 7 days or have even worsened, a bacterial infection may have developed.  Medication information  I am prescribing:     Amoxicillin-pot clavulanate 875-125 mg oral tablet. Take 1 tablet by mouth every 12 hours for 10 days. There are no refills with this prescription.   Because you usually get a yeast infection when taking antibiotics, I am also prescribing:     Fluconazole (Diflucan) 150 mg oral tablet. Take 1 tablet by mouth in a single dose. Repeat dose in 3 days if symptoms are still present. There are no refills with this prescription.   If you become pregnant during this course of treatment, stop taking the medication and contact your primary care provider.  Self care  The following tips will keep you as comfortable as possible while you recover:     Rest    Drink plenty of water and other liquids    Take a hot shower to loosen congestion    Take a spoonful of honey to reduce your cough     When to seek care  Please be seen in a clinic or urgent care if any of the following occur:     Symptoms do not start to improve after 3 days of treatment    New symptoms develop, or " symptoms become worse     It is possible to have an allergic reaction to an antibiotic even if you have not had one in the past. If you notice a new rash, significant swelling, or difficulty breathing, stop taking this medication immediately and go to a clinic or urgent care.   Diagnosis: Acute bacterial sinusitis  Diagnosis ICD: J01.90  Triage Notes: I reviewed the patient's history, verified their identity, and explained the Visit process.    Patient had teeth pulled.  Patient reviewed name and   Synchronous Triage: phone, status: completed, duration: 101 seconds  Prescription: amoxicillin-pot clavulanate 875-125 mg oral tablet 20 tablet, 10 days supply. Take 1 tablet by mouth every 12 hours for 10 days. Refills: 0, Refill as needed: no, Allow substitutions: yes  Prescription: fluconazole (Diflucan) 150 mg oral tablet 2 tablet, 4 days supply. Take 1 tablet by mouth in a single dose, repeat dose in 3 days if symptoms are still present. Refills: 0, Refill as needed: no, Allow substitutions: yes  Pharmacy: [facility] - [phone] - [address] [place] [address]

## 2020-01-10 ENCOUNTER — OFFICE VISIT (OUTPATIENT)
Dept: NUTRITION | Facility: CLINIC | Age: 30
End: 2020-01-10
Payer: COMMERCIAL

## 2020-01-10 VITALS — WEIGHT: 197 LBS | BODY MASS INDEX: 34.9 KG/M2

## 2020-01-10 DIAGNOSIS — E66.9 OBESITY: Primary | ICD-10-CM

## 2020-01-10 PROCEDURE — 97803 MED NUTRITION INDIV SUBSEQ: CPT

## 2020-01-10 NOTE — PROGRESS NOTES
MEDICAL NUTRITION THERAPY  Visit Type:Reassessment and intervention    SUBJECTIVE:   Jennifer Pastrana presents today for MNT and education related to weight management.   She is accompanied by self.     PATIENT STATED GOAL(S) FOR THIS VISIT: Continue to make progress in weight loss.  Patient reports reading labels, limiting purchases to 10g of sugar, if food has 8g of sugar she will limits serving.  Has been looking at more recipes on line.    EATING HABITS:   Breakfast: blue berry bagel plain OR Smoothie OR parfait OR oats  Lunch: mexican food ordered out- grilled chicken burrito  Dinner: pork chop, potates 1 cup, rice 3/4 cup  Snacks: tried baby carrots OR 1/2 a bag of pretzels   Beverages: Water throughout/day,  Now ordering crafted cold press or sugar free beverage, sometimes macciato, OR coffee with cream     Smoothies- 1 cup spinach, strawberry and leslie, papaya, pineapple, 2 ice cubes of plain greek yogurt, tried milk but has since switched to 1 cup of low sugar apple juice - breakfast     Misses meals? rarely  Eats out:  3 meals/per week wo    Previous diet education:  Yes     Food allergies/intolerances: raw carrots    EXERCISE: moderate regular exercise program.  Was going regularly until wisdom teeth came out.  Took 3 weeks off.  Went back to the gym 3 times per week, 30 minutes cardio (cycling OR elliptical), 20 minutes of weights.     SOCIO/ECONOMIC:   Lives with: son and .    OBJECTIVE:   Vitals: Wt 89.4 kg (197 lb)   BMI 34.90 kg/m       Wt Readings from Last 10 Encounters:   11/22/19 91.8 kg (202 lb 6.4 oz)   11/21/19 90.7 kg (200 lb)   11/08/19 91.2 kg (201 lb)   10/11/19 92.1 kg (203 lb)   10/03/19 90.7 kg (200 lb)   09/20/19 92 kg (202 lb 12.8 oz)   04/04/19 86.2 kg (190 lb)   01/31/19 87.5 kg (192 lb 12.8 oz)   10/04/18 87.5 kg (193 lb)   09/21/18 86.2 kg (190 lb)         Weight Change: Down 5 lbs in 2 months.    ASSESSMENT:   Patient has made significant changes in beverage intake, food  purchases and is looking up recipes.  She would benefit from consistently getting vegetables with lunch and dinner.   Diet is high in: calories, carbs, fat (saturated), fat (unsaturated), protein and sodium  Diet is low in: fiber, fruits and vegetables    Estimated Energy Expenditure: 1700 kcal to maintain current weight  Recommended Energy Intake: 9862-0678 kcal for weight loss.    VERBAL AND WRITTEN INFORMATION GIVEN TO SUPPORT:  Discussed: general nutrition guidelines, weight reduction, consistent meals, exercise, dining out/special occasions, fiber, behavior modification and portion control. Discussed portions of rice and potatoes, how to pair different sides together.  Discussed how to review food choices on a menu, selecting sides, and meat portion size.  Reviewed bagel portions and patient opted to save half for later .    Education Materials Provided: no new materials    PLAN:   Lunch: add in raw baby carrots working up to 1 cup vegetables OR small salad    Dinner: Add in vegetable to your dinner especially with the sauce.     Track fruit and vegetable intake for 1 week  then re-evaluate and track again in 2 weeks.    Goal: 30 minutes of cardio, 20 minutes of weights at gym- continue 3 days per week  Non- gym days: Use workout dice at home 20-30 minutes  PATIENT'S BEHAVIOR CHANGE GOALS:   See Patient Instructions for patient stated behavior change goals. AVS was printed and given to patient at today's appointment.    FOLLOW UP:   Follow-up appointment scheduled on 3/6/20.     Kyra Jimenez MS, RD, LD, CDE    Time spent in minutes: 46  Encounter: Individual

## 2020-01-10 NOTE — PATIENT INSTRUCTIONS
Lunch: add in raw baby carrots working up to 1 cup vegetables OR small salad    Dinner: Add in vegetable to your dinner especially with the sauce.     Track fruit and vegetable intake for 1 week  then re-evaluate and track again in 2 weeks.    Goal: 30 minutes of cardio, 20 minutes of weights at gym- continue 3 days per week  Non- gym days: Use workout dice at home 20-30 minutes

## 2020-02-03 ENCOUNTER — OFFICE VISIT (OUTPATIENT)
Dept: OPTOMETRY | Facility: CLINIC | Age: 30
End: 2020-02-03
Payer: COMMERCIAL

## 2020-02-03 DIAGNOSIS — H16.142 PUNCTATE KERATITIS OF LEFT EYE: Primary | ICD-10-CM

## 2020-02-03 DIAGNOSIS — H04.123 DRY EYES: ICD-10-CM

## 2020-02-03 PROCEDURE — 99213 OFFICE O/P EST LOW 20 MIN: CPT | Performed by: OPTOMETRIST

## 2020-02-03 ASSESSMENT — VISUAL ACUITY
OS_PH_CC: 20/50
METHOD: SNELLEN - LINEAR
OS_CC+: -3
OD_CC+: -1
CORRECTION_TYPE: GLASSES
OS_CC: 20/50
OS_PH_CC+: -2
OD_CC: 20/20

## 2020-02-03 ASSESSMENT — EXTERNAL EXAM - RIGHT EYE: OD_EXAM: NORMAL

## 2020-02-03 ASSESSMENT — SLIT LAMP EXAM - LIDS
COMMENTS: NORMAL
COMMENTS: NORMAL

## 2020-02-03 ASSESSMENT — EXTERNAL EXAM - LEFT EYE: OS_EXAM: NORMAL

## 2020-02-03 NOTE — PATIENT INSTRUCTIONS
Patient was advised of today's exam findings.  Use Soothe XP in vials every hour as needed   Ok to use Soothe XP in bottle up to 4 times a day once vision returns to normal     Muna Slade O.D.  [facility]   [address] [place], [place] [id number] [phone]

## 2020-02-03 NOTE — PROGRESS NOTES
Chief Complaint   Patient presents with     Eye Problem Left Eye       Do you wear contact lenses? Yes, but hasn't worn them for almost a month due to the eye trouble that she has been having.     Using Soothe XP in bottle           Bee Cortez Optometric Assistant      Review of Symptoms    EYES: See HPI  CONSTITUTIONAL: patient reports feeling healthy, heats home with fireplace in basement, uses humidifier in bedroom      Soothe XP in vials Lot [id number], exp 08/2020    Medical, surgical and family histories reviewed and updated 2/3/2020.           OBJECTIVE: See Ophthalmology exam    ASSESSMENT:    ICD-10-CM    1. 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 Dry eyes H04.123       PLAN:  Poor vision due to rough surface of left cornea  Patient Instructions   Patient was advised of today's exam findings.  Use Soothe XP in vials every hour as needed   Ok to use Soothe XP in bottle up to 4 times a day once vision returns to normal     Muna Slade O.D.  [facility]   [address] [place], [place] [id number] [phone]

## 2020-03-03 ENCOUNTER — OFFICE VISIT (OUTPATIENT)
Dept: FAMILY MEDICINE | Facility: CLINIC | Age: 30
End: 2020-03-03
Payer: COMMERCIAL

## 2020-03-03 VITALS
OXYGEN SATURATION: 99 % | HEIGHT: 63 IN | BODY MASS INDEX: 35.44 KG/M2 | TEMPERATURE: 98.2 F | WEIGHT: 200 LBS | SYSTOLIC BLOOD PRESSURE: 124 MMHG | HEART RATE: 95 BPM | DIASTOLIC BLOOD PRESSURE: 88 MMHG

## 2020-03-03 DIAGNOSIS — R03.0 ELEVATED BLOOD PRESSURE READING WITHOUT DIAGNOSIS OF HYPERTENSION: ICD-10-CM

## 2020-03-03 DIAGNOSIS — R00.2 PALPITATIONS: Primary | ICD-10-CM

## 2020-03-03 PROCEDURE — 93000 ELECTROCARDIOGRAM COMPLETE: CPT | Performed by: FAMILY MEDICINE

## 2020-03-03 PROCEDURE — 99214 OFFICE O/P EST MOD 30 MIN: CPT | Performed by: FAMILY MEDICINE

## 2020-03-03 ASSESSMENT — ANXIETY QUESTIONNAIRES
1. FEELING NERVOUS, ANXIOUS, OR ON EDGE: NOT AT ALL
3. WORRYING TOO MUCH ABOUT DIFFERENT THINGS: NOT AT ALL
6. BECOMING EASILY ANNOYED OR IRRITABLE: SEVERAL DAYS
7. FEELING AFRAID AS IF SOMETHING AWFUL MIGHT HAPPEN: NOT AT ALL
2. NOT BEING ABLE TO STOP OR CONTROL WORRYING: NOT AT ALL
IF YOU CHECKED OFF ANY PROBLEMS ON THIS QUESTIONNAIRE, HOW DIFFICULT HAVE THESE PROBLEMS MADE IT FOR YOU TO DO YOUR WORK, TAKE CARE OF THINGS AT HOME, OR GET ALONG WITH OTHER PEOPLE: SOMEWHAT DIFFICULT
GAD7 TOTAL SCORE: 1
5. BEING SO RESTLESS THAT IT IS HARD TO SIT STILL: NOT AT ALL

## 2020-03-03 ASSESSMENT — PATIENT HEALTH QUESTIONNAIRE - PHQ9
SUM OF ALL RESPONSES TO PHQ QUESTIONS 1-9: 2
5. POOR APPETITE OR OVEREATING: NOT AT ALL

## 2020-03-03 ASSESSMENT — MIFFLIN-ST. JEOR: SCORE: 1591.32

## 2020-03-03 NOTE — PATIENT INSTRUCTIONS
"Schedule the Echo and then the Zio patch later.   Follow up to discuss Zio Patch results.       Patient Education     Heart Palpitations    Palpitations are the feeling that your heart is beating hard, fast, or irregular. Some describe it as \"pounding,\" \"flip-flopping in the chest,\" or \"skipped beats.\" Palpitations may occur in someone with heart disease. But they can also occur in a healthy person.  Heart-related causes:    Heart rhythm problem (arrhythmia)    Heart valve disease    Disease of the heart muscle (cardiomyopathy)    Coronary artery disease    High blood pressure  Non-heart-related causes:    Certain medicines such as asthma inhalers and decongestants    Some herbal supplements, energy drinks and pills, and weight loss pills    Illegal stimulant drugs such as cocaine, crank, methamphetamine, PCP, bath salts, and ecstasy    Caffeine, alcohol, and tobacco    Health conditions such as thyroid disease, anemia, anxiety, and panic disorder  Sometimes the cause can't be found.  Home care  Follow these home care tips:    Don't use too much caffeine, alcohol, or tobacco, or any stimulant drugs.    Tell your doctor about any prescription or over-the-counter or herbal medicines you take.  Follow-up care    Follow up with your doctor, or as advised.    Call 911  This is the fastest and safest way to get to the emergency department. The paramedics can also start treatment on the way to the hospital, if needed.  Don't wait until your symptoms are severe to call 911. These are reasons to call 911:    Chest pain    Shortness of breath    Feeling lightheaded, faint, or dizzy, or losing consciousness    Very irregular heartbeat    Rapid heartbeat that makes you uncomfortable    Slower than usual heart rate along with symptoms    Chest pain with weakness, dizziness, heavy sweating, nausea, or vomiting    Extreme drowsiness, confusion, or weakness    Weakness of an arm or leg, or on one side of the face    Trouble with " speech or vision  When to seek medical advice  Call your healthcare provider right away if you have palpitations that last longer than normal, or are different from your past palpitations.   Date Last Reviewed: 4/27/2016 2000-2019 [employer]. [address] [place] [address], [place] [address]. All rights reserved. This information is not intended as a substitute for professional medical care. Always follow your healthcare professional's instructions.

## 2020-03-03 NOTE — PROGRESS NOTES
Subjective     Jennifer Pastrana is a 29 year old female who presents to clinic today for the following health issues:    HPI         Concern - Palpitions  States that her older sister was recently diagnosed with an arrhythmia that needed Ablation and is also concerned about her positive family history of heart disease in her grand parents(CAD in their 50s)   States that this family history heightened her concern of these intermittent palpitations and prompted the visit today.  Onset: about 1 month ago    Description:   Intermittent, feels like episodes of a rapid heart rate; states that she tracks it with her Apple watch and occasionally heart rate is > 100. States that her resting heart rate was 93 bpm today. States that she had an episode of chest pressure last week that she thinks was stress induced, as it occurred after she had an argument with her . States that the chest pain has since resolved, no recurrences.      Intensity: moderate    Progression of Symptoms:  intermittent    Accompanying Signs & Symptoms:  Denies having any associated symptoms of dyspnea on exertion, diaphoresis; syncopal episode    Previous history of similar problem:   No    Precipitating factors:   Worsened by: unknown    Alleviating factors:  Improved by: unknown    Therapies Tried and outcome: nothing     Denies feeling anxious/depressed.   - PHQ-9/ARNIE 7 completed, see Epic for details      Blood pressure is elevated in the clinic today- 141/91. Denies a known history of hypertension.     Patient Active Problem List   Diagnosis     CARDIOVASCULAR SCREENING; LDL GOAL LESS THAN 160     Hypothyroidism     Dry eye syndrome- mild     Dysplasia of cervix, low grade (MILO 1)     Generalized anxiety disorder     Supervision of normal first pregnancy     Migraine without aura and without status migrainosus, not intractable     Reactive airway disease that is not asthma     Oral contraceptive pill surveillance     Past Surgical History:    Procedure Laterality Date     NO HISTORY OF SURGERY         Social History     Tobacco Use     Smoking status: Never Smoker     Smokeless tobacco: Never Used     Tobacco comment: Nonsmoking household   Substance Use Topics     Alcohol use: No     Family History   Problem Relation Age of Onset     Breast Cancer Maternal Grandmother         in her 50s     Heart Disease Maternal Grandfather         50s     Myocardial Infarction Maternal Grandfather      Heart Disease Paternal Grandfather         50s     Myocardial Infarction Paternal Grandfather      Thyroid Disease Mother         graves-decompression and strabismus     Cancer Paternal Grandmother         cervical     Thyroid Disease Paternal Aunt         nine affected in family     Glaucoma No family hx of      Macular Degeneration No family hx of      Cerebrovascular Disease No family hx of      Hypertension No family hx of      Diabetes No family hx of          Current Outpatient Medications   Medication Sig Dispense Refill     albuterol (PROAIR HFA/PROVENTIL HFA/VENTOLIN HFA) 108 (90 Base) MCG/ACT inhaler Inhale 2 puffs into the lungs every 6 hours 1 Inhaler 3     fluticasone (FLONASE) 50 MCG/ACT nasal spray Spray 1-2 sprays into both nostrils daily 9.9 mL 3     levothyroxine (SYNTHROID/LEVOTHROID) 150 MCG tablet Take 1 tablet (150 mcg) by mouth daily 90 tablet 3     Loratadine (CLARITIN PO) Take 10 mg by mouth daily Reported on 4/26/2017       nortriptyline (PAMELOR) 25 MG capsule Take 2 capsules (50 mg) by mouth At Bedtime 180 capsule 2     rizatriptan (MAXALT-MLT) 10 MG ODT Take 1 tablet (10 mg) by mouth at onset of headache for migraine May repeat in 2 hours. Max 3 tablets/24 hours. 12 tablet 3     No Known Allergies      Reviewed and updated as needed this visit by Provider         Review of Systems   ROS COMP: Constitutional, HEENT, cardiovascular, pulmonary, gi and gu systems are negative, except as otherwise noted.      Objective    /88   Pulse 95   " Temp 98.2  F (36.8  C) (Tympanic)   Ht 1.6 m (5' 3\")   Wt 90.7 kg (200 lb)   LMP 02/22/2020   SpO2 99%   Breastfeeding No   BMI 35.43 kg/m    Body mass index is 35.43 kg/m .  Physical Exam   GENERAL: healthy, alert and no distress  RESP: lungs clear to auscultation - no rales, rhonchi or wheezes  CV: regular rate and rhythm, normal S1 S2, no S3 or S4, no murmur, click or rub, no peripheral edema and peripheral pulses strong  PSYCH: mentation appears normal, affect normal/bright, judgement and insight intact and appearance well groomed    Diagnostic Test Results:  Labs reviewed in Epic  EKG - appears normal, NSR, normal axis, normal intervals, no acute ST/T changes c/w ischemia, no LVH by voltage criteria, unchanged from previous tracings        Assessment & Plan     Jennifer was seen today for tachycardia.    Diagnoses and all orders for this visit:    Palpitations, intermittent. Newly diagnosed. Rule out an arrhythmia   -     EKG 12-lead complete w/read - Clinics- normal  -     Echocardiogram Complete  -     Zio Patch Holter Adult Greater than 48 hrs; Future after Echo is completed.  -     PHQ-9/ARNIE 7 completed, see Epic for details- unremarkable        Elevated blood pressure reading without diagnosis of hypertension      -    Repeat BP at the end of the visit was within goal.  Continue to monitor.      Patient education and Handout with home care instructions given. See AVS for details.       We will notify her with Echo results.       Return in about 1 month (around 4/3/2020) for after Zio Patch results are back .    Ashley Fish MD  [facility]      "

## 2020-03-04 ASSESSMENT — ANXIETY QUESTIONNAIRES: GAD7 TOTAL SCORE: 1

## 2020-03-05 ENCOUNTER — OFFICE VISIT (OUTPATIENT)
Dept: OPTOMETRY | Facility: CLINIC | Age: 30
End: 2020-03-05
Payer: COMMERCIAL

## 2020-03-05 DIAGNOSIS — H04.123 DRY EYES: ICD-10-CM

## 2020-03-05 DIAGNOSIS — H57.89 BILATERAL INCOMPLETE BLINK: ICD-10-CM

## 2020-03-05 DIAGNOSIS — H52.13 MYOPIA OF BOTH EYES: Primary | ICD-10-CM

## 2020-03-05 DIAGNOSIS — H16.143 PUNCTATE KERATITIS OF BOTH EYES: ICD-10-CM

## 2020-03-05 PROCEDURE — 92014 COMPRE OPH EXAM EST PT 1/>: CPT | Performed by: OPTOMETRIST

## 2020-03-05 PROCEDURE — 92015 DETERMINE REFRACTIVE STATE: CPT | Performed by: OPTOMETRIST

## 2020-03-05 ASSESSMENT — VISUAL ACUITY
OD_CC: 20/20
OD_CC: 20/20-1
OD_SC: 20/400
OS_SC: 20/100
OS_CC+: +2
METHOD: SNELLEN - LINEAR
OS_CC: 20/40
OS_CC: 20/25-1
OD_CC+: -1
CORRECTION_TYPE: GLASSES

## 2020-03-05 ASSESSMENT — REFRACTION_MANIFEST
OS_CYLINDER: +3.25
OD_CYLINDER: +0.25
OS_SPHERE: -4.75
OD_SPHERE: -3.00
METHOD_AUTOREFRACTION: 1
OD_SPHERE: -2.50
OD_AXIS: 139
OD_CYLINDER: SPHERE
OS_SPHERE: -4.50
OS_CYLINDER: +3.50
OS_AXIS: 045
OS_AXIS: 061

## 2020-03-05 ASSESSMENT — CONF VISUAL FIELD
METHOD: COUNTING FINGERS
OS_NORMAL: 1
OD_NORMAL: 1

## 2020-03-05 ASSESSMENT — KERATOMETRY
OS_K2POWER_DIOPTERS: 46
OS_AXISANGLE2_DEGREES: 147
OD_AXISANGLE2_DEGREES: 23
OS_K1POWER_DIOPTERS: 42.5
OD_K2POWER_DIOPTERS: 44
OD_K1POWER_DIOPTERS: 43.25

## 2020-03-05 ASSESSMENT — REFRACTION_WEARINGRX
SPECS_TYPE: SVL
OD_CYLINDER: SPHERE
OS_AXIS: 075
OS_CYLINDER: +0.50
OS_SPHERE: -2.25
OD_SPHERE: -2.25

## 2020-03-05 ASSESSMENT — TONOMETRY
OD_IOP_MMHG: 14
OS_IOP_MMHG: 14
IOP_METHOD: APPLANATION

## 2020-03-05 ASSESSMENT — SLIT LAMP EXAM - LIDS
COMMENTS: NORMAL
COMMENTS: NORMAL

## 2020-03-05 ASSESSMENT — CUP TO DISC RATIO
OS_RATIO: 0.2
OD_RATIO: 0.2

## 2020-03-05 ASSESSMENT — EXTERNAL EXAM - RIGHT EYE: OD_EXAM: NORMAL

## 2020-03-05 ASSESSMENT — EXTERNAL EXAM - LEFT EYE: OS_EXAM: NORMAL

## 2020-03-05 NOTE — PATIENT INSTRUCTIONS
Patient was advised of today's exam findings.  Wait to fill glasses prescription until left eye improves  Incomplete blink - make an effort to blink while using computer  Use humidifier in bedroom  Drink more water   Use Soothe or  Retain four times a day .   Use Thera Tears Nighttime Dry eye Therapy Lubricant Eye Gel- Preservative free, Refresh Liquigel or Systane Gel drops  before sleep  Return to clinic in 1 month to check cornea    Muna Slade O.D.  [facility]   [address] [place] [address] [phone]

## 2020-03-05 NOTE — PROGRESS NOTES
Chief Complaint   Patient presents with     Annual Eye Exam         Last Eye Exam: COMPLETE EYE EXAM 2/2018; Patient was seen on 2/3/2020 for punctate keratitis of left eye and dry eyes  Dilated Previously: Yes    What are you currently using to see?  Glasses - has not worn contacts in several months. Has elected NOT to do a contact fitting today       Distance Vision Acuity: Noticed sudden change in left eye. Patient states it is a little better, but still sees double in left eye.    Near Vision Acuity: Satisfied with vision while reading  with glasses    Eye Comfort: left eye feels dry  Uses humidifier in bedroom    Do you use eye drops? : Yes: Soothe and Retain - Patient will use either drop about 4x a day. Last used about 12:30 pm today.  Occupation or Hobbies: Pharmacy [employer]            Medical, surgical and family histories reviewed and updated 3/5/2020.       OBJECTIVE: See Ophthalmology exam    ASSESSMENT:    ICD-10-CM    1. Myopia of both eyes H52.13 REFRACTION     EYE EXAM (SIMPLE-NONBILLABLE)   2. Punctate keratitis of both eyes  L>>R H16.143 REFRACTION     EYE EXAM (SIMPLE-NONBILLABLE)   3. Dry eyes H04.123 REFRACTION     EYE EXAM (SIMPLE-NONBILLABLE)   4. Bilateral incomplete blink H57.89 REFRACTION     EYE EXAM (SIMPLE-NONBILLABLE)      PLAN:     Patient Instructions   Patient was advised of today's exam findings.  Wait to fill glasses prescription until left eye improves  Incomplete blink - make an effort to blink while using computer  Use humidifier in bedroom  Drink more water   Use Soothe or  Retain four times a day .   Use Thera Tears Nighttime Dry eye Therapy Lubricant Eye Gel- Preservative free, Refresh Liquigel or Systane Gel drops  before sleep  Return to clinic in 1 month to check cornea    Muna Slade O.D.  [facility] [place]   [address] [place] [address] [phone]

## 2020-03-06 ENCOUNTER — OFFICE VISIT (OUTPATIENT)
Dept: NUTRITION | Facility: CLINIC | Age: 30
End: 2020-03-06
Payer: COMMERCIAL

## 2020-03-06 DIAGNOSIS — E66.9 OBESITY: Primary | ICD-10-CM

## 2020-03-06 PROCEDURE — 97803 MED NUTRITION INDIV SUBSEQ: CPT

## 2020-03-06 NOTE — PROGRESS NOTES
MEDICAL NUTRITION THERAPY  Visit Type:Reassessment and intervention    SUBJECTIVE:   Jennifer Pastrana presents today for MNT and education related to weight management.   She is accompanied by self.     PATIENT STATED GOAL(S) FOR THIS VISIT: I haven't been able to exercise as much as I planned since my heart issues.       1/10/20-Plan  Lunch: add in raw baby carrots working up to 1 cup vegetables OR small salad-Has added in a small salad  Dinner: Add in vegetable to your dinner especially with the sauce. -Get's 2-3 days per week.  Track fruit and vegetable intake for 1 week  then re-evaluate and track again in 2 weeks.- Did not do, last week got pre cut fruit - and brought to work 3 days per week.  This wee bought spinach mix and split into 6 containers.     EATING HABITS:   Breakfast: blueberry bagel with honey, coffee at home with cream- stopped going out  Lunch: penne pasta with chicken, gordo, salad- 2 cups (croutons, gomez pc, sunflower seeds)  Dinner: steak (6oz), corn 1/3 cup, and potatoes 1/2 cup- usually gets a dinner vegetable 2-3 times per week  Snacks: 1 rice cake at work, pretzels at home or bag of shared popcorn.  Beverages: Coffee 1/day and Water throughout/day,  stopped smoothies    Last fruit- 5 days ago   Misses meals? never  Eats out:  Not discussed     Previous diet education:  Yes     Food allergies/intolerances: raw carrots    Bought whole wheat noodles plans to try them.     Pressure cooker with chicken broth, pepper, carrots, shredded chicken     EXERCISE: minimal exercise-currently getting heart assessments done, keeping activity low intensity.  Will go on treadmill or bike at gym for 20-30 minutes 3 times per week.      Plans to go gym before work next week.    SOCIO/ECONOMIC:   Lives with: self, spouse and son    OBJECTIVE:   Vitals: LMP 02/22/2020      Wt Readings from Last 10 Encounters:   03/03/20 90.7 kg (200 lb)   01/10/20 89.4 kg (197 lb)- last nutrition visit   11/22/19 91.8 kg (202  lb 6.4 oz)   11/21/19 90.7 kg (200 lb)   11/08/19 91.2 kg (201 lb)   10/11/19 92.1 kg (203 lb)   10/03/19 90.7 kg (200 lb)   09/20/19 92 kg (202 lb 12.8 oz)   04/04/19 86.2 kg (190 lb)   01/31/19 87.5 kg (192 lb 12.8 oz)     Weight Change: up 3 lbs since last visit.    ASSESSMENT:   Patient has made progress with consuming fruits and vegetables more often.  She consistently has vegetables 1 time per day, but does add high calorie toppings to salad.  Current breakfast is high in calories and added sugar recommend swapping for english muffin.   Diet is high in: calories, carbs, fat (saturated), fat (unsaturated) and sodium  Diet is low in: fiber, fruits and vegetables    Estimated Energy Expenditure: 1700 kcal to maintain current weight  Recommended Energy Intake: 6788-8254 kcal for weight loss.    VERBAL AND WRITTEN INFORMATION GIVEN TO SUPPORT:  Discussed: general nutrition guidelines, weight reduction, labeling, exercise, behavior modification and portion control.  Provided education on how to include vegetables, ways to cook them.    Education Materials Provided: no new handouts.  Discussed website resources.     PLAN:   PATIENT'S BEHAVIOR CHANGE GOALS:   See Patient Instructions for patient stated behavior change goals. AVS was printed and given to patient at today's appointment.    FOLLOW UP:   Follow-up appointment scheduled on 5/17/20.     Kyra Jimenez MS, RD, LD, CDE    Time spent in minutes: 40  Encounter: Individual

## 2020-03-06 NOTE — PATIENT INSTRUCTIONS
Workout in the morning: Pack gym bag the night before, pack work clothes night before, towel, and make breakfast/coffee and lunch the night before.    Breakfast: swap bagel for whole grain english muffin  Mid morning snack: 1 pc fruit    Fruit- have some hard on hand and fruit that will spoil faster- rotate between them.    Sauces: swap gordo with marinara OR pumpkin sauce  Butts: add 2-3 different kinds of vegetables to any sauce    Aim for 2 cups total of vegetable per day    Salads- try adding some different vegetables and double check labels on toppings you're adding to salad.     Vegetables: grilling, hot or cold, roasted in the oven with garlic,     Olive oil, salt, pepper, and maybe a squeeze of lemon are your friends.     Search for recipe ideas by likes, dislikes, specific foods, and cooking styles,   Https://www.diabetesfoodhub.org/    Nutrition information, how to cook, and simple recipes for fruits and vegetables.  Useful short videos about fruits and vegetables.  Great website if you are looking to try something new.  https://www.fruitsandveggiesmorematters.org/fruit-vegetable-nutrition-database

## 2020-03-09 ENCOUNTER — ANCILLARY PROCEDURE (OUTPATIENT)
Dept: CARDIOLOGY | Facility: CLINIC | Age: 30
End: 2020-03-09
Attending: FAMILY MEDICINE
Payer: COMMERCIAL

## 2020-03-09 PROCEDURE — 93306 TTE W/DOPPLER COMPLETE: CPT | Performed by: INTERNAL MEDICINE

## 2020-03-12 ENCOUNTER — ALLIED HEALTH/NURSE VISIT (OUTPATIENT)
Dept: NURSING | Facility: CLINIC | Age: 30
End: 2020-03-12
Payer: COMMERCIAL

## 2020-03-12 DIAGNOSIS — R00.2 PALPITATIONS: Primary | ICD-10-CM

## 2020-03-12 PROCEDURE — 99207 ZZC NO CHARGE NURSE ONLY: CPT

## 2020-03-12 NOTE — PROGRESS NOTES
I have place a 48 Hour Zio Patch on this patient. Patient was given Zio Patch instructions and [employer] contact information. Patient understands when they should remove and return their monitor to [employer].     Orders are to wear for 14 days.  Dana Martínez MA

## 2020-04-02 ENCOUNTER — TELEPHONE (OUTPATIENT)
Dept: NEUROLOGY | Facility: CLINIC | Age: 30
End: 2020-04-02

## 2020-04-02 NOTE — TELEPHONE ENCOUNTER
I called patient and reschedule to video appt. Information sent through [facility].    Ally Rodriguez LPN

## 2020-04-03 ENCOUNTER — TELEPHONE (OUTPATIENT)
Dept: FAMILY MEDICINE | Facility: CLINIC | Age: 30
End: 2020-04-03

## 2020-04-03 NOTE — TELEPHONE ENCOUNTER
Left message for patient to call back pod 2 line.([phone])    If patient returns call please schedule her a tele visit for next week to go over results- reassure her that no fatal arrhythmias were noted per Dr. Fish.       -------------------------------------------------------------------------------------------------------------------    Ashley Fish MD [facility]    Cc: Hanna Coles MA; [facility]  Pool               Please let Jennifer that her Zio Patch results are in. Can schedule Tele visit.   Reassure- no fatal arrhythmias were noted.

## 2020-04-08 ENCOUNTER — VIRTUAL VISIT (OUTPATIENT)
Dept: FAMILY MEDICINE | Facility: CLINIC | Age: 30
End: 2020-04-08
Payer: COMMERCIAL

## 2020-04-08 DIAGNOSIS — I49.1 SUPRAVENTRICULAR PREMATURE BEATS: ICD-10-CM

## 2020-04-08 DIAGNOSIS — R00.2 PALPITATIONS: Primary | ICD-10-CM

## 2020-04-08 PROCEDURE — 99214 OFFICE O/P EST MOD 30 MIN: CPT | Mod: TEL | Performed by: FAMILY MEDICINE

## 2020-04-08 RX ORDER — METOPROLOL SUCCINATE 25 MG/1
25 TABLET, EXTENDED RELEASE ORAL DAILY
Qty: 30 TABLET | Refills: 1 | Status: SHIPPED | OUTPATIENT
Start: 2020-04-08 | End: 2020-05-22

## 2020-04-08 NOTE — PROGRESS NOTES
"Subjective     Jennifer Pastrana is a 29 year old female who is being evaluated via a billable telephone visit.      The patient has been notified of following:     \"This telephone visit will be conducted via a call between you and your physician/provider. We have found that certain health care needs can be provided without the need for a physical exam.  This service lets us provide the care you need with a short phone conversation.  If a prescription is necessary we can send it directly to your pharmacy.  If lab work is needed we can place an order for that and you can then stop by our lab to have the test done at a later time.    Telephone visits are billed at different rates depending on your insurance coverage. During this emergency period, for some insurers they may be billed the same as an in-person visit.  Please reach out to your insurance provider with any questions.    If during the course of the call the physician/provider feels a telephone visit is not appropriate, you will not be charged for this service.\"    Patient has given verbal consent for Telephone visit?  Yes    Jennifer Pastrana complains of   Chief Complaint   Patient presents with     Palpitations       ALLERGIES  Patient has no known allergies.      Follow Up Palpitations  Would like to discuss recent Zio Results  States palpitations have continued as well as rapid heart rate.   On average occurring about 2-3 x/ week- feels pounding sensation in the chest.   Denies having any chest pain, shortness of breath or dyspnea on exertion.      Patient Active Problem List   Diagnosis     CARDIOVASCULAR SCREENING; LDL GOAL LESS THAN 160     Hypothyroidism     Dry eye syndrome- mild     Dysplasia of cervix, low grade (MILO 1)     Generalized anxiety disorder     Supervision of normal first pregnancy     Migraine without aura and without status migrainosus, not intractable     Reactive airway disease that is not asthma     Oral contraceptive pill surveillance "     Past Surgical History:   Procedure Laterality Date     NO HISTORY OF SURGERY         Social History     Tobacco Use     Smoking status: Never Smoker     Smokeless tobacco: Never Used     Tobacco comment: Nonsmoking household   Substance Use Topics     Alcohol use: No     Family History   Problem Relation Age of Onset     Breast Cancer Maternal Grandmother         in her 50s     Heart Disease Maternal Grandfather         50s     Myocardial Infarction Maternal Grandfather      Heart Disease Paternal Grandfather         50s     Myocardial Infarction Paternal Grandfather      Thyroid Disease Mother         graves-decompression and strabismus     Cancer Paternal Grandmother         cervical     Thyroid Disease Paternal Aunt         nine affected in family     Glaucoma No family hx of      Macular Degeneration No family hx of      Cerebrovascular Disease No family hx of      Hypertension No family hx of      Diabetes No family hx of          Current Outpatient Medications   Medication Sig Dispense Refill     albuterol (PROAIR HFA/PROVENTIL HFA/VENTOLIN HFA) 108 (90 Base) MCG/ACT inhaler Inhale 2 puffs into the lungs every 6 hours 1 Inhaler 3     fluticasone (FLONASE) 50 MCG/ACT nasal spray Spray 1-2 sprays into both nostrils daily 9.9 mL 3     levothyroxine (SYNTHROID/LEVOTHROID) 150 MCG tablet Take 1 tablet (150 mcg) by mouth daily 90 tablet 3     Loratadine (CLARITIN PO) Take 10 mg by mouth daily Reported on 4/26/2017       metoprolol succinate ER (TOPROL-XL) 25 MG 24 hr tablet Take 1 tablet (25 mg) by mouth daily 30 tablet 1     nortriptyline (PAMELOR) 25 MG capsule Take 2 capsules (50 mg) by mouth At Bedtime 180 capsule 2     rizatriptan (MAXALT-MLT) 10 MG ODT Take 1 tablet (10 mg) by mouth at onset of headache for migraine May repeat in 2 hours. Max 3 tablets/24 hours. 12 tablet 3     No Known Allergies    Reviewed and updated as needed this visit by Provider         Review of Systems   ROS COMP: Constitutional,  HEENT, cardiovascular, pulmonary, gi and gu systems are negative, except as otherwise noted.       Objective   Reported vitals:  There were no vitals taken for this visit.   Psych: Alert and oriented times 3; coherent speech, normal   rate and volume, able to articulate logical thoughts, able   to abstract reason, no tangential thoughts, no hallucinations   or delusions.    Diagnostic Test Results:  Labs reviewed in Epic  Echo- was normal  Zio Patch- min HR- 58 bpm; max HR-168 bpm with av HR-99 bpm.  Predominant underlying rhythm was sinus rhythm.  Symptoms were associated with SV and V ectopy.         Assessment/Plan:  1. Palpitations due to Supraventricular premature beats  Treatment options discussed to include non-pharmacotherapy (avoid caffeine, alcohol, stress reduction etc) versus pharmacotherapy options- patient wished to try medication  - Trial: metoprolol succinate ER (TOPROL-XL) 25 MG 24 hr tablet; Take 1 tablet (25 mg) by mouth daily  Dispense: 30 tablet; Refill: 1    Re-evaluate in a month    Return in about 1 month (around 5/8/2020) for Virtual Visit..      Phone call duration:  25  minutes    Ashley Fish MD

## 2020-04-09 ENCOUNTER — VIRTUAL VISIT (OUTPATIENT)
Dept: NEUROLOGY | Facility: CLINIC | Age: 30
End: 2020-04-09
Payer: COMMERCIAL

## 2020-04-09 DIAGNOSIS — G43.009 MIGRAINE WITHOUT AURA AND WITHOUT STATUS MIGRAINOSUS, NOT INTRACTABLE: ICD-10-CM

## 2020-04-09 PROCEDURE — 99213 OFFICE O/P EST LOW 20 MIN: CPT | Mod: GT | Performed by: PSYCHIATRY & NEUROLOGY

## 2020-04-09 RX ORDER — RIZATRIPTAN BENZOATE 10 MG/1
10 TABLET, ORALLY DISINTEGRATING ORAL
Qty: 12 TABLET | Refills: 3 | Status: SHIPPED | OUTPATIENT
Start: 2020-04-09 | End: 2020-10-07

## 2020-04-09 RX ORDER — NORTRIPTYLINE HCL 25 MG
75 CAPSULE ORAL AT BEDTIME
Qty: 180 CAPSULE | Refills: 2 | Status: SHIPPED | OUTPATIENT
Start: 2020-04-09 | End: 2020-07-08

## 2020-04-09 NOTE — PROGRESS NOTES
Visit Date:   04/09/2020      NEUROLOGY CLINIC       This is a virtual video visit being done because of the COVID-19 pandemic.      Jennifer Pastrana is a 29-year-old female I follow for common migraine.      She tells me she rarely has a migraine headache now, at the most once a month.  She does get good relief with Maxalt ODT from these headaches.  She, however, reports localized head pain that she had rarely in the past, but now she is having it once or twice a week.  It is about a 2-inch diameter area of pain at the posterior parietal vertex mainly on the right.  It is brief in duration but can last up to 10 minutes.  It is not related to changes in position, although she notices it most often when she is either sitting at work or in a chair at home.  The pain is not precipitated by strain.  She has not appreciated any numbness or tingling up the back of her head.  I did have her palpate along the back of her skull base and she has a slight area of tenderness on the right.      She continues on nortriptyline 50 mg and she tolerates it well and it has been very helpful in reducing the frequency of her migraine.  She also has headaches that are tension in their quality.      CURRENT MEDICATIONS:   1.  Albuterol.   2.  Flonase.   3.  Mirena IUD   4.  Levothyroxine.   5.  Claritin.   6.  Toprol.   7.  Nortriptyline 50 mg.   8.  Maxalt 10 mg ODT.      IMPRESSION:   1.  Common migraine.   2.  Episodic localized head pain.  Possible occipital neuralgia, but undetermined at this time.      PLAN:  I did ask her if this was a new headache.  In fact, she says that although it is more frequent recently, she has had it rarely over the years.  I did review prior imaging including unremarkable brain MRI scan in 2017 and cervical MRI scan in 2018 that revealed just a mild disc bulge at C3-4 level.      She is going to try increasing her nortriptyline dose to 75 mg.  If she has trouble tolerating it at that dose, she will go back  down to 50 mg.      She can continue with Maxalt ODT for acute management of her infrequent migraines.      I have asked her to check back with me in 2-3 weeks for an update.      Total video visit time was 15 minutes from 4:03 p.m. until 4:18 p.m.         AZALIA PARRISH MD             D: 2020   T: 2020   MT: MEGHA      Name:     KYRA MELENDEZ   MRN:      [id number]        Account:      [id number]   :      1990           Visit Date:   2020      Document: [id number]

## 2020-04-09 NOTE — NURSING NOTE
Jennifer Pastrana's goals for this visit include: return  She requests these members of her care team be copied on today's visit information:     PCP: Ashley Fish    Referring Provider:  No referring provider defined for this encounter.    There were no vitals taken for this visit.    Do you need any medication refills at today's visit? No

## 2020-04-09 NOTE — PROGRESS NOTES
".  Jennifer Pastrana is a 29 year old female who is being evaluated via a billable video visit.      The patient has been notified of following:     \"This video visit will be conducted via a call between you and your physician/provider. We have found that certain health care needs can be provided without the need for an in-person physical exam.  This service lets us provide the care you need with a video conversation.  If a prescription is necessary we can send it directly to your pharmacy.  If lab work is needed we can place an order for that and you can then stop by our lab to have the test done at a later time.    Video visits are billed at different rates depending on your insurance coverage.  Please reach out to your insurance provider with any questions.    If during the course of the call the physician/provider feels a video visit is not appropriate, you will not be charged for this service.\"    Patient has given verbal consent for Video visit? Yes    How would you like to obtain your AVS? Mail a copy    Patient would like the video invitation sent by: Text to cell phone: [phone]      Video Start Time: 4:03    Jennifer Pastrana complains of    Chief Complaint   Patient presents with     RECHECK       I have reviewed and updated the patient's Past Medical History, Social History, Family History and Medication List.    ALLERGIES  Patient has no known allergies.          Video-Visit Details    Type of service:  Video Visit    Video End Time (time video stopped): 4:18    Originating Location (pt. Location): Home    Distant Location (provider location):  [facility]     Mode of Communication:  Video Conference via [email or web address]          "

## 2020-05-04 ENCOUNTER — TELEPHONE (OUTPATIENT)
Dept: OPTOMETRY | Facility: CLINIC | Age: 30
End: 2020-05-04

## 2020-05-04 NOTE — TELEPHONE ENCOUNTER
Patient sent a [employer] scheduling request last week inquiring about a Follow up appointment with Dr. Slade.  Optometry schedules were not decided due to COVID-19, until today.  Attempted to call the patient and offer an appointment if she needed, but had to leave a message requesting her to call back.

## 2020-05-05 NOTE — TELEPHONE ENCOUNTER
5/5/2020    I called patient and left a message letting her know that if she still needed to be seen she could call our scheduling line as Dr Slade is going to be in clinic today ad could see her.   I also sent a message to  with details of when to schedule her if she calls back. There spot for today with Dr Slade would be at 2:20PM       Bee Cortez Optometric Assistant

## 2020-05-06 NOTE — TELEPHONE ENCOUNTER
Multiple attempts to reach patient and offer an appointment, but she did not call back.    Encounter will be closed.

## 2020-07-08 ENCOUNTER — OFFICE VISIT (OUTPATIENT)
Dept: NEUROLOGY | Facility: CLINIC | Age: 30
End: 2020-07-08
Payer: COMMERCIAL

## 2020-07-08 VITALS — OXYGEN SATURATION: 99 % | DIASTOLIC BLOOD PRESSURE: 84 MMHG | HEART RATE: 102 BPM | SYSTOLIC BLOOD PRESSURE: 125 MMHG

## 2020-07-08 DIAGNOSIS — G43.009 MIGRAINE WITHOUT AURA AND WITHOUT STATUS MIGRAINOSUS, NOT INTRACTABLE: Primary | ICD-10-CM

## 2020-07-08 DIAGNOSIS — M54.81 OCCIPITAL NEURALGIA OF RIGHT SIDE: ICD-10-CM

## 2020-07-08 PROCEDURE — 99213 OFFICE O/P EST LOW 20 MIN: CPT | Performed by: PSYCHIATRY & NEUROLOGY

## 2020-07-08 RX ORDER — NORTRIPTYLINE HCL 25 MG
50 CAPSULE ORAL AT BEDTIME
Qty: 180 CAPSULE | Refills: 2 | Status: SHIPPED | OUTPATIENT
Start: 2020-07-08 | End: 2021-04-07

## 2020-07-08 ASSESSMENT — PAIN SCALES - GENERAL: PAINLEVEL: MILD PAIN (2)

## 2020-07-08 NOTE — LETTER
7/8/2020         RE: Jennifer Pastrana  [address] [facility] [address]        Dear Colleague,    Thank you for referring your patient, Jennifer Pastrana, to the [facility]. Please see a copy of my visit note below.    Visit Date:   07/08/2020      HISTORY OF PRESENT ILLNESS:  Jennifer Pastrana returns for a formal face-to-face evaluation.  She is a patient with common migraine.  When I did a video followup visit with her in April, she was complaining of episodic intense albeit brief pain at right parietal vertex.  This was not necessarily a new problem for her, but it was occurring more frequently.  She can go several days now 1 or 2 of these a brief intense pains for 5 days in a row.  She has not found anything that provokes them such as her head or neck position, body position or strain.      When we met in April, I suggested increasing her nortriptyline dose from 50 mg to 75 mg.  She did so and indicates it really did not have any positive impact.      Her migraines remain under good control.  In addition to nortriptyline, she will utilize Maxalt 10 mg for abortive therapy.      A trial of Topamax in the past produced intolerable paresthesias.      CURRENT MEDICATIONS:   1.  Albuterol p.r.n.   2.  Flonase.   3.  Mirena IUD   4.  Levothyroxine.   5.  Claritin.   6.  Metoprolol   7.  Nortriptyline 75 mg.   8.  Maxalt ODT 10 mg.      PHYSICAL EXAMINATION:   GENERAL:  Reveals she is alert and in no apparent distress.   VITAL SIGNS:  Heart rate 102.  Blood pressure 125/86.   HEENT:  There is no sensory loss over the back of the head or at the vertex.  She does have some point tenderness in the right suboccipital region, but not a Tinel sign.  Funduscopic examination reveals sharp disc margins.  Pupils are equal, round and react well to light.  Extraocular motility is full.  Visual fields are intact.   NEUROLOGIC:  Otherwise, cranial nerves II-XII are intact.  Motor, sensory, cerebellar and gait  testing are normal.  Reflexes are 2+.  Plantar responses are flexor.      IMPRESSION:   1.  Common migraine.   2.  Episodic, brief right parietal vertex head pain -- possible right occipital neuralgia.      PLAN:  Increasing the nortriptyline to 75 mg did not help, and so I recommended she go back down to 50 mg.      I discussed the possibility that this could represent occipital neuralgia on the right.  She would be interested in pursuing a right occipital nerve block to see if it helps with these pains, and this will be arranged.      She will contact me after she has had the nerve block.  If it proves ineffective, then I might consider adding a low dose of gabapentin.  I did touch upon this with her today.         AZALIA STEWARD MD             D: 2020   T: 2020   MT: FLORENCIO      Name:     KYRA MELENDEZ   MRN:      [id number]        Account:      [id number]   :      1990           Visit Date:   2020      Document: [id number]        Again, thank you for allowing me to participate in the care of your patient.        Sincerely,        Azalia Steward MD

## 2020-07-08 NOTE — NURSING NOTE
Jennifer Pastrana's goals for this visit include: return  She requests these members of her care team be copied on today's visit information:     PCP: Ashley Fish    Referring Provider:  No referring provider defined for this encounter.    /84   Pulse 102   SpO2 99%     Do you need any medication refills at today's visit? y

## 2020-07-09 NOTE — PROGRESS NOTES
Visit Date:   07/08/2020      HISTORY OF PRESENT ILLNESS:  Jennifer Pastrana returns for a formal face-to-face evaluation.  She is a patient with common migraine.  When I did a video followup visit with her in April, she was complaining of episodic intense albeit brief pain at right parietal vertex.  This was not necessarily a new problem for her, but it was occurring more frequently.  She can go several days now 1 or 2 of these a brief intense pains for 5 days in a row.  She has not found anything that provokes them such as her head or neck position, body position or strain.      When we met in April, I suggested increasing her nortriptyline dose from 50 mg to 75 mg.  She did so and indicates it really did not have any positive impact.      Her migraines remain under good control.  In addition to nortriptyline, she will utilize Maxalt 10 mg for abortive therapy.      A trial of Topamax in the past produced intolerable paresthesias.      CURRENT MEDICATIONS:   1.  Albuterol p.r.n.   2.  Flonase.   3.  Mirena IUD   4.  Levothyroxine.   5.  Claritin.   6.  Metoprolol   7.  Nortriptyline 75 mg.   8.  Maxalt ODT 10 mg.      PHYSICAL EXAMINATION:   GENERAL:  Reveals she is alert and in no apparent distress.   VITAL SIGNS:  Heart rate 102.  Blood pressure 125/86.   HEENT:  There is no sensory loss over the back of the head or at the vertex.  She does have some point tenderness in the right suboccipital region, but not a Tinel sign.  Funduscopic examination reveals sharp disc margins.  Pupils are equal, round and react well to light.  Extraocular motility is full.  Visual fields are intact.   NEUROLOGIC:  Otherwise, cranial nerves II-XII are intact.  Motor, sensory, cerebellar and gait testing are normal.  Reflexes are 2+.  Plantar responses are flexor.      IMPRESSION:   1.  Common migraine.   2.  Episodic, brief right parietal vertex head pain -- possible right occipital neuralgia.      PLAN:  Increasing the nortriptyline to  75 mg did not help, and so I recommended she go back down to 50 mg.      I discussed the possibility that this could represent occipital neuralgia on the right.  She would be interested in pursuing a right occipital nerve block to see if it helps with these pains, and this will be arranged.      She will contact me after she has had the nerve block.  If it proves ineffective, then I might consider adding a low dose of gabapentin.  I did touch upon this with her today.     ADDENDUM: MyChart note from patient. Right ON block seems to have helped.      20: Had second ON block but wears off after a couple weeks. This is expensive for her. Will Start Gabapentin 100 mg tid        AZALIA PARRISH MD             D: 2020   T: 2020   MT: FLORENCIO      Name:     KYRA MELENDEZ   MRN:      [id number]        Account:      [id number]   :      1990           Visit Date:   2020      Document: [id number]

## 2020-07-16 ENCOUNTER — TRANSFERRED RECORDS (OUTPATIENT)
Dept: HEALTH INFORMATION MANAGEMENT | Facility: CLINIC | Age: 30
End: 2020-07-16

## 2020-07-29 ENCOUNTER — TELEPHONE (OUTPATIENT)
Dept: NEUROLOGY | Facility: CLINIC | Age: 30
End: 2020-07-29

## 2020-07-29 NOTE — TELEPHONE ENCOUNTER
Spoke to patient. Right occipital nerve block alleviated the pain for a week but now returning. Will try another Right occipital nerve block at [facility]

## 2020-08-03 ENCOUNTER — VIRTUAL VISIT (OUTPATIENT)
Dept: FAMILY MEDICINE | Facility: OTHER | Age: 30
End: 2020-08-03
Payer: COMMERCIAL

## 2020-08-03 PROCEDURE — 99421 OL DIG E/M SVC 5-10 MIN: CPT | Performed by: PHYSICIAN ASSISTANT

## 2020-08-03 NOTE — PROGRESS NOTES
"Date: 2020 09:38:36  Clinician: Derrick Mclean  Clinician NPI: [id number]  Patient: Jennifer Pastrana  Patient : 1990  Patient Address: [address] [place] [address], [place] [address]  Patient Phone: [phone]  Visit Protocol: Eczema  Patient Summary:  Jennifer is a 29 year old ( : 1990 ) female who initiated a Visit for evaluation of contact dermatitis. When asked the question \"Please sign me up to receive news, health information and promotions from [employer].\", Jennifer responded \"No\".    Images of her skin condition were uploaded.  Her symptoms started 1-2 weeks ago. The rash is located on her feet. The rash is white and red in color.   The affected area has sores and scabs. It feels itchy. The symptoms interfere with her sleep.   Symptom details   Redness: The redness has not rapidly increased in size.   Denied symptoms include blisters, warm to touch, tender to touch, burning, pain, numbness, crusts, flaky skin, scaly skin, drainage, and dry skin. Jennifer does not feel feverish.   Treatments or home remedies used to relieve the symptoms as reported by the patient (free text): hydrocortisone cream and benadryl have been mildly helpful with itching. Effects are very short term. They have not helped lessen the rash   Precipitating events  Just before the symptoms started, Jennifer came in contact with an other irritant. Other possible irritants as reported by the patient (free text):  possible bug bite (was camping)   She spent time in a wooded area, went swimming, and spent excess time in the sun just before her symptoms started.   Jennifer has not been in close contact with anyone that has similar symptoms.   Pertinent medical history  Jennifer has not experienced this skin condition before.   Jennifer has a history of seasonal allergies or hay fever.   Ongoing medical conditions were denied.   Jennifer does not smoke or use smokeless tobacco.   She denies pregnancy and denies breastfeeding. Her last period was " over a month ago.   Additional information as reported by the patient (free text): The spots on my feet started out looking like a bite on 7/25. The one on my left foot started with 2 bite marks next to each other and did not start to itch until 3 days later. The one on my right foot started out looking like a dirt jasbir and now looks like a raised bite jasbir and started itching the same day as my left foot. The redness on my left foot has spread and I have developed a small patch of hives 2 days ago on the top of my left foot in close proximity to the irritation.     MEDICATIONS: metoprolol succinate oral, Zyrtec oral, levothyroxine oral, nortriptyline oral, ALLERGIES: NKDA  Clinician Response:  Dear Jennifer,  Based on the information provided, you have a rash without a clear cause. A rash can be caused by diseases, irritating substances, allergies, and your genetics.  Although some skin rashes have a distinct appearance, many rash symptoms do not point to a specific cause. As long as symptoms are not a sign of a serious condition, treatment focuses on controlling symptoms.  Medication information  Unless you are allergic to the over-the-counter medication(s) below, I recommend using:   An antihistamine such as Zyrtec, Claritin, Allegra, or store brand during the day to reduce itching.   Over-the-counter medications do not require a prescription. Ask the pharmacist if you have any questions.  Self care  Steps you can take to be as comfortable as possible:     Avoid scratching the rash    Take a lukewarm bath to soothe the skin (adding colloidal oatmeal can help even more)    Apply a moisturizing lotion immediately after bathing and frequently reapply throughout the day    Apply a cool, wet washcloth to your rash for 15 minutes several times a day    Use mild soap and laundry detergent    Choose clothing and bedding made of a breathable material like cotton    Do not use antibiotic creams or ointments unless  recommended by a  provider     When to seek care  Please make an appointment to be seen in a clinic or urgent care if any of the following occur:     You develop new symptoms or your symptoms become worse    Your rash hasn't improved after 7 days    Symptoms are so severe that you are unable to sleep or do regular activities    You have areas of broken skin from scratching    You notice symptoms of a skin infection (e.g. Spreading redness, pain that is not improving, fever, warmth)      Diagnosis: Rash and other nonspecific skin eruption  Diagnosis ICD: R21

## 2020-08-14 ENCOUNTER — TRANSFERRED RECORDS (OUTPATIENT)
Dept: HEALTH INFORMATION MANAGEMENT | Facility: CLINIC | Age: 30
End: 2020-08-14

## 2020-08-28 DIAGNOSIS — M54.81 OCCIPITAL NEURALGIA OF RIGHT SIDE: Primary | ICD-10-CM

## 2020-08-28 RX ORDER — GABAPENTIN 100 MG/1
100 CAPSULE ORAL 3 TIMES DAILY
Qty: 90 CAPSULE | Refills: 3 | Status: SHIPPED | OUTPATIENT
Start: 2020-08-28 | End: 2020-10-07

## 2020-09-24 ENCOUNTER — OFFICE VISIT (OUTPATIENT)
Dept: OPTOMETRY | Facility: CLINIC | Age: 30
End: 2020-09-24
Payer: COMMERCIAL

## 2020-09-24 DIAGNOSIS — H57.89 BILATERAL INCOMPLETE BLINK: ICD-10-CM

## 2020-09-24 DIAGNOSIS — H52.13 MYOPIA OF BOTH EYES: Primary | ICD-10-CM

## 2020-09-24 DIAGNOSIS — H04.123 DRY EYES: ICD-10-CM

## 2020-09-24 PROCEDURE — 99212 OFFICE O/P EST SF 10 MIN: CPT | Performed by: OPTOMETRIST

## 2020-09-24 PROCEDURE — 92310 CONTACT LENS FITTING OU: CPT | Mod: GA | Performed by: OPTOMETRIST

## 2020-09-24 ASSESSMENT — REFRACTION_MANIFEST
OS_SPHERE: -2.25
OD_CYLINDER: +0.25
OS_AXIS: 067
OS_SPHERE: -2.00
OD_SPHERE: -2.75
METHOD_AUTOREFRACTION: 1
OD_CYLINDER: +0.25
OS_CYLINDER: +0.25
OD_AXIS: 155
OD_SPHERE: -2.25
OS_AXIS: 060
OS_CYLINDER: +0.25
OD_AXIS: 145

## 2020-09-24 ASSESSMENT — REFRACTION_CURRENTRX
OD_BRAND: ALCON DAILIES TOTAL 1 BC 8.5, D 14.1
OD_SPHERE: -2.25
OS_SPHERE: -2.25
OS_BRAND: ALCON DAILIES TOTAL 1 BC 8.5, D 14.1

## 2020-09-24 ASSESSMENT — KERATOMETRY
OS_AXISANGLE2_DEGREES: 150
OS_K2POWER_DIOPTERS: 44.00
OS_K1POWER_DIOPTERS: 43.00
OD_K2POWER_DIOPTERS: 43.75
OD_AXISANGLE2_DEGREES: 20
OD_K1POWER_DIOPTERS: 43.25

## 2020-09-24 ASSESSMENT — REFRACTION_WEARINGRX
OS_AXIS: 075
SPECS_TYPE: SVL
OS_SPHERE: -2.25
OD_CYLINDER: SPHERE
OD_SPHERE: -2.25
OS_AXIS: 075
OS_SPHERE: -2.25
OS_CYLINDER: +0.50
OS_CYLINDER: +0.50
OD_SPHERE: -2.25
OD_CYLINDER: SPHERE
SPECS_TYPE: SVL

## 2020-09-24 ASSESSMENT — ANXIETY QUESTIONNAIRES
IF YOU CHECKED OFF ANY PROBLEMS ON THIS QUESTIONNAIRE, HOW DIFFICULT HAVE THESE PROBLEMS MADE IT FOR YOU TO DO YOUR WORK, TAKE CARE OF THINGS AT HOME, OR GET ALONG WITH OTHER PEOPLE: NOT DIFFICULT AT ALL
5. BEING SO RESTLESS THAT IT IS HARD TO SIT STILL: NOT AT ALL
6. BECOMING EASILY ANNOYED OR IRRITABLE: NOT AT ALL
2. NOT BEING ABLE TO STOP OR CONTROL WORRYING: NOT AT ALL
3. WORRYING TOO MUCH ABOUT DIFFERENT THINGS: NOT AT ALL
7. FEELING AFRAID AS IF SOMETHING AWFUL MIGHT HAPPEN: NOT AT ALL
GAD7 TOTAL SCORE: 1
1. FEELING NERVOUS, ANXIOUS, OR ON EDGE: NOT AT ALL

## 2020-09-24 ASSESSMENT — VISUAL ACUITY
OD_CC: 20/20
METHOD: SNELLEN - LINEAR
OS_CC: 20/20
CORRECTION_TYPE: GLASSES
OD_CC: 20/20
OS_CC: 20/20

## 2020-09-24 ASSESSMENT — PATIENT HEALTH QUESTIONNAIRE - PHQ9: 5. POOR APPETITE OR OVEREATING: SEVERAL DAYS

## 2020-09-24 NOTE — PATIENT INSTRUCTIONS
Patient was advised of today's exam findings.  Fill glasses prescription as needed  Trials given to patient, wear them a few days then alright to order  supply   Contact lenses prescription given to patient today  Return in 1 year for eye exam    Muna Slade O.D.  [facility]   [address] [place], [place] [id number] [phone]

## 2020-09-24 NOTE — LETTER
9/24/2020         RE: Jennifer Pastrana  [address] [place] [facility] [address]        Dear Colleague,    Thank you for referring your patient, Jennifer Pastrana, to the [facility]. Please see a copy of my visit note below.    Chief Complaint   Patient presents with     Contact Lens Re-fitting     Had CE 3/5/20, Also Request to Recheck Cornea/ left eye         Previous contact lens wearer? Yes: Wasn't able to be fit at her 3/5/20 appointment due to cornea. Last wore about 2 weeks ago   Comfort of contact lenses :Comfort Ok   Satisfied with current lenses: Sees better with the glasses, thinks that the Rx on the contacts needs to be updated - leans in to see computer better         Last Eye Exam: 3/5/2020  Dilated Previously: Yes    What are you currently using to see?  glasses and contacts    Distance Vision Acuity: Satisfied with vision    Near Vision Acuity: Satisfied with vision while reading and using computer with glasses, not as good with the contacts     Eye Comfort: good  Do you use eye drops? : Yes: Currently using Soothe XP twice a day , if right eye is sore uses Theratears in vials as needed - 2 days of use made vision improve greatly       Bee Cortez Optometric Assistant    Review of Symptoms    EYES: no eye discomfort since began regular use of artificial tears , vision is stable too  CONSTITUTIONAL: patient reports feeling healthy      Medical, surgical and family histories reviewed and updated 9/24/2020.       OBJECTIVE: See Ophthalmology exam    ASSESSMENT:    ICD-10-CM    1. Myopia of both eyes  H52.13 CONTACT LENS FITTING,BILAT w/ signed waiver     CANCELED: CONTACT LENS FITTING,BILAT   2. Dry eyes  H04.123 CONTACT LENS FITTING,BILAT w/ signed waiver   3. Bilateral incomplete blink  H57.89 CONTACT LENS FITTING,BILAT w/ signed waiver      PLAN:     Patient Instructions   Patient was advised of today's exam findings.  Fill glasses prescription as needed  Trials given to patient, wear them a  few days then alright to order  supply   Contact lenses prescription given to patient today  Return in 1 year for eye exam    Muna Slade O.D.  [facility]   [id number] [place] [id number] [phone]                           Again, thank you for allowing me to participate in the care of your patient.        Sincerely,        Muna Slade, OD

## 2020-09-24 NOTE — PROGRESS NOTES
SUBJECTIVE:   CC: Jennifer Pastrana is an 29 year old woman who presents for preventive health visit.       Patient has been advised of split billing requirements and indicates understanding: Yes  Healthy Habits:    Do you get at least three servings of calcium containing foods daily (dairy, green leafy vegetables, etc.)? no    Amount of exercise or daily activities, outside of work: none    Problems taking medications regularly No    Medication side effects: No    Have you had an eye exam in the past two years? yes    Do you see a dentist twice per year? yes    Do you have sleep apnea, excessive snoring or daytime drowsiness?no    ADDITIONAL CONCERNS    HYPOTHYROIDISM - Patient has a longstanding history of chronic Hypothyroidism. Patient has been doing well, noting no tremor, insomnia, hair loss or changes in skin texture. Continues to take medications - Levothyroxine 150 mcg/day as directed, without adverse reactions or side effects. Last TSH   Lab Results   Component Value Date    TSH 1.48 09/20/2019       Requesting a refill on Flonase - states that it's effective for her seasonal allergic rhinitis symptoms. Tolerating well.       Ear Problem- Left  Reporting sensitivity in left ear, Cleaning left ear with Qtip will cause watering of eyes, coughing, and make her gag.       HEALTH CARE MAINTENANCE: Due for a Flu shot, Pap smear and annual labs.    Patient informed that anything we discuss that is not related to preventative medicine, may be billed for; patient verbalizes understanding.      Today's PHQ-2 Score:   PHQ-2 ( 1999 [employer]) 9/25/2020 9/25/2020   Q1: Little interest or pleasure in doing things 0 0   Q2: Feeling down, depressed or hopeless 0 0   PHQ-2 Score 0 0   Q1: Little interest or pleasure in doing things Not at all -   Q2: Feeling down, depressed or hopeless Not at all -   PHQ-2 Score 0 -       Abuse: Current or Past(Physical, Sexual or Emotional)- No  Do you feel safe in your environment?  Yes        Social History     Tobacco Use     Smoking status: Never Smoker     Smokeless tobacco: Never Used     Tobacco comment: Nonsmoking household   Substance Use Topics     Alcohol use: No     If you drink alcohol do you typically have >3 drinks per day or >7 drinks per week? No                     Reviewed orders with patient.  Reviewed health maintenance and updated orders accordingly - Yes  Lab work is in process  Labs reviewed in EPIC  BP Readings from Last 3 Encounters:   09/25/20 117/76   07/08/20 125/84   03/03/20 124/88    Wt Readings from Last 3 Encounters:   09/25/20 93.8 kg (206 lb 12.8 oz)   03/03/20 90.7 kg (200 lb)   01/10/20 89.4 kg (197 lb)                  Patient Active Problem List   Diagnosis     CARDIOVASCULAR SCREENING; LDL GOAL LESS THAN 160     Hypothyroidism     Dry eye syndrome- mild     Dysplasia of cervix, low grade (MILO 1)     Generalized anxiety disorder     Supervision of normal first pregnancy     Migraine without aura and without status migrainosus, not intractable     Reactive airway disease that is not asthma     Oral contraceptive pill surveillance     Past Surgical History:   Procedure Laterality Date     HC TOOTH EXTRACTION W/FORCEP         Social History     Tobacco Use     Smoking status: Never Smoker     Smokeless tobacco: Never Used     Tobacco comment: Nonsmoking household   Substance Use Topics     Alcohol use: No     Family History   Problem Relation Age of Onset     Breast Cancer Maternal Grandmother         in her 50s     Heart Disease Maternal Grandfather         50s     Myocardial Infarction Maternal Grandfather      Heart Disease Paternal Grandfather         50s     Myocardial Infarction Paternal Grandfather      Thyroid Disease Mother         graves-decompression and strabismus     Cancer Paternal Grandmother         cervical     Thyroid Disease Paternal Aunt         nine affected in family     Glaucoma No family hx of      Macular Degeneration No family hx of       Cerebrovascular Disease No family hx of      Hypertension No family hx of      Diabetes No family hx of          Current Outpatient Medications   Medication Sig Dispense Refill     albuterol (PROAIR HFA/PROVENTIL HFA/VENTOLIN HFA) 108 (90 Base) MCG/ACT inhaler Inhale 2 puffs into the lungs every 6 hours 1 Inhaler 3     fluticasone (FLONASE) 50 MCG/ACT nasal spray Spray 1-2 sprays into both nostrils daily 9.9 mL 3     gabapentin (NEURONTIN) 100 MG capsule Take 1 capsule (100 mg) by mouth 3 times daily 90 capsule 3     levonorgestrel (MIRENA) 20 MCG/24HR IUD 1 each by Intrauterine route once       levothyroxine (SYNTHROID/LEVOTHROID) 150 MCG tablet Take 1 tablet (150 mcg) by mouth daily 90 tablet 3     Loratadine (CLARITIN PO) Take 10 mg by mouth daily Reported on 4/26/2017       metoprolol succinate ER (TOPROL-XL) 25 MG 24 hr tablet Take 1 tablet (25 mg) by mouth daily 90 tablet 3     nortriptyline (PAMELOR) 25 MG capsule Take 2 capsules (50 mg) by mouth At Bedtime 180 capsule 2     rizatriptan (MAXALT-MLT) 10 MG ODT Take 1 tablet (10 mg) by mouth at onset of headache for migraine May repeat in 2 hours. Max 3 tablets/24 hours. 12 tablet 3     No Known Allergies    Mammogram not appropriate for this patient based on age.    Pertinent mammograms are reviewed under the imaging tab.  History of abnormal Pap smear: NO - age 30-65 PAP every 5 years with negative HPV co-testing recommended  PAP / HPV 10/26/2017 2/25/2015 2/24/2014   PAP NIL NIL NIL     Reviewed and updated as needed this visit by clinical staff  Tobacco  Allergies  Meds  Surg Hx  Fam Hx  Soc Hx        Reviewed and updated as needed this visit by Provider  Tobacco  Surg Hx  Fam Hx  Soc Hx           ROS:  CONSTITUTIONAL: NEGATIVE for fever, chills, change in weight  INTEGUMENTARU/SKIN: NEGATIVE for worrisome rashes, moles or lesions  EYES: NEGATIVE for vision changes or irritation  ENT: NEGATIVE for ear, mouth and throat problems  RESP: NEGATIVE  "for significant cough or SOB  BREAST: NEGATIVE for masses, tenderness or discharge  CV: NEGATIVE for chest pain, palpitations or peripheral edema  GI: NEGATIVE for nausea, abdominal pain, heartburn, or change in bowel habits  : NEGATIVE for unusual urinary or vaginal symptoms. Periods are regular.  MUSCULOSKELETAL: NEGATIVE for significant arthralgias or myalgia  NEURO: NEGATIVE for weakness, dizziness or paresthesias  PSYCHIATRIC: NEGATIVE for changes in mood or affect    OBJECTIVE:   /76   Pulse 93   Temp 98.6  F (37  C) (Tympanic)   Resp 12   Ht 1.59 m (5' 2.6\")   Wt 93.8 kg (206 lb 12.8 oz)   SpO2 100%   Breastfeeding No   BMI 37.10 kg/m    EXAM:  GENERAL: healthy, alert and no distress  EYES: Eyes grossly normal to inspection, PERRL and conjunctivae and sclerae normal  HENT: ear canals and TM's normal, nose and mouth without ulcers or lesions  NECK: no adenopathy, no asymmetry, masses, or scars and thyroid normal to palpation  RESP: lungs clear to auscultation - no rales, rhonchi or wheezes  BREAST: normal without masses, tenderness or nipple discharge and no palpable axillary masses or adenopathy  CV: regular rate and rhythm, normal S1 S2, no S3 or S4, no murmur, click or rub, no peripheral edema and peripheral pulses strong  ABDOMEN: soft, nontender, no hepatosplenomegaly, no masses and bowel sounds normal   (female): normal female external genitalia, normal urethral meatus, vaginal mucosa pink, moist, well rugated, and normal cervix/adnexa/uterus without masses or discharge. IUD strings visualized. Pap smear done.   MS: no gross musculoskeletal defects noted, no edema  SKIN: no suspicious lesions or rashes  NEURO: Normal strength and tone, mentation intact and speech normal  PSYCH: mentation appears normal, affect normal/bright    Diagnostic Test Results:  Labs in process    ASSESSMENT/PLAN:   Jennifer was seen today for physical.    Diagnoses and all orders for this visit:    Routine " "general medical examination at a health care facility  -     CBC with platelets  -     Comprehensive metabolic panel    Cervical cancer screening  -     Pap imaged thin layer screen with HPV - recommended age 30 - 65 years (select HPV order below)  -     HPV High Risk Types DNA Cervical    Lipid screening  -     Lipid Profile; Future    Hypothyroidism, unspecified type, on Levothyroxine  -     TSH with free T4 reflex  -      Refill Levothyroxine after labs    Chronic seasonal allergic rhinitis  -     Refill: fluticasone (FLONASE) 50 MCG/ACT nasal spray; Spray 1-2 sprays into both nostrils daily  Patient Education: Arnold nerve reflex on ear issue above.     Need for prophylactic vaccination and inoculation against influenza  -     INFLUENZA VACCINE IM > 6 MONTHS VALENT IIV4 [17456]  -     ADMIN 1st VACCINE        Patient has been advised of split billing requirements and indicates understanding: Yes  COUNSELING:   Reviewed preventive health counseling, as reflected in patient instructions       Regular exercise       Healthy diet/nutrition    Estimated body mass index is 37.1 kg/m  as calculated from the following:    Height as of this encounter: 1.59 m (5' 2.6\").    Weight as of this encounter: 93.8 kg (206 lb 12.8 oz).    Weight management plan: Discussed healthy diet and exercise guidelines    She reports that she has never smoked. She has never used smokeless tobacco.      Counseling Resources:  ATP IV Guidelines  Pooled Cohorts Equation Calculator  Breast Cancer Risk Calculator  BRCA-Related Cancer Risk Assessment: FHS-7 Tool  FRAX Risk Assessment  ICSI Preventive Guidelines  Dietary Guidelines for Americans, 2010  USDA's MyPlate  ASA Prophylaxis  Lung CA Screening    Follow up annually and as needed thoughout the year.    Ashley Fish MD  [facility]  "

## 2020-09-24 NOTE — PROGRESS NOTES
Chief Complaint   Patient presents with     Contact Lens Re-fitting     Had CE 3/5/20, Also Request to Recheck Cornea/ left eye         Previous contact lens wearer? Yes: Wasn't able to be fit at her 3/5/20 appointment due to cornea. Last wore about 2 weeks ago   Comfort of contact lenses :Comfort Ok   Satisfied with current lenses: Sees better with the glasses, thinks that the Rx on the contacts needs to be updated - leans in to see computer better         Last Eye Exam: 3/5/2020  Dilated Previously: Yes    What are you currently using to see?  glasses and contacts    Distance Vision Acuity: Satisfied with vision    Near Vision Acuity: Satisfied with vision while reading and using computer with glasses, not as good with the contacts     Eye Comfort: good  Do you use eye drops? : Yes: Currently using Soothe XP twice a day , if right eye is sore uses Theratears in vials as needed - 2 days of use made vision improve greatly       Bee Cortez Optometric Assistant    Review of Symptoms    EYES: no eye discomfort since began regular use of artificial tears , vision is stable too  CONSTITUTIONAL: patient reports feeling healthy      Medical, surgical and family histories reviewed and updated 9/24/2020.       OBJECTIVE: See Ophthalmology exam    ASSESSMENT:    ICD-10-CM    1. Myopia of both eyes  H52.13 CONTACT LENS FITTING,BILAT w/ signed waiver     CANCELED: CONTACT LENS FITTING,BILAT   2. Dry eyes  H04.123 CONTACT LENS FITTING,BILAT w/ signed waiver   3. Bilateral incomplete blink  H57.89 CONTACT LENS FITTING,BILAT w/ signed waiver      PLAN:     Patient Instructions   Patient was advised of today's exam findings.  Fill glasses prescription as needed  Trials given to patient, wear them a few days then alright to order  supply   Contact lenses prescription given to patient today  Return in 1 year for eye exam    Muna Slade O.D.  [facility]   [address] [place] [address] [phone]

## 2020-09-25 ENCOUNTER — OFFICE VISIT (OUTPATIENT)
Dept: FAMILY MEDICINE | Facility: CLINIC | Age: 30
End: 2020-09-25
Payer: COMMERCIAL

## 2020-09-25 VITALS
HEART RATE: 93 BPM | HEIGHT: 63 IN | SYSTOLIC BLOOD PRESSURE: 117 MMHG | WEIGHT: 206.8 LBS | TEMPERATURE: 98.6 F | OXYGEN SATURATION: 100 % | RESPIRATION RATE: 12 BRPM | BODY MASS INDEX: 36.64 KG/M2 | DIASTOLIC BLOOD PRESSURE: 76 MMHG

## 2020-09-25 DIAGNOSIS — Z00.00 ROUTINE GENERAL MEDICAL EXAMINATION AT A HEALTH CARE FACILITY: Primary | ICD-10-CM

## 2020-09-25 DIAGNOSIS — J30.2 CHRONIC SEASONAL ALLERGIC RHINITIS: ICD-10-CM

## 2020-09-25 DIAGNOSIS — Z13.220 LIPID SCREENING: ICD-10-CM

## 2020-09-25 DIAGNOSIS — Z23 NEED FOR PROPHYLACTIC VACCINATION AND INOCULATION AGAINST INFLUENZA: ICD-10-CM

## 2020-09-25 DIAGNOSIS — Z12.4 CERVICAL CANCER SCREENING: ICD-10-CM

## 2020-09-25 DIAGNOSIS — E03.9 HYPOTHYROIDISM, UNSPECIFIED TYPE: ICD-10-CM

## 2020-09-25 LAB
ALBUMIN SERPL-MCNC: 4.1 G/DL (ref 3.4–5)
ALP SERPL-CCNC: 102 U/L (ref 40–150)
ALT SERPL W P-5'-P-CCNC: 18 U/L (ref 0–50)
ANION GAP SERPL CALCULATED.3IONS-SCNC: 9 MMOL/L (ref 3–14)
AST SERPL W P-5'-P-CCNC: 12 U/L (ref 0–45)
BILIRUB SERPL-MCNC: 0.8 MG/DL (ref 0.2–1.3)
BUN SERPL-MCNC: 12 MG/DL (ref 7–30)
CALCIUM SERPL-MCNC: 9.1 MG/DL (ref 8.5–10.1)
CHLORIDE SERPL-SCNC: 103 MMOL/L (ref 94–109)
CO2 SERPL-SCNC: 23 MMOL/L (ref 20–32)
CREAT SERPL-MCNC: 0.83 MG/DL (ref 0.52–1.04)
ERYTHROCYTE [DISTWIDTH] IN BLOOD BY AUTOMATED COUNT: 13.6 % (ref 10–15)
GFR SERPL CREATININE-BSD FRML MDRD: >90 ML/MIN/{1.73_M2}
GLUCOSE SERPL-MCNC: 78 MG/DL (ref 70–99)
HCT VFR BLD AUTO: 35.9 % (ref 35–47)
HGB BLD-MCNC: 11.4 G/DL (ref 11.7–15.7)
MCH RBC QN AUTO: 26.2 PG (ref 26.5–33)
MCHC RBC AUTO-ENTMCNC: 31.8 G/DL (ref 31.5–36.5)
MCV RBC AUTO: 83 FL (ref 78–100)
PLATELET # BLD AUTO: 335 10E9/L (ref 150–450)
POTASSIUM SERPL-SCNC: 3.9 MMOL/L (ref 3.4–5.3)
PROT SERPL-MCNC: 7.9 G/DL (ref 6.8–8.8)
RBC # BLD AUTO: 4.35 10E12/L (ref 3.8–5.2)
SODIUM SERPL-SCNC: 135 MMOL/L (ref 133–144)
TSH SERPL DL<=0.005 MIU/L-ACNC: 1.75 MU/L (ref 0.4–4)
WBC # BLD AUTO: 8.8 10E9/L (ref 4–11)

## 2020-09-25 PROCEDURE — 99395 PREV VISIT EST AGE 18-39: CPT | Mod: 25 | Performed by: FAMILY MEDICINE

## 2020-09-25 PROCEDURE — 99213 OFFICE O/P EST LOW 20 MIN: CPT | Mod: 25 | Performed by: FAMILY MEDICINE

## 2020-09-25 PROCEDURE — 87624 HPV HI-RISK TYP POOLED RSLT: CPT | Performed by: FAMILY MEDICINE

## 2020-09-25 PROCEDURE — 36415 COLL VENOUS BLD VENIPUNCTURE: CPT | Performed by: FAMILY MEDICINE

## 2020-09-25 PROCEDURE — 84443 ASSAY THYROID STIM HORMONE: CPT | Performed by: FAMILY MEDICINE

## 2020-09-25 PROCEDURE — 90471 IMMUNIZATION ADMIN: CPT | Performed by: FAMILY MEDICINE

## 2020-09-25 PROCEDURE — G0145 SCR C/V CYTO,THINLAYER,RESCR: HCPCS | Performed by: FAMILY MEDICINE

## 2020-09-25 PROCEDURE — 90686 IIV4 VACC NO PRSV 0.5 ML IM: CPT | Performed by: FAMILY MEDICINE

## 2020-09-25 PROCEDURE — 80053 COMPREHEN METABOLIC PANEL: CPT | Performed by: FAMILY MEDICINE

## 2020-09-25 PROCEDURE — 85027 COMPLETE CBC AUTOMATED: CPT | Performed by: FAMILY MEDICINE

## 2020-09-25 RX ORDER — LEVOTHYROXINE SODIUM 150 UG/1
150 TABLET ORAL DAILY
Qty: 90 TABLET | Refills: 3 | Status: CANCELLED | OUTPATIENT
Start: 2020-09-25

## 2020-09-25 RX ORDER — FLUTICASONE PROPIONATE 50 MCG
1-2 SPRAY, SUSPENSION (ML) NASAL DAILY
Qty: 9.9 ML | Refills: 3 | Status: SHIPPED | OUTPATIENT
Start: 2020-09-25 | End: 2021-10-01

## 2020-09-25 ASSESSMENT — ENCOUNTER SYMPTOMS
NAUSEA: 0
HEMATOCHEZIA: 0
DYSURIA: 0
HEARTBURN: 0
HEMATURIA: 0
DIZZINESS: 0
SHORTNESS OF BREATH: 0
FREQUENCY: 0
BREAST MASS: 0
ABDOMINAL PAIN: 0
PARESTHESIAS: 0
CHILLS: 0
SORE THROAT: 0
MYALGIAS: 0
NERVOUS/ANXIOUS: 0
ARTHRALGIAS: 0
DIARRHEA: 0
FEVER: 0
PALPITATIONS: 0
CONSTIPATION: 0
COUGH: 0
WEAKNESS: 0
EYE PAIN: 0
HEADACHES: 0
JOINT SWELLING: 0

## 2020-09-25 ASSESSMENT — ANXIETY QUESTIONNAIRES
2. NOT BEING ABLE TO STOP OR CONTROL WORRYING: NOT AT ALL
5. BEING SO RESTLESS THAT IT IS HARD TO SIT STILL: NOT AT ALL
IF YOU CHECKED OFF ANY PROBLEMS ON THIS QUESTIONNAIRE, HOW DIFFICULT HAVE THESE PROBLEMS MADE IT FOR YOU TO DO YOUR WORK, TAKE CARE OF THINGS AT HOME, OR GET ALONG WITH OTHER PEOPLE: NOT DIFFICULT AT ALL
1. FEELING NERVOUS, ANXIOUS, OR ON EDGE: NOT AT ALL
GAD7 TOTAL SCORE: 2
7. FEELING AFRAID AS IF SOMETHING AWFUL MIGHT HAPPEN: NOT AT ALL
6. BECOMING EASILY ANNOYED OR IRRITABLE: SEVERAL DAYS
3. WORRYING TOO MUCH ABOUT DIFFERENT THINGS: NOT AT ALL

## 2020-09-25 ASSESSMENT — PATIENT HEALTH QUESTIONNAIRE - PHQ9
5. POOR APPETITE OR OVEREATING: SEVERAL DAYS
SUM OF ALL RESPONSES TO PHQ QUESTIONS 1-9: 2

## 2020-09-25 ASSESSMENT — MIFFLIN-ST. JEOR: SCORE: 1615.78

## 2020-09-25 ASSESSMENT — SLIT LAMP EXAM - LIDS
COMMENTS: INCOMPLETE BLINK
COMMENTS: INCOMPLETE BLINK

## 2020-09-26 ASSESSMENT — ANXIETY QUESTIONNAIRES: GAD7 TOTAL SCORE: 2

## 2020-09-28 DIAGNOSIS — E03.9 HYPOTHYROIDISM, UNSPECIFIED TYPE: ICD-10-CM

## 2020-09-28 RX ORDER — LEVOTHYROXINE SODIUM 150 UG/1
150 TABLET ORAL DAILY
Qty: 90 TABLET | Refills: 3 | Status: SHIPPED | OUTPATIENT
Start: 2020-09-28 | End: 2021-08-27

## 2020-09-29 LAB
COPATH REPORT: NORMAL
PAP: NORMAL

## 2020-10-01 LAB
FINAL DIAGNOSIS: NORMAL
HPV HR 12 DNA CVX QL NAA+PROBE: NEGATIVE
HPV16 DNA SPEC QL NAA+PROBE: NEGATIVE
HPV18 DNA SPEC QL NAA+PROBE: NEGATIVE
SPECIMEN DESCRIPTION: NORMAL
SPECIMEN SOURCE CVX/VAG CYTO: NORMAL

## 2020-10-07 ENCOUNTER — OFFICE VISIT (OUTPATIENT)
Dept: NEUROLOGY | Facility: CLINIC | Age: 30
End: 2020-10-07
Payer: COMMERCIAL

## 2020-10-07 VITALS
DIASTOLIC BLOOD PRESSURE: 83 MMHG | HEIGHT: 63 IN | WEIGHT: 206 LBS | HEART RATE: 89 BPM | SYSTOLIC BLOOD PRESSURE: 122 MMHG | BODY MASS INDEX: 36.5 KG/M2 | OXYGEN SATURATION: 100 %

## 2020-10-07 DIAGNOSIS — G43.009 MIGRAINE WITHOUT AURA AND WITHOUT STATUS MIGRAINOSUS, NOT INTRACTABLE: ICD-10-CM

## 2020-10-07 DIAGNOSIS — M54.81 OCCIPITAL NEURALGIA OF RIGHT SIDE: ICD-10-CM

## 2020-10-07 PROCEDURE — 99213 OFFICE O/P EST LOW 20 MIN: CPT | Performed by: PSYCHIATRY & NEUROLOGY

## 2020-10-07 RX ORDER — GABAPENTIN 100 MG/1
100 CAPSULE ORAL 3 TIMES DAILY
Qty: 180 CAPSULE | Refills: 3 | Status: SHIPPED | OUTPATIENT
Start: 2020-10-07 | End: 2021-04-07

## 2020-10-07 RX ORDER — RIZATRIPTAN BENZOATE 10 MG/1
10 TABLET, ORALLY DISINTEGRATING ORAL
Qty: 12 TABLET | Refills: 3 | Status: SHIPPED | OUTPATIENT
Start: 2020-10-07 | End: 2021-04-07

## 2020-10-07 ASSESSMENT — PAIN SCALES - GENERAL: PAINLEVEL: NO PAIN (0)

## 2020-10-07 ASSESSMENT — MIFFLIN-ST. JEOR: SCORE: 1618.54

## 2020-10-07 NOTE — NURSING NOTE
"Jennifer Pastrana's goals for this visit include: return  She requests these members of her care team be copied on today's visit information:     PCP: Ashley Fish    Referring Provider:  No referring provider defined for this encounter.    /83   Pulse 89   Ht 1.6 m (5' 3\")   Wt 93.4 kg (206 lb)   SpO2 100%   BMI 36.49 kg/m      Do you need any medication refills at today's visit? ?  "

## 2020-10-07 NOTE — LETTER
10/7/2020         RE: Jennifer Pastrana  [address] [facility] [address]        Dear Colleague,    Thank you for referring your patient, Jennifer Pastrana, to the [facility] [place]. Please see a copy of my visit note below.    Visit Date:   10/07/2020      HISTORY OF PRESENT ILLNESS:  Jennifer Pastrana returns for followup.  She is a patient with common migraine.  More recently, she has developed brief right parietal vertex head pain, possibly representing a right occipital neuralgia.  She tried increasing her nortriptyline dose to 75 mg and that did not help.  She subsequently had 2 occipital nerve blocks.  The first eliminated the pain for a week and the second one about 3 weeks.  She did not want to continue with occipital nerve blocks due to the cost.  I did speak to her on 08/28/2020 about this and she has since started on gabapentin 100 mg 3 times a day and she now tells me the sharp, brief head pains are few and far between.  She is tolerating the gabapentin well.      Her migraine control has been pretty good also.  She is taking nortriptyline 50 mg and Maxalt for acute management.      PHYSICAL EXAMINATION:   VITAL SIGNS:  Heart rate is 89.  Blood pressure 122/83.  There is no tenderness at the base of her skull on the right.  There is no sensory loss up the back of the head to the vertex.   CRANIAL NERVES:  Pupils are equal, round and react well to light.  She has full extraocular motility.  Speech is clear.   MOTOR:  Normal strength.  There is no pronator drift.   GAIT:  Normal.  Reflexes are 2+.  Plantar responses are flexor.      IMPRESSION:   1.  Common migraine.   2.  Brief, sharp head pains -- improved on gabapentin.      PLAN:  She is going to continue with her current treatment.  I told her she has the latitude to double up on her gabapentin dose to 200 mg 3 times a day if the head pain started returning again.      If the brief head pains ultimately resolved, she might  consider tapering off the gabapentin.      She is going to continue on nortriptyline 50 mg and Maxalt p.r.n. migraine.      I will be seeing her back in 6 months.         ABDI STEWARD MD             D: 10/07/2020   T: 10/08/2020   MT:       Name:     KYRA MELENDEZ   MRN:      [id number]        Account:      [id number]   :      1990           Visit Date:   10/07/2020      Document: [id number]          Again, thank you for allowing me to participate in the care of your patient.        Sincerely,        Abdi Steward MD

## 2020-10-09 DIAGNOSIS — Z13.220 LIPID SCREENING: ICD-10-CM

## 2020-10-09 LAB
CHOLEST SERPL-MCNC: 145 MG/DL
HDLC SERPL-MCNC: 48 MG/DL
LDLC SERPL CALC-MCNC: 84 MG/DL
NONHDLC SERPL-MCNC: 97 MG/DL
TRIGL SERPL-MCNC: 63 MG/DL

## 2020-10-09 PROCEDURE — 80061 LIPID PANEL: CPT | Performed by: FAMILY MEDICINE

## 2020-11-25 ENCOUNTER — HOSPITAL ENCOUNTER (EMERGENCY)
Facility: CLINIC | Age: 30
Discharge: HOME | End: 2020-11-25
Attending: NURSE PRACTITIONER | Admitting: NURSE PRACTITIONER
Payer: COMMERCIAL

## 2020-11-25 ENCOUNTER — APPOINTMENT (OUTPATIENT)
Dept: GENERAL RADIOLOGY | Facility: CLINIC | Age: 30
End: 2020-11-25
Attending: NURSE PRACTITIONER
Payer: COMMERCIAL

## 2020-11-25 VITALS
OXYGEN SATURATION: 100 % | BODY MASS INDEX: 33.66 KG/M2 | WEIGHT: 190 LBS | HEART RATE: 102 BPM | TEMPERATURE: 98.1 F | DIASTOLIC BLOOD PRESSURE: 99 MMHG | RESPIRATION RATE: 18 BRPM | HEIGHT: 63 IN | SYSTOLIC BLOOD PRESSURE: 157 MMHG

## 2020-11-25 DIAGNOSIS — S93.402A LEFT ANKLE SPRAIN: ICD-10-CM

## 2020-11-25 PROCEDURE — 73630 X-RAY EXAM OF FOOT: CPT | Mod: LT

## 2020-11-25 PROCEDURE — 73610 X-RAY EXAM OF ANKLE: CPT | Mod: LT

## 2020-11-25 PROCEDURE — G0463 HOSPITAL OUTPT CLINIC VISIT: HCPCS | Performed by: NURSE PRACTITIONER

## 2020-11-25 PROCEDURE — 99213 OFFICE O/P EST LOW 20 MIN: CPT | Performed by: NURSE PRACTITIONER

## 2020-11-25 ASSESSMENT — ENCOUNTER SYMPTOMS
JOINT SWELLING: 1
NUMBNESS: 0
WOUND: 0
NECK PAIN: 0
MYALGIAS: 1
WEAKNESS: 0
ARTHRALGIAS: 1
BACK PAIN: 0
NECK STIFFNESS: 0

## 2020-11-25 ASSESSMENT — MIFFLIN-ST. JEOR: SCORE: 1540.96

## 2020-11-25 NOTE — ED AVS SNAPSHOT
[facility] Emergency Dept  [address] [facility] [address]  Phone: [phone]  Fax: [phone]                                    Jennifer aPstrana   MRN: [id number]    Department: [facility] Emergency Dept   Date of Visit: 11/25/2020           After Visit Summary Signature Page    I have received my discharge instructions, and my questions have been answered. I have discussed any challenges I see with this plan with the nurse or doctor.    ..........................................................................................................................................  Patient/Patient Representative Signature      ..........................................................................................................................................  Patient Representative Print Name and Relationship to Patient    ..................................................               ................................................  Date                                   Time    ..........................................................................................................................................  Reviewed by Signature/Title    ...................................................              ..............................................  Date                                               Time          22EPIC Rev 08/18

## 2020-11-26 NOTE — ED TRIAGE NOTES
left foot and ankle pain; states her foot fell asleep and when she stood up, she twisted her ankle.

## 2020-11-26 NOTE — ED PROVIDER NOTES
History     Chief Complaint   Patient presents with     Ankle Pain     left foot and ankle pain; states her foot fell asleep and when she stood up, she twisted her ankle.     HPI  Jennifer Pastrana is a 30 year old female who presents the urgent care for evaluation of left foot and ankle pain.  Prior to arrival patient reports that left foot had fallen asleep and when she went to stand up she outwardly rolled her left ankle. No numbness or tingling. Has not taken any OTC medications or applied ice. Ambulatory upon arrival.     Allergies:  No Known Allergies    Problem List:    Patient Active Problem List    Diagnosis Date Noted     Oral contraceptive pill surveillance 09/21/2018     Priority: Medium     Reactive airway disease that is not asthma      Priority: Medium     on Albuterol prn        Migraine without aura and without status migrainosus, not intractable 02/15/2018     Priority: Medium     Supervision of normal first pregnancy 04/10/2017     Priority: Medium     Generalized anxiety disorder 10/03/2013     Priority: Medium     Diagnosis updated by automated process. Provider to review and confirm.       Dysplasia of cervix, low grade (MILO 1) 01/10/2012     Priority: Medium     1/10/12:Pap - LSIL. Plan colp.  1/27/12:[place] - mild dysplasia. Repeat pap 6 months. Reminder in epic.   10/30/12: Patient failed follow-up. Pap tracking file closed.   2/7/13: ASCUS pap, + HR HPV 53. Repeat pap in 6 months. Reminder in [facility].   8/19/13: ASCUS pap, Negative HPV. Repeat pap 6 months with AFE. Reminder in [facility].   2/24/14: NIL pap, Neg HPV. Repeat pap 1 year. Reminder in [facility].   2/25/15: NIL pap, + HR HPV. Plan cotest in 1 year. If NIL/Neg then routine screening.  6/1/16: NIL pap (abstracted from [place]). Not known if HPV was done. Plan cotest at pp visit. FARHAN 9/10/17  10/26/17 NIL pap, neg HR HPV. Plan pap in 3 years  9/25/20 NIL Pap, Neg HR HPV. Plan: cotest in 5 years.           Dry eye syndrome- mild 04/25/2011      Priority: Medium     Hypothyroidism 12/28/2010     Priority: Medium     CARDIOVASCULAR SCREENING; LDL GOAL LESS THAN 160 10/31/2010     Priority: Medium      Past Medical History:    Past Medical History:   Diagnosis Date     AR (allergic rhinitis)      ASCUS with positive high risk HPV 2/2013     Cervical high risk HPV (human papillomavirus) test positive 2/25/15     Dysplasia of cervix, low grade (MILO 1)      History of migraine headaches      Hypothyroidism      IUD (intrauterine device) in place      Mood disorder (H)      Reactive airway disease that is not asthma      Past Surgical History:    Past Surgical History:   Procedure Laterality Date     HC TOOTH EXTRACTION W/FORCEP       Family History:    Family History   Problem Relation Age of Onset     Breast Cancer Maternal Grandmother         in her 50s     Heart Disease Maternal Grandfather         50s     Myocardial Infarction Maternal Grandfather      Heart Disease Paternal Grandfather         50s     Myocardial Infarction Paternal Grandfather      Thyroid Disease Mother         graves-decompression and strabismus     Cancer Paternal Grandmother         cervical     Thyroid Disease Paternal Aunt         nine affected in family     Glaucoma No family hx of      Macular Degeneration No family hx of      Cerebrovascular Disease No family hx of      Hypertension No family hx of      Diabetes No family hx of      Social History:  Marital Status:   [2]  Social History     Tobacco Use     Smoking status: Never Smoker     Smokeless tobacco: Never Used     Tobacco comment: Nonsmoking household   Substance Use Topics     Alcohol use: No     Drug use: No      Medications:         albuterol (PROAIR HFA/PROVENTIL HFA/VENTOLIN HFA) 108 (90 Base) MCG/ACT inhaler       fluticasone (FLONASE) 50 MCG/ACT nasal spray       gabapentin (NEURONTIN) 100 MG capsule       levonorgestrel (MIRENA) 20 MCG/24HR IUD       levothyroxine (SYNTHROID/LEVOTHROID) 150 MCG  "tablet       Loratadine (CLARITIN PO)       metoprolol succinate ER (TOPROL-XL) 25 MG 24 hr tablet       nortriptyline (PAMELOR) 25 MG capsule       rizatriptan (MAXALT-MLT) 10 MG ODT      Review of Systems   Musculoskeletal: Positive for arthralgias, joint swelling and myalgias. Negative for back pain, gait problem, neck pain and neck stiffness.   Skin: Negative for wound.   Neurological: Negative for weakness and numbness.   All other systems reviewed and are negative.    Physical Exam   BP: (!) 157/99  Pulse: 102  Temp: 98.1  F (36.7  C)  Resp: 18  Height: 160 cm (5' 3\")  Weight: 86.2 kg (190 lb)  SpO2: 100 %    Physical Exam  Constitutional:       General: She is not in acute distress.     Appearance: Normal appearance.   Cardiovascular:      Rate and Rhythm: Normal rate and regular rhythm.   Pulmonary:      Effort: Pulmonary effort is normal.   Musculoskeletal:      Left ankle: She exhibits decreased range of motion (d/t pain) and swelling (mild lateral malleolus and sacnt distal dorsal foot). She exhibits no ecchymosis, no laceration and normal pulse. Tenderness. Medial malleolus tenderness found. Achilles tendon normal.      Comments:  Pulses and perfusion are equal bilaterally. No overlying erythema, abrasions, or ecchymosis.      Skin:     General: Skin is warm.      Capillary Refill: Capillary refill takes less than 2 seconds.   Neurological:      General: No focal deficit present.      Mental Status: She is alert.   Psychiatric:         Mood and Affect: Mood normal.       ED Course        Procedures          Results for orders placed or performed during the hospital encounter of 11/25/20 (from the past 24 hour(s))   XR Ankle Left G/E 3 Views    Narrative    EXAM: XR ANKLE LT G/E 3 VW  LOCATION: [facility]  DATE/TIME: 11/25/2020 6:53 PM    INDICATION: Medial ankle pain after injury  COMPARISON: None.      Impression    IMPRESSION: The ankle is negative for fracture or disruption of the ankle " mortise. Slight soft tissue swelling.   Foot XR, G/E 3 views, left    Narrative    EXAM: XR FOOT LT G/E 3 VW  LOCATION: [facility]  DATE/TIME: 11/25/2020 6:52 PM    INDICATION: Dorsal foot pain  COMPARISON: None.      Impression    IMPRESSION: No evidence for fracture. Slight soft tissue swelling along the dorsal aspect of the foot.       Medications - No data to display    Assessments & Plan (with Medical Decision Making)   Jennifer Pastrana is a 30 year old female who presents the urgent care for evaluation of left foot and ankle pain.  Prior to arrival patient reports that left foot had fallen asleep and when she went to stand up she outwardly rolled her left ankle. Exam as above. Xray obtained with no acute fracture or malalignment. Discussed likely sprain/strain and home management. Rest, ice, elevate, compression as needed. OTC medications as needed and appropriate. Crutches and ace wrap at patient request. Return precautions reviewed, all questions answered. Patient is agreeable to plan of care and discharged in no acute distress.     I have reviewed the nursing notes.    I have reviewed the findings, diagnosis, plan and need for follow up with the patient.  New Prescriptions    No medications on file     Final diagnoses:   Left ankle sprain       11/25/2020   [facility] EMERGENCY DEPT     Haleigh Geller, APRN CNP  11/25/20 1940

## 2021-03-23 ENCOUNTER — OFFICE VISIT (OUTPATIENT)
Dept: FAMILY MEDICINE | Facility: CLINIC | Age: 31
End: 2021-03-23
Payer: COMMERCIAL

## 2021-03-23 VITALS
RESPIRATION RATE: 16 BRPM | WEIGHT: 203.38 LBS | OXYGEN SATURATION: 98 % | HEIGHT: 63 IN | HEART RATE: 104 BPM | SYSTOLIC BLOOD PRESSURE: 122 MMHG | TEMPERATURE: 98.1 F | DIASTOLIC BLOOD PRESSURE: 84 MMHG | BODY MASS INDEX: 36.04 KG/M2

## 2021-03-23 DIAGNOSIS — Z91.018 ALLERGY TO OTHER FOODS: ICD-10-CM

## 2021-03-23 DIAGNOSIS — J30.2 SEASONAL ALLERGIC RHINITIS, UNSPECIFIED TRIGGER: Primary | ICD-10-CM

## 2021-03-23 PROCEDURE — 99213 OFFICE O/P EST LOW 20 MIN: CPT | Performed by: PHYSICIAN ASSISTANT

## 2021-03-23 ASSESSMENT — ENCOUNTER SYMPTOMS
SINUS PRESSURE: 0
RHINORRHEA: 1
ABDOMINAL PAIN: 0
NERVOUS/ANXIOUS: 0
SINUS PAIN: 0
FEVER: 0
SHORTNESS OF BREATH: 0
COUGH: 0
CHILLS: 0

## 2021-03-23 ASSESSMENT — MIFFLIN-ST. JEOR: SCORE: 1601.63

## 2021-03-23 ASSESSMENT — PAIN SCALES - GENERAL: PAINLEVEL: NO PAIN (0)

## 2021-03-23 NOTE — PROGRESS NOTES
Assessment & Plan     Seasonal allergic rhinitis, unspecified trigger  Allergy to other foods  Patient is a 30-year-old female who presents to clinic due to 10+ years of having significant seasonal allergies which have worsened recently.  Patient also notes concerns for food allergies due to recent mouth tingling following ingestion of carrots as well as spinach.  Vital signs normal.  Physical exam without acute abnormalities.  For further evaluation and testing for allergies, referral placed to allergy specialist.  Recommended continued use of Claritin/Zyrtec and Flonase.  - ALLERGY/ASTHMA ADULT REFERRAL      See Patient Instructions    Return in about 2 weeks (around 4/6/2021), or if symptoms worsen or fail to improve.    NI[facility] ADAM Rodriguez is a 30 year old who presents for the following health issues:    HPI     Allergies  Onset/Duration: 10+ years, these worsened 4 years ago after having children. Around 3-4 weeks ago allergies worsened significantly.  Claritin/Zyrtec and Flonase.  Symptoms:   Nasal congestion: YES  Sneezing: YES  Red, itchy eyes: YES  Progression of Symptoms: same worse  Accompanying Signs & Symptoms:  Cough: no  Wheezing: no  Rash: no  Sinus/facial pain: no  History:   Is it seasonal: Worse with weather changes, she notes food allergies as well, raw carrots-mouth tingling, birch pollen? Patient also noticed tingling after eating spinach.   History of Asthma: no  Has allergy testing been done: no  Precipitating factors:   None  Alleviating factors:  None  Therapies tried and outcome: Claritin or Zyrtec, Flonase        Review of Systems   Constitutional: Negative for chills and fever.   HENT: Positive for congestion, postnasal drip and rhinorrhea. Negative for sinus pressure and sinus pain.    Respiratory: Negative for cough and shortness of breath.    Cardiovascular: Negative for chest pain.   Gastrointestinal: Negative for abdominal  "pain.   Skin: Negative for rash.   Psychiatric/Behavioral: The patient is not nervous/anxious.             Objective    /84   Pulse 104   Temp 98.1  F (36.7  C) (Tympanic)   Resp 16   Ht 1.6 m (5' 3\")   Wt 92.3 kg (203 lb 6 oz)   SpO2 98%   BMI 36.03 kg/m    Body mass index is 36.03 kg/m .  Physical Exam  Vitals signs and nursing note reviewed.   Constitutional:       General: She is not in acute distress.     Appearance: Normal appearance.   HENT:      Head: Normocephalic and atraumatic.      Nose: No congestion.      Mouth/Throat:      Mouth: Mucous membranes are moist.      Pharynx: Oropharynx is clear. No oropharyngeal exudate or posterior oropharyngeal erythema.   Eyes:      Extraocular Movements: Extraocular movements intact.      Pupils: Pupils are equal, round, and reactive to light.   Neck:      Musculoskeletal: Normal range of motion.   Cardiovascular:      Rate and Rhythm: Normal rate and regular rhythm.      Heart sounds: Normal heart sounds.   Pulmonary:      Effort: Pulmonary effort is normal.      Breath sounds: Normal breath sounds. No wheezing.   Musculoskeletal: Normal range of motion.   Skin:     General: Skin is warm and dry.   Neurological:      General: No focal deficit present.      Mental Status: She is alert.   Psychiatric:         Mood and Affect: Mood normal.         Behavior: Behavior normal.                        "

## 2021-03-23 NOTE — PATIENT INSTRUCTIONS
For further evaluation of your allergies, a referral has been placed to the allergy specialist.  They will call you within the next few days to schedule your appointment.  Otherwise, you may call the number listed on your paperwork to schedule.  You may continue with Claritin/Zyrtec and Flonase.  Please reach out with any questions or concerns.  Patient Education     General Allergic Reactions  An allergic reaction is a set of symptoms caused by an allergen. An allergen is something that causes your immune system to react abnormally. It releases various chemicals. These include histamine. Histamine causes swelling and itching. An allergic reaction may affect the entire body. This is called a general allergic reaction. Often symptoms affect only one part of the body. This is called a local allergic reaction.   You are having an allergic reaction. Almost anything can cause one. Different people are allergic to different things. It is usually something that you ate or swallowed, came into contact with by getting or putting it on your skin or clothes, or something you breathed in the air. This can be very annoying and sometimes scary.   Most people think of allergic reactions when they have a rash or itchy skin. Other symptoms can include:     Itching of the eyes, nose, and roof of the mouth    Runny or stuffy nose    Watery eyes     Sneezing or coughing     A blocked feeling in the ear    Red, raised, itchy rash called hives    Red and purple spots    Rash, redness, welts, blisters    Itching, burning, stinging, pain    Dry, flaky, cracking, scaly skin  Severe symptoms include:    Swelling of the face, lips, or other parts of the body    Hoarse voice    Trouble swallowing, feeling like your throat is closing    Trouble breathing, wheezing    Nausea, vomiting, diarrhea, stomach cramps    Feeling faint or lightheaded, rapid heart rate  Sometimes the cause may be obvious. But there are so many things that can cause a  reaction that you may not be able to figure it out. The most important things to help find your allergen are to remember:     When it started    What you were doing at the time or just before that    What activities you were involved in    If you were exposed to anything new  Below are some common causes of allergies. Some of these can cause severe general allergic reactions. Others can cause mild to moderate symptoms. But remember that almost anything can cause a reaction. You may not even be aware that you came into contact with one of these things:     Dust, mold, pollen    Plants (common ones are poison ivy and poison oak, but there are many others)     Animals    Foods such as shrimp, shellfish, peanuts, milk products, gluten, and eggs    Food colorings, flavorings, and additives    Insect bites or stings such as bees, mosquitoes, fleas, and ticks    Medicines such as penicillin, sulfa medicines, aspirin, and ibuprofen. But any medicine can cause a reaction.    Jewelry such as nickel or gold. This can be new, or something you ve worn for a while, including zippers and buttons.    Latex such as in gloves, clothes, toys, balloons, or some tapes. Some people allergic to latex may also have problems with foods like bananas, avocados, kiwi, papaya, or chestnuts.    Lotions, perfumes, cosmetics, soaps, shampoos, skincare products, nail products    Chemicals or dyes in clothing, linen, , hair dyes, soaps, iodine  Many viruses and common colds can cause a rash that is not an allergic reaction. Sometimes it is hard to tell the difference between allergies, sensitivity, or an intolerance to something. This is especially true with food. Many things can cause diarrhea, vomiting, stomach cramps, and skin irritation.   Home care    The goal of treatment is to help relieve the symptoms and get you feeling better. The rash will usually fade over several days. But it can sometimes last a couple of weeks. Over the next  couple of days, there may be times when it gets a little worse, and then better again. Here are some things to do:     If you know what you are allergic to, stay away from it. Future exposures may cause similar or sometimes worse symptoms.    Don't wear tight clothing and stay away from anything that heats up your skin such as hot showers or baths, and direct sunlight. Heat will make itching worse.    An ice pack will relieve local areas of intense itching and redness. To make an ice pack, put ice cubes in a plastic bag that seals at the top. Wrap it in a thin, clean towel. Don t put the ice directly on the skin because it can damage the skin.    Oral diphenhydramine is an over-the-counter antihistamine sold at pharmacies and grocery stores. Unless a prescription antihistamine was given, diphenhydramine may be used to reduce itching if large areas of the skin are involved. It may make you sleepy. So be careful using it in the daytime or when going to school, working, or driving. Note: Don t use diphenhydramine if you have glaucoma or if you are a man with trouble urinating because of an enlarged prostate. There are other antihistamines that won t make you so sleepy. These are good choices for daytime use. Ask your healthcare provider or pharmacist for suggestions.    Don t use diphenhydramine cream on your skin unless prescribed. It may cause a worse reaction in some people.    To help prevent an infection, don't scratch the affected area. Scratching may worsen the reaction and damage your skin. It can also lead to an infection. Always check the affected areas for signs of an infection.    Call your healthcare provider and ask what you can use to help decrease the itching.    To decrease your exposure to allergens, try the following:    ? Use heat-steam to clean your home.  ? Use high-efficiency particulate (HEPA) vacuums and filters.  ? Stay away from food and pet triggers.  ? Kill any cockroaches and use pest  control to keep further infestations from happening.  ? Clean your house often.  Follow-up care  Follow up with your healthcare provider, or as advised. If you had a severe reaction today, or if you have had several mild to medium allergic reactions in the past, ask your provider about allergy testing. This can help you find out what you are allergic to. If you had a severe reaction that included dizziness, fainting, or trouble breathing or swallowing, ask your provider about carrying auto-injectable epinephrine.   Call 911  Call 911 if any of these occur:     Trouble breathing or swallowing, wheezing    Cool, moist, pale skin    Shortness of breath    Hoarse voice or trouble speaking    Confusion     Very drowsy or trouble awakening    Fainting or loss of consciousness    Rapid heart rate    Feeling of dizziness or weakness or a sudden drop in blood pressure    Feeling of doom    Feeling lightheaded    Severe nausea or vomiting, or diarrhea    Seizure    Swelling in the face, eyelids, lips, mouth, throat, or tongue    Drooling  When to seek medical advice  Call your healthcare provider or get medical care right away if any of these occur:     Spreading areas of itching, redness, or swelling    Nausea or stomach cramps or abdominal pain    Continuing or recurring symptoms    Spreading areas of redness, swelling, or itching    Signs of infection at the affected site:  ? Spreading redness  ? Increased pain or swelling  ? Fluid or colored drainage from the site  ? Fever of 100.4 F (38 C) or above lasting for 24 to 48 hours, or as directed by your provider  [employer] last reviewed this educational content on 10/1/2019    1775-4568 [employer]. All rights reserved. This information is not intended as a substitute for professional medical care. Always follow your healthcare professional's instructions.           Patient Education     Food Sensitivity   A food sensitivity reaction (food intolerance) occurs if  you eat foods that your body can't digest properly. Food intolerance is often confused with a food allergy. Intolerance is often from a lack of specific digestive enzymes that break down food. But, there can be other reasons that you might not tolerate a food. Food sensitivity is not an allergic reaction. An allergic reaction is an immune system response. Instead, an ingredient or substance in the food causes an irritation of the digestive system.   Symptoms may start within a few hours after eating. Symptoms include bloating, nausea, vomiting, diarrhea, stomach cramps, or excess gas. This illness usually is over within 6 to 24 hours. Simple home treatment will be helpful. Foods that commonly cause this include:    Milk and dairy products (cheese, yogurt and sour cream), because of a lactose intolerance. Some people who are lactose intolerant may be able to tolerate yogurt and some hard cheeses.    MSG, a common additive in Chinese cooking    Peas, broccoli, and mushrooms    Wheat, rye, and barley, because of gluten intolerance  Home care  The following will help you care for yourself at home:    If symptoms are severe, rest at home for the next 24 hours.    Don't use tobacco or alcohol. These can make symptoms worse.    Start with a clear liquid diet, taking small amounts often. Avoid dehydration by drinking 6 to 8 glasses of fluid per day. This can include water, sport drinks, soft drinks without caffeine, juices, tea, and soup. Gradually resume a normal diet as you start to feel better.    In the future, don't eat the foods that caused your reaction. If you are not sure what caused it and you want to find out, cut out all suspected foods. Keeping a food diary may help you figure this out. Add suspected foods back into your diet in small amounts, one at a time, until you have another reaction.  Follow-up care  Follow up with your healthcare provider, or as advised, if you are not getting better over the next 2 to  3 days.  Call 911  Call 911 if you have any of these:    Hard time breathing    Confusion    Drowsiness or trouble awakening    Fainting or loss of consciousness    Rapid heart rate    Vomiting blood, or large amounts of blood in stool    Seizure  When to seek medical advice  Call your healthcare provider right away if any of these occur:    Severe constant lower right belly pain    Vomiting that won't stop (unable to keep liquids down)    Excessive diarrhea    Blood or mucus in your diarrhea    Weakness or dizziness  Yannick last reviewed this educational content on 6/1/2019 2000-2020 [employer]. All rights reserved. This information is not intended as a substitute for professional medical care. Always follow your healthcare professional's instructions.

## 2021-04-01 ENCOUNTER — OFFICE VISIT (OUTPATIENT)
Dept: ALLERGY | Facility: CLINIC | Age: 31
End: 2021-04-01
Payer: COMMERCIAL

## 2021-04-01 VITALS
HEART RATE: 99 BPM | HEIGHT: 63 IN | WEIGHT: 203 LBS | DIASTOLIC BLOOD PRESSURE: 83 MMHG | OXYGEN SATURATION: 100 % | BODY MASS INDEX: 35.97 KG/M2 | SYSTOLIC BLOOD PRESSURE: 119 MMHG

## 2021-04-01 DIAGNOSIS — T78.1XXD POLLEN-FOOD ALLERGY, SUBSEQUENT ENCOUNTER: ICD-10-CM

## 2021-04-01 DIAGNOSIS — J30.1 SEASONAL ALLERGIC RHINITIS DUE TO POLLEN: Primary | ICD-10-CM

## 2021-04-01 PROCEDURE — 36415 COLL VENOUS BLD VENIPUNCTURE: CPT | Performed by: ALLERGY & IMMUNOLOGY

## 2021-04-01 PROCEDURE — 99203 OFFICE O/P NEW LOW 30 MIN: CPT | Performed by: ALLERGY & IMMUNOLOGY

## 2021-04-01 PROCEDURE — 86003 ALLG SPEC IGE CRUDE XTRC EA: CPT | Performed by: ALLERGY & IMMUNOLOGY

## 2021-04-01 ASSESSMENT — MIFFLIN-ST. JEOR: SCORE: 1599.93

## 2021-04-01 NOTE — ASSESSMENT & PLAN NOTE
Perennial spring and fall worsening nasal and ocular symptoms.  Dogs in the home.    Skin testing:  Negative histamine.  This is likely from amitriptyline.  We will do blood testing instead.    -Serum IgE for environmental allergens.  -Allegra and/or Claritin daily as needed.  -Flonase 2 sprays per nostril daily.  -Pataday 1 drop per eye daily as needed.  -Allergen avoidance measures discussed and literature provided.

## 2021-04-01 NOTE — LETTER
4/1/2021         RE: Jennifer Pastrana  [address] [place] [address]        Dear Colleague,    Thank you for referring your patient, Jennifer Pastrana, to the [facility]. Please see a copy of my visit note below.    Jennifer Pastrana is a 30 year old White female with previous medical history significant for allergic rhinitis. Jennifer Pastrana is being seen today for evaluation of allergies to food and seasonal allergies. The patient is being seen in consultation at the request of Krystal Esquivel PA-C.     The patient reports that for 10 to 15 years she has had perennial with spring and fall worsening sinus pressure, rhinorrhea, sneezing, nasal itching, congestion, postnasal drainage, ocular itching, ocular watering, ocular redness, ear itching, ear pressure and throat itching.  She alternates treatment with Zyrtec and Claritin.  Helpful but not 100%.  She will use Flonase 1 to 2 sprays per nostril daily in the spring and the fall.  She has used keto teeth and eyedrops and these have been beneficial.  She has 2 dogs in her home.  No clear problems around dogs.  No exposure to cats but she does not feel like cats bother her on rare exposure that she does have.  No history of allergy testing.  No history of allergen immunotherapy.  Sense of smell and taste intact.  No history of sinus surgery.  No history of nasal polyposis.  She does report that after eating raw carrots she will immediately developed tingling of her mouth.  This persist 1 to 6 hours and then resolves.  Tolerates carrots if they are cooked.    ENVIRONMENTAL HISTORY: [place] The home is heated with a gas furnace. They do have central air conditioning. The patient's bedroom is furnished with feather/wool bedding or pillows and carpeting in bedroom.  Pets inside the house include 2 dog(s). There is no history of cockroach or mice infestation. There is/are 0 smokers in the house.  The house  does not have a damp basement.       Past Medical History:   Diagnosis Date     AR (allergic rhinitis)      ASCUS with positive high risk HPV 2/2013    type 53     Cervical high risk HPV (human papillomavirus) test positive 2/25/15     Dysplasia of cervix, low grade (MILO 1)      History of migraine headaches     controlled on Nortriptyline and Maxalt prn      Hypothyroidism      IUD (intrauterine device) in place     Mirena- placed 10/2019     Mood disorder (H)      Reactive airway disease that is not asthma     on Albuterol prn      Family History   Problem Relation Age of Onset     Breast Cancer Maternal Grandmother         in her 50s     Heart Disease Maternal Grandfather         50s     Myocardial Infarction Maternal Grandfather      Heart Disease Paternal Grandfather         50s     Myocardial Infarction Paternal Grandfather      Thyroid Disease Mother         graves-decompression and strabismus     Cancer Paternal Grandmother         cervical     Thyroid Disease Paternal Aunt         nine affected in family     Glaucoma No family hx of      Macular Degeneration No family hx of      Cerebrovascular Disease No family hx of      Hypertension No family hx of      Diabetes No family hx of      Past Surgical History:   Procedure Laterality Date     HC TOOTH EXTRACTION W/FORCEP         REVIEW OF SYSTEMS:  General: negative for weight gain. negative for weight loss. negative for changes in sleep.   Ears: negative for fullness. negative for hearing loss. negative for dizziness.   Nose: positive  for snoring.negative for changes in smell. negative for drainage.   Eyes: positive  for eye watering. positive  for eye itching. negative for vision changes. negative for eye redness.  Throat: negative for hoarseness. negative for sore throat. negative for trouble swallowing.   Lungs: negative for shortness of breath.negative for wheezing. negative for sputum production.   Cardiovascular: negative for chest pain. negative for  swelling of ankles. negative for fast or irregular heartbeat.   Gastrointestinal: negative for nausea. negative for heartburn. negative for acid reflux.   Musculoskeletal: negative for joint pain. negative for joint stiffness. negative for joint swelling.   Neurologic: negative for seizures. negative for fainting. negative for weakness.   Psychiatric: negative for changes in mood. negative for anxiety.   Endocrine: negative for cold intolerance. negative for heat intolerance. negative for tremors.   Lymphatic: negative for lower extremity swelling. negative for lymph node swelling.   Hematologic: negative for easy bruising. negative for easy bleeding.  Integumentary: negative for rash. negative for scaling. negative for nail changes.       Current Outpatient Medications:      albuterol (PROAIR HFA/PROVENTIL HFA/VENTOLIN HFA) 108 (90 Base) MCG/ACT inhaler, Inhale 2 puffs into the lungs every 6 hours, Disp: 1 Inhaler, Rfl: 3     fluticasone (FLONASE) 50 MCG/ACT nasal spray, Spray 1-2 sprays into both nostrils daily, Disp: 9.9 mL, Rfl: 3     gabapentin (NEURONTIN) 100 MG capsule, Take 1 capsule (100 mg) by mouth 3 times daily May increase to 2 CAPS three times a day if needed, Disp: 180 capsule, Rfl: 3     levonorgestrel (MIRENA) 20 MCG/24HR IUD, 1 each by Intrauterine route once, Disp: , Rfl:      levothyroxine (SYNTHROID/LEVOTHROID) 150 MCG tablet, Take 1 tablet (150 mcg) by mouth daily, Disp: 90 tablet, Rfl: 3     metoprolol succinate ER (TOPROL-XL) 25 MG 24 hr tablet, Take 1 tablet (25 mg) by mouth daily, Disp: 90 tablet, Rfl: 3     nortriptyline (PAMELOR) 25 MG capsule, Take 2 capsules (50 mg) by mouth At Bedtime, Disp: 180 capsule, Rfl: 2     rizatriptan (MAXALT-MLT) 10 MG ODT, Take 1 tablet (10 mg) by mouth at onset of headache for migraine May repeat in 2 hours. Max 3 tablets/24 hours., Disp: 12 tablet, Rfl: 3  Immunization History   Administered Date(s) Administered     HPV 02/14/2007, 07/20/2007, 12/18/2007,  12/26/2008     HepB 11/08/1999, 01/05/2000, 03/08/2001     Influenza (H1N1) 02/08/2010     Influenza (IIV3) PF 10/13/2010, 10/02/2011, 08/19/2012     Influenza Vaccine IM > 6 months Valent IIV4 09/13/2017, 10/15/2018, 09/25/2020     MMR 11/08/1999     TD (ADULT, 7+) 11/08/1999     TDAP Vaccine (Adacel) 12/28/2009, 07/14/2017     No Known Allergies      EXAM:   Constitutional:  Appears well-developed and well-nourished. No distress.   HEENT:   Head: Normocephalic.   Cobblestoning of posterior oropharynx.   Boggy nasal tissue and pale.    Eyes: Conjunctivae are non-erythematous   No maxillary or frontal sinus tenderness to palpation.   Cardiovascular: Normal rate, regular rhythm and normal heart sounds. Exam reveals no gallop and no friction rub.   No murmur heard.  Respiratory: Effort normal and breath sounds normal. No respiratory distress. No wheezes. No rales.   Musculoskeletal: Normal range of motion.   Neuro: Oriented to person, place, and time.  Skin: Skin is warm and dry. No rash noted.   Psychiatric: Normal mood and affect.     Nursing note and vitals reviewed.        ASSESSMENT/PLAN:  Problem List Items Addressed This Visit        Respiratory    Seasonal allergic rhinitis due to pollen - Primary     Perennial spring and fall worsening nasal and ocular symptoms.  Dogs in the home.    Skin testing:  Negative histamine.  This is likely from amitriptyline.  We will do blood testing instead.    -Serum IgE for environmental allergens.  -Allegra and/or Claritin daily as needed.  -Flonase 2 sprays per nostril daily.  -Pataday 1 drop per eye daily as needed.  -Allergen avoidance measures discussed and literature provided.         Relevant Orders    Allergen cat epithellium IgE    Allergen dog epithelium IgE    Allergen juan jose IgE    Allergen D pteronyssinus IgE    Allergen D farinae IgE    Allergen alternaria alternata IgE    Allergen aspergillus fumigatus IgE    Allergen cladosporium herbarum IgE    Allergen  Epicoccum purpurascens IgE    Allergen penicillium notatum IgE    Allergen mera white IgE    Allergen Cedar IgE    Allergen cottonwood IgE    Allergen elm IgE    Allergen maple box elder IgE    Allergen oak white IgE    Allergen Red [place] IgE    Allergen silver  birch IgE    Allergen Tree White [place] IgE    Allergen English plantain IgE    Allergen giant ragweed IgE    Allergen lamb's quarter IgE    Allergen Mugwort IgE    Allergen ragweed short IgE    Allergen Sagebrush Wormwood IgE    Allergen Sheep Sorrel IgE    Allergen thistle Russian IgE    Allergen Weed Nettle IgE    Allergen, Kochia/Firebush       Other    Pollen-food allergy, subsequent encounter     History of mouth itching after consumption of raw carrots.  Tolerates of cooked.    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 This can occur in some uncooked fruits and vegetables, but does not occur with cooked fruits and vegetables. Risk of systemic reaction is low. The patient should avoid raw fruits and vegetables that cause symptoms, but okay to consume in the cooked form.                  Chart documentation with Dragon Voice recognition Software. Although reviewed after completion, some words and grammatical errors may remain.    Gilbert Hutchinson DO FAAAAI  Medical Director for Allergy/Immunology at [place], [place]        Again, thank you for allowing me to participate in the care of your patient.        Sincerely,        Gilbert Hutchinson DO

## 2021-04-01 NOTE — PATIENT INSTRUCTIONS
Allergy Staff Appt Hours Shot Hours Locations    Physician     Gilbert Hutchinson DO       Support Staff     Christie ASHLEY RN      Danitza AGUILAR, NARCISA  Tuesday:        [place] 7-5 Wednesday:        [place]: 7-5 Thursday:                    [place] 7-6     Friday:  [place]  7-2   [place]        Thursday: 8-5:20        Friday: 7-12     [place]        Tuesday: 7- 3:20 Wednesday: 7-4:20     [place] Monday: 7-2:20 Tuesday: 9-5:20         [facility]  [address] [place] [address]  Appt Line: [phone]  Allergy RN:  [phone]    [facility]  [address] [place] [address]  Appt Line: [phone]  Allergy RN:  [phone]       Important Scheduling Information  Aspirin Desensitization: Appt will last 2 clinic days. Please call the Allergy RN line for your clinic to schedule. Discontinue antihistamines 7 days prior to the appointment.     Food Challenges: Appt will last 3-4 hours. Please call the Allergy RN line for your clinic to schedule. Discontinue antihistamines 7 days prior to the appointment.     Penicillin Testing: Appt will last 2-3 hours. Please call the Allergy RN line for your clinic to schedule. Discontinue antihistamines 7 days prior to the appointment.     Skin Testing: Appt will about 40 minutes. Call the appointment line for your clinic to schedule. Discontinue antihistamines 7 days prior to the appointment.     Venom Testing: Appt will last 2-3 hours. Please call the Allergy RN line for your clinic to schedule. Discontinue antihistamines 7 days prior to the appointment.     Thank you for trusting us with your Allergy, Asthma, and Immunology care. Please feel free to contact us with any questions or concerns you may have.      - Flonase 2 sprays/nostril daily. Spring and fall.   - Antihistamine 1-2 tablets as needed.   - Pataday (olapatadine) 1 drop/eye daily as needed.   - Allergy blood testing today.

## 2021-04-01 NOTE — ASSESSMENT & PLAN NOTE
History of mouth itching after consumption of raw carrots.  Tolerates of cooked.    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 This can occur in some uncooked fruits and vegetables, but does not occur with cooked fruits and vegetables. Risk of systemic reaction is low. The patient should avoid raw fruits and vegetables that cause symptoms, but okay to consume in the cooked form.

## 2021-04-01 NOTE — PROGRESS NOTES
Jennifer Pastrana is a 30 year old White female with previous medical history significant for allergic rhinitis. Jennifer Pastrana is being seen today for evaluation of allergies to food and seasonal allergies. The patient is being seen in consultation at the request of Krystal Esquivel PA-C.     The patient reports that for 10 to 15 years she has had perennial with spring and fall worsening sinus pressure, rhinorrhea, sneezing, nasal itching, congestion, postnasal drainage, ocular itching, ocular watering, ocular redness, ear itching, ear pressure and throat itching.  She alternates treatment with Zyrtec and Claritin.  Helpful but not 100%.  She will use Flonase 1 to 2 sprays per nostril daily in the spring and the fall.  She has used keto teeth and eyedrops and these have been beneficial.  She has 2 dogs in her home.  No clear problems around dogs.  No exposure to cats but she does not feel like cats bother her on rare exposure that she does have.  No history of allergy testing.  No history of allergen immunotherapy.  Sense of smell and taste intact.  No history of sinus surgery.  No history of nasal polyposis.  She does report that after eating raw carrots she will immediately developed tingling of her mouth.  This persist 1 to 6 hours and then resolves.  Tolerates carrots if they are cooked.    ENVIRONMENTAL HISTORY: [place] The home is heated with a gas furnace. They do have central air conditioning. The patient's bedroom is furnished with feather/wool bedding or pillows and carpeting in bedroom.  Pets inside the house include 2 dog(s). There is no history of cockroach or mice infestation. There is/are 0 smokers in the house.  The house does not have a damp basement.       Past Medical History:   Diagnosis Date     AR (allergic rhinitis)      ASCUS with positive high risk HPV 2/2013    type 53     Cervical high risk HPV (human papillomavirus) test positive 2/25/15      Dysplasia of cervix, low grade (MILO 1)      History of migraine headaches     controlled on Nortriptyline and Maxalt prn      Hypothyroidism      IUD (intrauterine device) in place     Mirena- placed 10/2019     Mood disorder (H)      Reactive airway disease that is not asthma     on Albuterol prn      Family History   Problem Relation Age of Onset     Breast Cancer Maternal Grandmother         in her 50s     Heart Disease Maternal Grandfather         50s     Myocardial Infarction Maternal Grandfather      Heart Disease Paternal Grandfather         50s     Myocardial Infarction Paternal Grandfather      Thyroid Disease Mother         graves-decompression and strabismus     Cancer Paternal Grandmother         cervical     Thyroid Disease Paternal Aunt         nine affected in family     Glaucoma No family hx of      Macular Degeneration No family hx of      Cerebrovascular Disease No family hx of      Hypertension No family hx of      Diabetes No family hx of      Past Surgical History:   Procedure Laterality Date     HC TOOTH EXTRACTION W/FORCEP         REVIEW OF SYSTEMS:  General: negative for weight gain. negative for weight loss. negative for changes in sleep.   Ears: negative for fullness. negative for hearing loss. negative for dizziness.   Nose: positive  for snoring.negative for changes in smell. negative for drainage.   Eyes: positive  for eye watering. positive  for eye itching. negative for vision changes. negative for eye redness.  Throat: negative for hoarseness. negative for sore throat. negative for trouble swallowing.   Lungs: negative for shortness of breath.negative for wheezing. negative for sputum production.   Cardiovascular: negative for chest pain. negative for swelling of ankles. negative for fast or irregular heartbeat.   Gastrointestinal: negative for nausea. negative for heartburn. negative for acid reflux.   Musculoskeletal: negative for joint pain. negative for joint stiffness. negative  for joint swelling.   Neurologic: negative for seizures. negative for fainting. negative for weakness.   Psychiatric: negative for changes in mood. negative for anxiety.   Endocrine: negative for cold intolerance. negative for heat intolerance. negative for tremors.   Lymphatic: negative for lower extremity swelling. negative for lymph node swelling.   Hematologic: negative for easy bruising. negative for easy bleeding.  Integumentary: negative for rash. negative for scaling. negative for nail changes.       Current Outpatient Medications:      albuterol (PROAIR HFA/PROVENTIL HFA/VENTOLIN HFA) 108 (90 Base) MCG/ACT inhaler, Inhale 2 puffs into the lungs every 6 hours, Disp: 1 Inhaler, Rfl: 3     fluticasone (FLONASE) 50 MCG/ACT nasal spray, Spray 1-2 sprays into both nostrils daily, Disp: 9.9 mL, Rfl: 3     gabapentin (NEURONTIN) 100 MG capsule, Take 1 capsule (100 mg) by mouth 3 times daily May increase to 2 CAPS three times a day if needed, Disp: 180 capsule, Rfl: 3     levonorgestrel (MIRENA) 20 MCG/24HR IUD, 1 each by Intrauterine route once, Disp: , Rfl:      levothyroxine (SYNTHROID/LEVOTHROID) 150 MCG tablet, Take 1 tablet (150 mcg) by mouth daily, Disp: 90 tablet, Rfl: 3     metoprolol succinate ER (TOPROL-XL) 25 MG 24 hr tablet, Take 1 tablet (25 mg) by mouth daily, Disp: 90 tablet, Rfl: 3     nortriptyline (PAMELOR) 25 MG capsule, Take 2 capsules (50 mg) by mouth At Bedtime, Disp: 180 capsule, Rfl: 2     rizatriptan (MAXALT-MLT) 10 MG ODT, Take 1 tablet (10 mg) by mouth at onset of headache for migraine May repeat in 2 hours. Max 3 tablets/24 hours., Disp: 12 tablet, Rfl: 3  Immunization History   Administered Date(s) Administered     HPV 02/14/2007, 07/20/2007, 12/18/2007, 12/26/2008     HepB 11/08/1999, 01/05/2000, 03/08/2001     Influenza (H1N1) 02/08/2010     Influenza (IIV3) PF 10/13/2010, 10/02/2011, 08/19/2012     Influenza Vaccine IM > 6 months Valent IIV4 09/13/2017, 10/15/2018, 09/25/2020     MMR  11/08/1999     TD (ADULT, 7+) 11/08/1999     TDAP Vaccine (Adacel) 12/28/2009, 07/14/2017     No Known Allergies      EXAM:   Constitutional:  Appears well-developed and well-nourished. No distress.   HEENT:   Head: Normocephalic.   Cobblestoning of posterior oropharynx.   Boggy nasal tissue and pale.    Eyes: Conjunctivae are non-erythematous   No maxillary or frontal sinus tenderness to palpation.   Cardiovascular: Normal rate, regular rhythm and normal heart sounds. Exam reveals no gallop and no friction rub.   No murmur heard.  Respiratory: Effort normal and breath sounds normal. No respiratory distress. No wheezes. No rales.   Musculoskeletal: Normal range of motion.   Neuro: Oriented to person, place, and time.  Skin: Skin is warm and dry. No rash noted.   Psychiatric: Normal mood and affect.     Nursing note and vitals reviewed.        ASSESSMENT/PLAN:  Problem List Items Addressed This Visit        Respiratory    Seasonal allergic rhinitis due to pollen - Primary     Perennial spring and fall worsening nasal and ocular symptoms.  Dogs in the home.    Skin testing:  Negative histamine.  This is likely from amitriptyline.  We will do blood testing instead.    -Serum IgE for environmental allergens.  -Allegra and/or Claritin daily as needed.  -Flonase 2 sprays per nostril daily.  -Pataday 1 drop per eye daily as needed.  -Allergen avoidance measures discussed and literature provided.         Relevant Orders    Allergen cat epithellium IgE    Allergen dog epithelium IgE    Allergen juan jose IgE    Allergen D pteronyssinus IgE    Allergen D farinae IgE    Allergen alternaria alternata IgE    Allergen aspergillus fumigatus IgE    Allergen cladosporium herbarum IgE    Allergen Epicoccum purpurascens IgE    Allergen penicillium notatum IgE    Allergen mera white IgE    Allergen Cedar IgE    Allergen cottonwood IgE    Allergen elm IgE    Allergen maple box elder IgE    Allergen oak white IgE    Allergen Red Drasco  IgE    Allergen silver  birch IgE    Allergen Tree White Quinton IgE    Allergen English plantain IgE    Allergen giant ragweed IgE    Allergen lamb's quarter IgE    Allergen Mugwort IgE    Allergen ragweed short IgE    Allergen Sagebrush Wormwood IgE    Allergen Sheep Sorrel IgE    Allergen thistle Russian IgE    Allergen Weed Nettle IgE    Allergen, Kochia/Firebush       Other    Pollen-food allergy, subsequent encounter     History of mouth itching after consumption of raw carrots.  Tolerates of cooked.    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 This can occur in some uncooked fruits and vegetables, but does not occur with cooked fruits and vegetables. Risk of systemic reaction is low. The patient should avoid raw fruits and vegetables that cause symptoms, but okay to consume in the cooked form.                  Chart documentation with Dragon Voice recognition Software. Although reviewed after completion, some words and grammatical errors may remain.    Gilbert Hutchinson DO FAAAAI  Medical Director for Allergy/Immunology at [place], [place]

## 2021-04-02 LAB
A ALTERNATA IGE QN: <0.1 KU(A)/L
A FUMIGATUS IGE QN: <0.1 KU(A)/L
C HERBARUM IGE QN: <0.1 KU(A)/L
CAT DANDER IGG QN: <0.1 KU(A)/L
CEDAR IGE QN: <0.1 KU(A)/L
COMMON RAGWEED IGE QN: <0.1 KU(A)/L
COTTONWOOD IGE QN: <0.1 KU(A)/L
D FARINAE IGE QN: <0.1 KU(A)/L
D PTERONYSS IGE QN: <0.1 KU(A)/L
DOG DANDER+EPITH IGE QN: <0.1 KU(A)/L
E PURPURASCENS IGE QN: <0.1 KU(A)/L
ENGL PLANTAIN IGE QN: <0.1 KU(A)/L
FIREBUSH IGE QN: <0.1 KU(A)/L
GIANT RAGWEED IGE QN: <0.1 KU(A)/L
GOOSEFOOT IGE QN: <0.1 KU(A)/L
MAPLE IGE QN: <0.1 KU(A)/L
MUGWORT IGE QN: <0.1 KU(A)/L
NETTLE IGE QN: <0.1 KU(A)/L
P NOTATUM IGE QN: <0.1 KU(A)/L
RED MULBERRY IGE QN: <0.1 KU(A)/L
SALTWORT IGE QN: <0.1 KU(A)/L
SHEEP SORREL IGE QN: <0.1 KU(A)/L
SILVER BIRCH IGE QN: <0.1 KU(A)/L
TIMOTHY IGE QN: <0.1 KU(A)/L
WHITE ASH IGE QN: <0.1 KU(A)/L
WHITE ELM IGE QN: <0.1 KU(A)/L
WHITE MULBERRY IGE QN: <0.1 KU(A)/L
WHITE OAK IGE QN: <0.1 KU(A)/L
WORMWOOD IGE QN: <0.1 KU(A)/L

## 2021-04-02 NOTE — RESULT ENCOUNTER NOTE
Allergy testing is normal. I still suspect allergies given symptoms and especially with carrots. Likely local allergic rhinitis that causes local allergy symptoms without positive testing. Continue treatment as we discussed. Follow up in 6 weeks. Thanks.     Dr. Hutchinson

## 2021-04-07 ENCOUNTER — OFFICE VISIT (OUTPATIENT)
Dept: NEUROLOGY | Facility: CLINIC | Age: 31
End: 2021-04-07
Payer: COMMERCIAL

## 2021-04-07 VITALS
HEIGHT: 63 IN | HEART RATE: 92 BPM | SYSTOLIC BLOOD PRESSURE: 130 MMHG | BODY MASS INDEX: 35.97 KG/M2 | WEIGHT: 203 LBS | DIASTOLIC BLOOD PRESSURE: 89 MMHG

## 2021-04-07 DIAGNOSIS — M54.81 OCCIPITAL NEURALGIA OF RIGHT SIDE: ICD-10-CM

## 2021-04-07 DIAGNOSIS — G43.009 MIGRAINE WITHOUT AURA AND WITHOUT STATUS MIGRAINOSUS, NOT INTRACTABLE: ICD-10-CM

## 2021-04-07 PROCEDURE — 99213 OFFICE O/P EST LOW 20 MIN: CPT | Performed by: PSYCHIATRY & NEUROLOGY

## 2021-04-07 RX ORDER — NORTRIPTYLINE HCL 25 MG
50 CAPSULE ORAL AT BEDTIME
Qty: 180 CAPSULE | Refills: 2 | Status: SHIPPED | OUTPATIENT
Start: 2021-04-07 | End: 2021-09-10

## 2021-04-07 RX ORDER — GABAPENTIN 100 MG/1
100 CAPSULE ORAL 3 TIMES DAILY
Qty: 360 CAPSULE | Refills: 3 | Status: SHIPPED | OUTPATIENT
Start: 2021-04-07 | End: 2021-10-05

## 2021-04-07 RX ORDER — RIZATRIPTAN BENZOATE 10 MG/1
10 TABLET, ORALLY DISINTEGRATING ORAL
Qty: 12 TABLET | Refills: 3 | Status: SHIPPED | OUTPATIENT
Start: 2021-04-07 | End: 2022-06-16

## 2021-04-07 ASSESSMENT — MIFFLIN-ST. JEOR: SCORE: 1599.93

## 2021-04-07 ASSESSMENT — PAIN SCALES - GENERAL: PAINLEVEL: MILD PAIN (2)

## 2021-04-07 NOTE — LETTER
4/7/2021         RE: Jennifer Pastrana  [address] [facility] [address]        Dear Colleague,    Thank you for referring your patient, Jennifer Pastrana, to the [facility] [place]. Please see a copy of my visit note below.    Visit Date:   04/07/2021      HISTORY OF PRESENT ILLNESS:  Jennifer Pastrana returns for followup of common migraine as well as brief, sharp posterior head pains, possibly related to occipital neuralgia.      Overall, she tells me she is doing good.  She had a 1-week daily headache about 2 months ago that she thinks may have related to her neck.  These were more of a tension quality headache.  Otherwise, her migraines seem well controlled, and the sharp head pains are infrequent.  They are mainly on the right side, but occasionally on the left.      She continues on nortriptyline 50 mg and Maxalt p.r.n. migraines.  She also takes gabapentin.  While she has the latitude to increase to 200 mg 3 times a day, she is trying to keep the dose low.  She, on a regular basis, takes 200 mg in the morning and 100 mg at night, but if the sharp head pains start returning, she will take an additional 100 mg in the midday.  Overall, she feels well and is comfortable with how she is doing with this treatment.      PHYSICAL EXAMINATION:  Examination reveals her heart rate is 92.  Blood pressure 130/89.      She is alert and cooperative.  There is no sensory loss up the back of the head or at the vertex to pinprick.  Pupils are equal, round and react well to light.  Visual fields are intact.  Cranial nerves II-XII are intact.  Motor, sensory, cerebellar and gait testing are normal.  Reflexes are 2+.  Plantar responses are flexor.      IMPRESSION:   1.  Common migraine.   2.  Brief, sharp head pains, possibly related to occipital neuralgia.      PLAN:  She is going to continue on nortriptyline, gabapentin and Maxalt.      I did tell her I will be retiring at the end of June.      She  will have a followup visit arranged for one of my neurology colleagues here at [place] in 6 months.      I did refill her medications today.      Total visit time was 20 minutes.  This included reviewing history, examination, counseling, and documentation.         AZALIA STEWARD MD             D: 2021   T: 2021   MT: [place]      Name:     KYRA MELENDEZ   MRN:      [id number]        Account:      [id number]   :      1990           Visit Date:   2021      Document: [id number]          Again, thank you for allowing me to participate in the care of your patient.        Sincerely,        Azalia Steward MD

## 2021-04-07 NOTE — NURSING NOTE
"Jennifer Pastrana's goals for this visit include: return  She requests these members of her care team be copied on today's visit information:     PCP: Ashley Fish    Referring Provider:  No referring provider defined for this encounter.    /89   Pulse 92   Ht 1.6 m (5' 3\")   Wt 92.1 kg (203 lb)   BMI 35.96 kg/m      Do you need any medication refills at today's visit? y  "

## 2021-04-08 NOTE — PROGRESS NOTES
Visit Date:   04/07/2021      HISTORY OF PRESENT ILLNESS:  Jennifer Pastrana returns for followup of common migraine as well as brief, sharp posterior head pains, possibly related to occipital neuralgia.      Overall, she tells me she is doing good.  She had a 1-week daily headache about 2 months ago that she thinks may have related to her neck.  These were more of a tension quality headache.  Otherwise, her migraines seem well controlled, and the sharp head pains are infrequent.  They are mainly on the right side, but occasionally on the left.      She continues on nortriptyline 50 mg and Maxalt p.r.n. migraines.  She also takes gabapentin.  While she has the latitude to increase to 200 mg 3 times a day, she is trying to keep the dose low.  She, on a regular basis, takes 200 mg in the morning and 100 mg at night, but if the sharp head pains start returning, she will take an additional 100 mg in the midday.  Overall, she feels well and is comfortable with how she is doing with this treatment.      PHYSICAL EXAMINATION:  Examination reveals her heart rate is 92.  Blood pressure 130/89.      She is alert and cooperative.  There is no sensory loss up the back of the head or at the vertex to pinprick.  Pupils are equal, round and react well to light.  Visual fields are intact.  Cranial nerves II-XII are intact.  Motor, sensory, cerebellar and gait testing are normal.  Reflexes are 2+.  Plantar responses are flexor.      IMPRESSION:   1.  Common migraine.   2.  Brief, sharp head pains, possibly related to occipital neuralgia.      PLAN:  She is going to continue on nortriptyline, gabapentin and Maxalt.      I did tell her I will be retiring at the end of June.      She will have a followup visit arranged for one of my neurology colleagues here at [place] in 6 months.      I did refill her medications today.      Total visit time was 20 minutes.  This included reviewing history, examination, counseling, and documentation.          AZALIA PARRISH MD             D: 2021   T: 2021   MT: FLORENCIO      Name:     KYRA MELENDEZ   MRN:      [id number]        Account:      [id number]   :      1990           Visit Date:   2021      Document: [id number]

## 2021-05-12 DIAGNOSIS — I49.1 SUPRAVENTRICULAR PREMATURE BEATS: ICD-10-CM

## 2021-05-12 DIAGNOSIS — R00.2 PALPITATIONS: ICD-10-CM

## 2021-05-14 RX ORDER — METOPROLOL SUCCINATE 25 MG/1
TABLET, EXTENDED RELEASE ORAL
Qty: 90 TABLET | Refills: 1 | Status: SHIPPED | OUTPATIENT
Start: 2021-05-14 | End: 2021-12-05

## 2021-05-14 NOTE — TELEPHONE ENCOUNTER
"Requested Prescriptions   Pending Prescriptions Disp Refills     metoprolol succinate ER (TOPROL-XL) 25 MG 24 hr tablet [Pharmacy Med Name: METOPROLOL SUCCINATE ER 25MG TB24] 90 tablet 3     Sig: TAKE ONE TABLET BY MOUTH ONCE DAILY       Beta-Blockers Protocol Passed - 5/12/2021  1:08 PM        Passed - Blood pressure under 140/90 in past 12 months     BP Readings from Last 3 Encounters:   04/07/21 130/89   04/01/21 119/83   03/23/21 122/84                 Passed - Patient is age 6 or older        Passed - Recent (12 mo) or future (30 days) visit within the authorizing provider's specialty     Patient has had an office visit with the authorizing provider or a provider within the authorizing providers department within the previous 12 mos or has a future within next 30 days. See \"Patient Info\" tab in inbasket, or \"Choose Columns\" in Meds & Orders section of the refill encounter.              Passed - Medication is active on med list           See plan:    Instructions       Return in about 1 year (around 9/25/2021) for Physical Exam and as needed throughout the year.      Prescription approved per [facility] Refill Protocol.    Tricia Beyer RN  [facility]      "

## 2021-05-25 ENCOUNTER — IMMUNIZATION (OUTPATIENT)
Dept: LAB | Facility: CLINIC | Age: 31
End: 2021-05-25
Payer: COMMERCIAL

## 2021-08-06 ENCOUNTER — OFFICE VISIT (OUTPATIENT)
Dept: FAMILY MEDICINE | Facility: CLINIC | Age: 31
End: 2021-08-06
Payer: COMMERCIAL

## 2021-08-06 VITALS
HEART RATE: 86 BPM | TEMPERATURE: 98.5 F | BODY MASS INDEX: 35.68 KG/M2 | SYSTOLIC BLOOD PRESSURE: 118 MMHG | DIASTOLIC BLOOD PRESSURE: 78 MMHG | OXYGEN SATURATION: 97 % | WEIGHT: 201.4 LBS | RESPIRATION RATE: 18 BRPM

## 2021-08-06 DIAGNOSIS — F41.0 GENERALIZED ANXIETY DISORDER WITH PANIC ATTACKS: Primary | ICD-10-CM

## 2021-08-06 DIAGNOSIS — Z63.79 STRESSFUL LIFE EVENT AFFECTING FAMILY: ICD-10-CM

## 2021-08-06 DIAGNOSIS — D17.30 LIPOMA OF SKIN AND SUBCUTANEOUS TISSUE: ICD-10-CM

## 2021-08-06 DIAGNOSIS — F41.1 GENERALIZED ANXIETY DISORDER WITH PANIC ATTACKS: Primary | ICD-10-CM

## 2021-08-06 PROCEDURE — 99214 OFFICE O/P EST MOD 30 MIN: CPT | Performed by: FAMILY MEDICINE

## 2021-08-06 RX ORDER — BUPROPION HYDROCHLORIDE 150 MG/1
150 TABLET ORAL EVERY MORNING
Qty: 30 TABLET | Refills: 1 | Status: SHIPPED | OUTPATIENT
Start: 2021-08-06 | End: 2021-09-10

## 2021-08-06 ASSESSMENT — ANXIETY QUESTIONNAIRES
IF YOU CHECKED OFF ANY PROBLEMS ON THIS QUESTIONNAIRE, HOW DIFFICULT HAVE THESE PROBLEMS MADE IT FOR YOU TO DO YOUR WORK, TAKE CARE OF THINGS AT HOME, OR GET ALONG WITH OTHER PEOPLE: SOMEWHAT DIFFICULT
6. BECOMING EASILY ANNOYED OR IRRITABLE: MORE THAN HALF THE DAYS
GAD7 TOTAL SCORE: 7
2. NOT BEING ABLE TO STOP OR CONTROL WORRYING: SEVERAL DAYS
5. BEING SO RESTLESS THAT IT IS HARD TO SIT STILL: NOT AT ALL
3. WORRYING TOO MUCH ABOUT DIFFERENT THINGS: SEVERAL DAYS
7. FEELING AFRAID AS IF SOMETHING AWFUL MIGHT HAPPEN: NOT AT ALL
1. FEELING NERVOUS, ANXIOUS, OR ON EDGE: MORE THAN HALF THE DAYS

## 2021-08-06 ASSESSMENT — PATIENT HEALTH QUESTIONNAIRE - PHQ9
5. POOR APPETITE OR OVEREATING: SEVERAL DAYS
SUM OF ALL RESPONSES TO PHQ QUESTIONS 1-9: 11

## 2021-08-06 NOTE — PATIENT INSTRUCTIONS
Depression Self Care Plan / Survival Kit    Self-Care for Depression  Here s the deal. Your body and mind are really not as separate as most people think.  What you do and think affects how you feel and how you feel influences what you do and think. This means if you do things that people who feel good do, it will help you feel better.  Sometimes this is all it takes.  There is also a place for medication and therapy depending on how severe your depression is, so be sure to consult with your medical provider and/ or Behavioral Health Consultant if your symptoms are worsening or not improving.     In order to better manage my stress, I will:    Exercise  Get some form of exercise, every day. This will help reduce pain and release endorphins, the  feel good  chemicals in your brain. This is almost as good as taking antidepressants!  This is not the same as joining a gym and then never going! (they count on that by the way ) It can be as simple as just going for a walk or doing some gardening, anything that will get you moving.      Hygiene   Maintain good hygiene (Get out of bed in the morning, Make your bed, Brush your teeth, Take a shower, and Get dressed like you were going to work, even if you are unemployed).  If your clothes don't fit try to get ones that do.    Diet  I will strive to eat foods that are good for me, drink plenty of water, and avoid excessive sugar, caffeine, alcohol, and other mood-altering substances.  Some foods that are helpful in depression are: complex carbohydrates, B vitamins, flaxseed, fish or fish oil, fresh fruits and vegetables.    Psychotherapy  I agree to participate in Individual Therapy (if recommended).    Medication  If prescribed medications, I agree to take them.  Missing doses can result in serious side effects.  I understand that drinking alcohol, or other illicit drug use, may cause potential side effects.  I will not stop my medication abruptly without first discussing  it with my provider.    Staying Connected With Others  I will stay in touch with my friends, family members, and my primary care provider/team.    Use your imagination  Be creative.  We all have a creative side; it doesn t matter if it s oil painting, sand castles, or mud pies! This will also kick up the endorphins.    Witness Beauty  (AKA stop and smell the roses) Take a look outside, even in mid-winter. Notice colors, textures. Watch the squirrels and birds.     Service to others  Be of service to others.  There is always someone else in need.  By helping others we can  get out of ourselves  and remember the really important things.  This also provides opportunities for practicing all the other parts of the program.    Humor  Laugh and be silly!  Adjust your TV habits for less news and crime-drama and more comedy.    Control your stress  Try breathing deep, massage therapy, biofeedback, and meditation. Find time to relax each day.

## 2021-08-06 NOTE — PROGRESS NOTES
"  Assessment & Plan     Generalized anxiety disorder with panic attacks  -  PHQ-9/ARNIE 7 completed, see below/Epic for details    - MENTAL HEALTH REFERRAL  - Adult; Outpatient Treatment; Individual/Couples/Family/Group Therapy/Health Psychology; [facility] [phone]; We will contact you to schedule the appointment or please call with any questions  - Start: buPROPion (WELLBUTRIN XL) 150 MG 24 hr tablet  Dispense: 30 tablet; Refill: 1    Stressful life event affecting family  - Dad had a motorcycle accident, hospitalized and patient was the main caretaker. Clashed with family on how best to take care of him   - MENTAL HEALTH REFERRAL  - Adult; Outpatient Treatment; Individual/Couples/Family/Group Therapy/Health Psychology; [facility] [phone]; We will contact you to schedule the appointment or please call with any questions    Lipoma of skin and subcutaneous tissue  Patient education and reassurance that it's benign.         BMI:   Estimated body mass index is 35.68 kg/m  as calculated from the following:    Height as of 4/7/21: 1.6 m (5' 3\").    Weight as of this encounter: 91.4 kg (201 lb 6.4 oz).   Weight management plan: Discussed healthy diet and exercise guidelines    Depression Screening Follow Up    PHQ 8/6/2021   PHQ-9 Total Score 11   Q9: Thoughts of better off dead/self-harm past 2 weeks Several days     Last PHQ-9 8/6/2021   1.  Little interest or pleasure in doing things 0   2.  Feeling down, depressed, or hopeless 2   3.  Trouble falling or staying asleep, or sleeping too much 3   4.  Feeling tired or having little energy 3   5.  Poor appetite or overeating 0   6.  Feeling bad about yourself 2   7.  Trouble concentrating 0   8.  Moving slowly or restless 0   Q9: Thoughts of better off dead/self-harm past 2 weeks 1   PHQ-9 Total Score 11   Difficulty at work, home, or with people Somewhat difficult         No flowsheet data found.      Follow Up      Follow Up " Actions Taken  Crisis resource information provided in the After Visit Summary  Mental Health Referral placed    Discussed the following ways the patient can remain in a safe environment:  be around others      Return in about 1 month (around 9/6/2021) for Medication check, Virtual Visit.., sooner if needed.     Ashley Fish MD  [facility] ADAM Rodriguez is a 30 year old who presents for the following health issues    HPI     Abnormal Mood Symptoms  Onset/Duration: x4 months   Description:   Depression (if yes, do PHQ-9): YES  Anxiety (if yes, do ARNIE-7): YES  Accompanying Signs & Symptoms:  Still participating in activities that you used to enjoy: YES  Fatigue: YES  Irritability: YES  Difficulty concentrating: no  Changes in appetite: YES- but has since improved   Problems with sleep: YES  Heart racing/beating fast: YES- takes metoprolol for Supraventricular premature beats but palpitations have worsened over the past x3 weeks since her father has been discharged from hospital   Abnormally elevated, expansive, or irritable mood: YES  Persistently increased activity or energy: no  Thoughts of hurting yourself or others: YES- thoughts of self harm x2 weeks ago- passive thoughts. Not anymore.   History:  Recent stress or major life event: YES- father was in severe motobike accident and he was hospitalized for 74 days- he was discharged from hospital x3 weeks ago   Prior depression or anxiety: depression while in high school, anxiety 2013  Family history of depression or anxiety: YES- both.  Father- depression ( has attempted suicide 2x). All aunts and uncles on dad's side have anxiety and depression   Alcohol/drug use: YES- alcohol is rare  Difficulty sleeping: YES- typically staying asleep but will also have night she can't fall asleep   Precipitating or alleviating factors: None  Therapies tried and outcome: father was in sever accident and he was hospitalized for 74 days- he was  discharged from hospital x3 weeks ago   PHQ 9/24/2020 9/25/2020 8/6/2021   PHQ-9 Total Score 1 2 11   Q9: Thoughts of better off dead/self-harm past 2 weeks Not at all Not at all Several days     ARNIE-7 SCORE 9/24/2020 9/25/2020 8/6/2021   Total Score - - -   Total Score 1 2 7       Mass  Located at base of neck .     first noticed mass about x6 months ago.    States she does have pain and weakness that radiates through left arm and into fingers.   Describing lump as a pressure point being pushed on, is painful to touch.           Review of Systems   Constitutional, HEENT, cardiovascular, pulmonary, gi and gu systems are negative, except as otherwise noted.      Objective    /78   Pulse 86   Temp 98.5  F (36.9  C) (Tympanic)   Resp 18   Wt 91.4 kg (201 lb 6.4 oz)   SpO2 97%   Breastfeeding No   BMI 35.68 kg/m    Body mass index is 35.68 kg/m .  Physical Exam   GENERAL: healthy, alert and no distress  PSYCH: mentation appears normal, anxious and appearance well groomed.  SKIN: Lipomatous hump on the posterior neck. No overlying skin changes.     DATA  None

## 2021-08-06 NOTE — TELEPHONE ENCOUNTER
Reviewed last OV notes per GAYATHRI Elise, MICHAEL on 04-10-17. Noted pt transferred her OB care from [place] and brought in records.  Noted pt wants all Rx's given. Noted pt request specific PNV.  Preferred pharmacy entered.   Will route to GAYATHRI Elise, CNP for review & orders. Sugey Fernando RN, BAN       Detail Level: Zone

## 2021-08-07 ASSESSMENT — ANXIETY QUESTIONNAIRES: GAD7 TOTAL SCORE: 7

## 2021-08-12 ENCOUNTER — VIRTUAL VISIT (OUTPATIENT)
Dept: FAMILY MEDICINE | Facility: CLINIC | Age: 31
End: 2021-08-12
Payer: COMMERCIAL

## 2021-08-12 DIAGNOSIS — M54.2 NECK PAIN: ICD-10-CM

## 2021-08-12 DIAGNOSIS — S46.812A STRAIN OF LEFT TRAPEZIUS MUSCLE, INITIAL ENCOUNTER: Primary | ICD-10-CM

## 2021-08-12 PROCEDURE — 99213 OFFICE O/P EST LOW 20 MIN: CPT | Mod: 95 | Performed by: NURSE PRACTITIONER

## 2021-08-12 RX ORDER — NAPROXEN 500 MG/1
TABLET ORAL
Qty: 40 TABLET | Refills: 0 | Status: SHIPPED | OUTPATIENT
Start: 2021-08-12 | End: 2021-11-11

## 2021-08-12 ASSESSMENT — ENCOUNTER SYMPTOMS
NUMBNESS: 1
WEAKNESS: 1
NECK PAIN: 1
ARTHRALGIAS: 1

## 2021-08-12 NOTE — PROGRESS NOTES
Jennifer is a 30 year old who is being evaluated via a billable video visit.      How would you like to obtain your AVS? Mail a copy  If the video visit is dropped, the invitation should be resent by: Text to cell phone: [phone]  Will anyone else be joining your video visit? No      Video Start Time: 11:17 AM    Assessment & Plan     Strain of left trapezius muscle, initial encounter  Previous office note reviewed.  Educated on use of naproxen.  Encouraged to apply heat to area.  We will set up for physical therapy.  Notify if no decrease in pain over the next 48 hours and may need to add muscle relaxer.  - naproxen (NAPROSYN) 500 MG tablet; Take 1 pill twice per day with food for 14 days then every 12 hours as needed  - [facility]; Future    Neck pain  Previous office note reviewed.  Educated on use of naproxen.  Encouraged to apply heat to area.  We will set up for physical therapy.  Notify if no decrease in pain over the next 48 hours and may need to add muscle relaxer.  - naproxen (NAPROSYN) 500 MG tablet; Take 1 pill twice per day with food for 14 days then every 12 hours as needed  - [facility]; Future    Prescription drug management  16 minutes spent on the date of the encounter doing chart review, history and exam, documentation and further activities per the note       No follow-ups on file.    GAYATHRI Samson CNP  [facility] ADAM Rodriguez is a 30 year old who presents for the following health issues     HPI     Musculoskeletal problem/pain  Onset/Duration: 2 weeks  Description  Location: shoulder - left, radiating all the way down left arm in to pinky finger  Joint Swelling: no  Redness: no  Pain: YES  Warmth: no  Intensity:  6/10  Progression of Symptoms:  worsening  Accompanying signs and symptoms:   Fevers: no  Numbness/tingling/weakness: YES-weakness in arm   History  Trauma to the area: no  Recent illness:  no  Previous similar  problem: YES-trigger point above left shoulder in to neck  Previous evaluation:  YES-brief evaluation 8/6/21, massage suggested  Precipitating or alleviating factors:  Aggravating factors include: raising arm   Therapies tried and outcome: massage helped for 2 days      Video visit completed due to COVID 19 outbreak.     Has been having some pain to left shoulder that is going down left arm. This has been going on for 2 weeks. Pain starts in left shoulder blader. Pain comes and goes. Each day is more frequent and lasts longer. Got a massage and that did help for about a day and then gradually started to come back. Is able to move arm and shoulder but will hurt some in back. Does have some tightness to left side of neck. Is caring for father, does not have to do any lifting. Has a weak type feeling down arm. Does have some occasional tingling to fingers. Hard to fall asleep, hard to find comfortable position. No pain to chest, no dizziness or light head, no shortness of breath.  Is able to walk, talk, eat and drink move arms and legs without difficulty.  Has taking some ibuprofen and that did help a little bit. No injury that s aware of.     Review of Systems   Musculoskeletal: Positive for arthralgias (left shoulder) and neck pain.        Left upper shoulder blade area    Neurological: Positive for weakness (left arm) and numbness (occ).          Objective           Vitals:  No vitals were obtained today due to virtual visit.    Physical Exam  Constitutional:       Appearance: Normal appearance.   HENT:      Nose: No congestion.   Eyes:      General: Lids are normal.   Pulmonary:      Effort: No tachypnea, bradypnea or respiratory distress.   Musculoskeletal:        Arms:    Skin:     Coloration: Skin is not ashen, cyanotic, jaundiced or pale.   Neurological:      Mental Status: She is alert.   Psychiatric:         Mood and Affect: Mood normal.         Speech: Speech normal.         Behavior: Behavior normal.              Video-Visit Details    Type of service:  Video Visit    Video End Time:11:25 AM    Originating Location (pt. Location): Home    Distant Location (provider location):  [facility] ADAM     Platform used for Video Visit: Sindy

## 2021-08-16 ENCOUNTER — MYC MEDICAL ADVICE (OUTPATIENT)
Dept: FAMILY MEDICINE | Facility: CLINIC | Age: 31
End: 2021-08-16

## 2021-08-16 DIAGNOSIS — S46.819A STRAIN OF TRAPEZIUS MUSCLE, UNSPECIFIED LATERALITY, INITIAL ENCOUNTER: Primary | ICD-10-CM

## 2021-08-16 DIAGNOSIS — S46.812A STRAIN OF LEFT TRAPEZIUS MUSCLE, INITIAL ENCOUNTER: ICD-10-CM

## 2021-08-16 RX ORDER — CYCLOBENZAPRINE HCL 5 MG
5-10 TABLET ORAL 3 TIMES DAILY PRN
Qty: 90 TABLET | Refills: 0 | Status: SHIPPED | OUTPATIENT
Start: 2021-08-16 | End: 2022-04-15

## 2021-08-16 NOTE — TELEPHONE ENCOUNTER
Assessment and Plan:    1. Routine general medical examination at a health care facility  Provided influenza and pneumococcal vaccines.  She declines HIV screening.  She will see if her insurance covers the shingles vaccine.  - Influenza, Recombinant, Inj, Quadrivalent, PF, 18+YRS  - Pneumococcal polysaccharide vaccine 23-valent greater than or equal to 1yo subcutaneous/IM  - Gynecologic Cytology (PAP Smear)    2. Encounter for screening mammogram for breast cancer  - Mammo Screening Bilateral; Future    3. Prediabetes  We will check A1c notify patient of results.  - Glycosylated Hemoglobin A1c    4. Sicca syndrome (H)  Patient has been seeing rheumatology.  She is quite discouraged and does not understand why she is not receiving treatment.  Patient has a plethora of symptoms including polymyalgias and possible peripheral neuropathy.  She has labs to review her form direct rheumatology.  I encouraged her to reach out to her rheumatologist about her symptoms.    5. Mixed hyperlipidemia  She is not currently taking medication.  Will notify patient of results.  - Lipid Cascade    6. Moderate persistent asthma without complication  Patient continues Flovent daily and albuterol as needed.    7. Pain in multiple finger joints  Patient's description of the pain in her hands could be related to peripheral neuropathy or Raynaud's.  I encouraged her to speak to her rheumatologist.  If symptoms persist or worsen during the winter, may consider discontinuing lisinopril and adding adding the amlodipine to assist with circulation.    8. Benign essential hypertension  Blood pressure is controlled.  She continues lisinopril.  - lisinopriL (PRINIVIL,ZESTRIL) 5 MG tablet; Take 1 tablet (5 mg total) by mouth daily.  Dispense: 90 tablet; Refill: 3    9. Class 1 obesity due to excess calories with serious comorbidity and body mass index (BMI) of 31.0 to 31.9 in adult  The following high BMI interventions were performed this visit:  Chief Complaint/Reason for Visit:   Chief Complaint   Patient presents with   • Hospital F/U     [facility] F/uP oN CAD 8/6/20220 & S/P CABG 8/10/2020     HISTORY OF PRESENT ILLNESS: Brian Perdomo is a 67 year old male with a past medical history of hypertension, cervical and lumbar disc herniation who initially presented to [facility] with left arm weakness and numbness. Patient was noted to have high grade right ICA stenosis. Patient had an abnormal stress test and underwent left heart cath 8/5/2020 which revealed multivessel CAD. Patient underwent right CEA on 8/5/2020. Patient then underwent CABG on 8/10/2020.    Patient presents today for hospital discharge follow up. He was seen by CV surgery and is being treated for an infection of the leg harvest site with antibiotics.  Denies chest pain, shortness of breath, dizziness, palpitations, PND, orthopnea, or lower extremity edema.    REVIEW OF SYSTEMS:  Review of Systems   Constitution: Negative for chills and fever.   HENT: Negative for nosebleeds.    Eyes: Negative for blurred vision.   Cardiovascular: Negative for chest pain, dyspnea on exertion, leg swelling, orthopnea, palpitations and paroxysmal nocturnal dyspnea.   Respiratory: Negative for cough, shortness of breath and sleep disturbances due to breathing.    Hematologic/Lymphatic: Does not bruise/bleed easily.   Skin: Negative for color change and poor wound healing (Infection of left leg harvest site).   Musculoskeletal: Negative for myalgias.   Gastrointestinal: Negative for hematemesis and melena.   Genitourinary: Negative for dysuria and hematuria.   Neurological: Negative for dizziness and light-headedness.   Psychiatric/Behavioral: Negative for altered mental status.   Allergic/Immunologic: Negative for environmental allergies.       PAST MEDICAL HISTORY:   Past Medical History:   Diagnosis Date   • Abnormal EKG    • CKD (chronic kidney disease)    • Elevated glucose    • Essential hypertension   Patient informed of rx.     • Hypercholesterolemia    • Idiopathic peripheral neuropathy    • Myalgia    • Vitamin D deficiency      PAST SURGICAL HISTORY:   Past Surgical History:   Procedure Laterality Date   • Cervical discectomy  10/2016   • Colonoscopy     • Coronary artery bypass graft  08/10/2020   • Endarterectomy Right 08/05/2020    carotid   • Inguinal hernia repair Right 1972   • Prostate biopsy      1990s   • Total knee arthroplasty Right      FAMILY HISTORY:   Family History   Problem Relation Age of Onset   • Dementia/Alzheimers Mother      SOCIAL HISTORY:   Social History     Tobacco Use   • Smoking status: Former Smoker     Quit date: 12/5/2016     Years since quitting: 3.7   • Smokeless tobacco: Never Used   Substance Use Topics   • Alcohol use: No     Frequency: Never   • Drug use: No       Drug Use:    No              ALLERGIES:   ALLERGIES:   Allergen Reactions   • Amoxicillin-Pot Clavulanate Other (See Comments)     Stomach cramps and dizziness   • Soap RASH     MEDICATIONS:   Current Outpatient Medications   Medication Sig Dispense Refill   • furosemide (LASIX) 40 MG tablet Take 1 tablet by mouth daily.  0   • sulfamethoxazole-trimethoprim (Bactrim DS) 800-160 MG per tablet Take 1 tablet by mouth 2 times daily for 7 days. 14 tablet 0   • potassium chloride (KLOR-CON) 10 MEQ ER tablet TAKE 1 TABLET BY MOUTH EVERY DAY 90 tablet 0   • rosuvastatin (CRESTOR) 40 MG tablet TAKE 1 TABLET BY MOUTH EVERY DAY 90 tablet 0   • metoPROLOL succinate (TOPROL-XL) 50 MG 24 hr tablet TAKE 1 TABLET BY MOUTH EVERY DAY 90 tablet 0   • gabapentin (NEURONTIN) 300 MG capsule Take 1 capsule by mouth 3 times daily. 90 capsule 1   • Choline Fenofibrate (FENOFIBRIC ACID) 135 MG CAPSULE DELAYED RELEASE Take 135 mg by mouth daily.     • aspirin 81 MG tablet Take 1 tablet by mouth daily.     • cyclobenzaprine (FLEXERIL) 5 MG tablet TK 1 T PO BID  0     No current facility-administered medications for this visit.      PHYSICAL  EXAMINATION  Visit  encouragement to exercise and lifestyle education regarding diet      Subjective:     Roverto is a 55 y.o. female presenting to the clinic for a female physical.        LMP: six years ago  Hx of abnormal pap smear: none  Last pap smear: 2015 normal, negative HPV   Perform self-breast exams: yes   Vaginal discharge or irritation: none   Sexually active: yes,  for 26 years  Contraception: none   Concerns for STDs: none   Previous pregnancies:four pregnancies (one miscarriage and 3 vaginal deliveries)     Patient sees rheumatology and has been diagnosed with sicca syndrome in the past.  She has labs to be performed today.  She is concerned of joint pain within her fingers.  Her hands have been feeling weak.  She has not noticed any rashes.  She complains of stabbing pains within her finger tips and toes.  She has had some numbness and tingling.  She has not tried any over-the-counter products for her symptoms.    Patient has moderate persistent asthma and is seeing pulmonology.  She is using Flovent daily and albuterol as needed.  She feels as though her symptoms are well controlled.    Patient has hypertension and is taking lisinopril 10 mg daily.    Review of systems:  I performed a 10 point review of systems.  All pertinent positives and negatives are noted in the HPI. All others are negative.     No Known Allergies    Current Outpatient Medications on File Prior to Visit   Medication Sig Dispense Refill     albuterol (PROAIR HFA;PROVENTIL HFA;VENTOLIN HFA) 90 mcg/actuation inhaler Inhale 2 puffs every 6 (six) hours as needed for wheezing. 1 each 11     FLOVENT HFA 44 mcg/actuation inhaler INHALE 2 PUFFS BY MOUTH TWICE DAILY 1 Inhaler 11     triamcinolone (KENALOG) 0.1 % cream Apply to the affected area twice daily as needed.  No longer than 2 weeks 454 g 2     [DISCONTINUED] lisinopril (PRINIVIL,ZESTRIL) 5 MG tablet Take 1 tablet (5 mg total) by mouth daily. 90 tablet 3     [DISCONTINUED] atovaquone-proguaniL  "(MALARONE) 250-100 mg Tab Take 1 tablet by mouth daily with breakfast. 53 tablet 0     No current facility-administered medications on file prior to visit.        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   Social Needs     Financial resource strain: Not on file     Food insecurity     Worry: Not on file     Inability: Not on file     Transportation needs     Medical: Not on file     Non-medical: Not on file   Tobacco Use     Smoking status: Never Smoker     Smokeless tobacco: Never Used   Substance and Sexual Activity     Alcohol use: No     Drug use: No     Sexual activity: Never   Lifestyle     Physical activity     Days per week: Not on file     Minutes per session: Not on file     Stress: Not on file   Relationships     Social connections     Talks on phone: Not on file     Gets together: Not on file     Attends [employer] service: Not on file     Active member of club or organization: Not on file     Attends meetings of clubs or organizations: Not on file     Relationship status: Not on file     Intimate partner violence     Fear of current or ex partner: Not on file     Emotionally abused: Not on file     Physically abused: Not on file     Forced sexual activity: Not on file   Other Topics Concern     Not on file   Social History Narrative    Originally from [place].  3 grown children.  Emigrated from .       Past Medical History:   Diagnosis Date     Breast cancer (H) 2014    s/p resection and radiation     DCIS (ductal carcinoma in situ) of breast 2014    ER-[place]-     GERD (gastroesophageal reflux disease)      Hx of radiation therapy 2014     Vitamin D deficiency        Family History   Problem Relation Age of Onset     Throat cancer Father          \"I think age 82.\"     Hypertension Mother  " Vitals  /70 (BP Location: RUE - Right upper extremity, Patient Position: Sitting, Cuff Size: Regular)   Pulse 68   Resp 16   Ht 5' 5\" (1.651 m)   Wt 68.9 kg (152 lb)   BMI 25.29 kg/m²       Physical Exam   Constitutional: He is oriented to person, place, and time. He appears well-developed and well-nourished. No distress.   HENT:   Head: Normocephalic and atraumatic.   Mouth/Throat: Oropharynx is clear and moist.   Eyes: Conjunctivae are normal.   Neck: Normal range of motion. No JVD present. Carotid bruit is not present.   Cardiovascular: Normal rate, regular rhythm, S1 normal, S2 normal and normal pulses.   No murmur heard.  Right radial site with no bruit, no hematoma.  Good CMS to right hand.  Median sternotomy incision is well approximated with no discharge or drainage noted.  Left leg graft harvest site with bandage in place.  Yellow purulent drainage noted.   Pulmonary/Chest: Effort normal and breath sounds normal. No respiratory distress. He has no wheezes. He has no rales. He exhibits no tenderness.   Abdominal: Soft. He exhibits no distension.   Musculoskeletal: Normal range of motion.         General: No edema.      Comments: Ambulating with walker   Neurological: He is alert and oriented to person, place, and time.   Skin: Skin is warm and dry.   Psychiatric: He has a normal mood and affect.     Diagnostic Testing:     STRESS 8/4/2020  IMPRESSION:    1.  No symptoms of angina occurred.  2.  There were no electrocardiographic changes diagnostic of myocardial ischemia.  3.  There were no significant dysrhythmias noted.  4.  Ischemia of the basal to distal inferior wall.  5.  LV systolic function was normal.    Risk/Extent for Ischemia is high.      TTE 8/3/2020  STUDY CONCLUSIONS  SUMMARY:   Left ventricle: The cavity size is normal. Wall thickness is  mildly increased. Systolic function is normal. The estimated ejection  fraction is 55-60%, by single plane method of disks.    Labs:    Recent Labs      BRCA 1/2 Neg Hx        Past Surgical History:   Procedure Laterality Date     BREAST BIOPSY Left 2014     BREAST LUMPECTOMY Left 6/14       Objective:     Vitals:    10/20/20 0908   BP: 132/78   Pulse:    SpO2:        Patient is alert, no obvious distress.   Skin: Warm, dry.  No rashes or lesions. Skin turgor rapid return.   HEENT:  Eyes normal.  Ears normal.  Nose patent, mucosa pink.  Oropharynx mucosa pink, no lesions or tonsil enlargement.   Neck:  Supple, without lymphadenopathy, bruits, JVD. Thyroid normal texture and size.    Lungs:  Clear to auscultation.  No wheezing, rales noted.  Respirations even and unlabored.   Heart:  Regular rate and rhythm.  No murmurs.   Breasts:  Normal.  No surrounding adenopathy.   Abdomen: Soft, nontender.  No organomegaly.  Bowel sounds normoactive.  No guarding or masses noted.   :  External genitalia normal.  Normal vaginal mucosa.  Cervix no lesions or cervical motion tenderness.   Musculoskeletal:  Full ROM of extremities.  Muscle strength equal +5/5.  She has full range of motion of her fingers.  Sensations intact.  No obvious deformity.  No skin changes noted.  Neurological:  Cranial nerves 2-12 intact.                Lab 08/02/20  0549   CHOLESTEROL 153     Desirable            <200  Borderline High      200 to 239  High                 >=240      HDL 39*  Low            <40  Borderline Low 40 to 49  Near Optimal   50 to 59  Optimal        >=60   TRIGLYCERIDE 366*  Normal                   <150  Borderline High          150 to 199  High                     200 to 499  Very High                >=500   CALCULATED LDL 41  OPTIMAL               <100  NEAR OPTIMAL          100-129  BORDERLINE HIGH       130-159  HIGH                  160-189  VERY HIGH             >=190   CALCULATED NON      Therapeutic Target:  CHD and risk equivalents <130  Multiple risk factors    <160  0 to 1 risk factors      <190        Recent Labs   Lab 09/02/20  0426  02/10/20  1114   Sodium 135   < > 136   Chloride 95*   < > 99   BUN 17   < > 16   GFR Estimate,  >90  eGFR results = or >90 mL/min/1.73m2 = Normal kidney function.   < > >90   BUN/Creatinine Ratio 21   < > 20   Potassium 3.2*   < > 3.8   Glucose 100*   < > 84   Creatinine 0.80   < > 0.78   GFR Estimate, Non  >90  eGFR results = or >90 mL/min/1.73m2 = Normal kidney function.   < > >90   CALCIUM 9.1   < > 9.6   TSH  --   --  1.097    < > = values in this interval not displayed.     IMPRESSION/PLAN:    1. Hospital discharge follow-up  -Patient is a 67-year-old male past medical history of hypertension, cervical and lumbar disc herniation who initially presented with left arm weakness and numbness and ultimately underwent CABG on 8/10/2020.    2. Status post carotid endarterectomy  -High grade R ICA stenosis s/p R CEA on 8/5/2020  -Continue aspirin, statin therapy    3. S/P CABG (coronary artery bypass graft)  -Cardiac cath 8/5 revealed multivessel CAD  -Echo:  EF 55-60%  -s/p CABG x3 on 8/10/2020  -Continue ASA, statin, beta blocker  -Recommend outpatient cardiac rehab in the future.  Patient is currently undergoing PT/OT at [facility]  [facility] lab 3 days/week    4. Essential hypertension  -Blood pressure controlled on current medical therapy  -Continue to monitor    5. Acute diastolic congestive heart failure (CMS/HCC)  -Acute diastolic CHF  -Sodium restricted diet  -Maintain adequate blood pressure control  -Appears compensated  -Reduce furosemide to 40mg once daily    6. Dyslipidemia  -On statin    9/15/2020    Return for Dr. Munoz as scheduled.    Rosa Maria Sarabia CNP   [facility]

## 2021-08-16 NOTE — TELEPHONE ENCOUNTER
Patient had virtual visit 8/12/21 with PCP Dr. Fish.    See plan for left trapezius strain/pain:    Notify if no decrease in pain over the next 48 hours and may need to add muscle relaxer.      PCP out of clinic today, see patient's mychart note, is requesting the muscle relaxer as well.      Routed to PCP and covering provider pool to address.    Tricia Beyer RN  [facility]

## 2021-08-25 DIAGNOSIS — E03.9 HYPOTHYROIDISM, UNSPECIFIED TYPE: ICD-10-CM

## 2021-08-27 RX ORDER — LEVOTHYROXINE SODIUM 150 UG/1
150 TABLET ORAL DAILY
Qty: 30 TABLET | Refills: 0 | Status: SHIPPED | OUTPATIENT
Start: 2021-08-27 | End: 2021-09-10

## 2021-09-10 ENCOUNTER — VIRTUAL VISIT (OUTPATIENT)
Dept: FAMILY MEDICINE | Facility: CLINIC | Age: 31
End: 2021-09-10
Payer: COMMERCIAL

## 2021-09-10 DIAGNOSIS — F41.1 GENERALIZED ANXIETY DISORDER WITH PANIC ATTACKS: ICD-10-CM

## 2021-09-10 DIAGNOSIS — E03.9 ACQUIRED HYPOTHYROIDISM: ICD-10-CM

## 2021-09-10 DIAGNOSIS — F41.0 GENERALIZED ANXIETY DISORDER WITH PANIC ATTACKS: ICD-10-CM

## 2021-09-10 PROCEDURE — 99214 OFFICE O/P EST MOD 30 MIN: CPT | Mod: 95 | Performed by: FAMILY MEDICINE

## 2021-09-10 RX ORDER — LEVOTHYROXINE SODIUM 150 UG/1
150 TABLET ORAL DAILY
Qty: 30 TABLET | Refills: 0 | Status: SHIPPED | OUTPATIENT
Start: 2021-09-10 | End: 2021-10-06

## 2021-09-10 RX ORDER — BUPROPION HYDROCHLORIDE 150 MG/1
150 TABLET ORAL EVERY MORNING
Qty: 90 TABLET | Refills: 1 | Status: SHIPPED | OUTPATIENT
Start: 2021-09-10 | End: 2022-04-15 | Stop reason: DRUGHIGH

## 2021-09-10 ASSESSMENT — ANXIETY QUESTIONNAIRES
5. BEING SO RESTLESS THAT IT IS HARD TO SIT STILL: NOT AT ALL
3. WORRYING TOO MUCH ABOUT DIFFERENT THINGS: SEVERAL DAYS
7. FEELING AFRAID AS IF SOMETHING AWFUL MIGHT HAPPEN: NOT AT ALL
2. NOT BEING ABLE TO STOP OR CONTROL WORRYING: NOT AT ALL
IF YOU CHECKED OFF ANY PROBLEMS ON THIS QUESTIONNAIRE, HOW DIFFICULT HAVE THESE PROBLEMS MADE IT FOR YOU TO DO YOUR WORK, TAKE CARE OF THINGS AT HOME, OR GET ALONG WITH OTHER PEOPLE: SOMEWHAT DIFFICULT
6. BECOMING EASILY ANNOYED OR IRRITABLE: SEVERAL DAYS
1. FEELING NERVOUS, ANXIOUS, OR ON EDGE: NOT AT ALL
GAD7 TOTAL SCORE: 3

## 2021-09-10 ASSESSMENT — PATIENT HEALTH QUESTIONNAIRE - PHQ9
SUM OF ALL RESPONSES TO PHQ QUESTIONS 1-9: 3
5. POOR APPETITE OR OVEREATING: SEVERAL DAYS

## 2021-09-10 NOTE — PROGRESS NOTES
Jennifer is a 30 year old who is being evaluated via a billable video visit.      How would you like to obtain your AVS? MyChart  If the video visit is dropped, the invitation should be resent by: Text to cell phone: [phone]  Will anyone else be joining your video visit? No    Video Start Time: 1:15 pm    Assessment & Plan     Generalized anxiety disorder with panic attacks, much improved  - PHQ-9/ARNIE 7 completed, see below/Epic for details    - Refill: buPROPion (WELLBUTRIN XL) 150 MG 24 hr tablet  Dispense: 90 tablet; Refill: 1  - Continue Psychotherapy Counseling through work.     Acquired hypothyroidism  - Refill: levothyroxine (SYNTHROID/LEVOTHROID) 150 MCG tablet  Dispense: 30 tablet; Refill: 0  - TSH with free T4 reflex      Return in about 3 weeks (around 10/1/2021) for Physical Exam.    Ashley Fish MD  [facility] ADAM Rodriguez is a 30 year old who presents for the following health issues     HPI       Patient is scheduled for annual physical early October 2021.    Depression and Anxiety Follow-Up  Started on Wellbutrin at the last visit- tolerating well.     How are you doing with your depression since your last visit? Improved     How are you doing with your anxiety since your last visit?  Improved some    Are you having other symptoms that might be associated with depression or anxiety? AmWell    Have you had a significant life event? No     Do you have any concerns with your use of alcohol or other drugs? No    Social History     Tobacco Use     Smoking status: Never Smoker     Smokeless tobacco: Never Used     Tobacco comment: Nonsmoking household   Vaping Use     Vaping Use: Never used   Substance Use Topics     Alcohol use: No     Drug use: No     PHQ 9/25/2020 8/6/2021 9/10/2021   PHQ-9 Total Score 2 11 3   Q9: Thoughts of better off dead/self-harm past 2 weeks Not at all Several days Not at all     ARNIE-7 SCORE 9/25/2020 8/6/2021 9/10/2021   Total Score - - -    Total Score 2 7 3     Last PHQ-9 9/10/2021   1.  Little interest or pleasure in doing things 0   2.  Feeling down, depressed, or hopeless 0   3.  Trouble falling or staying asleep, or sleeping too much 0   4.  Feeling tired or having little energy 1   5.  Poor appetite or overeating 1   6.  Feeling bad about yourself 0   7.  Trouble concentrating 1   8.  Moving slowly or restless 0   Q9: Thoughts of better off dead/self-harm past 2 weeks 0   PHQ-9 Total Score 3   Difficulty at work, home, or with people Somewhat difficult     ARNIE-7  9/10/2021   1. Feeling nervous, anxious, or on edge 0   2. Not being able to stop or control worrying 0   3. Worrying too much about different things 1   4. Trouble relaxing 1   5. Being so restless that it is hard to sit still 0   6. Becoming easily annoyed or irritable 1   7. Feeling afraid, as if something awful might happen 0   ARNIE-7 Total Score 3   If you checked any problems, how difficult have they made it for you to do your work, take care of things at home, or get along with other people? Somewhat difficult       Suicide Assessment Five-step Evaluation and Treatment (SAFE-T)        Review of Systems   Constitutional, HEENT, cardiovascular, pulmonary, gi and gu systems are negative, except as otherwise noted.      Objective           Vitals:  No vitals were obtained today due to virtual visit.    Physical Exam   RESP: No audible wheeze, cough, or visible cyanosis.  No visible retractions or increased work of breathing.    PSYCH: Mentation appears normal, affect pleasant, judgement and insight intact, normal speech.            Video-Visit Details    Type of service:  Video Visit    Video End Time:1:30 pm    Originating Location (pt. Location): Home    Distant Location (provider location):  [facility] [employer]     Platform used for Video Visit: [employer]

## 2021-09-11 ASSESSMENT — ANXIETY QUESTIONNAIRES: GAD7 TOTAL SCORE: 3

## 2021-09-24 ASSESSMENT — ENCOUNTER SYMPTOMS
EYE PAIN: 0
JOINT SWELLING: 0
PALPITATIONS: 0
MYALGIAS: 0
HEMATOCHEZIA: 0
SORE THROAT: 0
DIZZINESS: 1
DIARRHEA: 0
CHILLS: 0
WEAKNESS: 0
BREAST MASS: 0
PARESTHESIAS: 0
HEMATURIA: 0
NAUSEA: 0
CONSTIPATION: 0
ARTHRALGIAS: 0
HEADACHES: 1
SHORTNESS OF BREATH: 0
HEARTBURN: 0
ABDOMINAL PAIN: 0
FREQUENCY: 0
DYSURIA: 0
NERVOUS/ANXIOUS: 0
COUGH: 0
FEVER: 0

## 2021-10-01 ENCOUNTER — OFFICE VISIT (OUTPATIENT)
Dept: FAMILY MEDICINE | Facility: CLINIC | Age: 31
End: 2021-10-01
Payer: COMMERCIAL

## 2021-10-01 VITALS
RESPIRATION RATE: 16 BRPM | HEART RATE: 92 BPM | DIASTOLIC BLOOD PRESSURE: 62 MMHG | BODY MASS INDEX: 37.25 KG/M2 | TEMPERATURE: 98.6 F | HEIGHT: 63 IN | SYSTOLIC BLOOD PRESSURE: 104 MMHG | WEIGHT: 210.2 LBS

## 2021-10-01 DIAGNOSIS — Z11.59 NEED FOR HEPATITIS C SCREENING TEST: ICD-10-CM

## 2021-10-01 DIAGNOSIS — E03.9 ACQUIRED HYPOTHYROIDISM: ICD-10-CM

## 2021-10-01 DIAGNOSIS — J30.2 CHRONIC SEASONAL ALLERGIC RHINITIS: ICD-10-CM

## 2021-10-01 DIAGNOSIS — Z13.220 LIPID SCREENING: ICD-10-CM

## 2021-10-01 DIAGNOSIS — Z00.00 ROUTINE GENERAL MEDICAL EXAMINATION AT A HEALTH CARE FACILITY: Primary | ICD-10-CM

## 2021-10-01 DIAGNOSIS — L65.0 TELOGEN EFFLUVIUM: ICD-10-CM

## 2021-10-01 LAB
ALBUMIN SERPL-MCNC: 3.9 G/DL (ref 3.4–5)
ALP SERPL-CCNC: 85 U/L (ref 40–150)
ALT SERPL W P-5'-P-CCNC: 18 U/L (ref 0–50)
ANION GAP SERPL CALCULATED.3IONS-SCNC: 6 MMOL/L (ref 3–14)
AST SERPL W P-5'-P-CCNC: 9 U/L (ref 0–45)
BILIRUB SERPL-MCNC: 0.4 MG/DL (ref 0.2–1.3)
BUN SERPL-MCNC: 15 MG/DL (ref 7–30)
CALCIUM SERPL-MCNC: 9.3 MG/DL (ref 8.5–10.1)
CHLORIDE BLD-SCNC: 104 MMOL/L (ref 94–109)
CO2 SERPL-SCNC: 26 MMOL/L (ref 20–32)
CREAT SERPL-MCNC: 0.89 MG/DL (ref 0.52–1.04)
ERYTHROCYTE [DISTWIDTH] IN BLOOD BY AUTOMATED COUNT: 14.5 % (ref 10–15)
GFR SERPL CREATININE-BSD FRML MDRD: 86 ML/MIN/1.73M2
GLUCOSE BLD-MCNC: 95 MG/DL (ref 70–99)
HCT VFR BLD AUTO: 34.8 % (ref 35–47)
HGB BLD-MCNC: 10.7 G/DL (ref 11.7–15.7)
MCH RBC QN AUTO: 25.1 PG (ref 26.5–33)
MCHC RBC AUTO-ENTMCNC: 30.7 G/DL (ref 31.5–36.5)
MCV RBC AUTO: 82 FL (ref 78–100)
PLATELET # BLD AUTO: 328 10E3/UL (ref 150–450)
POTASSIUM BLD-SCNC: 4.4 MMOL/L (ref 3.4–5.3)
PROT SERPL-MCNC: 7.9 G/DL (ref 6.8–8.8)
RBC # BLD AUTO: 4.26 10E6/UL (ref 3.8–5.2)
SODIUM SERPL-SCNC: 136 MMOL/L (ref 133–144)
T4 FREE SERPL-MCNC: 1 NG/DL (ref 0.76–1.46)
TSH SERPL DL<=0.005 MIU/L-ACNC: 5.68 MU/L (ref 0.4–4)
WBC # BLD AUTO: 7.5 10E3/UL (ref 4–11)

## 2021-10-01 PROCEDURE — 84439 ASSAY OF FREE THYROXINE: CPT | Performed by: FAMILY MEDICINE

## 2021-10-01 PROCEDURE — 99214 OFFICE O/P EST MOD 30 MIN: CPT | Mod: 25 | Performed by: FAMILY MEDICINE

## 2021-10-01 PROCEDURE — 36415 COLL VENOUS BLD VENIPUNCTURE: CPT | Performed by: FAMILY MEDICINE

## 2021-10-01 PROCEDURE — 84443 ASSAY THYROID STIM HORMONE: CPT | Performed by: FAMILY MEDICINE

## 2021-10-01 PROCEDURE — 99395 PREV VISIT EST AGE 18-39: CPT | Performed by: FAMILY MEDICINE

## 2021-10-01 PROCEDURE — 80053 COMPREHEN METABOLIC PANEL: CPT | Performed by: FAMILY MEDICINE

## 2021-10-01 PROCEDURE — 85027 COMPLETE CBC AUTOMATED: CPT | Performed by: FAMILY MEDICINE

## 2021-10-01 PROCEDURE — 86803 HEPATITIS C AB TEST: CPT | Performed by: FAMILY MEDICINE

## 2021-10-01 RX ORDER — FLUTICASONE PROPIONATE 50 MCG
1-2 SPRAY, SUSPENSION (ML) NASAL DAILY
Qty: 9.9 ML | Refills: 3 | Status: SHIPPED | OUTPATIENT
Start: 2021-10-01 | End: 2022-04-15

## 2021-10-01 ASSESSMENT — ANXIETY QUESTIONNAIRES
IF YOU CHECKED OFF ANY PROBLEMS ON THIS QUESTIONNAIRE, HOW DIFFICULT HAVE THESE PROBLEMS MADE IT FOR YOU TO DO YOUR WORK, TAKE CARE OF THINGS AT HOME, OR GET ALONG WITH OTHER PEOPLE: NOT DIFFICULT AT ALL
6. BECOMING EASILY ANNOYED OR IRRITABLE: MORE THAN HALF THE DAYS
7. FEELING AFRAID AS IF SOMETHING AWFUL MIGHT HAPPEN: NOT AT ALL
1. FEELING NERVOUS, ANXIOUS, OR ON EDGE: NOT AT ALL
GAD7 TOTAL SCORE: 3
5. BEING SO RESTLESS THAT IT IS HARD TO SIT STILL: NOT AT ALL
2. NOT BEING ABLE TO STOP OR CONTROL WORRYING: NOT AT ALL
3. WORRYING TOO MUCH ABOUT DIFFERENT THINGS: NOT AT ALL

## 2021-10-01 ASSESSMENT — PATIENT HEALTH QUESTIONNAIRE - PHQ9
5. POOR APPETITE OR OVEREATING: SEVERAL DAYS
SUM OF ALL RESPONSES TO PHQ QUESTIONS 1-9: 3

## 2021-10-01 ASSESSMENT — PAIN SCALES - GENERAL: PAINLEVEL: NO PAIN (0)

## 2021-10-01 ASSESSMENT — MIFFLIN-ST. JEOR: SCORE: 1632.59

## 2021-10-01 NOTE — PROGRESS NOTES
SUBJECTIVE:   CC: Jennifer Pastrana is an 30 year old woman who presents for preventive health visit.       Patient has been advised of split billing requirements and indicates understanding: Yes     Healthy Habits:     Getting at least 3 servings of Calcium per day:  NO    Bi-annual eye exam:  Yes    Dental care twice a year:  Yes    Sleep apnea or symptoms of sleep apnea:  Daytime drowsiness    Diet:  Regular (no restrictions) and Carbohydrate counting    Frequency of exercise:  1 day/week    Duration of exercise:  Less than 15 minutes    Taking medications regularly:  No    Medication side effects:  None    PHQ-2 Total Score: 0    Additional concerns today:  Yes    .  HYPOTHYROIDISM - Patient has a longstanding history of chronic Hypothyroidism. Patient has been doing well, noting no tremor, insomnia, hair loss or changes in skin texture. Continues to take medications as directed, without adverse reactions or side effects. Last TSH   Lab Results   Component Value Date    TSH 1.75 09/25/2020       Recently underwent significant stress at home, her dad was hospitalized, now he's been discharged. Refer to prior clinic notes for details.   States that since that stressful episode, she noticed diffuse thinning of her hair.  No bald spots.    Reports that her anxiety and depression has significantly improved in the recent past.  Continues to take Wellbutrin 150 mg daily no side effects reported.    Last PHQ-9 10/1/2021   1.  Little interest or pleasure in doing things 0   2.  Feeling down, depressed, or hopeless 0   3.  Trouble falling or staying asleep, or sleeping too much 2   4.  Feeling tired or having little energy 1   5.  Poor appetite or overeating 0   6.  Feeling bad about yourself 0   7.  Trouble concentrating 0   8.  Moving slowly or restless 0   Q9: Thoughts of better off dead/self-harm past 2 weeks 0   PHQ-9 Total Score 3   Difficulty at work, home, or with people Somewhat difficult     ARNIE-7  10/1/2021    1. Feeling nervous, anxious, or on edge 0   2. Not being able to stop or control worrying 0   3. Worrying too much about different things 0   4. Trouble relaxing 1   5. Being so restless that it is hard to sit still 0   6. Becoming easily annoyed or irritable 2   7. Feeling afraid, as if something awful might happen 0   ARNIE-7 Total Score 3   If you checked any problems, how difficult have they made it for you to do your work, take care of things at home, or get along with other people? Not difficult at all     In the past two weeks have you had thoughts of suicide or self-harm?  No.    Do you have concerns about your personal safety or the safety of others?   No      Requesting a refill on her Flonase.  Reports that this helps with her seasonal allergic rhinitis symptoms.      HEALTH CARE MAINTENANCE: Due for  labs    Today's PHQ-2 Score:   PHQ-2 ( 1999 Pfizer) 9/24/2021   Q1: Little interest or pleasure in doing things 0   Q2: Feeling down, depressed or hopeless 0   PHQ-2 Score 0   Q1: Little interest or pleasure in doing things Not at all   Q2: Feeling down, depressed or hopeless Not at all   PHQ-2 Score 0       Abuse: Current or Past (Physical, Sexual or Emotional) - No  Do you feel safe in your environment? Yes    Have you ever done Advance Care Planning? (For example, a Health Directive, POLST, or a discussion with a medical provider or your loved ones about your wishes): No, advance care planning information given to patient to review.  Patient declined advance care planning discussion at this time.    Social History     Tobacco Use     Smoking status: Never Smoker     Smokeless tobacco: Never Used     Tobacco comment: Nonsmoking household   Substance Use Topics     Alcohol use: Not Currently         Alcohol Use 9/24/2021   Prescreen: >3 drinks/day or >7 drinks/week? No   Prescreen: >3 drinks/day or >7 drinks/week? -       Reviewed orders with patient.  Reviewed health maintenance and updated orders  accordingly - Yes  Lab work is in process  Labs reviewed in EPIC  BP Readings from Last 3 Encounters:   10/01/21 104/62   08/06/21 118/78   04/07/21 130/89    Wt Readings from Last 3 Encounters:   10/01/21 95.3 kg (210 lb 3.2 oz)   08/06/21 91.4 kg (201 lb 6.4 oz)   04/07/21 92.1 kg (203 lb)                  Patient Active Problem List   Diagnosis     CARDIOVASCULAR SCREENING; LDL GOAL LESS THAN 160     Hypothyroidism     Dry eye syndrome- mild     Dysplasia of cervix, low grade (MILO 1)     Generalized anxiety disorder     Supervision of normal first pregnancy     Migraine without aura and without status migrainosus, not intractable     Oral contraceptive pill surveillance     Seasonal allergic rhinitis due to pollen     Pollen-food allergy, subsequent encounter     Past Surgical History:   Procedure Laterality Date     BIOPSY  [id number]    Cervix     HC TOOTH EXTRACTION W/FORCEP         Social History     Tobacco Use     Smoking status: Never Smoker     Smokeless tobacco: Never Used     Tobacco comment: Nonsmoking household   Substance Use Topics     Alcohol use: Not Currently     Family History   Problem Relation Age of Onset     Breast Cancer Maternal Grandmother         in her 50s     Heart Disease Maternal Grandfather         50s     Myocardial Infarction Maternal Grandfather      Heart Disease Paternal Grandfather         50s     Myocardial Infarction Paternal Grandfather      Thyroid Disease Mother         graves-decompression and strabismus     Cancer Paternal Grandmother         cervical     Other Cancer Paternal Grandmother         Cervical     Thyroid Disease Paternal Aunt         nine affected in family     Glaucoma No family hx of      Macular Degeneration No family hx of      Cerebrovascular Disease No family hx of      Hypertension No family hx of      Diabetes No family hx of          Current Outpatient Medications   Medication Sig Dispense Refill     buPROPion (WELLBUTRIN XL) 150 MG 24 hr tablet  Take 1 tablet (150 mg) by mouth every morning 90 tablet 1     cyclobenzaprine (FLEXERIL) 5 MG tablet Take 1-2 tablets (5-10 mg) by mouth 3 times daily as needed for muscle spasms 90 tablet 0     fluticasone (FLONASE) 50 MCG/ACT nasal spray Spray 1-2 sprays into both nostrils daily 9.9 mL 3     gabapentin (NEURONTIN) 100 MG capsule Take 1 capsule (100 mg) by mouth 3 times daily May increase to 2 CAPS three times a day if needed 360 capsule 3     levonorgestrel (MIRENA) 20 MCG/24HR IUD 1 each by Intrauterine route once       levothyroxine (SYNTHROID/LEVOTHROID) 150 MCG tablet Take 1 tablet (150 mcg) by mouth daily Due for labs. 30 tablet 0     metoprolol succinate ER (TOPROL-XL) 25 MG 24 hr tablet TAKE ONE TABLET BY MOUTH ONCE DAILY 90 tablet 1     naproxen (NAPROSYN) 500 MG tablet Take 1 pill twice per day with food for 14 days then every 12 hours as needed 40 tablet 0     rizatriptan (MAXALT-MLT) 10 MG ODT Take 1 tablet (10 mg) by mouth at onset of headache for migraine May repeat in 2 hours. Max 3 tablets/24 hours. 12 tablet 3     No Known Allergies    Breast Cancer Screening:    FHS-7:   Breast CA Risk Assessment (FHS-7) 9/24/2021   Did any of your first-degree relatives have breast or ovarian cancer? No   Did any of your relatives have bilateral breast cancer? Unknown   Did any man in your family have breast cancer? No   Did any woman in your family have breast and ovarian cancer? No   Did any woman in your family have breast cancer before age 50 y? No   Do you have 2 or more relatives with breast and/or ovarian cancer? Yes   Do you have 2 or more relatives with breast and/or bowel cancer? No       Patient under 40 years of age: Routine Mammogram Screening not recommended.   Pertinent mammograms are reviewed under the imaging tab.    History of abnormal Pap smear: NO - age 30-65 PAP every 5 years with negative HPV co-testing recommended  PAP / HPV Latest Ref Rng & Units 9/25/2020 10/26/2017 2/25/2015   PAP  "(Historical) - NIL NIL NIL   HPV16 NEG:Negative Negative - -   HPV18 NEG:Negative Negative - -   HRHPV NEG:Negative Negative - -     Reviewed and updated as needed this visit by clinical staff  Tobacco  Allergies  Meds   Med Hx  Surg Hx  Fam Hx  Soc Hx        Reviewed and updated as needed this visit by Provider                    Review of Systems  CONSTITUTIONAL: NEGATIVE for fever, chills, change in weight  INTEGUMENTARU/SKIN: NEGATIVE for worrisome rashes, moles or lesions  EYES: NEGATIVE for vision changes or irritation  ENT: NEGATIVE for ear, mouth and throat problems  RESP: NEGATIVE for significant cough or SOB  BREAST: NEGATIVE for masses, tenderness or discharge  CV: NEGATIVE for chest pain, palpitations or peripheral edema  GI: NEGATIVE for nausea, abdominal pain, heartburn, or change in bowel habits  : NEGATIVE for unusual urinary or vaginal symptoms. Periods are regular.  MUSCULOSKELETAL: NEGATIVE for significant arthralgias or myalgia  NEURO: NEGATIVE for weakness, dizziness or paresthesias  PSYCHIATRIC: NEGATIVE for changes in mood or affect     OBJECTIVE:   /62 (BP Location: Right arm, Patient Position: Sitting, Cuff Size: Adult Large)   Pulse 92   Temp 98.6  F (37  C) (Tympanic)   Resp 16   Ht 1.6 m (5' 3\")   Wt 95.3 kg (210 lb 3.2 oz)   BMI 37.24 kg/m    Physical Exam  GENERAL: healthy, alert and no distress  EYES: Eyes grossly normal to inspection, PERRL and conjunctivae and sclerae normal  HENT: ear canals and TM's normal, nose and mouth without ulcers or lesions  NECK: no adenopathy, no asymmetry, masses, or scars and thyroid normal to palpation  RESP: lungs clear to auscultation - no rales, rhonchi or wheezes  BREAST: normal without masses, tenderness or nipple discharge and no palpable axillary masses or adenopathy  CV: regular rate and rhythm, normal S1 S2, no S3 or S4, no murmur, click or rub, no peripheral edema and peripheral pulses strong  ABDOMEN: soft, nontender, no " "hepatosplenomegaly, no masses and bowel sounds normal  MS: no gross musculoskeletal defects noted, no edema  SKIN: no suspicious lesions or rashes.  Diffuse thinning hair.  No alopecia.  NEURO: Normal strength and tone, mentation intact and speech normal  PSYCH: mentation appears normal, affect normal/bright    Diagnostic Test Results:  Labs reviewed in Epic    ASSESSMENT/PLAN:   Jennifer was seen today for physical.    Diagnoses and all orders for this visit:    Routine general medical examination at a health care facility  -     REVIEW OF HEALTH MAINTENANCE PROTOCOL ORDERS  -     Comprehensive metabolic panel (BMP + Alb, Alk Phos, ALT, AST, Total. Bili, TP)  -     CBC with platelets    Lipid screening  -     Lipid panel reflex to direct LDL Fasting; Future    Need for hepatitis C screening test  -     Hepatitis C Screen Reflex to HCV RNA Quant and Genotype    Chronic seasonal allergic rhinitis  -     Refill: fluticasone (FLONASE) 50 MCG/ACT nasal spray; Spray 1-2 sprays into both nostrils daily    Telogen effluvium  Patient education.  Reassured that this will likely grow back after the stressful events have resolved but may take few months to grow back.  Will obtain labs to rule out any other correctable causes  -     Comprehensive metabolic panel (BMP + Alb, Alk Phos, ALT, AST, Total. Bili, TP)  -     CBC with platelets  -     TSH with free T4 reflex    Acquired hypothyroidism, on levothyroxine  -     TSH with free T4 reflex  -     Refill levothyroxine after labs        Patient has been advised of split billing requirements and indicates understanding: Yes  COUNSELING:  Reviewed preventive health counseling, as reflected in patient instructions       Regular exercise       Healthy diet/nutrition    Estimated body mass index is 37.24 kg/m  as calculated from the following:    Height as of this encounter: 1.6 m (5' 3\").    Weight as of this encounter: 95.3 kg (210 lb 3.2 oz).    Weight management plan: Discussed " healthy diet and exercise guidelines    She reports that she has never smoked. She has never used smokeless tobacco.      Counseling Resources:  ATP IV Guidelines  Pooled Cohorts Equation Calculator  Breast Cancer Risk Calculator  BRCA-Related Cancer Risk Assessment: FHS-7 Tool  FRAX Risk Assessment  ICSI Preventive Guidelines  Dietary Guidelines for Americans, 2010  USDA's MyPlate  ASA Prophylaxis  Lung CA Screening    Follow up in 6 months- med check, sooner if needed.  Next Annual Physical due in 9 /2022    Ashley Fish MD  [facility]

## 2021-10-02 ASSESSMENT — ANXIETY QUESTIONNAIRES: GAD7 TOTAL SCORE: 3

## 2021-10-04 LAB — HCV AB SERPL QL IA: NONREACTIVE

## 2021-10-05 ENCOUNTER — OFFICE VISIT (OUTPATIENT)
Dept: NEUROLOGY | Facility: CLINIC | Age: 31
End: 2021-10-05
Payer: COMMERCIAL

## 2021-10-05 VITALS
HEART RATE: 83 BPM | DIASTOLIC BLOOD PRESSURE: 80 MMHG | BODY MASS INDEX: 37.2 KG/M2 | WEIGHT: 210 LBS | SYSTOLIC BLOOD PRESSURE: 119 MMHG

## 2021-10-05 DIAGNOSIS — G43.009 MIGRAINE WITHOUT AURA AND WITHOUT STATUS MIGRAINOSUS, NOT INTRACTABLE: ICD-10-CM

## 2021-10-05 DIAGNOSIS — M54.81 OCCIPITAL NEURALGIA OF RIGHT SIDE: ICD-10-CM

## 2021-10-05 PROCEDURE — 99214 OFFICE O/P EST MOD 30 MIN: CPT | Performed by: INTERNAL MEDICINE

## 2021-10-05 RX ORDER — NORTRIPTYLINE HYDROCHLORIDE 50 MG/1
50 CAPSULE ORAL AT BEDTIME
COMMUNITY
End: 2021-10-05

## 2021-10-05 RX ORDER — NORTRIPTYLINE HYDROCHLORIDE 50 MG/1
50 CAPSULE ORAL AT BEDTIME
Qty: 90 CAPSULE | Refills: 3 | Status: SHIPPED | OUTPATIENT
Start: 2021-10-05 | End: 2022-08-03

## 2021-10-05 RX ORDER — GABAPENTIN 100 MG/1
CAPSULE ORAL
Qty: 360 CAPSULE | Refills: 3 | Status: SHIPPED | OUTPATIENT
Start: 2021-10-05 | End: 2022-05-17

## 2021-10-05 ASSESSMENT — PAIN SCALES - GENERAL: PAINLEVEL: NO PAIN (0)

## 2021-10-05 NOTE — LETTER
10/5/2021         RE: Jennifer Pastrana  [address] [place] [address]        Dear Colleague,    Thank you for referring your patient, Jennifer Pastrana, to the [facility] [place]. Please see a copy of my visit note below.    [facility] Neurology Consultation    Jennifer Pastrana MRN# [id number]   Age: 30 year old YOB: 1990     Requesting physician: Ashley Damon     Reason for Consultation: migraines and brief head pains      History of Presenting Symptoms:   Jennifer Pastrana is a 30 year old female who presents today for evaluation of migraines and brief head pains.    Patient previously followed with Dr Steward, last seen in 4/2021. She is currently taking gabapentin 200 mg AM and 100 mg PM. She is also nortriptyline 50 mg nightly and prn Maxalt. Patient has a strong family history of migraines and she has had migraine headaches for a long time. Migraines usually start behind one eye, but they can also start bilaterally. She denies any visual auras. She gets significant light and sound sensitivity. She denies any nausea. She denies any clear triggers that she has found. She is getting migraines about every 6 weeks currently. She thinks the nortriptyline is helping. She takes Maxalt 10 mg, which usually completely gets rid of the headaches.     Patient also gets brief unilateral headaches on the back right side of the head. These are described as an ice pick sensation. These last for about 1 minute usually. She had bilateral occipital nerve blocks previously which transiently improved symptoms. Gabapentin has significantly improved the frequency of these headaches. They currently occur about every few weeks.     For treatment of migraines she previously was on amitriptyline, Cymbalta, Topamax.       Past Medical History:     Patient Active Problem List   Diagnosis     CARDIOVASCULAR SCREENING; LDL GOAL LESS THAN 160     Hypothyroidism     Dry eye syndrome-  mild     Dysplasia of cervix, low grade (MILO 1)     Generalized anxiety disorder     Supervision of normal first pregnancy     Migraine without aura and without status migrainosus, not intractable     Oral contraceptive pill surveillance     Seasonal allergic rhinitis due to pollen     Pollen-food allergy, subsequent encounter     Past Medical History:   Diagnosis Date     AR (allergic rhinitis)      ASCUS with positive high risk HPV 2/2013    type 53     Cervical high risk HPV (human papillomavirus) test positive 2/25/15     Dysplasia of cervix, low grade (MILO 1)      History of migraine headaches     controlled on Nortriptyline and Maxalt prn      Hypothyroidism      IUD (intrauterine device) in place     Mirena- placed 10/2019     Mood disorder (H)      Reactive airway disease that is not asthma     on Albuterol prn      Uncomplicated asthma 7/2015    Mild        Past Surgical History:     Past Surgical History:   Procedure Laterality Date     BIOPSY  [id number]    Cervix     HC TOOTH EXTRACTION W/FORCEP          Social History:     Social History     Tobacco Use     Smoking status: Never Smoker     Smokeless tobacco: Never Used     Tobacco comment: Nonsmoking household   Vaping Use     Vaping Use: Never used   Substance Use Topics     Alcohol use: Not Currently     Drug use: No        Family History:     Family History   Problem Relation Age of Onset     Breast Cancer Maternal Grandmother         in her 50s     Heart Disease Maternal Grandfather         50s     Myocardial Infarction Maternal Grandfather      Heart Disease Paternal Grandfather         50s     Myocardial Infarction Paternal Grandfather      Thyroid Disease Mother         graves-decompression and strabismus     Cancer Paternal Grandmother         cervical     Other Cancer Paternal Grandmother         Cervical     Thyroid Disease Paternal Aunt         nine affected in family     Glaucoma No family hx of      Macular Degeneration No family hx of       Cerebrovascular Disease No family hx of      Hypertension No family hx of      Diabetes No family hx of         Medications:     Current Outpatient Medications   Medication Sig     buPROPion (WELLBUTRIN XL) 150 MG 24 hr tablet Take 1 tablet (150 mg) by mouth every morning     cyclobenzaprine (FLEXERIL) 5 MG tablet Take 1-2 tablets (5-10 mg) by mouth 3 times daily as needed for muscle spasms     fluticasone (FLONASE) 50 MCG/ACT nasal spray Spray 1-2 sprays into both nostrils daily     gabapentin (NEURONTIN) 100 MG capsule Take 1 capsule (100 mg) by mouth 3 times daily May increase to 2 CAPS three times a day if needed     levonorgestrel (MIRENA) 20 MCG/24HR IUD 1 each by Intrauterine route once     levothyroxine (SYNTHROID/LEVOTHROID) 150 MCG tablet Take 1 tablet (150 mcg) by mouth daily Due for labs.     metoprolol succinate ER (TOPROL-XL) 25 MG 24 hr tablet TAKE ONE TABLET BY MOUTH ONCE DAILY     naproxen (NAPROSYN) 500 MG tablet Take 1 pill twice per day with food for 14 days then every 12 hours as needed     rizatriptan (MAXALT-MLT) 10 MG ODT Take 1 tablet (10 mg) by mouth at onset of headache for migraine May repeat in 2 hours. Max 3 tablets/24 hours.     No current facility-administered medications for this visit.        Allergies:   No Known Allergies     Review of Systems:   As above     Physical Exam:   Vitals: /80 (BP Location: Right arm, Patient Position: Sitting, Cuff Size: Adult Large)   Pulse 83   Wt 95.3 kg (210 lb)   BMI 37.20 kg/m     General: Seated comfortably in no acute distress.  HEENT: Optic discs sharp and vasculature normal on funduscopic exam.   Lungs: breathing comfortably  Neurologic:     Mental Status: Fully alert, attentive. Normal memory and fund of knowledge. Language normal, speech clear and fluent, no paraphasic errors.      Cranial Nerves: Visual fields intact. PERRL. EOMI with normal smooth pursuit. Facial sensation intact/symmetric. Facial movements symmetric. Hearing not  formally tested but intact to conversation. Palate elevation symmetric, uvula midline. No dysarthria. Shoulder shrug strong bilaterally. Tongue protrusion midline.     Motor: No tremors or other abnormal movements observed. Muscle tone normal throughout. Strength 5/5 throughout upper and lower extremities.     Sensory: Intact/symmetric to light touch throughout upper and lower extremities.      Coordination: Finger-nose-finger without dysmetria.      Gait: Normal, steady casual gait.          Data: Pertinent prior to visit   Imaging:  MRA head 2019  IMPRESSION: Mildly motion degraded images. No aneurysm identified.     MRI cervical 2018  IMPRESSION:  1. Minimal C3-C4 disc bulge without stenosis.  2. No evidence for any demyelinating disease.    Procedures:  None    Laboratory:  None         Assessment and Plan:   Assessment:  Jennifer Pastrana is a 30 year old female who presents today for follow-up of episodic migraines without auras and brief head pains. Migraines are currently well controlled today on nortriptyline and prn Maxalt. Right sided head pains are possibly secondary to occipital neuralgia. These are also well controlled on gabapentin. We discussed continuing the same regimen today.      Plan:  - Continue nortriptyline 50 mg night  - Continue gabapentin 200 mg AM, 100 mg PM; extra 100 mg prn in the afternoon  - Maxalt 10 mg prn at the onset of migraine    Follow up in Neurology clinic in 1 year or earlier as needed should new concerns arise.    Patric Duron MD   of Neurology  [facility]        Again, thank you for allowing me to participate in the care of your patient.        Sincerely,        Patric Duron MD

## 2021-10-05 NOTE — PROGRESS NOTES
[facility] Neurology Consultation    Jennifer Pastrana MRN# [id number]   Age: 30 year old YOB: 1990     Requesting physician: Ashley Damon     Reason for Consultation: migraines and brief head pains      History of Presenting Symptoms:   Jennifer Pastrana is a 30 year old female who presents today for evaluation of migraines and brief head pains.    Patient previously followed with Dr Steward, last seen in 4/2021. She is currently taking gabapentin 200 mg AM and 100 mg PM. She is also nortriptyline 50 mg nightly and prn Maxalt. Patient has a strong family history of migraines and she has had migraine headaches for a long time. Migraines usually start behind one eye, but they can also start bilaterally. She denies any visual auras. She gets significant light and sound sensitivity. She denies any nausea. She denies any clear triggers that she has found. She is getting migraines about every 6 weeks currently. She thinks the nortriptyline is helping. She takes Maxalt 10 mg, which usually completely gets rid of the headaches.     Patient also gets brief unilateral headaches on the back right side of the head. These are described as an ice pick sensation. These last for about 1 minute usually. She had bilateral occipital nerve blocks previously which transiently improved symptoms. Gabapentin has significantly improved the frequency of these headaches. They currently occur about every few weeks.     For treatment of migraines she previously was on amitriptyline, Cymbalta, Topamax.       Past Medical History:     Patient Active Problem List   Diagnosis     CARDIOVASCULAR SCREENING; LDL GOAL LESS THAN 160     Hypothyroidism     Dry eye syndrome- mild     Dysplasia of cervix, low grade (MILO 1)     Generalized anxiety disorder     Supervision of normal first pregnancy     Migraine without aura and without status migrainosus, not intractable     Oral contraceptive pill surveillance     Seasonal allergic  rhinitis due to pollen     Pollen-food allergy, subsequent encounter     Past Medical History:   Diagnosis Date     AR (allergic rhinitis)      ASCUS with positive high risk HPV 2/2013    type 53     Cervical high risk HPV (human papillomavirus) test positive 2/25/15     Dysplasia of cervix, low grade (MILO 1)      History of migraine headaches     controlled on Nortriptyline and Maxalt prn      Hypothyroidism      IUD (intrauterine device) in place     Mirena- placed 10/2019     Mood disorder (H)      Reactive airway disease that is not asthma     on Albuterol prn      Uncomplicated asthma 7/2015    Mild        Past Surgical History:     Past Surgical History:   Procedure Laterality Date     BIOPSY  [id number]    Cervix     HC TOOTH EXTRACTION W/FORCEP          Social History:     Social History     Tobacco Use     Smoking status: Never Smoker     Smokeless tobacco: Never Used     Tobacco comment: Nonsmoking household   Vaping Use     Vaping Use: Never used   Substance Use Topics     Alcohol use: Not Currently     Drug use: No        Family History:     Family History   Problem Relation Age of Onset     Breast Cancer Maternal Grandmother         in her 50s     Heart Disease Maternal Grandfather         50s     Myocardial Infarction Maternal Grandfather      Heart Disease Paternal Grandfather         50s     Myocardial Infarction Paternal Grandfather      Thyroid Disease Mother         graves-decompression and strabismus     Cancer Paternal Grandmother         cervical     Other Cancer Paternal Grandmother         Cervical     Thyroid Disease Paternal Aunt         nine affected in family     Glaucoma No family hx of      Macular Degeneration No family hx of      Cerebrovascular Disease No family hx of      Hypertension No family hx of      Diabetes No family hx of         Medications:     Current Outpatient Medications   Medication Sig     buPROPion (WELLBUTRIN XL) 150 MG 24 hr tablet Take 1 tablet (150 mg) by mouth  every morning     cyclobenzaprine (FLEXERIL) 5 MG tablet Take 1-2 tablets (5-10 mg) by mouth 3 times daily as needed for muscle spasms     fluticasone (FLONASE) 50 MCG/ACT nasal spray Spray 1-2 sprays into both nostrils daily     gabapentin (NEURONTIN) 100 MG capsule Take 1 capsule (100 mg) by mouth 3 times daily May increase to 2 CAPS three times a day if needed     levonorgestrel (MIRENA) 20 MCG/24HR IUD 1 each by Intrauterine route once     levothyroxine (SYNTHROID/LEVOTHROID) 150 MCG tablet Take 1 tablet (150 mcg) by mouth daily Due for labs.     metoprolol succinate ER (TOPROL-XL) 25 MG 24 hr tablet TAKE ONE TABLET BY MOUTH ONCE DAILY     naproxen (NAPROSYN) 500 MG tablet Take 1 pill twice per day with food for 14 days then every 12 hours as needed     rizatriptan (MAXALT-MLT) 10 MG ODT Take 1 tablet (10 mg) by mouth at onset of headache for migraine May repeat in 2 hours. Max 3 tablets/24 hours.     No current facility-administered medications for this visit.        Allergies:   No Known Allergies     Review of Systems:   As above     Physical Exam:   Vitals: /80 (BP Location: Right arm, Patient Position: Sitting, Cuff Size: Adult Large)   Pulse 83   Wt 95.3 kg (210 lb)   BMI 37.20 kg/m     General: Seated comfortably in no acute distress.  HEENT: Optic discs sharp and vasculature normal on funduscopic exam.   Lungs: breathing comfortably  Neurologic:     Mental Status: Fully alert, attentive. Normal memory and fund of knowledge. Language normal, speech clear and fluent, no paraphasic errors.      Cranial Nerves: Visual fields intact. PERRL. EOMI with normal smooth pursuit. Facial sensation intact/symmetric. Facial movements symmetric. Hearing not formally tested but intact to conversation. Palate elevation symmetric, uvula midline. No dysarthria. Shoulder shrug strong bilaterally. Tongue protrusion midline.     Motor: No tremors or other abnormal movements observed. Muscle tone normal throughout.  Strength 5/5 throughout upper and lower extremities.     Sensory: Intact/symmetric to light touch throughout upper and lower extremities.      Coordination: Finger-nose-finger without dysmetria.      Gait: Normal, steady casual gait.          Data: Pertinent prior to visit   Imaging:  MRA head 2019  IMPRESSION: Mildly motion degraded images. No aneurysm identified.     MRI cervical 2018  IMPRESSION:  1. Minimal C3-C4 disc bulge without stenosis.  2. No evidence for any demyelinating disease.    Procedures:  None    Laboratory:  None         Assessment and Plan:   Assessment:  Jennifre Pastrana is a 30 year old female who presents today for follow-up of episodic migraines without auras and brief head pains. Migraines are currently well controlled today on nortriptyline and prn Maxalt. Right sided head pains are possibly secondary to occipital neuralgia. These are also well controlled on gabapentin. We discussed continuing the same regimen today.      Plan:  - Continue nortriptyline 50 mg night  - Continue gabapentin 200 mg AM, 100 mg PM; extra 100 mg prn in the afternoon  - Maxalt 10 mg prn at the onset of migraine    Follow up in Neurology clinic in 1 year or earlier as needed should new concerns arise.    Patric Duron MD   of Neurology  [facility]

## 2021-10-06 DIAGNOSIS — D64.9 MILD ANEMIA: Primary | ICD-10-CM

## 2021-10-06 DIAGNOSIS — E03.9 ACQUIRED HYPOTHYROIDISM: ICD-10-CM

## 2021-10-06 RX ORDER — LEVOTHYROXINE SODIUM 175 UG/1
175 TABLET ORAL DAILY
Qty: 30 TABLET | Refills: 1 | Status: SHIPPED | OUTPATIENT
Start: 2021-10-06 | End: 2021-11-15

## 2021-10-06 RX ORDER — FERROUS SULFATE 325(65) MG
325 TABLET, DELAYED RELEASE (ENTERIC COATED) ORAL DAILY
Qty: 90 TABLET | Refills: 1 | Status: SHIPPED | OUTPATIENT
Start: 2021-10-06

## 2021-10-19 ENCOUNTER — OFFICE VISIT (OUTPATIENT)
Dept: NEUROLOGY | Facility: CLINIC | Age: 31
End: 2021-10-19
Payer: COMMERCIAL

## 2021-10-19 VITALS
HEART RATE: 89 BPM | DIASTOLIC BLOOD PRESSURE: 86 MMHG | BODY MASS INDEX: 37.2 KG/M2 | WEIGHT: 210 LBS | SYSTOLIC BLOOD PRESSURE: 120 MMHG

## 2021-10-19 DIAGNOSIS — G43.009 MIGRAINE WITHOUT AURA AND WITHOUT STATUS MIGRAINOSUS, NOT INTRACTABLE: ICD-10-CM

## 2021-10-19 DIAGNOSIS — R42 VERTIGO: Primary | ICD-10-CM

## 2021-10-19 DIAGNOSIS — M54.81 OCCIPITAL NEURALGIA OF RIGHT SIDE: ICD-10-CM

## 2021-10-19 PROCEDURE — 99215 OFFICE O/P EST HI 40 MIN: CPT | Performed by: INTERNAL MEDICINE

## 2021-10-19 ASSESSMENT — PAIN SCALES - GENERAL: PAINLEVEL: NO PAIN (0)

## 2021-10-19 NOTE — PROGRESS NOTES
"[facility] Neurology Follow Up Visit    Jennifer Pastrana MRN# [id number]   Age: 30 year old YOB: 1990     Brief history of symptoms: The patient was initially seen in neurologic consultation on 10/5/2021 for evaluation of migraines and head pains. Please see the comprehensive neurologic consultation note from that date in the Epic records for details.     Interval history:   About 2 weeks ago patient had a headache at work (tension headache across forehead). She took Excedrin. About an hour later she took the Maxalt. She then started feeling lightheaded, \"vertigo\", slightly band around the head, sore arms, fatigue, and numbness in the finger taps.     She has pretty much constant vertigo now, which is her most pressing concern today. She feels a rocking sensation. Visual stimuli seems to make it worse. Symptoms are most prominent with sitting or standing stationary. She has had sporadic vertigo like this for about 3 years several times per month.     She denies any episodic room spinning sensations in general or with turning over in bed. She denies any recent illness. She has been getting more recent migraines. She has taken about 2 Excedrin and 3 Maxalt in the last few weeks and migraines respond to this.       Past Medical History:     Patient Active Problem List   Diagnosis     CARDIOVASCULAR SCREENING; LDL GOAL LESS THAN 160     Hypothyroidism     Dry eye syndrome- mild     Dysplasia of cervix, low grade (MILO 1)     Generalized anxiety disorder     Supervision of normal first pregnancy     Migraine without aura and without status migrainosus, not intractable     Oral contraceptive pill surveillance     Seasonal allergic rhinitis due to pollen     Pollen-food allergy, subsequent encounter     Past Medical History:   Diagnosis Date     AR (allergic rhinitis)      ASCUS with positive high risk HPV 2/2013    type 53     Cervical high risk HPV (human papillomavirus) test positive 2/25/15     Dysplasia of " cervix, low grade (MILO 1)      History of migraine headaches     controlled on Nortriptyline and Maxalt prn      Hypothyroidism      IUD (intrauterine device) in place     Mirena- placed 10/2019     Mood disorder (H)      Reactive airway disease that is not asthma     on Albuterol prn      Uncomplicated asthma 7/2015    Mild        Past Surgical History:     Past Surgical History:   Procedure Laterality Date     BIOPSY  [id number]    Cervix     HC TOOTH EXTRACTION W/FORCEP          Social History:     Social History     Tobacco Use     Smoking status: Never Smoker     Smokeless tobacco: Never Used     Tobacco comment: Nonsmoking household   Vaping Use     Vaping Use: Never used   Substance Use Topics     Alcohol use: Not Currently     Drug use: No        Family History:     Family History   Problem Relation Age of Onset     Breast Cancer Maternal Grandmother         in her 50s     Heart Disease Maternal Grandfather         50s     Myocardial Infarction Maternal Grandfather      Heart Disease Paternal Grandfather         50s     Myocardial Infarction Paternal Grandfather      Thyroid Disease Mother         graves-decompression and strabismus     Cancer Paternal Grandmother         cervical     Other Cancer Paternal Grandmother         Cervical     Thyroid Disease Paternal Aunt         nine affected in family     Glaucoma No family hx of      Macular Degeneration No family hx of      Cerebrovascular Disease No family hx of      Hypertension No family hx of      Diabetes No family hx of         Medications:     Current Outpatient Medications   Medication Sig     buPROPion (WELLBUTRIN XL) 150 MG 24 hr tablet Take 1 tablet (150 mg) by mouth every morning     cyclobenzaprine (FLEXERIL) 5 MG tablet Take 1-2 tablets (5-10 mg) by mouth 3 times daily as needed for muscle spasms     ferrous sulfate (FE TABS) 325 (65 Fe) MG EC tablet Take 1 tablet (325 mg) by mouth daily     fluticasone (FLONASE) 50 MCG/ACT nasal spray Spray 1-2  sprays into both nostrils daily     gabapentin (NEURONTIN) 100 MG capsule Take 2 capsules in the morning and 1 capsule at night. Can take 1 capsule in the afternoon as needed.     levonorgestrel (MIRENA) 20 MCG/24HR IUD 1 each by Intrauterine route once     levothyroxine (SYNTHROID/LEVOTHROID) 175 MCG tablet Take 1 tablet (175 mcg) by mouth daily Due for labs.     metoprolol succinate ER (TOPROL-XL) 25 MG 24 hr tablet TAKE ONE TABLET BY MOUTH ONCE DAILY     naproxen (NAPROSYN) 500 MG tablet Take 1 pill twice per day with food for 14 days then every 12 hours as needed     nortriptyline (PAMELOR) 50 MG capsule Take 1 capsule (50 mg) by mouth At Bedtime     rizatriptan (MAXALT-MLT) 10 MG ODT Take 1 tablet (10 mg) by mouth at onset of headache for migraine May repeat in 2 hours. Max 3 tablets/24 hours.     No current facility-administered medications for this visit.        Allergies:   No Known Allergies     Review of Systems:   As above     Physical Exam:   Vitals: /86 (BP Location: Right arm, Patient Position: Sitting, Cuff Size: Adult Regular)   Pulse 89   Wt 95.3 kg (210 lb)   BMI 37.20 kg/m     General: Seated comfortably in no acute distress.  Lungs: breathing comfortably  Extremities: no edema  Neurologic:     Mental Status: Fully alert, attentive. Normal memory and fund of knowledge. Language normal, speech clear and fluent, no paraphasic errors.      Cranial Nerves: Visual fields intact. PERRL. EOMI with normal smooth pursuit. Facial sensation intact/symmetric. Facial movements symmetric. Hearing not formally tested but intact to conversation. Palate elevation symmetric, uvula midline. No dysarthria. Shoulder shrug strong bilaterally. Tongue protrusion midline.     Motor: No tremors in the extremities. Intermittent irregular movements of head from side to side. Muscle tone normal throughout. Normal/symmetric rapid finger tapping. Strength 5/5 throughout upper and lower extremities.     Deep Tendon  Reflexes: 2+/symmetric throughout upper and lower extremities. No clonus. Toes equivocal.     Sensory: Intact/symmetric to light touch, temperature, vibration throughout upper and lower extremities. Negative Romberg.      Coordination: Finger-nose-finger and heel-shin intact without dysmetria. Rapid alternating movements intact/symmetric with normal speed and rhythm.     Gait: Normal, steady casual gait. Able to walk on toes, heels and tandem without difficulty.  Smoaks Hallpike examination normal/negative     Data reviewed on previous visits    Imaging:  MRA head 2019  IMPRESSION: Mildly motion degraded images. No aneurysm identified.      MRI cervical 2018  IMPRESSION:  1. Minimal C3-C4 disc bulge without stenosis.  2. No evidence for any demyelinating disease.    Pertinent Investigations since last visit:   None         Assessment and Plan:   Assessment:  Jennifer Pastrana is a 30 year old female who presents today for follow-up of episodic migraines without auras and brief head pains. Right sided head pains are possibly secondary to occipital neuralgia. These are also well controlled on gabapentin. Patient was seen 2 weeks ago and at that visit migraines were well controlled on nortriptyline and Maxalt prn. She has had ~3 migraines in the last 2 weeks, which responded to Maxalt.     In the last 2 weeks she has also felt increase in symptoms of vertigo. Vertigo is described as a swaying sensation provoked by head movements and visual stimuli. She has noticed these sensation a few days a month over the last several years. In the last 2 weeks sensations have become daily. Unclear etiology of vertigo at this point. She was recently diagnosed with mild anemia and mild hypothyroidism, which could be contributing in part. Symptoms aren't typical of BPPV and Joe Hallpike was negative. History and examination aren't suggestive of vestibulopathy such as vestibular neuritis. It is possible that symptoms may be in part related to  migraine headaches, but that is not clear per the history. Regardless of exact etiology, patient would benefit from vestibular therapy evaluation for symptomatic management. If this is not effective, additional work-up could be considered.       Plan:  - Vestibular therapy referral  - Continue nortriptyline 50 mg night  - Continue gabapentin 200 mg AM, 100 mg PM; extra 100 mg prn in the afternoon  - Maxalt 10 mg prn at the onset of migraine    Follow up in Neurology clinic in 3 months or earlier as needed should new concerns arise.    Patric Duron MD   of Neurology  [facility]    The total time of this encounter today amounted to 40 minutes. This time included time spent with the patient, prep work, ordering tests, and performing post visit documentation.

## 2021-10-19 NOTE — LETTER
"    10/19/2021         RE: Jennifer Pastrana  [address] [facility] [address]        Dear Colleague,    Thank you for referring your patient, Jennifer Pastrana, to the [facility]. Please see a copy of my visit note below.    [facility] Neurology Follow Up Visit    Jennifer Pastrana MRN# [id number]   Age: 30 year old YOB: 1990     Brief history of symptoms: The patient was initially seen in neurologic consultation on 10/5/2021 for evaluation of migraines and head pains. Please see the comprehensive neurologic consultation note from that date in the Epic records for details.     Interval history:   About 2 weeks ago patient had a headache at work (tension headache across forehead). She took Excedrin. About an hour later she took the Maxalt. She then started feeling lightheaded, \"vertigo\", slightly band around the head, sore arms, fatigue, and numbness in the finger taps.     She has pretty much constant vertigo now, which is her most pressing concern today. She feels a rocking sensation. Visual stimuli seems to make it worse. Symptoms are most prominent with sitting or standing stationary. She has had sporadic vertigo like this for about 3 years several times per month.     She denies any episodic room spinning sensations in general or with turning over in bed. She denies any recent illness. She has been getting more recent migraines. She has taken about 2 Excedrin and 3 Maxalt in the last few weeks and migraines respond to this.       Past Medical History:     Patient Active Problem List   Diagnosis     CARDIOVASCULAR SCREENING; LDL GOAL LESS THAN 160     Hypothyroidism     Dry eye syndrome- mild     Dysplasia of cervix, low grade (MILO 1)     Generalized anxiety disorder     Supervision of normal first pregnancy     Migraine without aura and without status migrainosus, not intractable     Oral contraceptive pill surveillance     Seasonal allergic rhinitis due to pollen     " Pollen-food allergy, subsequent encounter     Past Medical History:   Diagnosis Date     AR (allergic rhinitis)      ASCUS with positive high risk HPV 2/2013    type 53     Cervical high risk HPV (human papillomavirus) test positive 2/25/15     Dysplasia of cervix, low grade (MILO 1)      History of migraine headaches     controlled on Nortriptyline and Maxalt prn      Hypothyroidism      IUD (intrauterine device) in place     Mirena- placed 10/2019     Mood disorder (H)      Reactive airway disease that is not asthma     on Albuterol prn      Uncomplicated asthma 7/2015    Mild        Past Surgical History:     Past Surgical History:   Procedure Laterality Date     BIOPSY  [id number]    Cervix     HC TOOTH EXTRACTION W/FORCEP          Social History:     Social History     Tobacco Use     Smoking status: Never Smoker     Smokeless tobacco: Never Used     Tobacco comment: Nonsmoking household   Vaping Use     Vaping Use: Never used   Substance Use Topics     Alcohol use: Not Currently     Drug use: No        Family History:     Family History   Problem Relation Age of Onset     Breast Cancer Maternal Grandmother         in her 50s     Heart Disease Maternal Grandfather         50s     Myocardial Infarction Maternal Grandfather      Heart Disease Paternal Grandfather         50s     Myocardial Infarction Paternal Grandfather      Thyroid Disease Mother         graves-decompression and strabismus     Cancer Paternal Grandmother         cervical     Other Cancer Paternal Grandmother         Cervical     Thyroid Disease Paternal Aunt         nine affected in family     Glaucoma No family hx of      Macular Degeneration No family hx of      Cerebrovascular Disease No family hx of      Hypertension No family hx of      Diabetes No family hx of         Medications:     Current Outpatient Medications   Medication Sig     buPROPion (WELLBUTRIN XL) 150 MG 24 hr tablet Take 1 tablet (150 mg) by mouth every morning      cyclobenzaprine (FLEXERIL) 5 MG tablet Take 1-2 tablets (5-10 mg) by mouth 3 times daily as needed for muscle spasms     ferrous sulfate (FE TABS) 325 (65 Fe) MG EC tablet Take 1 tablet (325 mg) by mouth daily     fluticasone (FLONASE) 50 MCG/ACT nasal spray Spray 1-2 sprays into both nostrils daily     gabapentin (NEURONTIN) 100 MG capsule Take 2 capsules in the morning and 1 capsule at night. Can take 1 capsule in the afternoon as needed.     levonorgestrel (MIRENA) 20 MCG/24HR IUD 1 each by Intrauterine route once     levothyroxine (SYNTHROID/LEVOTHROID) 175 MCG tablet Take 1 tablet (175 mcg) by mouth daily Due for labs.     metoprolol succinate ER (TOPROL-XL) 25 MG 24 hr tablet TAKE ONE TABLET BY MOUTH ONCE DAILY     naproxen (NAPROSYN) 500 MG tablet Take 1 pill twice per day with food for 14 days then every 12 hours as needed     nortriptyline (PAMELOR) 50 MG capsule Take 1 capsule (50 mg) by mouth At Bedtime     rizatriptan (MAXALT-MLT) 10 MG ODT Take 1 tablet (10 mg) by mouth at onset of headache for migraine May repeat in 2 hours. Max 3 tablets/24 hours.     No current facility-administered medications for this visit.        Allergies:   No Known Allergies     Review of Systems:   As above     Physical Exam:   Vitals: /86 (BP Location: Right arm, Patient Position: Sitting, Cuff Size: Adult Regular)   Pulse 89   Wt 95.3 kg (210 lb)   BMI 37.20 kg/m     General: Seated comfortably in no acute distress.  Lungs: breathing comfortably  Extremities: no edema  Neurologic:     Mental Status: Fully alert, attentive. Normal memory and fund of knowledge. Language normal, speech clear and fluent, no paraphasic errors.      Cranial Nerves: Visual fields intact. PERRL. EOMI with normal smooth pursuit. Facial sensation intact/symmetric. Facial movements symmetric. Hearing not formally tested but intact to conversation. Palate elevation symmetric, uvula midline. No dysarthria. Shoulder shrug strong bilaterally.  Tongue protrusion midline.     Motor: No tremors in the extremities. Intermittent irregular movements of head from side to side. Muscle tone normal throughout. Normal/symmetric rapid finger tapping. Strength 5/5 throughout upper and lower extremities.     Deep Tendon Reflexes: 2+/symmetric throughout upper and lower extremities. No clonus. Toes equivocal.     Sensory: Intact/symmetric to light touch, temperature, vibration throughout upper and lower extremities. Negative Romberg.      Coordination: Finger-nose-finger and heel-shin intact without dysmetria. Rapid alternating movements intact/symmetric with normal speed and rhythm.     Gait: Normal, steady casual gait. Able to walk on toes, heels and tandem without difficulty.  Waverly Hallpike examination normal/negative     Data reviewed on previous visits    Imaging:  MRA head 2019  IMPRESSION: Mildly motion degraded images. No aneurysm identified.      MRI cervical 2018  IMPRESSION:  1. Minimal C3-C4 disc bulge without stenosis.  2. No evidence for any demyelinating disease.    Pertinent Investigations since last visit:   None         Assessment and Plan:   Assessment:  Jennifer Pastrana is a 30 year old female who presents today for follow-up of episodic migraines without auras and brief head pains. Right sided head pains are possibly secondary to occipital neuralgia. These are also well controlled on gabapentin. Patient was seen 2 weeks ago and at that visit migraines were well controlled on nortriptyline and Maxalt prn. She has had ~3 migraines in the last 2 weeks, which responded to Maxalt.     In the last 2 weeks she has also felt increase in symptoms of vertigo. Vertigo is described as a swaying sensation provoked by head movements and visual stimuli. She has noticed these sensation a few days a month over the last several years. In the last 2 weeks sensations have become daily. Unclear etiology of vertigo at this point. She was recently diagnosed with mild anemia  and mild hypothyroidism, which could be contributing in part. Symptoms aren't typical of BPPV and Bryant Hallpike was negative. History and examination aren't suggestive of vestibulopathy such as vestibular neuritis. It is possible that symptoms may be in part related to migraine headaches, but that is not clear per the history. Regardless of exact etiology, patient would benefit from vestibular therapy evaluation for symptomatic management. If this is not effective, additional work-up could be considered.       Plan:  - Vestibular therapy referral  - Continue nortriptyline 50 mg night  - Continue gabapentin 200 mg AM, 100 mg PM; extra 100 mg prn in the afternoon  - Maxalt 10 mg prn at the onset of migraine    Follow up in Neurology clinic in 3 months or earlier as needed should new concerns arise.    Patric Duron MD   of Neurology  [facility]    The total time of this encounter today amounted to 40 minutes. This time included time spent with the patient, prep work, ordering tests, and performing post visit documentation.        Again, thank you for allowing me to participate in the care of your patient.        Sincerely,        Patric Duron MD

## 2021-11-02 ENCOUNTER — LAB (OUTPATIENT)
Dept: LAB | Facility: CLINIC | Age: 31
End: 2021-11-02
Payer: COMMERCIAL

## 2021-11-02 DIAGNOSIS — Z13.220 LIPID SCREENING: ICD-10-CM

## 2021-11-02 DIAGNOSIS — D64.9 MILD ANEMIA: ICD-10-CM

## 2021-11-02 DIAGNOSIS — E03.9 ACQUIRED HYPOTHYROIDISM: ICD-10-CM

## 2021-11-02 LAB
CHOLEST SERPL-MCNC: 141 MG/DL
ERYTHROCYTE [DISTWIDTH] IN BLOOD BY AUTOMATED COUNT: 17.5 % (ref 10–15)
FASTING STATUS PATIENT QL REPORTED: YES
HCT VFR BLD AUTO: 39 % (ref 35–47)
HDLC SERPL-MCNC: 48 MG/DL
HGB BLD-MCNC: 12.2 G/DL (ref 11.7–15.7)
LDLC SERPL CALC-MCNC: 79 MG/DL
MCH RBC QN AUTO: 26.6 PG (ref 26.5–33)
MCHC RBC AUTO-ENTMCNC: 31.3 G/DL (ref 31.5–36.5)
MCV RBC AUTO: 85 FL (ref 78–100)
NONHDLC SERPL-MCNC: 93 MG/DL
PLATELET # BLD AUTO: 285 10E3/UL (ref 150–450)
RBC # BLD AUTO: 4.58 10E6/UL (ref 3.8–5.2)
T4 FREE SERPL-MCNC: 0.95 NG/DL (ref 0.76–1.46)
TRIGL SERPL-MCNC: 71 MG/DL
TSH SERPL DL<=0.005 MIU/L-ACNC: 6.93 MU/L (ref 0.4–4)
WBC # BLD AUTO: 6.2 10E3/UL (ref 4–11)

## 2021-11-02 PROCEDURE — 84443 ASSAY THYROID STIM HORMONE: CPT

## 2021-11-02 PROCEDURE — 85027 COMPLETE CBC AUTOMATED: CPT

## 2021-11-02 PROCEDURE — 36415 COLL VENOUS BLD VENIPUNCTURE: CPT

## 2021-11-02 PROCEDURE — 80061 LIPID PANEL: CPT

## 2021-11-02 PROCEDURE — 84439 ASSAY OF FREE THYROXINE: CPT

## 2021-11-05 ENCOUNTER — HOSPITAL ENCOUNTER (OUTPATIENT)
Dept: PHYSICAL THERAPY | Facility: CLINIC | Age: 31
Setting detail: THERAPIES SERIES
End: 2021-11-05
Attending: INTERNAL MEDICINE
Payer: COMMERCIAL

## 2021-11-05 DIAGNOSIS — R42 VERTIGO: ICD-10-CM

## 2021-11-05 PROCEDURE — 97112 NEUROMUSCULAR REEDUCATION: CPT | Mod: GP | Performed by: PHYSICAL THERAPIST

## 2021-11-05 PROCEDURE — 97161 PT EVAL LOW COMPLEX 20 MIN: CPT | Mod: GP | Performed by: PHYSICAL THERAPIST

## 2021-11-05 NOTE — PROGRESS NOTES
"   11/05/21 1500   Quick Adds   Quick Adds Vestibular Eval   Type of Visit Initial OP PT Evaluation   General Information   Start of Care Date 11/05/21   Referring Physician Patric Duron   Orders Evaluate and Treat as Indicated   Order Date 10/19/21   Medical Diagnosis vertigo    Onset of illness/injury or Date of Surgery 10/05/21   Precautions/Limitations no known precautions/limitations   Surgical/Medical history reviewed Yes   Pertinent history of current problem (include personal factors and/or comorbidities that impact the POC) Has had on/off vertigo for the past several years but this time is much different. At the beginning of October, had an \"attack\" of an headache/mirgrane. Symptoms progressed into feeling tingling/band feeling across her head, felt drunk, light headedness, extreme fatigue, nausea. Some symptoms would only last for about 5 minutes and other symptoms lingered into the rest of the day. Only noticed symptoms when sitting down but when standing symptoms went away. Riding in a car watching something, it starts symptoms. Ringing in ears but this chronic. No pressure or pain in either ear. Still getting daily bouts of vertigo mostly with just sitting. Reading a book, scrolling on a phone, or on a computer brings on symptoms.    Pertinent Visual History  no changes    Patient/Family Goals Statement improve dizziness feeling    General Information Comments PMH: throid problems, dizziness, allergies    Fall Risk Screen   Fall screen completed by PT   Have you fallen 2 or more times in the past year? No   Have you fallen and had an injury in the past year? No   Is patient a fall risk? No   Abuse Screen (yes response referral indicated)   Feels Unsafe at Home or Work/School no   Feels Threatened by Someone no   Does Anyone Try to Keep You From Having Contact with Others or Doing Things Outside Your Home? no   Physical Signs of Abuse Present no   System Outcome Measures   Outcome Measures BPPV "   Dizziness Handicap Inventory (score out of 100) A decrease in score by 17.18 or greater indicates a clinically significant change in symptoms. 26   Gait Special Tests   Gait Special Tests DYNAMIC GAIT INDEX   Gait Special Tests Dynamic Gait Index   Score out of 24 12/12   Comments 4 item DGI    Balance Special Tests   Balance Special Tests Modified CTSIB Conditions   Balance Special Tests Modified CTSIB Conditions   Condition 1, seconds 30 Seconds   Condition 2, seconds 30 Seconds   Condition 4, seconds 30 Seconds   Condition 5, seconds 30 Seconds   Cervicogenic Screen   Neck ROM rotation: WNL, flex/ext: WNL, SB: WNL   no increase in symptoms    Vertebral Artery Test Normal   Oculomotor Exam   Smooth Pursuit Normal   Saccades Normal   VOR Normal   VOR Cancellation Normal   VOR Cancellation Comments felt a little bit of Nausea after and surroundings moving    Rapid Head Thrust Corrective Saccade L head thrust   Rapid Head Thrust Comments very minor corrective saccade noticed 2/3 times testing    Convergence Testing Normal   Convergence Testing Comments 1 inch from nose    Infrared Goggle Exam or Frenzel Lense Exam   Vestibular Suppressant in Last 24 Hours? No   Exam completed with Infrared Goggles   Spontaneous Nystagmus Negative   Gaze Evoked Nystagmus Negative   Head Shake Horizontal Nystagmus Negative   Head Shake Horizontal Nystagmus comments feels like she is moving slightly for a few seconds following    [place]-Hallpike (right) Negative   Irvine-Hallpike (Left) Negative   HSCC Supine Roll Test (Right) Negative   HSCC Supine Roll Test (Left) Negative   Supine Neck Extension Test Negative   Dynamic Visual Acuity (DVA)   Static Acuity (LogMar) line 8    Horizontal Head Movement at 1 Hz (LogMar) line 7    Horizontal Head Movement at 2 Hz (LogMar) line 5 Missed letters    DVA Comments increased symptoms of dizziness with 2Hz    Planned Therapy Interventions   Planned Therapy Interventions neuromuscular  re-education;balance training   Clinical Impression   Criteria for Skilled Therapeutic Interventions Met yes, treatment indicated   PT Diagnosis left vestibular hypofunction    Influenced by the following impairments corrective saccade L, dizziness, + DVA    Functional limitations due to impairments reading, computer screens, driving, busy environments    Clinical Presentation Stable/Uncomplicated   Clinical Presentation Rationale clinical decision making and chart review    Clinical Decision Making (Complexity) Low complexity   Therapy Frequency 1 time/week   Predicted Duration of Therapy Intervention (days/wks) 6 weeks   Risk & Benefits of therapy have been explained Yes   Patient, Family & other staff in agreement with plan of care Yes   Clinical Impression Comments Jennifer Pastrana is a 30 year old female who presents to therapy with insidious onset of vertigo symptoms over the past month. Signs and symptoms consistent with vestibular hypofunction on the left.    Education Assessment   Preferred Learning Style Listening;Demonstration;Pictures/video   Barriers to Learning No barriers   GOALS   PT Eval Goals 1;2;3   Goal 1   Goal Identifier 1   Goal Description Patient will be able to read a book without feeling dizzy/nauseous.    Target Date 11/26/21   Goal 2   Goal Identifier 2   Goal Description Patient will be able to drive in her car without symptoms of dizziness/nausea.    Target Date 12/17/21   Goal 3   Goal Identifier 3   Goal Description Patient will be able to work on a computer screen or scroll on her phone for 30 minutes without symptoms of dizziness.    Target Date 12/17/21   Total Evaluation Time   PT Eval, Low Complexity Minutes (05134) 27       Bee Flores  PT, DPT       11/5/2021   [address]   [address] [place] [id number]  [email or web address]  Voicemail: [phone]

## 2021-11-08 ENCOUNTER — MYC MEDICAL ADVICE (OUTPATIENT)
Dept: FAMILY MEDICINE | Facility: CLINIC | Age: 31
End: 2021-11-08
Payer: COMMERCIAL

## 2021-11-09 DIAGNOSIS — M54.2 NECK PAIN: ICD-10-CM

## 2021-11-09 DIAGNOSIS — S46.812A STRAIN OF LEFT TRAPEZIUS MUSCLE, INITIAL ENCOUNTER: ICD-10-CM

## 2021-11-10 ENCOUNTER — HOSPITAL ENCOUNTER (OUTPATIENT)
Dept: PHYSICAL THERAPY | Facility: CLINIC | Age: 31
Setting detail: THERAPIES SERIES
End: 2021-11-10
Attending: INTERNAL MEDICINE
Payer: COMMERCIAL

## 2021-11-10 PROCEDURE — 97112 NEUROMUSCULAR REEDUCATION: CPT | Mod: GP | Performed by: PHYSICAL THERAPIST

## 2021-11-11 RX ORDER — NAPROXEN 500 MG/1
TABLET ORAL
Qty: 40 TABLET | Refills: 0 | Status: SHIPPED | OUTPATIENT
Start: 2021-11-11 | End: 2022-04-15

## 2021-11-12 NOTE — TELEPHONE ENCOUNTER
Prescription approved per [facility] Refill Protocol.  Dorcas Zaldivar RN

## 2021-11-15 DIAGNOSIS — E03.9 ACQUIRED HYPOTHYROIDISM: ICD-10-CM

## 2021-11-15 RX ORDER — LEVOTHYROXINE SODIUM 200 UG/1
200 TABLET ORAL DAILY
Qty: 30 TABLET | Refills: 1 | Status: SHIPPED | OUTPATIENT
Start: 2021-11-15 | End: 2022-01-10

## 2021-11-17 ENCOUNTER — HOSPITAL ENCOUNTER (OUTPATIENT)
Dept: PHYSICAL THERAPY | Facility: CLINIC | Age: 31
Setting detail: THERAPIES SERIES
End: 2021-11-17
Attending: INTERNAL MEDICINE
Payer: COMMERCIAL

## 2021-11-17 PROCEDURE — 97112 NEUROMUSCULAR REEDUCATION: CPT | Mod: GP | Performed by: PHYSICAL THERAPIST

## 2021-12-02 DIAGNOSIS — I49.1 SUPRAVENTRICULAR PREMATURE BEATS: ICD-10-CM

## 2021-12-02 DIAGNOSIS — R00.2 PALPITATIONS: ICD-10-CM

## 2021-12-05 RX ORDER — METOPROLOL SUCCINATE 25 MG/1
TABLET, EXTENDED RELEASE ORAL
Qty: 90 TABLET | Refills: 0 | Status: SHIPPED | OUTPATIENT
Start: 2021-12-05 | End: 2022-03-01

## 2021-12-23 NOTE — PROGRESS NOTES
SUBJECTIVE:   Jennifer Pastrana is a 28 year old female who presents to clinic today for the following health issues:      Dizziness  Onset: x 6 days    Description:   Do you feel faint:  YES  Does it feel like the surroundings (bed, room) are moving: YES  Unsteady/off balance: YES  Have you passed out or fallen: no     Intensity: 8/10; will occur 1-2x/ day lasting about 30 min    Progression of Symptoms:  same     Accompanying Signs & Symptoms:  Heart palpitations: no   Nausea, vomiting: no   Weakness in arms or legs: YES- heaviness in legs with walking when she is feeling dizzy   Fatigue: no   Vision or speech changes: no   Ringing in ears (Tinnitus): no   Hearing Loss: no     History:   Head trauma/concussion hx: no   Previous similar symptoms: no   Recent bleeding history: no     Precipitating factors:   Worse with activity or head movement: YES- seems to occur more when looking down   Any new medications (BP?): no   Alcohol/drug abuse/withdrawal: no     Alleviating factors:   Does staying in a fixed position give relief:  YES    Therapies Tried and outcome: none    Concerned to a prior family history of brain aneurysm (paternal grandmother and paternal aunts).    Denies feeling anxious or depressed.   - PHQ-9/ARNIE 7 completed, see Epic for details      Currently on OCPs. Menses are regular.    Problem list and histories reviewed & adjusted, as indicated.  Additional history: as documented    Patient Active Problem List   Diagnosis     CARDIOVASCULAR SCREENING; LDL GOAL LESS THAN 160     Hypothyroidism     Dry eye syndrome- mild     Dysplasia of cervix, low grade (MILO 1)     Generalized anxiety disorder     Supervision of normal first pregnancy     Headache in pregnancy-NEURO referral     Migraine without aura and without status migrainosus, not intractable     Reactive airway disease that is not asthma     Oral contraceptive pill surveillance     Past Surgical History:   Procedure Laterality Date     NO HISTORY  Procedure Scheduling Information    Procedure:  EGD  Last Procedure:  1/17/2019   Procedure Date:  3/1/2022   Procedure Time:  1415  Reason:  Dysphagia   Referring provider:  La GILLESPIE   [facility]: [facility]   Prep:  N/A  Sedation:  MAC     If MAC, why:  Multiple Comorbidities  Pacemaker/Defibrillator:  no     Device Interrogation Form Faxed:  n/a  Anticoagulation:  yes - Eliquis    Prescribing Provider:  Debra Rodriguez PA-C                        OF SURGERY         Social History     Tobacco Use     Smoking status: Never Smoker     Smokeless tobacco: Never Used     Tobacco comment: Nonsmoking household   Substance Use Topics     Alcohol use: No     Family History   Problem Relation Age of Onset     Breast Cancer Maternal Grandmother         in her 50s     Heart Disease Maternal Grandfather         50s     Myocardial Infarction Maternal Grandfather      Heart Disease Paternal Grandfather         50s     Myocardial Infarction Paternal Grandfather      Thyroid Disease Mother         graves-decompression and strabismus     Cancer Paternal Grandmother         cervical     Thyroid Disease Paternal Aunt         nine affected in family     Glaucoma No family hx of      Macular Degeneration No family hx of      Cerebrovascular Disease No family hx of      Hypertension No family hx of      Diabetes No family hx of          Current Outpatient Medications   Medication Sig Dispense Refill     albuterol (PROAIR HFA/PROVENTIL HFA/VENTOLIN HFA) 108 (90 Base) MCG/ACT inhaler Inhale 2 puffs into the lungs every 6 hours 1 Inhaler 11     cyanocobalamin (VITAMIN  B-12) 1000 MCG tablet Take by mouth daily       diazepam (VALIUM) 5 MG tablet One tablet one half hour before MRI. May take second tablet if needed 2 tablet 0     fluticasone (FLONASE) 50 MCG/ACT spray Spray 1-2 sprays into both nostrils daily 1 Bottle 11     levothyroxine (SYNTHROID/LEVOTHROID) 150 MCG tablet Take 1 tablet (150 mcg) by mouth daily 90 tablet 3     Loratadine (CLARITIN PO) Take 10 mg by mouth daily Reported on 4/26/2017       norgestimate-ethinyl estradiol (ORTHO-CYCLEN, SPRINTEC) 0.25-35 MG-MCG per tablet Take 1 tablet by mouth daily 90 tablet 3     nortriptyline (PAMELOR) 10 MG capsule 3 CAPS at bedtime 270 capsule 2     rizatriptan (MAXALT-MLT) 10 MG ODT tab Take 1 tablet (10 mg) by mouth at onset of headache for migraine May repeat in 2 hours. Max 3 tablets/24 hours. 12 tablet 1     VITAMIN D,  "CHOLECALCIFEROL, PO Take 2,000 Units by mouth daily       No Known Allergies    Reviewed and updated as needed this visit by clinical staff       Reviewed and updated as needed this visit by Provider         ROS:  Constitutional, HEENT, cardiovascular, pulmonary, gi and gu systems are negative, except as otherwise noted.    OBJECTIVE:     /86 (BP Location: Left arm, Patient Position: Standing, Cuff Size: Adult Regular)   Pulse 105   Temp 97.9  F (36.6  C) (Tympanic)   Ht 1.594 m (5' 2.75\")   Wt 87.5 kg (192 lb 12.8 oz)   LMP 01/24/2019 (Exact Date)   SpO2 98%   Breastfeeding? No   BMI 34.43 kg/m    Body mass index is 34.43 kg/m .  GENERAL: healthy, alert and no distress  EYES: Eyes grossly normal to inspection, PERRL and conjunctivae and sclerae normal  HENT: ear canals and TM's normal, nose and mouth without ulcers or lesions  RESP: lungs clear to auscultation - no rales, rhonchi or wheezes  CV: regular rate and rhythm, normal S1 S2, no S3 or S4, no murmur, click or rub, no peripheral edema and peripheral pulses strong  MS: no gross musculoskeletal defects noted, no edema  NEURO: Normal strength and tone, sensory exam grossly normal, mentation intact, speech normal, cranial nerves 2-12 intact and gait normal including heel/toe/tandem walking  PSYCH: mentation appears normal, affect normal/bright    Diagnostic Test Results:  Reviewed and discussed with patient prior to discharge.  Results for orders placed or performed in visit on 01/31/19   CBC with platelets   Result Value Ref Range    WBC 5.4 4.0 - 11.0 10e9/L    RBC Count 4.79 3.8 - 5.2 10e12/L    Hemoglobin 14.2 11.7 - 15.7 g/dL    Hematocrit 42.5 35.0 - 47.0 %    MCV 89 78 - 100 fl    MCH 29.6 26.5 - 33.0 pg    MCHC 33.4 31.5 - 36.5 g/dL    RDW 12.6 10.0 - 15.0 %    Platelet Count 279 150 - 450 10e9/L     EKG: appears normal, NSR, normal axis, normal intervals, no acute ST/T changes c/w ischemia, no LVH by voltage criteria, there are no prior " tracings available      ASSESSMENT/PLAN:   Jennifer was seen today for dizziness.    Diagnoses and all orders for this visit:    Dizziness, intermittent  -    Orthostatics negative     -    CBC with platelets  -     Basic metabolic panel  -     TSH with free T4 reflex  -     Vitamin B12  -     Folate  -     EKG 12-lead complete w/read - Clinics  -      PHQ-9/ARNIE 7 completed, see above/Epic for details    -     In the interim encouraged to increase fluid intake      Family history of aneurysm of blood vessel of brain  -     MRA Brain ([place]) wo Contrast; Future    History of claustrophobia  -     diazepam (VALIUM) 5 MG tablet; One tablet one half hour before MRI. May take second tablet if needed    Will notify her with the rest of the results.    Patient education and Handout given       Follow up if symptoms fail to improve 1 week or worsen.      The patient was in agreement with the plan today and had no questions or concerns prior to leaving the clinic.      Ashley Fish MD  [facility]

## 2021-12-28 ENCOUNTER — E-VISIT (OUTPATIENT)
Dept: URGENT CARE | Facility: URGENT CARE | Age: 31
End: 2021-12-28
Payer: COMMERCIAL

## 2021-12-28 DIAGNOSIS — J01.90 ACUTE BACTERIAL SINUSITIS: Primary | ICD-10-CM

## 2021-12-28 DIAGNOSIS — B96.89 ACUTE BACTERIAL SINUSITIS: Primary | ICD-10-CM

## 2021-12-28 PROCEDURE — 99421 OL DIG E/M SVC 5-10 MIN: CPT | Performed by: FAMILY MEDICINE

## 2021-12-28 NOTE — PATIENT INSTRUCTIONS
Dear Jennifer Pastrana    After reviewing your responses, I've been able to diagnose you with?a sinus infection caused by bacteria.?     Based on your responses and diagnosis, I have prescribed Augmentin to treat your symptoms. I have sent this to your pharmacy.?     It is also important to stay well hydrated, get lots of rest and take over-the-counter decongestants,?tylenol?or ibuprofen if you?are able to?take those medications per your primary care provider to help relieve discomfort.?     It is important that you take?all of?your prescribed medication even if your symptoms are improving after a few doses.? Taking?all of?your medicine helps prevent the symptoms from returning.?     If your symptoms worsen, you develop severe headache, vomiting, high fever (>102), or are not improving in 7 days, please contact your primary care provider for an appointment or visit any of our convenient Walk-in Care or Urgent Care Centers to be seen which can be found on our website?here.?     Thanks again for choosing?us?as your health care partner,?   ?  Montana Wilkinson, DO?    Please ask if pt would like me to call in a nasal spray for her  I see she has been on a few in the past  If so I will send to her pharmacy

## 2022-01-07 ENCOUNTER — LAB (OUTPATIENT)
Dept: LAB | Facility: CLINIC | Age: 32
End: 2022-01-07
Payer: COMMERCIAL

## 2022-01-07 DIAGNOSIS — E03.9 ACQUIRED HYPOTHYROIDISM: ICD-10-CM

## 2022-01-07 LAB — TSH SERPL DL<=0.005 MIU/L-ACNC: 1.12 MU/L (ref 0.4–4)

## 2022-01-07 PROCEDURE — 84443 ASSAY THYROID STIM HORMONE: CPT

## 2022-01-07 PROCEDURE — 36415 COLL VENOUS BLD VENIPUNCTURE: CPT

## 2022-01-10 DIAGNOSIS — E03.9 ACQUIRED HYPOTHYROIDISM: ICD-10-CM

## 2022-01-10 RX ORDER — LEVOTHYROXINE SODIUM 200 UG/1
200 TABLET ORAL DAILY
Qty: 90 TABLET | Refills: 1 | Status: SHIPPED | OUTPATIENT
Start: 2022-01-10 | End: 2022-04-18

## 2022-01-28 ENCOUNTER — VIRTUAL VISIT (OUTPATIENT)
Dept: FAMILY MEDICINE | Facility: CLINIC | Age: 32
End: 2022-01-28
Payer: COMMERCIAL

## 2022-01-28 DIAGNOSIS — M54.50 ACUTE BILATERAL LOW BACK PAIN WITHOUT SCIATICA: Primary | ICD-10-CM

## 2022-01-28 PROCEDURE — 99214 OFFICE O/P EST MOD 30 MIN: CPT | Mod: GT | Performed by: PHYSICIAN ASSISTANT

## 2022-01-28 RX ORDER — CYCLOBENZAPRINE HCL 10 MG
5-10 TABLET ORAL 3 TIMES DAILY PRN
Qty: 25 TABLET | Refills: 0 | Status: SHIPPED | OUTPATIENT
Start: 2022-01-28 | End: 2022-12-04

## 2022-01-28 NOTE — PATIENT INSTRUCTIONS
Raoul Rodriguez,    Thank you for allowing [facility] to manage your care.    I am unsure of the cause of your symptoms, but this is most consistent with musculoskeletal pain of the lumbar spine. We will see what our workup shows.     If you develop worsening/changing symptoms at any time, please call 911 or go to the emergency department for evaluation.    I sent your prescriptions to your pharmacy.    For your pain, please use Ibuprofen 400mg four times daily with food. Between ibuprofen doses, you may use Tylenol 650mg.     Max acetaminophen (Tylenol) 4,000mg/24 hours  Max ibuprofen 3,200mg/24 hours    For severe pain not controlled by over the counter medications, please use cyclobenzaprine as prescribed. Do not use this medication while driving, operating machinery, with other sedating medications, or while drinking alcohol as it will make you drowsy.    I ordered an MRI if you are not improving in 2 weeks as needed. Please call diagnostic imaging [phone] to schedule your test.    I made a referral t PT and spine. Please call themto schedule. See PT now and spine after your MRI if needed.    Drink 8-10 glasses of fluid daily to stay well-hydrated.    Please allow 1-2 business days for our office to contact you in regards to your laboratory/radiological studies.  If not done so, I encourage you to login into [employer] [email or web address] to review your results as well.     If you have any questions or concerns, please feel free to call us at [phone]    Sincerely,    Franklyn Hernandez PA-C    Did you know?      You can schedule a video visit for follow-up appointments as well as future appointments for certain conditions.  Please see the below link.     [email or web address]    If you have not already done so,  I encourage you to sign up for [employer] ([email or web address]).  This will allow you to review your results, securely  communicate with a provider, and schedule virtual visits as well.      Patient Education     Relieving Back Pain  Back pain is a common problem. You can strain back muscles by lifting too much weight or just by moving the wrong way. Back strain can be uncomfortable, even painful. And it can take weeks or months to improve. To help yourself feel better and prevent future back strains, try these tips.  Important: Don't give aspirin to children or teens without first discussing it with your child's healthcare provider.  Ice    Ice reduces muscle pain and swelling. It helps most during the first 24 to 48 hours after an injury.    Wrap an ice pack or a bag of frozen peas in a thin towel. Never put ice directly on your skin.    Place the ice where your back hurts the most.    Don t ice for more than 20 minutes at a time.    You can use ice several times a day.  Medicines  Over-the-counter pain relievers include acetaminophen and anti-inflammatory medicines, which includes aspirin, naproxen, or ibuprofen. They can help ease discomfort. Some also reduce swelling.    Tell your healthcare provider about any medicines you are already taking.    Take medicines only as directed.  Manipulation and massage  Having manipulation by an osteopathic doctor or chiropractor may be helpful. Getting a massage also may help.   Heat  After the first 48 hours, heat can relax sore muscles and improve blood flow.    Try a warm bath or shower. Or use a heating pad set on low. To prevent a burn, keep a cloth between you and the heating pad.    Don t use a heating pad for more than 15 minutes at a time. Never sleep on a heating pad.  ihush.com last reviewed this educational content on 6/1/2018 2000-2021 [employer]. All rights reserved. This information is not intended as a substitute for professional medical care. Always follow your healthcare professional's instructions.

## 2022-01-28 NOTE — PROGRESS NOTES
Jennifer is a 31 year old who is being evaluated via a billable video visit.      How would you like to obtain your AVS? MyChart  If the video visit is dropped, the invitation should be resent by: Text to cell phone: [phone]  Will anyone else be joining your video visit? No    Video Start Time: 2:41 PM    Assessment & Plan   Problem List Items Addressed This Visit     None      Visit Diagnoses     Acute bilateral low back pain without sciatica    -  Primary    Relevant Medications    cyclobenzaprine (FLEXERIL) 10 MG tablet    Other Relevant Orders    [facility] Referral    MR Lumbar Spine w/o Contrast    Spine  Referral         I do not believe a fracture to be the source of this patient's pain as there was no preceding trauma.  Based on this, no imaging of the spine was done.    I do not believe the pain is caused by an epidural abscess as the patient denies hx of IV drug use and does not have fevers/chills and no recent procedures.    I do not believe this patient's pain is from an infiltrative vertebral tumor as the patient does not have weight loss or night sweats and no known history of cancer.    I do not believe this patient's pain represents a rupture AAA.  There is no palpable, pulsatile mass on exam and patient does not have any ABD pain.      Based on history and exam, the most likely etiology of this patient's back pain is lumbosacral strain, less likely radiculopathy.  Emergent MRI is not indicated as this patient does not have new weakness or cauda equina syndrome.  Patient has no saddle anesthesia, bowel or bladder incontinence. Will send home with otc analgesics,  muscle relaxant for breakthrough pain/spasm, rice/heat, and physical therapy referral.  Follow-up in 2-3 weeks for MRI and spine eval if not improving.    DDx and Dx discussed with and explained to the pt to their satisfaction.  All questions were answered at this time. Pt expressed understanding of and agreement with this  dx, tx, and plan. No further workup warranted and standard medication warnings given. I have given the patient a list of pertinent indications for re-evaluation. Will go to the Emergency Department if symptoms worsen or new concerning symptoms arise. Patient left the call in no apparent distress.      See Patient Instructions    Return in about 1 month (around 2/28/2022) for a recheck of your symptoms if not improving, or call 911/go to an ER anytime if worsening.    TRAY Ceballos  [facility] ADAM Rodriguez is a 31 year old who presents for the following health issues     HPI   Low back pain for one month bilaterally. Worse with sitting. Feels some pain in right calf from time to time.    Pain History:  When did you first notice your pain? - 4 weeks   Have you seen any provider previously for this issue? No  How has your pain affected your ability to work? 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 Musculoskeletal problem/pain  Onset/Duration: 1 month   Description  Location: low  - back pain   Joint Swelling: no  Redness: no  Pain: YES  Warmth: no  Intensity:  moderate  Progression of Symptoms:  worsening and intermittent  Accompanying signs and symptoms:   Fevers: no  Numbness/tingling/weakness: no  History  Trauma to the area: no  Recent illness:  no  Previous similar problem: no  Previous evaluation:  no  Precipitating or alleviating factors:  Aggravating factors include: sitting  Therapies tried and outcome: heat, ice, walking, Ibuprofen and naproxen - some relief       Review of Systems   Constitutional, HEENT, cardiovascular, pulmonary, gi and gu systems are negative, except as otherwise noted.      Objective           Vitals:  No vitals were obtained today due to virtual visit.    Physical Exam   GENERAL: Healthy, alert and no distress  EYES: Eyes grossly normal to inspection.  No  discharge or erythema, or obvious scleral/conjunctival abnormalities.  RESP: No audible wheeze, cough, or visible cyanosis.  No visible retractions or increased work of breathing.    SKIN: Visible skin clear. No significant rash, abnormal pigmentation or lesions.  NEURO: Able to toe lift, heel walk and knee bend. Cranial nerves grossly intact.  Mentation and speech appropriate for age.  PSYCH: Mentation appears normal, affect normal/bright, judgement and insight intact, normal speech and appearance well-groomed.        Video-Visit Details    Type of service:  Video Visit    Video End Time: 2:55 PM    Originating Location (pt. Location): Other work    Distant Location (provider location):  work    Platform used for Video Visit: [employer]

## 2022-02-01 ENCOUNTER — OFFICE VISIT (OUTPATIENT)
Dept: NEUROLOGY | Facility: CLINIC | Age: 32
End: 2022-02-01
Payer: COMMERCIAL

## 2022-02-01 VITALS
SYSTOLIC BLOOD PRESSURE: 124 MMHG | HEIGHT: 64 IN | DIASTOLIC BLOOD PRESSURE: 83 MMHG | BODY MASS INDEX: 35.51 KG/M2 | WEIGHT: 208 LBS | HEART RATE: 90 BPM

## 2022-02-01 DIAGNOSIS — G43.009 MIGRAINE WITHOUT AURA AND WITHOUT STATUS MIGRAINOSUS, NOT INTRACTABLE: Primary | ICD-10-CM

## 2022-02-01 DIAGNOSIS — R42 VERTIGO: ICD-10-CM

## 2022-02-01 DIAGNOSIS — M54.81 OCCIPITAL NEURALGIA OF RIGHT SIDE: ICD-10-CM

## 2022-02-01 PROCEDURE — 99214 OFFICE O/P EST MOD 30 MIN: CPT | Performed by: INTERNAL MEDICINE

## 2022-02-01 ASSESSMENT — MIFFLIN-ST. JEOR: SCORE: 1625.54

## 2022-02-01 ASSESSMENT — PAIN SCALES - GENERAL: PAINLEVEL: NO PAIN (0)

## 2022-02-01 NOTE — LETTER
2/1/2022         RE: Jennifer Pastrana  [address] [facility] [address]        Dear Colleague,    Thank you for referring your patient, Jennifer Pastrana, to the [facility] NEUROLOGY [facility] [place]. Please see a copy of my visit note below.    [facility] Neurology Follow Up Visit    Jennifer Pastrana MRN# [id number]   Age: 30 year old YOB: 1990     Brief history of symptoms: The patient was initially seen in neurologic consultation on 10/5/2021 for evaluation of migraines and head pains. Please see the comprehensive neurologic consultation note from that date in the Epic records for details.     Interval history:   Vertigo is better compared to last visit. After last visit she did several weeks of vestibular therapy and this seemed to improve symptoms.     Migraines and head pains are overall well controlled. She has only needed to take Maxalt a few times each month. She continues on nortriptyline and gabapentin without side effects. She would like to continue current dosages of medications.       Past Medical History:     Patient Active Problem List   Diagnosis     CARDIOVASCULAR SCREENING; LDL GOAL LESS THAN 160     Hypothyroidism     Dry eye syndrome- mild     Dysplasia of cervix, low grade (MILO 1)     Generalized anxiety disorder     Supervision of normal first pregnancy     Migraine without aura and without status migrainosus, not intractable     Oral contraceptive pill surveillance     Seasonal allergic rhinitis due to pollen     Pollen-food allergy, subsequent encounter     Past Medical History:   Diagnosis Date     AR (allergic rhinitis)      ASCUS with positive high risk HPV 2/2013    type 53     Cervical high risk HPV (human papillomavirus) test positive 2/25/15     Dysplasia of cervix, low grade (MILO 1)      History of migraine headaches     controlled on Nortriptyline and Maxalt prn      Hypothyroidism      IUD (intrauterine device) in place     Mirena- placed 10/2019     Mood  disorder (H)      Reactive airway disease that is not asthma     on Albuterol prn      Uncomplicated asthma 7/2015    Mild        Past Surgical History:     Past Surgical History:   Procedure Laterality Date     BIOPSY  [id number]    Cervix     HC TOOTH EXTRACTION W/FORCEP          Social History:     Social History     Tobacco Use     Smoking status: Never Smoker     Smokeless tobacco: Never Used     Tobacco comment: Nonsmoking household   Vaping Use     Vaping Use: Never used   Substance Use Topics     Alcohol use: Not Currently     Drug use: No        Family History:     Family History   Problem Relation Age of Onset     Breast Cancer Maternal Grandmother         in her 50s     Heart Disease Maternal Grandfather         50s     Myocardial Infarction Maternal Grandfather      Heart Disease Paternal Grandfather         50s     Myocardial Infarction Paternal Grandfather      Thyroid Disease Mother         graves-decompression and strabismus     Cancer Paternal Grandmother         cervical     Other Cancer Paternal Grandmother         Cervical     Thyroid Disease Paternal Aunt         nine affected in family     Glaucoma No family hx of      Macular Degeneration No family hx of      Cerebrovascular Disease No family hx of      Hypertension No family hx of      Diabetes No family hx of         Medications:     Current Outpatient Medications   Medication Sig     buPROPion (WELLBUTRIN XL) 150 MG 24 hr tablet Take 1 tablet (150 mg) by mouth every morning     cyclobenzaprine (FLEXERIL) 10 MG tablet Take 0.5-1 tablets (5-10 mg) by mouth 3 times daily as needed for muscle spasms     cyclobenzaprine (FLEXERIL) 5 MG tablet Take 1-2 tablets (5-10 mg) by mouth 3 times daily as needed for muscle spasms     ferrous sulfate (FE TABS) 325 (65 Fe) MG EC tablet Take 1 tablet (325 mg) by mouth daily     fluticasone (FLONASE) 50 MCG/ACT nasal spray Spray 1-2 sprays into both nostrils daily     gabapentin (NEURONTIN) 100 MG capsule Take  "2 capsules in the morning and 1 capsule at night. Can take 1 capsule in the afternoon as needed.     levonorgestrel (MIRENA) 20 MCG/24HR IUD 1 each by Intrauterine route once     levothyroxine (SYNTHROID/LEVOTHROID) 200 MCG tablet Take 1 tablet (200 mcg) by mouth daily     metoprolol succinate ER (TOPROL-XL) 25 MG 24 hr tablet TAKE ONE TABLET BY MOUTH ONCE DAILY     naproxen (NAPROSYN) 500 MG tablet Take 1 pill twice per day with food for 14 days then every 12 hours as needed     nortriptyline (PAMELOR) 50 MG capsule Take 1 capsule (50 mg) by mouth At Bedtime     rizatriptan (MAXALT-MLT) 10 MG ODT Take 1 tablet (10 mg) by mouth at onset of headache for migraine May repeat in 2 hours. Max 3 tablets/24 hours.     No current facility-administered medications for this visit.        Allergies:   No Known Allergies     Review of Systems:   As above     Physical Exam:   Vitals: /83   Pulse 90   Ht 1.613 m (5' 3.5\")   Wt 94.3 kg (208 lb)   BMI 36.27 kg/m     General: Seated comfortably in no acute distress.  Lungs: breathing comfortably  Extremities: no edema  Neurologic:     Mental Status: Fully alert, attentive. Normal memory and fund of knowledge. Language normal, speech clear and fluent, no paraphasic errors.      Cranial Nerves: Visual fields intact. PERRL. EOMI with normal smooth pursuit. Facial sensation intact/symmetric. Facial movements symmetric. Hearing not formally tested but intact to conversation. Palate elevation symmetric, uvula midline. No dysarthria. Shoulder shrug strong bilaterally. Tongue protrusion midline.     Motor: No tremors in the extremities. Intermittent irregular movements of head from side to side. Muscle tone normal throughout. Normal/symmetric rapid finger tapping. Strength 5/5 throughout upper and lower extremities.     Deep Tendon Reflexes: 2+/symmetric throughout upper and lower extremities. No clonus.      Sensory: Intact/symmetric to light touch throughout upper and lower " extremities. Negative Romberg.      Coordination: Finger-nose-finger and heel-shin intact without dysmetria. Rapid alternating movements intact/symmetric with normal speed and rhythm.     Gait: Normal, steady casual gait. Able to walk on toes, heels and tandem without difficulty.     Data reviewed on previous visits    Imaging:  MRA head 2019  IMPRESSION: Mildly motion degraded images. No aneurysm identified.      MRI cervical 2018  IMPRESSION:  1. Minimal C3-C4 disc bulge without stenosis.  2. No evidence for any demyelinating disease.    Pertinent Investigations since last visit:   None         Assessment and Plan:   Assessment:  Jennifer Pastrana is a 30 year old female who presents today for follow-up of episodic migraines without auras, brief head pains, and intermittent vertigo. Right sided head pains are possibly secondary to occipital neuralgia. These are also well controlled on gabapentin. Migraines are well controlled on nortriptyline and Maxalt prn. At last visit in the fall patient had developed 2 weeks of vertigo, described as a swaying sensation provoked by head movements and visual stimuli. Symptoms were not specific to an etiology, but have improved with vestibular therapy.     Plan:  - Continue nortriptyline 50 mg night  - Continue gabapentin 200 mg AM, 100 mg PM; extra 100 mg prn in the afternoon  - Maxalt 10 mg prn at the onset of migraine  - Vestibular exercises prn for vertiginous symptoms    Follow up in Neurology clinic in 6 months or earlier as needed should new concerns arise.    Patric Duron MD   of Neurology  [facility]      Again, thank you for allowing me to participate in the care of your patient.        Sincerely,        Patric Duron MD

## 2022-02-01 NOTE — NURSING NOTE
"Jennifer Pastrana's goals for this visit include: return  She requests these members of her care team be copied on today's visit information:     PCP: Ashley Fish    Referring Provider:  No referring provider defined for this encounter.    /83   Pulse 90   Ht 1.613 m (5' 3.5\")   Wt 94.3 kg (208 lb)   BMI 36.27 kg/m      Do you need any medication refills at today's visit? n  "

## 2022-02-01 NOTE — PROGRESS NOTES
[facility] Neurology Follow Up Visit    Jennifer Pastrana MRN# [id number]   Age: 30 year old YOB: 1990     Brief history of symptoms: The patient was initially seen in neurologic consultation on 10/5/2021 for evaluation of migraines and head pains. Please see the comprehensive neurologic consultation note from that date in the Epic records for details.     Interval history:   Vertigo is better compared to last visit. After last visit she did several weeks of vestibular therapy and this seemed to improve symptoms.     Migraines and head pains are overall well controlled. She has only needed to take Maxalt a few times each month. She continues on nortriptyline and gabapentin without side effects. She would like to continue current dosages of medications.       Past Medical History:     Patient Active Problem List   Diagnosis     CARDIOVASCULAR SCREENING; LDL GOAL LESS THAN 160     Hypothyroidism     Dry eye syndrome- mild     Dysplasia of cervix, low grade (MILO 1)     Generalized anxiety disorder     Supervision of normal first pregnancy     Migraine without aura and without status migrainosus, not intractable     Oral contraceptive pill surveillance     Seasonal allergic rhinitis due to pollen     Pollen-food allergy, subsequent encounter     Past Medical History:   Diagnosis Date     AR (allergic rhinitis)      ASCUS with positive high risk HPV 2/2013    type 53     Cervical high risk HPV (human papillomavirus) test positive 2/25/15     Dysplasia of cervix, low grade (MILO 1)      History of migraine headaches     controlled on Nortriptyline and Maxalt prn      Hypothyroidism      IUD (intrauterine device) in place     Mirena- placed 10/2019     Mood disorder (H)      Reactive airway disease that is not asthma     on Albuterol prn      Uncomplicated asthma 7/2015    Mild        Past Surgical History:     Past Surgical History:   Procedure Laterality Date     BIOPSY  [id number]    Cervix     HC TOOTH EXTRACTION  W/FORCEP          Social History:     Social History     Tobacco Use     Smoking status: Never Smoker     Smokeless tobacco: Never Used     Tobacco comment: Nonsmoking household   Vaping Use     Vaping Use: Never used   Substance Use Topics     Alcohol use: Not Currently     Drug use: No        Family History:     Family History   Problem Relation Age of Onset     Breast Cancer Maternal Grandmother         in her 50s     Heart Disease Maternal Grandfather         50s     Myocardial Infarction Maternal Grandfather      Heart Disease Paternal Grandfather         50s     Myocardial Infarction Paternal Grandfather      Thyroid Disease Mother         graves-decompression and strabismus     Cancer Paternal Grandmother         cervical     Other Cancer Paternal Grandmother         Cervical     Thyroid Disease Paternal Aunt         nine affected in family     Glaucoma No family hx of      Macular Degeneration No family hx of      Cerebrovascular Disease No family hx of      Hypertension No family hx of      Diabetes No family hx of         Medications:     Current Outpatient Medications   Medication Sig     buPROPion (WELLBUTRIN XL) 150 MG 24 hr tablet Take 1 tablet (150 mg) by mouth every morning     cyclobenzaprine (FLEXERIL) 10 MG tablet Take 0.5-1 tablets (5-10 mg) by mouth 3 times daily as needed for muscle spasms     cyclobenzaprine (FLEXERIL) 5 MG tablet Take 1-2 tablets (5-10 mg) by mouth 3 times daily as needed for muscle spasms     ferrous sulfate (FE TABS) 325 (65 Fe) MG EC tablet Take 1 tablet (325 mg) by mouth daily     fluticasone (FLONASE) 50 MCG/ACT nasal spray Spray 1-2 sprays into both nostrils daily     gabapentin (NEURONTIN) 100 MG capsule Take 2 capsules in the morning and 1 capsule at night. Can take 1 capsule in the afternoon as needed.     levonorgestrel (MIRENA) 20 MCG/24HR IUD 1 each by Intrauterine route once     levothyroxine (SYNTHROID/LEVOTHROID) 200 MCG tablet Take 1 tablet (200 mcg) by mouth  "daily     metoprolol succinate ER (TOPROL-XL) 25 MG 24 hr tablet TAKE ONE TABLET BY MOUTH ONCE DAILY     naproxen (NAPROSYN) 500 MG tablet Take 1 pill twice per day with food for 14 days then every 12 hours as needed     nortriptyline (PAMELOR) 50 MG capsule Take 1 capsule (50 mg) by mouth At Bedtime     rizatriptan (MAXALT-MLT) 10 MG ODT Take 1 tablet (10 mg) by mouth at onset of headache for migraine May repeat in 2 hours. Max 3 tablets/24 hours.     No current facility-administered medications for this visit.        Allergies:   No Known Allergies     Review of Systems:   As above     Physical Exam:   Vitals: /83   Pulse 90   Ht 1.613 m (5' 3.5\")   Wt 94.3 kg (208 lb)   BMI 36.27 kg/m     General: Seated comfortably in no acute distress.  Lungs: breathing comfortably  Extremities: no edema  Neurologic:     Mental Status: Fully alert, attentive. Normal memory and fund of knowledge. Language normal, speech clear and fluent, no paraphasic errors.      Cranial Nerves: Visual fields intact. PERRL. EOMI with normal smooth pursuit. Facial sensation intact/symmetric. Facial movements symmetric. Hearing not formally tested but intact to conversation. Palate elevation symmetric, uvula midline. No dysarthria. Shoulder shrug strong bilaterally. Tongue protrusion midline.     Motor: No tremors in the extremities. Intermittent irregular movements of head from side to side. Muscle tone normal throughout. Normal/symmetric rapid finger tapping. Strength 5/5 throughout upper and lower extremities.     Deep Tendon Reflexes: 2+/symmetric throughout upper and lower extremities. No clonus.      Sensory: Intact/symmetric to light touch throughout upper and lower extremities. Negative Romberg.      Coordination: Finger-nose-finger and heel-shin intact without dysmetria. Rapid alternating movements intact/symmetric with normal speed and rhythm.     Gait: Normal, steady casual gait. Able to walk on toes, heels and tandem without " difficulty.     Data reviewed on previous visits    Imaging:  MRA head 2019  IMPRESSION: Mildly motion degraded images. No aneurysm identified.      MRI cervical 2018  IMPRESSION:  1. Minimal C3-C4 disc bulge without stenosis.  2. No evidence for any demyelinating disease.    Pertinent Investigations since last visit:   None         Assessment and Plan:   Assessment:  Jennifer Pastrana is a 30 year old female who presents today for follow-up of episodic migraines without auras, brief head pains, and intermittent vertigo. Right sided head pains are possibly secondary to occipital neuralgia. These are also well controlled on gabapentin. Migraines are well controlled on nortriptyline and Maxalt prn. At last visit in the fall patient had developed 2 weeks of vertigo, described as a swaying sensation provoked by head movements and visual stimuli. Symptoms were not specific to an etiology, but have improved with vestibular therapy.     Plan:  - Continue nortriptyline 50 mg night  - Continue gabapentin 200 mg AM, 100 mg PM; extra 100 mg prn in the afternoon  - Maxalt 10 mg prn at the onset of migraine  - Vestibular exercises prn for vertiginous symptoms    Follow up in Neurology clinic in 6 months or earlier as needed should new concerns arise.    Patric Duron MD   of Neurology  [facility]

## 2022-02-04 NOTE — PROGRESS NOTES
[facility]    Outpatient Physical Therapy Discharge Note  Patient: Jennifer Pastrana  : 1990    Beginning/End Dates of Reporting Period:  21 to 21    Referring Provider: Ptaric Montero Diagnosis: left vestibular hypofunction     Client Self Report: Things starting to improve. Can look at the computer screen for about an hour still and then symptoms bother. Not to bad in the car.     Objective Measurements:  Objective Measure: VOR cancellation   Details: brings on symptoms after 45 seconds.         Goals:  Goal Identifier 1   Goal Description Patient will be able to read a book without feeling dizzy/nauseous.    Target Date 21   Date Met  21   Progress (detail required for progress note):       Goal Identifier 2   Goal Description Patient will be able to drive in her car without symptoms of dizziness/nausea.    Target Date 21   Date Met      Progress (detail required for progress note): mostly met, had just a few symptoms when sitting in turn j[place] with cars rushing past and car rocking      Goal Identifier 3   Goal Description Patient will be able to work on a computer screen or scroll on her phone for 30 minutes without symptoms of dizziness.    Target Date 21   Date Met      Progress (detail required for progress note): improving, still having some symptoms after about an hour          Progress towards Goals:   Progress this reporting period: All information listed above was at patient's last attended PT visit. At her last attended session, patient's symptoms were improving and she was given exercise progressions to keep working on independently. She was instructed to f/u with therapy within 30 days if symptoms had not fully resolved yet.  It has now been over 30 days and patient has not returned so this episode of care is being discharged.     Plan:  Discharge  from therapy.    Discharge:    Reason for Discharge: improvement in symptoms     Equipment Issued: none    Discharge Plan:  Continue with HEP.    Bee Flores  PT, DPT       2/4/2022   [address] [address]  [email or web address]  Voicemail: [phone]

## 2022-02-22 ENCOUNTER — ANCILLARY PROCEDURE (OUTPATIENT)
Dept: MRI IMAGING | Facility: CLINIC | Age: 32
End: 2022-02-22
Attending: PHYSICIAN ASSISTANT
Payer: COMMERCIAL

## 2022-02-22 DIAGNOSIS — M54.50 ACUTE BILATERAL LOW BACK PAIN WITHOUT SCIATICA: ICD-10-CM

## 2022-02-22 PROCEDURE — 72148 MRI LUMBAR SPINE W/O DYE: CPT | Mod: TC | Performed by: RADIOLOGY

## 2022-02-24 ENCOUNTER — OFFICE VISIT (OUTPATIENT)
Dept: PALLIATIVE MEDICINE | Facility: CLINIC | Age: 32
End: 2022-02-24
Payer: COMMERCIAL

## 2022-02-24 VITALS — SYSTOLIC BLOOD PRESSURE: 122 MMHG | HEART RATE: 97 BPM | DIASTOLIC BLOOD PRESSURE: 85 MMHG

## 2022-02-24 DIAGNOSIS — M79.18 MYOFASCIAL MUSCLE PAIN: ICD-10-CM

## 2022-02-24 DIAGNOSIS — M54.16 LUMBAR RADICULOPATHY: Primary | ICD-10-CM

## 2022-02-24 PROCEDURE — 99204 OFFICE O/P NEW MOD 45 MIN: CPT | Performed by: PAIN MEDICINE

## 2022-02-24 ASSESSMENT — PAIN SCALES - GENERAL: PAINLEVEL: MODERATE PAIN (4)

## 2022-02-24 NOTE — PATIENT INSTRUCTIONS
- Further procedures recommended:    - would consider L5-S1 ILESI   - Medication Management:    - would consider adding mobic 15mg daily    - start by increasing gabapentin to see if symptoms improve. Increase to 200mg BID for 1 week. If tolerating increase to 300mg BID. Would reevaluate progress at that point before further titration  - Physical Therapy: Would highly consider new referral   - Clinical Health Psychologist to address issues of relaxation, behavioral change, coping style, and other factors important to improvement: no  - Diagnostic Studies: no  - Urine toxicology screen today: no   - Follow up:    - 1-2 months       ----------------------------------------------------------------  Clinic Number:  [phone]     Call with any questions about your care and for scheduling assistance.     Calls are returned Monday through Friday between 8 AM and 4:30 PM. We usually get back to you within 2 business days depending on the issue/request.    If we are prescribing your medications:    For opioid medication refills, call the clinic or send a Snowman message 7 days in advance.  Please include:    Name of requested medication    Name of the pharmacy.    For non-opioid medications, call your pharmacy directly to request a refill. Please allow 3-4 days to be processed.     Per [place] State Law:    All controlled substance prescriptions must be filled within 30 days of being written.      For those controlled substances allowing refills, pickup must occur within 30 days of last fill.      We believe regular attendance is key to your success in our program!      Any time you are unable to keep your appointment we ask that you call us at least 24 hours in advance to cancel.This will allow us to offer the appointment time to another patient.     Multiple missed appointments may lead to dismissal from the clinic.

## 2022-02-24 NOTE — PROGRESS NOTES
[facility] Consultation    Date of visit: 2/24/2022    Reason for consultation:    Primary Care Provider is Ashley Fish.  Pain medications are being prescribed by n/a.    Please see the [facility] health questionnaire which the patient completed and reviewed with me in detail.    Chief Complaint:    Chief Complaint   Patient presents with     Pain     MME prescribed prior to seeing patient:  Current MME:    Pain history:  Jennifer Pastrana is a 31 year old female who first started having problems with pain 6months  Denies any inciting event  The pain is L>R radiating to her upper buttocks  The pain varies in severity  The pain varies in nature  The pain is intermittent  The pain is getting progressively worse  Denies any numbness tingling or burning  Worse with bending and lifting  Worse with prolonged siting  Improved with standing  Ok doing some cardio       In the past the pt had radic going all the way down her leg only on the right side      The pt cont to do her daily routine in terms of stretching   Pain rating: intensity  Averages 5/10 on a 0-10 scale.    Current treatments include:  200, 100 gabapentin   Flexeril 1-2 times daily   pamelrt  Previous medication treatments included:  no    Other treatments have included:  Jennifer Pastrana has not been seen at a pain clinic in the past.    PT: y  Chiropractic: n  Acupuncture: n  TENs Unit: n  Injections: n    Past Medical History:  Past Medical History:   Diagnosis Date     AR (allergic rhinitis)      ASCUS with positive high risk HPV 2/2013    type 53     Cervical high risk HPV (human papillomavirus) test positive 2/25/15     Dysplasia of cervix, low grade (MILO 1)      History of migraine headaches     controlled on Nortriptyline and Maxalt prn      Hypothyroidism      IUD (intrauterine device) in place     Mirena- placed 10/2019     Mood disorder (H)      Reactive airway disease that is not asthma     on Albuterol prn       Uncomplicated asthma 7/2015    Mild     Past Surgical History:  Past Surgical History:   Procedure Laterality Date     BIOPSY  [id number]    Cervix     HC TOOTH EXTRACTION W/FORCEP       Medications:  Current Outpatient Medications   Medication Sig Dispense Refill     buPROPion (WELLBUTRIN XL) 150 MG 24 hr tablet Take 1 tablet (150 mg) by mouth every morning 90 tablet 1     cyclobenzaprine (FLEXERIL) 10 MG tablet Take 0.5-1 tablets (5-10 mg) by mouth 3 times daily as needed for muscle spasms 25 tablet 0     cyclobenzaprine (FLEXERIL) 5 MG tablet Take 1-2 tablets (5-10 mg) by mouth 3 times daily as needed for muscle spasms 90 tablet 0     ferrous sulfate (FE TABS) 325 (65 Fe) MG EC tablet Take 1 tablet (325 mg) by mouth daily 90 tablet 1     fluticasone (FLONASE) 50 MCG/ACT nasal spray Spray 1-2 sprays into both nostrils daily 9.9 mL 3     gabapentin (NEURONTIN) 100 MG capsule Take 2 capsules in the morning and 1 capsule at night. Can take 1 capsule in the afternoon as needed. 360 capsule 3     levonorgestrel (MIRENA) 20 MCG/24HR IUD 1 each by Intrauterine route once       levothyroxine (SYNTHROID/LEVOTHROID) 200 MCG tablet Take 1 tablet (200 mcg) by mouth daily 90 tablet 1     metoprolol succinate ER (TOPROL-XL) 25 MG 24 hr tablet TAKE ONE TABLET BY MOUTH ONCE DAILY 90 tablet 0     naproxen (NAPROSYN) 500 MG tablet Take 1 pill twice per day with food for 14 days then every 12 hours as needed 40 tablet 0     nortriptyline (PAMELOR) 50 MG capsule Take 1 capsule (50 mg) by mouth At Bedtime 90 capsule 3     rizatriptan (MAXALT-MLT) 10 MG ODT Take 1 tablet (10 mg) by mouth at onset of headache for migraine May repeat in 2 hours. Max 3 tablets/24 hours. 12 tablet 3     diazepam (VALIUM) 5 MG tablet One tablet one half hour before MRI. May take second tablet if needed (Patient not taking: Reported on 2/24/2022) 2 tablet 0     Allergies:   No Known Allergies  Social History:  Home situation: [facility]  Occupation/Schooling:  y      Family history:  Family History   Problem Relation Age of Onset     Breast Cancer Maternal Grandmother         in her 50s     Heart Disease Maternal Grandfather         50s     Myocardial Infarction Maternal Grandfather      Heart Disease Paternal Grandfather         50s     Myocardial Infarction Paternal Grandfather      Thyroid Disease Mother         graves-decompression and strabismus     Cancer Paternal Grandmother         cervical     Other Cancer Paternal Grandmother         Cervical     Thyroid Disease Paternal Aunt         nine affected in family     Glaucoma No family hx of      Macular Degeneration No family hx of      Cerebrovascular Disease No family hx of      Hypertension No family hx of      Diabetes No family hx of      Family history of headaches: n    Review of Systems:  Skin: negative  Eyes: negative  Ears/Nose/Throat: negative  Respiratory: No shortness of breath, dyspnea on exertion, cough, or hemoptysis  Cardiovascular: negative  Gastrointestinal: negative  Genitourinary: negative  Musculoskeletal: negative  Neurologic: negative  Psychiatric: negative  Hematologic/Lymphatic/Immunologic: negative  Endocrine: negative    Physical Exam:  Vitals:    02/24/22 1529   BP: 122/85   Pulse: 97     Exam:  Constitutional: healthy, alert and no distress  Head: normocephalic. Atraumatic.     Respiratory: Speaking in full sentences no accessory muscles use     Psychiatric: mentation appears normal and affect normal/bright    Musculoskeletal exam:  Gait/Station/Posture: wnl  Cervical spine: ROMwnl       Thoracic spine:  Normal     Lumbar spine:     ROM: wnl   Myofascial tenderness:  ++   Chen's: mild               Straight leg exam: mild on the left    YVETTE/FADIR: neg              SI: +   Greater trochanteric bursa: neg  Neurologic exam:  C  Motor:  5/5 UE and LE strength  Reflexes:       Achilles:  +2    Sensory:  (upper and lower extremities):   Light touch: normal    Allodynia: absent    Dysethesia:  absent    Hyperalgesia: absent     Diagnostic tests:  Segmental Analysis:      T12-L1:  Disc height maintained. No herniation. Normal facet joints.  No foraminal or spinal canal stenosis.      L1-L2:  Disc height maintained. No herniation. Normal facet joints. No  foraminal or spinal canal stenosis.       L2-L3:  Disc height maintained. No herniation. Normal facet joints. No  foraminal or spinal canal stenosis.       L3-L4:  Disc height maintained. No herniation. Normal facet joints. No  foraminal or spinal canal stenosis.       L4-L5:  Disc height maintained. No herniation. Normal facet joints. No  foraminal or spinal canal stenosis.       L5-S1:  Mild disc height loss. Disc bulge with left central disc  herniation (protrusion versus subtle extrusion). Mild bilateral facet  arthropathy. No right foraminal stenosis. Mild if any left foraminal  stenosis. Mild spinal canal stenosis.                                                                      IMPRESSION:    1. Mild disc height loss at L5-S1 with small left central disc  herniation.  2. No high-grade stenoses.            D.I.R.E Score: Patient Selection for Chronic Opioid Analgesia    For each factor, rate the patient's score from 1 - 3 based on the explanations on the right.       Diagnosis             2         1 = Benign chronic condition with minimal objective findings or no definite medical diagnosis.  Examples:  fibromyalgia, migraine, headaches, non-specific back pain.  2 = Slowly progressive condition concordant with moderate pain, or fixed condition with moderate objective findings.  Examples: failed back surgery syndrome, back pain with moderate degenerative changes, neuropathic pain.  3 = Advanced condition concordant with severe pain with objective findings.  Examples: severe ischemic vascular disease, advanced neuropathy, severe spinal stenosis.    Intractability             2         1 = Few therapies have been tried and the patient takes a  passive role in his/her pain management process.   2 = Most costomary treatments have been tried but the patient is not fully engaged in the pain management process, or barriers prevent (insurance, transportation, medical illness)  3 = Patient fully engaged in a spectrum of appropriate treatments but with inadequate response.    Risk   (Risk = Total of P+C+R+S below)       Psychological             2         1 = Serious personality dysfunction or mental illness interfering with care.  Examples: personality disorder, severe affective disorder, significant personality issues.  2 = Personality or mental health interferes moderately.  Example: depression or anxiety disorder.  3 = Good communication with the clinic.  No significant personality dysfunction or mental illness.       Chemical      Health             2         1 = Active or very recent use of illicit drugs, excessive alcohol, or prescription drug abuse.  2 = Chemical coper (uses medications to cope with stress) or history of chemical dependency in remission.  3 = No CD history.  Not drug-focused or chemically reliant       Reliability             2         1 = History of numerous problems: medication misuse, missed appointments, rarely follows through.  2 = Occasional difficulties with compliance, but generally reliable.  3 = Highly reliable patient with medications, appointments and treatment.       Social      Support             2         1= Life in chaos.  Little family support and few close relationships.  Loss of most normal life roles.  2 = Reduction in some relationships and life roles.  3 = Supportive family/close relationships.  Involved in work or school and no social isolation.    Efficacy score             2         1 = Poor function or minimal pain relief despite moderate to high doses.  2 = Moderate benefit with function improved in a number of ways (or insufficient info - hasn't tried opioid yet or very low doses or too short a trial.  3 =  Good improvement in pain and function and quality of life with stable doses over time.                                    14    Total score = D + I + R + E    Score 7-13: Not a suitable candidate for long-term opioid analgesia  Score 14-21: May be a good candidate      Assessment/Plan:  Jennifer Pastrana is a 31 year old female who presents with the complaints of acute on chronic left-sided low back pain radiating to her upper buttocks.   Jennifer was seen today for pain.    Diagnoses and all orders for this visit:    Lumbar radiculopathy    Myofascial muscle pain         - Further procedures recommended:    - would consider L5-S1 ILESI   - Medication Management:    - would consider adding mobic 15mg daily    - start by increasing gabapentin to see if symptoms improve. Increase to 200mg BID for 1 week. If tolerating increase to 300mg BID. Would reevaluate progress at that point before further titration  - Physical Therapy: Would highly consider new referral   - Clinical Health Psychologist to address issues of relaxation, behavioral change, coping style, and other factors important to improvement: no  - Diagnostic Studies: no  - Urine toxicology screen today: no   - Follow up:    - 1-2 months       The total TIME spent on this patient on the day of the appointment was 30 minutes.   Time spent preparing to see the patient (reviewing records and tests)  Time spend face to face with the patient  Time spent ordering tests, medications, procedures and referrals  Time spent Referring and communicating with other healthcare professionals  Documenting clinical information in Epic    Vu Cagle MD  [facility]  This note was created with voice recognition software, and while reviewed for accuracy, typos may remain.

## 2022-03-01 ENCOUNTER — OFFICE VISIT (OUTPATIENT)
Dept: OPTOMETRY | Facility: CLINIC | Age: 32
End: 2022-03-01
Payer: COMMERCIAL

## 2022-03-01 DIAGNOSIS — H04.123 DRY EYES: ICD-10-CM

## 2022-03-01 DIAGNOSIS — H17.9 CORNEAL SCAR, LEFT EYE: ICD-10-CM

## 2022-03-01 DIAGNOSIS — H52.223 REGULAR ASTIGMATISM OF BOTH EYES: ICD-10-CM

## 2022-03-01 DIAGNOSIS — H52.13 MYOPIA OF BOTH EYES: Primary | ICD-10-CM

## 2022-03-01 PROCEDURE — 92015 DETERMINE REFRACTIVE STATE: CPT | Performed by: OPTOMETRIST

## 2022-03-01 PROCEDURE — 92014 COMPRE OPH EXAM EST PT 1/>: CPT | Performed by: OPTOMETRIST

## 2022-03-01 PROCEDURE — 92310 CONTACT LENS FITTING OU: CPT | Mod: GA | Performed by: OPTOMETRIST

## 2022-03-01 ASSESSMENT — KERATOMETRY
OD_AXISANGLE2_DEGREES: 34
OS_AXISANGLE2_DEGREES: 145
OD_K1POWER_DIOPTERS: 43.00
OS_K2POWER_DIOPTERS: 4375
OS_K1POWER_DIOPTERS: 43.00
OD_K2POWER_DIOPTERS: 43.75

## 2022-03-01 ASSESSMENT — VISUAL ACUITY
CORRECTION_TYPE: CONTACTS
OD_CC: 20/20
METHOD: SNELLEN - LINEAR
OS_CC: 20/20
OS_CC: 20/20
OD_CC+: -2
OD_CC: 20/20
OS_CC+: -1

## 2022-03-01 ASSESSMENT — REFRACTION_MANIFEST
OS_SPHERE: -2.00
OD_SPHERE: -2.25
OS_SPHERE: -2.00
OD_SPHERE: -2.25
OD_CYLINDER: +0.25
OS_CYLINDER: +0.25
OD_CYLINDER: +0.50
METHOD_AUTOREFRACTION: 1
OD_AXIS: 154
OS_AXIS: 060
OD_AXIS: 155

## 2022-03-01 ASSESSMENT — REFRACTION_CURRENTRX
OD_SPHERE: -2.25
OD_BRAND: ALCON DAILIES TOTAL 1 BC 8.5, D 14.1
OS_SPHERE: -2.25
OS_BRAND: ALCON DAILIES TOTAL 1 BC 8.5, D 14.1

## 2022-03-01 ASSESSMENT — CUP TO DISC RATIO
OD_RATIO: 0.2
OS_RATIO: 0.2

## 2022-03-01 ASSESSMENT — REFRACTION_WEARINGRX
OD_SPHERE: -2.25
OD_AXIS: 155
OS_CYLINDER: +0.25
SPECS_TYPE: SVL
OD_CYLINDER: +0.25
OS_AXIS: 060
OS_SPHERE: -2.25

## 2022-03-01 ASSESSMENT — CONF VISUAL FIELD
OS_NORMAL: 1
OD_NORMAL: 1
METHOD: COUNTING FINGERS

## 2022-03-01 ASSESSMENT — TONOMETRY
OD_IOP_MMHG: 12
OS_IOP_MMHG: 12
IOP_METHOD: APPLANATION

## 2022-03-01 ASSESSMENT — SLIT LAMP EXAM - LIDS
COMMENTS: NORMAL
COMMENTS: NORMAL

## 2022-03-01 NOTE — LETTER
3/1/2022         RE: Jennifer Pastrana  [address] [facility] [address]        Dear Colleague,    Thank you for referring your patient, Jennifer Pastrana, to the [facility]. Please see a copy of my visit note below.    Chief Complaint   Patient presents with     COMPREHENSIVE EYE EXAM     CE/ Fit     Contact Lens Re-fitting        Previous contact lens wearer? Wearing Dailies Total 1   Comfort of contact lenses :Good   Satisfied with current lenses: Yes        Last Eye Exam: Exam was 3/5/20, and Fitting was 9/24/20  Dilated Previously: Yes    What are you currently using to see?  contacts and glasses as back up     Distance Vision Acuity: Satisfied with vision    Near Vision Acuity: Satisfied with vision while reading and using computer with glasses or contacts     Eye Comfort: good.     She mentioned that 2 months ago  she scratched her left cornea with heated eyelash wand and was seem at a [facility], had foreign body sensation, light sensitive - all resolved now   Do you use eye drops? : Yes: Uses Soothe XP if no contact, with contacts uses Refresh   Occupation or Hobbies: Pharmacy Tech., For [employer]       Bee Apple Optometric Assistant      Medical, surgical and family histories reviewed and updated 3/1/2022.       OBJECTIVE: See Ophthalmology exam    ASSESSMENT:    ICD-10-CM    1. Myopia of both eyes  H52.13 EYE EXAM (SIMPLE-NONBILLABLE)     REFRACTION     CONTACT LENS FITTING,BILAT w/ signed waiver   2. Dry eyes  H04.123 EYE EXAM (SIMPLE-NONBILLABLE)     REFRACTION     CONTACT LENS FITTING,BILAT w/ signed waiver   3. Regular astigmatism of both eyes  H52.223 EYE EXAM (SIMPLE-NONBILLABLE)     REFRACTION     CONTACT LENS FITTING,BILAT w/ signed waiver   4. 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  Use artificial tears in  vials Soothe XP or Refresh contact lenses drops twice a day as needed   Return in 1 year for eye exam    Muna Slade, OD  [phone]                                 Again, thank you for allowing me to participate in the care of your patient.        Sincerely,        Muna Slade, OD

## 2022-03-01 NOTE — PROGRESS NOTES
Chief Complaint   Patient presents with     COMPREHENSIVE EYE EXAM     CE/ Fit     Contact Lens Re-fitting        Previous contact lens wearer? Wearing Dailies Total 1   Comfort of contact lenses :Good   Satisfied with current lenses: Yes        Last Eye Exam: Exam was 3/5/20, and Fitting was 9/24/20  Dilated Previously: Yes    What are you currently using to see?  contacts and glasses as back up     Distance Vision Acuity: Satisfied with vision    Near Vision Acuity: Satisfied with vision while reading and using computer with glasses or contacts     Eye Comfort: good.     She mentioned that 2 months ago  she scratched her left cornea with heated eyelash wand and was seem at a [employer], had foreign body sensation, light sensitive - all resolved now   Do you use eye drops? 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       OBJECTIVE: See Ophthalmology exam    ASSESSMENT:    ICD-10-CM    1. Myopia of both eyes  H52.13 EYE EXAM (SIMPLE-NONBILLABLE)     REFRACTION     CONTACT LENS FITTING,BILAT w/ signed waiver   2. Dry eyes  H04.123 EYE EXAM (SIMPLE-NONBILLABLE)     REFRACTION     CONTACT LENS FITTING,BILAT w/ signed waiver   3. Regular astigmatism of both eyes  H52.223 EYE EXAM (SIMPLE-NONBILLABLE)     REFRACTION     CONTACT LENS FITTING,BILAT w/ signed waiver   4. 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  Use artificial tears in vials Soothe XP or Refresh contact lenses drops twice a day as needed   Return in 1 year for eye exam    Muna Slade, OD  [phone]

## 2022-03-01 NOTE — PATIENT INSTRUCTIONS
Optional to fill new glasses prescription, minimal change  Contact lenses prescription released to patient today.  Use artificial tears in vials Soothe XP or Refresh contact lenses drops twice a day as needed   Return in 1 year for eye exam    Muna Slade, OD  [phone]

## 2022-03-10 ENCOUNTER — MYC MEDICAL ADVICE (OUTPATIENT)
Dept: PALLIATIVE MEDICINE | Facility: CLINIC | Age: 32
End: 2022-03-10
Payer: COMMERCIAL

## 2022-03-10 DIAGNOSIS — M79.18 MYOFASCIAL MUSCLE PAIN: ICD-10-CM

## 2022-03-10 DIAGNOSIS — M54.16 LUMBAR RADICULOPATHY: Primary | ICD-10-CM

## 2022-03-15 NOTE — TELEPHONE ENCOUNTER
Pt calling to speak with nursing regarding her pain.      Asa Rodriguez    [facility]  And  Per patient Federico message:  From: Jennifer Pastrana      Created: 3/10/2022 10:38 AM      I wanted to follow up since my appointment 2 weeks ago. I noticed no changes while taking gabapentin 200mg bid; I titrated my gabapentin dose up to 300mg bid and have been taking this dose for a week. I have not had any improvements in my pain, and I feel like my pain is starting to travel to the outside of my left leg at times.   I am tolerating the gabapentin dose increase somewhat well. I have noticed that I have been drowsy in the mornings for the past week. I am not sure if this is due to the dose increase. If it is, I am not sure how I would handle another increase.  What will be the next steps and how should we go forward from here?

## 2022-03-17 NOTE — TELEPHONE ENCOUNTER
Responded to [employer] message asking for update    Tanvi Moretnsen RN, BSN, CMSRN  RN Care Coordinator  [facility]

## 2022-03-18 NOTE — TELEPHONE ENCOUNTER
Per patient myChart message:  From: Jennifer Pastrana      Created: 3/17/2022 10:23 AM      I am doing ok on the current dose of 300mg BID dose (twice a day dosing is what we agreed upon because it is very hard for me to remember to take a dose during the day). I am still experiencing drowsiness mostly in the morning.  I am still experiencing more pain and it now sometimes feels as if a nerve is possibly being pinched.  I'll get a sharp pain in my lower back close to my spine, which usually will gradually go away once I change my sitting or standing position.      ____________    Per Dr. Cagle's 2/24/22 eval notes:  Further procedures recommended:               - would consider L5-S1 ILESI   - Medication Management:               - would consider adding mobic 15mg daily               - start by increasing gabapentin to see if symptoms improve. Increase to 200mg BID for 1 week. If tolerating increase to 300mg BID. Would reevaluate progress at that point before further titration. On 300mg BID. Drowsiness and increased pain.   - Physical Therapy: Would highly consider new referral   - Clinical Health Psychologist to address issues of relaxation, behavioral change, coping style, and other factors important to improvement: no  - Diagnostic Studies: no  - Urine toxicology screen today: no   - Follow up:       - 1-2 months   _________________________________    Mychart sent to pt:  From: Natalia Thomas RN      Created: 3/18/2022 11:34 AM      Sukumar Crystal Dr[address]s notes in your last visit he says that adding an NSAID medication called Mobic may be an option.  Is this something you would want to try?  If so what pharmacy do you want to use?     A lumbar epidural steroid injection was also mentioned.  Are you interested in trying this?  He also recommended physical therapy.  We can place an order if you like.     I'll wait for the above responses and then will have Dr. Cagle review for you.        Natalia  RN-BSN  [facility]

## 2022-03-18 NOTE — TELEPHONE ENCOUNTER
From: Tanvi Pruitt RN      Created: 3/17/2022 9:52 AM        Jennifer,     Thank you for your message. I am sorry to hear about what you are going through and for our delayed response. How are you currently doing on the 300 mg Gabapentin TID dose? Are you still experiencing possible side effect of drowsiness?     Thank you,     Tanvi Stevens RN, BSN, CMSRN  [facility]

## 2022-03-21 RX ORDER — MELOXICAM 15 MG/1
15 TABLET ORAL DAILY
Qty: 30 TABLET | Refills: 0 | Status: SHIPPED | OUTPATIENT
Start: 2022-03-21 | End: 2022-04-18

## 2022-03-21 NOTE — TELEPHONE ENCOUNTER
Per patient myChart message:  From: Jennifer Pastrana      Created: 3/18/2022 12:02 PM      Yes, I would like to try adding Mobic to see if that helps. Should I continue taking the Gabapentin as 300mg BID or should I go back to my previous dose prescribed for my headaches?  I would like to wait to see if the Mobic helps before trying the epidural injection.   I have had an appointment with a physical therapist regarding proper technique for exercises to help with my lower back.       And   From: Jennifer Pastrana      Created: 3/18/2022 12:03 PM      Please send the Mobic prescription to [facility]           Dr. Cagle okay to prescribe mobic and should pt continue on current gabapentin dose of 300mg BID or go back to the 200mg BID as there was no change in pain level w/ the increase and she is now drowsy.      Natalia RN-BSN  [facility]

## 2022-04-15 ENCOUNTER — OFFICE VISIT (OUTPATIENT)
Dept: FAMILY MEDICINE | Facility: CLINIC | Age: 32
End: 2022-04-15
Payer: COMMERCIAL

## 2022-04-15 VITALS
RESPIRATION RATE: 16 BRPM | WEIGHT: 217 LBS | OXYGEN SATURATION: 97 % | DIASTOLIC BLOOD PRESSURE: 87 MMHG | TEMPERATURE: 99 F | SYSTOLIC BLOOD PRESSURE: 125 MMHG | BODY MASS INDEX: 37.84 KG/M2 | HEART RATE: 101 BPM

## 2022-04-15 DIAGNOSIS — F40.240 CLAUSTROPHOBIA: ICD-10-CM

## 2022-04-15 DIAGNOSIS — E03.9 ACQUIRED HYPOTHYROIDISM: ICD-10-CM

## 2022-04-15 DIAGNOSIS — E66.01 MORBID OBESITY (H): ICD-10-CM

## 2022-04-15 DIAGNOSIS — F41.1 GENERALIZED ANXIETY DISORDER: Primary | ICD-10-CM

## 2022-04-15 DIAGNOSIS — I49.1 SUPRAVENTRICULAR PREMATURE BEATS: ICD-10-CM

## 2022-04-15 DIAGNOSIS — R00.2 PALPITATIONS: ICD-10-CM

## 2022-04-15 DIAGNOSIS — J30.2 CHRONIC SEASONAL ALLERGIC RHINITIS: ICD-10-CM

## 2022-04-15 LAB
T4 FREE SERPL-MCNC: 1.44 NG/DL (ref 0.76–1.46)
TSH SERPL DL<=0.005 MIU/L-ACNC: 0.18 MU/L (ref 0.4–4)

## 2022-04-15 PROCEDURE — 84443 ASSAY THYROID STIM HORMONE: CPT | Performed by: FAMILY MEDICINE

## 2022-04-15 PROCEDURE — 84439 ASSAY OF FREE THYROXINE: CPT | Performed by: FAMILY MEDICINE

## 2022-04-15 PROCEDURE — 36415 COLL VENOUS BLD VENIPUNCTURE: CPT | Performed by: FAMILY MEDICINE

## 2022-04-15 PROCEDURE — 99214 OFFICE O/P EST MOD 30 MIN: CPT | Performed by: FAMILY MEDICINE

## 2022-04-15 RX ORDER — BUPROPION HYDROCHLORIDE 150 MG/1
150 TABLET ORAL EVERY MORNING
Qty: 90 TABLET | Refills: 1 | Status: CANCELLED | OUTPATIENT
Start: 2022-04-15

## 2022-04-15 RX ORDER — FLUTICASONE PROPIONATE 50 MCG
1-2 SPRAY, SUSPENSION (ML) NASAL DAILY
Qty: 9.9 ML | Refills: 3 | Status: SHIPPED | OUTPATIENT
Start: 2022-04-15 | End: 2024-02-19

## 2022-04-15 RX ORDER — BUPROPION HYDROCHLORIDE 75 MG/1
75 TABLET ORAL DAILY
Qty: 14 TABLET | Refills: 0 | Status: SHIPPED | OUTPATIENT
Start: 2022-04-15 | End: 2022-05-17

## 2022-04-15 RX ORDER — METOPROLOL SUCCINATE 25 MG/1
25 TABLET, EXTENDED RELEASE ORAL DAILY
Qty: 90 TABLET | Refills: 3 | Status: SHIPPED | OUTPATIENT
Start: 2022-04-15 | End: 2023-05-08

## 2022-04-15 ASSESSMENT — ANXIETY QUESTIONNAIRES
5. BEING SO RESTLESS THAT IT IS HARD TO SIT STILL: NOT AT ALL
3. WORRYING TOO MUCH ABOUT DIFFERENT THINGS: NOT AT ALL
1. FEELING NERVOUS, ANXIOUS, OR ON EDGE: SEVERAL DAYS
GAD7 TOTAL SCORE: 2
7. FEELING AFRAID AS IF SOMETHING AWFUL MIGHT HAPPEN: NOT AT ALL
IF YOU CHECKED OFF ANY PROBLEMS ON THIS QUESTIONNAIRE, HOW DIFFICULT HAVE THESE PROBLEMS MADE IT FOR YOU TO DO YOUR WORK, TAKE CARE OF THINGS AT HOME, OR GET ALONG WITH OTHER PEOPLE: NOT DIFFICULT AT ALL
2. NOT BEING ABLE TO STOP OR CONTROL WORRYING: NOT AT ALL
6. BECOMING EASILY ANNOYED OR IRRITABLE: NOT AT ALL

## 2022-04-15 ASSESSMENT — PATIENT HEALTH QUESTIONNAIRE - PHQ9
5. POOR APPETITE OR OVEREATING: SEVERAL DAYS
SUM OF ALL RESPONSES TO PHQ QUESTIONS 1-9: 4

## 2022-04-15 NOTE — PROGRESS NOTES
"  Assessment & Plan     Generalized anxiety disorder with Claustrophobia  - PHQ-9/ARNIE 7 completed, see below/Epic for details    - Taper to discontinue: buPROPion (WELLBUTRIN) 75 MG tablet  Dispense: 14 tablet; Refill: 0    Acquired hypothyroidism, currently hyperactive on Levothyroxine  - TSH with free T4 reflex  - T4 free  - Decrease dose of Levothyroxine and repeat labs in 1-2 months.     Palpitations secondary to Supraventricular premature beats  - Refill: metoprolol succinate ER (TOPROL-XL) 25 MG 24 hr tablet  Dispense: 90 tablet; Refill: 3    Chronic seasonal allergic rhinitis  - Refill: fluticasone (FLONASE) 50 MCG/ACT nasal spray  Dispense: 9.9 mL; Refill: 3    Morbid obesity (H)  - Weight management plan: Discussed healthy diet and exercise guidelines         BMI:   Estimated body mass index is 37.84 kg/m  as calculated from the following:    Height as of 2/1/22: 1.613 m (5' 3.5\").    Weight as of this encounter: 98.4 kg (217 lb).   Weight management plan: Discussed healthy diet and exercise guidelines      Return in about 1 month (around 5/15/2022).    Ashley Fish MD  [facility] ADAM Rodriguez is a 31 year old who presents for the following health issues     HPI     Anxiety Follow-Up  Doing well- would like to discontinue the Wellbutrin.  No longer feeling as anxious as before.  Family situation that was bothering her before resolved.      How are you doing with your anxiety since your last visit? Improved     Are you having other symptoms that might be associated with anxiety? No but is reporting increase in claustrophobia     Have you had a significant life event? No     Are you feeling depressed? No    Do you have any concerns with your use of alcohol or other drugs? No         Medication Followup of Metoprolol     Taking Medication as prescribed: yes    Side Effects:  Has had issues with weight loss though unsure if this medication can impact her weight.  She is " exercising 4-5x/ week with no results.     Medication Helping Symptoms:  Yes- palpitations     Requesting a refill on Flonase.      Social History     Tobacco Use     Smoking status: Never Smoker     Smokeless tobacco: Never Used     Tobacco comment: Nonsmoking household   Vaping Use     Vaping Use: Never used   Substance Use Topics     Alcohol use: Not Currently     Drug use: No     ARNIE-7 SCORE 9/10/2021 10/1/2021 4/15/2022   Total Score - - -   Total Score 3 3 2     PHQ 9/10/2021 10/1/2021 4/15/2022   PHQ-9 Total Score 3 3 4   Q9: Thoughts of better off dead/self-harm past 2 weeks Not at all Not at all Not at all     Last PHQ-9 4/15/2022   1.  Little interest or pleasure in doing things 0   2.  Feeling down, depressed, or hopeless 0   3.  Trouble falling or staying asleep, or sleeping too much 1   4.  Feeling tired or having little energy 1   5.  Poor appetite or overeating 2   6.  Feeling bad about yourself 0   7.  Trouble concentrating 0   8.  Moving slowly or restless 0   Q9: Thoughts of better off dead/self-harm past 2 weeks 0   PHQ-9 Total Score 4   Difficulty at work, home, or with people Not difficult at all     ARNIE-7  4/15/2022   1. Feeling nervous, anxious, or on edge 1   2. Not being able to stop or control worrying 0   3. Worrying too much about different things 0   4. Trouble relaxing 1   5. Being so restless that it is hard to sit still 0   6. Becoming easily annoyed or irritable 0   7. Feeling afraid, as if something awful might happen 0   ARNIE-7 Total Score 2   If you checked any problems, how difficult have they made it for you to do your work, take care of things at home, or get along with other people? Not difficult at all         Review of Systems   Constitutional, HEENT, cardiovascular, pulmonary, gi and gu systems are negative, except as otherwise noted.      Objective    /87   Pulse 101   Temp 99  F (37.2  C) (Tympanic)   Resp 16   Wt 98.4 kg (217 lb)   SpO2 97%   Breastfeeding No    BMI 37.84 kg/m    Body mass index is 37.84 kg/m .  Physical Exam   GENERAL: healthy, alert and no distress  PSYCH: mentation appears normal, anxious, judgement and insight intact and appearance well groomed    DATA  Reviewed and discussed with patient prior to discharge.  Results for orders placed or performed in visit on 04/15/22   TSH with free T4 reflex     Status: Abnormal   Result Value Ref Range    TSH 0.18 (L) 0.40 - 4.00 mU/L   T4 free     Status: Normal   Result Value Ref Range    Free T4 1.44 0.76 - 1.46 ng/dL

## 2022-04-16 ASSESSMENT — ANXIETY QUESTIONNAIRES: GAD7 TOTAL SCORE: 2

## 2022-04-18 DIAGNOSIS — M79.18 MYOFASCIAL MUSCLE PAIN: ICD-10-CM

## 2022-04-18 DIAGNOSIS — M54.16 LUMBAR RADICULOPATHY: ICD-10-CM

## 2022-04-18 DIAGNOSIS — E03.9 ACQUIRED HYPOTHYROIDISM: ICD-10-CM

## 2022-04-18 RX ORDER — MELOXICAM 15 MG/1
15 TABLET ORAL DAILY
Qty: 30 TABLET | Refills: 0 | Status: SHIPPED | OUTPATIENT
Start: 2022-04-18 | End: 2022-09-15

## 2022-04-18 RX ORDER — LEVOTHYROXINE SODIUM 175 UG/1
175 TABLET ORAL DAILY
Qty: 30 TABLET | Refills: 1 | Status: SHIPPED | OUTPATIENT
Start: 2022-04-18 | End: 2022-05-17

## 2022-04-18 NOTE — TELEPHONE ENCOUNTER
Received fax request from [facility] requesting refill(s) for meloxicam (MOBIC) 15 MG tablet    Last refilled on 03/23/22    Pt last seen on 02/24/22  Next appt scheduled for 05/02/22    Will facilitate refill.

## 2022-05-02 ENCOUNTER — OFFICE VISIT (OUTPATIENT)
Dept: PALLIATIVE MEDICINE | Facility: CLINIC | Age: 32
End: 2022-05-02
Payer: COMMERCIAL

## 2022-05-02 VITALS
DIASTOLIC BLOOD PRESSURE: 82 MMHG | BODY MASS INDEX: 37.85 KG/M2 | WEIGHT: 217.1 LBS | HEART RATE: 86 BPM | SYSTOLIC BLOOD PRESSURE: 120 MMHG

## 2022-05-02 DIAGNOSIS — M54.16 LUMBAR RADICULOPATHY: Primary | ICD-10-CM

## 2022-05-02 PROCEDURE — 99214 OFFICE O/P EST MOD 30 MIN: CPT | Performed by: PAIN MEDICINE

## 2022-05-02 ASSESSMENT — PAIN SCALES - GENERAL: PAINLEVEL: MILD PAIN (2)

## 2022-05-02 NOTE — PROGRESS NOTES
"[facility]    Date of visit: 5/2/2022    Chief complaint:   Chief Complaint   Patient presents with     Pain       Interval history:  Jennifer Pastrana was last seen by me on 2/24/2022 for diagnosis of:  - Lumbar radiculopathy  - Myofascial pain    Recommendations/plan at the last visit included:  - Further procedures recommended:               - would consider L5-S1 ILESI   - Medication Management:               - would consider adding mobic 15mg daily               - start by increasing gabapentin to see if symptoms improve. Increase to 200mg BID for 1 week. If tolerating increase to 300mg BID. Would reevaluate progress at that point before further titration  - Physical Therapy: Would highly consider new referral    Since her last visit, Jennifer Pastrana reports:  - Low back/left buttock pain is unchanged in character, but has gradually worsened in intensity  - Pain is now radiating down lateral left thigh to mid calf, can go down right leg as well but LLE radiation is more frequent  - Leg pain feels dull and achy, \"like a pinched nerve\"  - She also reports a more pinpoint sharp pain in the left side of her low back, approx L3-4 level  - Meloxicam has been the most helpful medication so far, had no pain the first day she took it, but pain is now breaking through more  - She saw a PT at her gym and reviewed some home exercises    Current pain treatments:   Meloxicam - H  Gabapentin 200 mg in AM, 100 mg in afternoon (if needed), 100 mg at bedtime (had drowsiness at higher doses) - for headaches    Past pain treatments:  Flexeril - [place]    Side Effects: no current side effects    Medications:  Current Outpatient Medications   Medication Sig Dispense Refill     cyclobenzaprine (FLEXERIL) 10 MG tablet Take 0.5-1 tablets (5-10 mg) by mouth 3 times daily as needed for muscle spasms 25 tablet 0     ferrous sulfate (FE TABS) 325 (65 Fe) MG EC tablet Take 1 tablet (325 mg) by mouth daily 90 tablet 1     " fluticasone (FLONASE) 50 MCG/ACT nasal spray Spray 1-2 sprays into both nostrils daily 9.9 mL 3     gabapentin (NEURONTIN) 100 MG capsule Take 2 capsules in the morning and 1 capsule at night. Can take 1 capsule in the afternoon as needed. 360 capsule 3     levonorgestrel (MIRENA) 20 MCG/24HR IUD 1 each by Intrauterine route once       levothyroxine (SYNTHROID/LEVOTHROID) 175 MCG tablet Take 1 tablet (175 mcg) by mouth daily 30 tablet 1     meloxicam (MOBIC) 15 MG tablet Take 1 tablet (15 mg) by mouth daily 30 tablet 0     metoprolol succinate ER (TOPROL-XL) 25 MG 24 hr tablet Take 1 tablet (25 mg) by mouth daily 90 tablet 3     nortriptyline (PAMELOR) 50 MG capsule Take 1 capsule (50 mg) by mouth At Bedtime 90 capsule 3     rizatriptan (MAXALT-MLT) 10 MG ODT Take 1 tablet (10 mg) by mouth at onset of headache for migraine May repeat in 2 hours. Max 3 tablets/24 hours. 12 tablet 3     buPROPion (WELLBUTRIN) 75 MG tablet Take 1 tablet (75 mg) by mouth daily (taper to discontinue) (Patient not taking: Reported on 5/2/2022) 14 tablet 0       Medical History: any changes in medical history since they were last seen? No    Review of Systems:  The 14 system ROS was reviewed from the intake questionnaire, and is positive for: negative other than noted in HPI  Any bowel or bladder problems: No  Mood: No issues    Physical Exam:  Blood pressure 120/82, pulse 86, weight 98.5 kg (217 lb 1.6 oz), not currently breastfeeding.    General: Awake, pleasant, in no apparent distress.   HEENT: Sclerae are anicteric. Neck supple.  Lungs: Unlabored respirations on room air.  Extremities: Pulses are palpable, no peripheral edema.  Musculoskeletal: No gross deformities noted. Tender to palpation over the Left lumbar paraspinals, approx L3-4 levels.  Mild PSIS tenderness bilaterally, Left > Right.  Neurologic exam: Alert and oriented, conversing appropriately. No facial droop, extraocular movements intact. 5/5 muscle strength in the lower  extremities. Sensation intact to light touch in the bilateral lower extremities.  Psychiatric: Normal affect.  Skin: Warm and dry. No suspicious lesions or rashes over exposed skin.      Assessment:   Jennifer Pastrana is a 31 year old female who presents with the complaints of acute on chronic left-sided low back pain radiating to her upper buttocks.   Jennifer was seen today for pain.     Diagnoses and all orders for this visit:     Lumbar radiculopathy     Myofascial muscle pain    Plan:  1. Physical Therapy:  Continue HEP  2. Clinical Health Psychologist to address issues of relaxation, behavioral change, coping style, and other factors important to improvement.  No  3. Diagnostic Studies:  None  4. Medication Management:  Continue meloxicam and gabapentin  5. Further procedures recommended: L5-S1 ILESI  6. Follow up: 1-2 months after procedure    Patient was seen and plan was discussed with attending physician, Dr. Cagle.    Watson Velez DO  Pain Medicine Fellow  [facility]  I saw and examined the patient with the Pain Fellow/Resident. I have reviewed and agree with the resident's note and plan of care and made changes and corrections directly to the body of the note.   TIME SPENT:   BY FELLOW/RESIDENT ALONE 20 MIN      BY MYSELF WITHOUT THE FELLOW/RESIDENT 20 MIN   These times included 20 minutes I spent counseling her about her diagnosis and treatment options and coordination of care with the primary team

## 2022-05-02 NOTE — PATIENT INSTRUCTIONS
- Further procedures recommended:    - ordered L5-S1 ILESI   - Medication Management:    - change gabapentin 100, 100, 200mg PM   - continue mobic as needed   - Physical Therapy: As needed   - Clinical Health Psychologist to address issues of relaxation, behavioral change, coping style, and other factors important to improvement:   - Diagnostic Studies: no  - Urine toxicology screen today: no   - Follow up:    1-2 months after procedure         ----------------------------------------------------------------  Clinic Number:  [phone]   Call with any questions about your care and for scheduling assistance.   Calls are returned Monday through Friday between 8 AM and 4:30 PM. We usually get back to you within 2 business days depending on the issue/request.    If we are prescribing your medications:  For opioid medication refills, call the clinic or send a [employer] message 7 days in advance.  Please include:  Name of requested medication  Name of the pharmacy.  For non-opioid medications, call your pharmacy directly to request a refill. Please allow 3-4 days to be processed.   Per [place] State Law:  All controlled substance prescriptions must be filled within 30 days of being written.    For those controlled substances allowing refills, pickup must occur within 30 days of last fill.      We believe regular attendance is key to your success in our program!    Any time you are unable to keep your appointment we ask that you call us at least 24 hours in advance to cancel.This will allow us to offer the appointment time to another patient.   Multiple missed appointments may lead to dismissal from the clinic.

## 2022-05-04 ENCOUNTER — TELEPHONE (OUTPATIENT)
Dept: PALLIATIVE MEDICINE | Facility: CLINIC | Age: 32
End: 2022-05-04
Payer: COMMERCIAL

## 2022-05-04 NOTE — TELEPHONE ENCOUNTER
Screening Questions for Radiology Injections:    Injection to be done at which interventional clinic site? [facility] [place]    Remind [place] Pt's that Covid test is no longer required except for sedation procedure Pt's.    Instruct patient to arrive as directed prior to the scheduled appointment time:    [facility]: 30 minutes before      [place]: 30 minutes before; if IV needed 1 hour before     Procedure ordered by Gurjit    Procedure ordered? Left L5-S1 ILESI       Transforaminal Cervical CHRISTIE -  [place] does NOT have providers that do these- [facility] and [facility] do have providers that do    As a reminder, receiving steroids can decrease your body's ability to fight infection.   Would you still like to move forward with scheduling the injection?  Yes    What insurance would patient like us to bill for this procedure? [employer]       Worker's comp or MVA (motor vehicle accident) -Any injection DO NOT SCHEDULE and route to Asa Rodriguez.      [employer] insurance - For SI joint injections, DO NOT SCHEDULE and route Massiel Saxena.       ALL BCBS, [employer] and [employer]-Route to Massiel for review DO NOT SCHEDULE      IF SCHEDULING IN [place] AND NEEDS A PA, IT IS OKAY TO SCHEDULE. [place] HANDLES THEIR OWN PA'S AFTER THE PATIENT IS SCHEDULED. PLEASE SCHEDULE AT LEAST 1 WEEK OUT SO A PA CAN BE OBTAINED.    Any chance of pregnancy? Not Applicable   If YES, do NOT schedule and route to RN pool    Is an  needed? No     Patient has a drive home? (mandatory) YES: ok    Is patient taking any blood thinners (That is: Coumadin, Warfarin, Jantoven, Pradaxa Xarelto, Eliquis, Edoxaban, Enoxaparin, Lovenox, Heparin, Arixtra, Fondaparinux, or Fragmin? OR Antiplatelet medication such as Plavix, Brilinta, or Effient? )? No   If hold needed, do NOT schedule, route to RN pool     Is patient taking any aspirin products (includes Excedrin and Fiorinal)? No     If more than 325mg/day, OK to  schedule; Instruct pt to decrease to less than 325 mg for 7 days AND route to RN pool    For CERVICAL procedures, hold all aspirin products for 6 days.     Tell pt that if aspirin product is not held for 6 days, the procedure WILL BE cancelled.      Does the patient have a bleeding or clotting disorder? No     If YES, okay to schedule AND route to RN nurse pool    For any patients with platelet count <100, must be forwarded to provider    Any allergies to contrast dye, iodine, shellfish, or numbing and steroid medications? No    If YES, add allergy information to appointment notes AND route to the RN pool     If CHRISTIE and Contrast Dye / Iodine Allergy? DO NOT SCHEDULE, route to RN pool    Allergies: Patient has no known allergies.     Is patient diabetic?  No  If YES, instruct them to bring their glucometer.    Does patient have an active infection or treated for one within the past week? No     Is patient currently taking any antibiotics?  No     For patients on chronic, preventative, or prophylactic antibiotics, procedures may be scheduled.     For patients on antibiotics for active or recent infection:antibiotic course must have been completed for 4 days    Is patient currently taking any steroid medications? (i.e. Prednisone, Medrol)  No     For patients on steroid medications, course must have been completed for 4 days    Is patient actively being treated for cancer or immunocompromised? No  If YES, do NOT schedule and route to RN pool     Are you able to get on and off an exam table with minimal or no assistance? Yes  If NO, do NOT schedule and route to RN pool    Are you able to roll over and lay on your stomach with minimal or no assistance? Yes  If NO, do NOT schedule and route to RN pool     Has the patient had a flu shot or any other vaccinations within 7 days before or after the procedure.  No     Have you recently had a COVID vaccine or have plans to get it in the near future? No    If yes, explain that  for the vaccine to work best they need to:       wait 1 week before and 1 week after getting Vaccine #1    wait 1 week before and 2 weeks after getting Vaccine #2    wait 1 week before and 2 weeks after getting Vaccine BOOSTER    If patient has concerns about the timing, send to RN pool     Does patient have an MRI/CT?  YES: MRI  Check Procedure Scheduling Grid to see if required.      Was the MRI done within the last 3 years?  Yes    If yes, where was the MRI done i.e.[facility], [facility], [facility], [facility] etc? [facility]      If no, do not schedule and route to RN pool    If MRI was not done at [facility], [facility] or [facility] do NOT schedule and route to RN pool.      If pt has an imaging disc, the injection MAY be scheduled but pt has to bring disc to appt.     If they show up without the disc the injection cannot be done    Procedure Specific Instructions:      If celiac plexus block, informed patient NPO for 6 hours and that it is okay to take medications with sips of water, especially blood pressure medications  Not Applicable         If this is for a cervical procedure, informed patient that aspirin needs to be held for 6 days.   Not Applicable      Sedation, If Sedation is ordered for any procedure, Pt must be NPO for 6 hours prior to procedure  Not Applicable      If IV needed:    Do not schedule procedures requiring IV placement in the first appointment of the day or first appointment after lunch. Do NOT schedule at 0745, 0815 or 1245. ok    Instructed pt to arrive 30 minutes early for IV start if required. (Check Procedure Scheduling Grid)  Not Applicable    Reminders:      If you are started on any steroids or antibiotics between now and your appointment, you must contact us because the procedure may need to be cancelled.  Yes      For all procedures except radiofrequency ablations (RFAs) and spinal cord stimulator (SCS) trials, informed patient:    IV sedation is not provided for this  procedure.  If you feel that an oral anti-anxiety medication is needed, you can discuss this further with your referring provider or primary care provider.  The Pain Clinic provider will discuss specifics of what the procedure includes at your appointment.  Most procedures last 10-20 minutes.  We use numbing medications to help with any discomfort during the procedure.  Not Applicable      For patients 85 or older we recommend having an adult stay w/ them for the remainder of the day.   ok    Does the patient have any questions?  NO  Veronica Rodriguez  [facility]      No

## 2022-05-17 ENCOUNTER — OFFICE VISIT (OUTPATIENT)
Dept: FAMILY MEDICINE | Facility: CLINIC | Age: 32
End: 2022-05-17

## 2022-05-17 ENCOUNTER — RADIOLOGY INJECTION OFFICE VISIT (OUTPATIENT)
Dept: PALLIATIVE MEDICINE | Facility: CLINIC | Age: 32
End: 2022-05-17
Attending: PAIN MEDICINE
Payer: COMMERCIAL

## 2022-05-17 VITALS
SYSTOLIC BLOOD PRESSURE: 127 MMHG | WEIGHT: 213.2 LBS | HEART RATE: 88 BPM | RESPIRATION RATE: 18 BRPM | OXYGEN SATURATION: 98 % | DIASTOLIC BLOOD PRESSURE: 88 MMHG | BODY MASS INDEX: 37.17 KG/M2 | TEMPERATURE: 97.8 F

## 2022-05-17 VITALS — DIASTOLIC BLOOD PRESSURE: 75 MMHG | HEART RATE: 92 BPM | OXYGEN SATURATION: 96 % | SYSTOLIC BLOOD PRESSURE: 119 MMHG

## 2022-05-17 DIAGNOSIS — E03.9 ACQUIRED HYPOTHYROIDISM: Primary | ICD-10-CM

## 2022-05-17 DIAGNOSIS — M54.16 LUMBAR RADICULOPATHY: ICD-10-CM

## 2022-05-17 DIAGNOSIS — E03.9 ACQUIRED HYPOTHYROIDISM: ICD-10-CM

## 2022-05-17 DIAGNOSIS — E66.01 MORBID OBESITY (H): ICD-10-CM

## 2022-05-17 LAB
T4 FREE SERPL-MCNC: 1.29 NG/DL (ref 0.76–1.46)
TSH SERPL DL<=0.005 MIU/L-ACNC: 0.14 MU/L (ref 0.4–4)

## 2022-05-17 PROCEDURE — 36415 COLL VENOUS BLD VENIPUNCTURE: CPT | Performed by: FAMILY MEDICINE

## 2022-05-17 PROCEDURE — 62323 NJX INTERLAMINAR LMBR/SAC: CPT | Performed by: PAIN MEDICINE

## 2022-05-17 PROCEDURE — 99214 OFFICE O/P EST MOD 30 MIN: CPT | Performed by: FAMILY MEDICINE

## 2022-05-17 PROCEDURE — 84443 ASSAY THYROID STIM HORMONE: CPT | Performed by: FAMILY MEDICINE

## 2022-05-17 PROCEDURE — 84439 ASSAY OF FREE THYROXINE: CPT | Performed by: FAMILY MEDICINE

## 2022-05-17 RX ORDER — BUPROPION HYDROCHLORIDE 75 MG/1
75 TABLET ORAL DAILY
Qty: 14 TABLET | Refills: 0 | Status: CANCELLED | OUTPATIENT
Start: 2022-05-17

## 2022-05-17 RX ORDER — GABAPENTIN 100 MG/1
CAPSULE ORAL
Qty: 360 CAPSULE | Refills: 3 | COMMUNITY
Start: 2022-05-17 | End: 2022-10-03

## 2022-05-17 RX ORDER — LEVOTHYROXINE SODIUM 150 UG/1
150 TABLET ORAL DAILY
Qty: 30 TABLET | Refills: 1 | Status: SHIPPED | OUTPATIENT
Start: 2022-05-17 | End: 2022-07-08

## 2022-05-17 RX ORDER — TRIAMCINOLONE ACETONIDE 40 MG/ML
40 INJECTION, SUSPENSION INTRA-ARTICULAR; INTRAMUSCULAR ONCE
Status: COMPLETED | OUTPATIENT
Start: 2022-05-17 | End: 2022-05-17

## 2022-05-17 RX ADMIN — TRIAMCINOLONE ACETONIDE 40 MG: 40 INJECTION, SUSPENSION INTRA-ARTICULAR; INTRAMUSCULAR at 11:39

## 2022-05-17 ASSESSMENT — PAIN SCALES - GENERAL: PAINLEVEL: MILD PAIN (3)

## 2022-05-17 NOTE — PROGRESS NOTES
Assessment & Plan     Acquired hypothyroidism, on Levothyroxine  - Due for repeat labs  - TSH with free T4 reflex FUTURE 2mo    Lumbar radiculopathy  - s/p CHRISTIE today  -Following with the pain management clinic    Morbid obesity (H)  -Congratulated patient for the weight loss efforts so far.  She has since joined [employer] and is exercising 30 to 45 minutes 4-5 times a week.  Discussed the following weight loss goals.   Weight loss goals:   1. Prevent further weight gain  2.  Aim to lose at least 1- 2 lbs/week.  3.  Eat a well balanced heart healthy diet, cut out soda/sugary drinks and control portion sizes.   4. Avoid missing breakfast.  5. Exercise regularly; increase exercise gradually as tolerated to a goal of  30-45 minutes/5 days a week.          Return in about 3 months (around 8/17/2022) for Follow up.    Ashley Fish MD  [facility] ADAM Rodriguez is a 31 year old who presents for the following health issues     History of Present Illness       Reason for visit:  Follow Up  Symptom onset:  More than a month  Symptoms include:  Weight loss resistance  Symptom intensity:  Moderate  Symptom progression:  Staying the same  Had these symptoms before:  No    She eats 2-3 servings of fruits and vegetables daily.She consumes 1 sweetened beverage(s) daily.She exercises with enough effort to increase her heart rate 30 to 60 minutes per day.  She exercises with enough effort to increase her heart rate 4 days per week.   She is taking medications regularly.     Patient reports that she recently enrolled in [employer] and going to the Gym for 30-45 min 4 x/week.  Has lost 4 lbs since the last office visit.     Wt Readings from Last 4 Encounters:   05/17/22 96.7 kg (213 lb 3.2 oz)   05/02/22 98.5 kg (217 lb 1.6 oz)   04/15/22 98.4 kg (217 lb)   02/01/22 94.3 kg (208 lb)       Hypothyroidism-had her levothyroxine decreased recently to 175 mcg/day.  Due for follow-up lab  Last TSH-  TSH   Date Value  Ref Range Status   04/15/2022 0.18 (L) 0.40 - 4.00 mU/L Final   09/25/2020 1.75 0.40 - 4.00 mU/L Final       Lumbar radiculopathy currently being seen in the pain clinic.  States that she had a epidural steroid injection earlier today.  No immediate complications.    Review of Systems   Constitutional, HEENT, cardiovascular, pulmonary, gi and gu systems are negative, except as otherwise noted.      Objective    /88   Pulse 88   Temp 97.8  F (36.6  C) (Tympanic)   Resp 18   Wt 96.7 kg (213 lb 3.2 oz)   SpO2 98%   Breastfeeding No   BMI 37.17 kg/m    Body mass index is 37.17 kg/m .  Physical Exam   GENERAL: healthy, alert and no distress  PSYCH: mentation appears normal, affect normal/bright    DATA  Previous labs reviewed.

## 2022-05-17 NOTE — NURSING NOTE
Discharge Information    IV Discontiued Time:  NA    Amount of Fluid Infused:  NA    Discharge Criteria = When patient returns to baseline or as per MD order    Consciousness:  Pt is fully awake    Circulation:  BP +/- 20% of pre-procedure level    Respiration:  Patient is able to breathe deeply    O2 Sat:  Patient is able to maintain O2 Sat >92% on room air    Activity:  Moves 4 extremities on command    Ambulation:  Patient is able to stand and walk or stand and pivot into wheelchair    Dressing:  Clean/dry or No Dressing    Notes:   Discharge instructions and AVS given to patient    Patient meets criteria for discharge?  YES    Admitted to PCU?  No    Responsible adult present to accompany patient home?  Yes    Signature/Title:    alessandra baum RN  RN Care Coordinator  [facility]

## 2022-05-17 NOTE — PATIENT INSTRUCTIONS
Weight loss goals:   Prevent further weight gain   Aim to lose at least 1- 2 lbs/week.   Eat a well balanced heart healthy diet, cut out soda/sugary drinks and control portion sizes.   Avoid missing breakfast.  Exercise regularly; increase exercise gradually as tolerated to a goal of  30-45 minutes/5 days a week.

## 2022-05-17 NOTE — PROGRESS NOTES
Pre procedure Diagnosis: Lumbar radiculopathy  Post procedure Diagnosis: Same  Procedure performed: L5-S1 interlaminar epidural steroid injection   Anesthesia: None  Complications: None  Operators: Vu Cagle MD     Indications:   Jennifer Pastrana is a 31 year old female.  The patient has a history of left-sided low back pain radiating to her lower extremity.  Examination shows positive slump.  she has tried conservative treatment including meds/PT.    MRI reviewed  Options/alternatives, benefits and risks were discussed with the patient including but not limited to bleeding, infection, no pain relief, tissue trauma, exposure to radiation, reaction to medications, spinal cord injury, dural puncture, weakness, numbness and headache.  Questions were answered to her satisfaction and she wishes to proceed. Voluntary informed consent was obtained and signed.     Vitals were reviewed: Yes  Allergies were reviewed:  Yes   Medications were reviewed:  Yes   Pre-procedure pain score: 3/10    Procedure:  The patient's medical history, medications, and allergies were reviewed and reconciled.  After obtaining signed informed consent, the patient was brought into the procedure suite and was placed in a prone position on the procedure table.   A Pause for the Cause was performed.  Patient was prepped and draped in the usual sterile fashion.     The L5-S1 interspace was identified with AP fluoroscopy.  A total of 4ml of 1% lidocaine was used to anesthetize the skin and subcutaneous tissues for a  midline approach.    A 20gauge 3.5inch Touhy needle was advanced utilizing intermittent AP and Lateral fluoroscopy and air for loss of resistance.  The epidural space was encountered on the first pass without difficulties.  Aspiration for blood and CSF was negative.  Needle position was verified by injecting 1 ml of Omnipaque utilizing real-time fluoroscopy that showed good needle placement and epidural spread without signs of  intravascular or intrathecal uptake.  Omnipaque wasted:  9 ml.    Then, after repeated negative aspiration for blood or CSF, a combination of Kenalog 40 mg, Bupivicaine 0.25% 2 ml, diluted with 3 ml of normal saline to a total injectate volume of 6 ml was injected into the epidural space in a slow and incremental fashion and the needle was restyletted and withdrawn.  All injected medications were preservative free.    The injection site was cleaned and a sterile dressing was applied.    The patient tolerated the procedure well without complications and was taken to the recovery room for continued observation.    Images were saved to PACS.    Post-procedure pain score: 0/10  Follow-up includes:   -f/u phone call in one week  -f/u with referring provider     Vu Cagle MD  [facility]

## 2022-05-17 NOTE — NURSING NOTE
Pre-procedure Intake  If YES to any questions or NO to having a   Please complete laminated checklist and leave on the computer keyboard for Provider, verbally inform provider if able.    For SCS Trial, RFA's or any sedation procedure:  Have you been fasting? NA    If yes, for how long?     Are you taking any any blood thinners such as Coumadin, Warfarin, Jantoven, Pradaxa Xarelto, Eliquis, Edoxaban, Enoxaparin, Lovenox, Heparin, Arixtra, Fondaparinux, or Fragmin? OR Antiplatelet medication such as Plavix, Brilinta, or Effient?   No     If yes, when did you take your last dose?     Do you take aspirin?  No    If cervical procedure, have you held aspirin for 6 days?       Do you have any allergies to contrast dye, iodine, steroid and/or numbing medications?  NO    Are you currently taking antibiotics or have an active infection?  NO    Have you had a fever/elevated temperature within the past week? NO    Are you currently taking oral steroids? NO    Do you have a ? Yes    Are you pregnant or breastfeeding?  NO    Have you received the COVID-19 vaccine? Yes    If yes, was it your 1st, 2nd or only dose needed?     Date of most recent vaccine: 5/25/21    Notify provider and RNs if systolic BP >170, diastolic BP >100, P >100 or O2 sats < 90%

## 2022-05-17 NOTE — PATIENT INSTRUCTIONS
[facility]   Procedure Discharge Instructions    Today you saw: Dr. Vu Cagle    You had an:  Epidural steroid injection   -lumbar    Be cautious when walking. Numbness and/or weakness in the lower extremities may occur for up to 6-8 hours after the procedure due to effect of the local anesthetic  Do not drive for 6 hours. The effect of the local anesthetic could slow your reflexes.   You may resume your regular activities after 24 hours  Avoid strenuous activity for the first 24 hours  You may shower, however avoid swimming, tub baths or hot tubs for 24 hours following your procedure  You may have a mild to moderate increase in pain for several days following the injection.  It may take up to 14 days for the steroid medication to start working although you may feel the effect as early as a few days after the procedure.     You may use ice packs for 10-15 minutes, 3 to 4 times a day at the injection site for comfort  Do not use heat to painful areas for 6 to 8 hours. This will give the local anesthetic time to wear off and prevent you from accidentally burning your skin.   Unless you have been directed to avoid the use of anti-inflammatory medications (NSAIDS), you may use medications such as ibuprofen, Aleve or Tylenol for pain control if needed.   If you were fasting, you may resume your normal diet and medications after the procedure  If you have diabetes, check your blood sugar more frequently than usual as your blood sugar may be higher than normal for 10-14 days following a steroid injection. Contact your doctor who manages your diabetes if your blood sugar is higher than usual  Possible side effects of steroids that you may experience include flushing, elevated blood pressure, increased appetite, mild headaches and restlessness.  All of these symptoms will get better with time.  If you experience any of the following, call the Pain Clinic during work hours (Mon-Friday  8-4:30 pm) at [phone] or the Provider Line after hours at [phone]:  -Fever over 100 degree F  -Swelling, bleeding, redness, drainage, warmth at the injection site  -Progressive weakness or numbness in your legs or arms  -Loss of bowel or bladder function  -Unusual headache that is not relieved by Tylenol or other pain reliever  -Unusual new onset of pain that is not improving

## 2022-05-18 ENCOUNTER — TELEPHONE (OUTPATIENT)
Dept: FAMILY MEDICINE | Facility: CLINIC | Age: 32
End: 2022-05-18
Payer: COMMERCIAL

## 2022-05-18 NOTE — TELEPHONE ENCOUNTER
----- Message from Ashley Fish MD sent at 5/17/2022 10:17 PM CDT -----        Jennifer-    Your thyroid labs still show that your thyroid is overactive on the current dose of Levothyroxine.   Let's decrease it further and repeat labs in 1-2 months.   New Rx sent.   Please schedule a lab only appointment in 2 months.     Please contact me if you have any additional questions or concerns.    Ashley Fish MD

## 2022-05-18 NOTE — TELEPHONE ENCOUNTER
Left message on voice mail for patient to call  [facility] at phone [phone].    Sapna Brewer RN BSN  [facility]

## 2022-05-18 NOTE — TELEPHONE ENCOUNTER
Patient returned call, providers result note message relayed to the patient. She verbalized understanding.     Yamileth Jones RN

## 2022-07-05 DIAGNOSIS — E03.9 ACQUIRED HYPOTHYROIDISM: ICD-10-CM

## 2022-07-07 NOTE — TELEPHONE ENCOUNTER
Routing refill request to provider for review/approval because:  TSH    TSH   Date Value Ref Range Status   05/17/2022 0.14 (L) 0.40 - 4.00 mU/L Final   09/25/2020 1.75 0.40 - 4.00 mU/L Final              Pending Prescriptions:                       Disp   Refills    levothyroxine (SYNTHROID/LEVOTHROID) 150 M*30 tab*1        Sig: TAKE ONE TABLET BY MOUTH ONCE DAILY        Sim Tenorio RN

## 2022-07-08 RX ORDER — LEVOTHYROXINE SODIUM 150 UG/1
TABLET ORAL
Qty: 30 TABLET | Refills: 1 | Status: SHIPPED | OUTPATIENT
Start: 2022-07-08 | End: 2022-07-19

## 2022-07-18 ENCOUNTER — LAB (OUTPATIENT)
Dept: LAB | Facility: CLINIC | Age: 32
End: 2022-07-18
Payer: COMMERCIAL

## 2022-07-18 ENCOUNTER — VIRTUAL VISIT (OUTPATIENT)
Dept: FAMILY MEDICINE | Facility: CLINIC | Age: 32
End: 2022-07-18
Payer: COMMERCIAL

## 2022-07-18 DIAGNOSIS — M54.16 LUMBAR RADICULOPATHY: ICD-10-CM

## 2022-07-18 DIAGNOSIS — E03.9 ACQUIRED HYPOTHYROIDISM: ICD-10-CM

## 2022-07-18 DIAGNOSIS — E03.9 ACQUIRED HYPOTHYROIDISM: Primary | ICD-10-CM

## 2022-07-18 DIAGNOSIS — Z92.241 S/P EPIDURAL STEROID INJECTION: ICD-10-CM

## 2022-07-18 LAB — TSH SERPL DL<=0.005 MIU/L-ACNC: 3.4 MU/L (ref 0.4–4)

## 2022-07-18 PROCEDURE — 99213 OFFICE O/P EST LOW 20 MIN: CPT | Mod: 95 | Performed by: FAMILY MEDICINE

## 2022-07-18 PROCEDURE — 36415 COLL VENOUS BLD VENIPUNCTURE: CPT

## 2022-07-18 PROCEDURE — 84443 ASSAY THYROID STIM HORMONE: CPT

## 2022-07-18 RX ORDER — LEVOTHYROXINE SODIUM 150 UG/1
150 TABLET ORAL DAILY
Qty: 30 TABLET | Refills: 1 | Status: CANCELLED | OUTPATIENT
Start: 2022-07-18

## 2022-07-18 NOTE — PROGRESS NOTES
Jennifer is a 31 year old who is being evaluated via a billable video visit.      How would you like to obtain your AVS? MyChart  If the video visit is dropped, the invitation should be resent by: Text to cell phone: [phone]  Will anyone else be joining your video visit? No        Assessment & Plan     Acquired hypothyroidism  -TSH in process.  -We will refill levothyroxine after reviewing labs.    Lumbar radiculopathy S/P epidural steroid injection on 5/17/2022  -Back pain significantly improved after her CHRISTIE injection couple months ago.  No additional treatments recommended at this time.        Return in about 3 months (around 10/18/2022) for Physical Exam.    Ashley Fish MD  [facility] ADAM Rodriguez is a 31 year old, presenting for the following health issues:  Back Pain      History of Present Illness       Back Pain:  She presents for follow up of back pain. Patient's back pain is a recurring problem.  Location of back pain:  Left lower back and left hip  Description of back pain: burning and dull ache  Back pain spreads: nowhere    Since last visit, how has back pain changed: gradually improving (since CHRISTIE on 5/17/2022. )  Since patient first noticed back pain, pain is: gradually worsening  Does back pain interfere with her job:  Yes      She eats 2-3 servings of fruits and vegetables daily.She consumes 0 sweetened beverage(s) daily.She exercises with enough effort to increase her heart rate 30 to 60 minutes per day.  She exercises with enough effort to increase her heart rate 4 days per week.   She is taking medications regularly.       Hypothyroidism Follow-up  Currently on levothyroxine 150 mcg daily.  Tolerating well,  no side effects reported.   Last TSH-  TSH   Date Value Ref Range Status   07/18/2022 3.40 0.40 - 4.00 mU/L Final   09/25/2020 1.75 0.40 - 4.00 mU/L Final     Patient had TSH labs today.  Results are pending at this time.      Since last visit,  patient describes the following symptoms: Weight stable, no hair loss, no skin changes, no constipation, no loose stools        Review of Systems   Constitutional, HEENT, cardiovascular, pulmonary, gi and gu systems are negative, except as otherwise noted.      Objective           Vitals:  No vitals were obtained today due to virtual visit.    Physical Exam   GENERAL: Healthy, alert and no distress  EYES: Eyes grossly normal to inspection.  No discharge or erythema, or obvious scleral/conjunctival abnormalities.  RESP: No audible wheeze, cough, or visible cyanosis.  No visible retractions or increased work of breathing.    SKIN: Visible skin clear. No significant rash, abnormal pigmentation or lesions.  NEURO: Cranial nerves grossly intact.  Mentation and speech appropriate for age.  PSYCH: Mentation appears normal, affect normal/bright, judgement and insight intact, normal speech and appearance well-groomed.              Video-Visit Details    Video Start Time: 2:30 pm    Type of service:  Video Visit    Video End Time:2:50 pm    Originating Location (pt. Location): Home    Distant Location (provider location):  [facility]     Platform used for Video Visit: [employer]  Anabel

## 2022-07-19 DIAGNOSIS — E03.9 ACQUIRED HYPOTHYROIDISM: ICD-10-CM

## 2022-07-19 RX ORDER — LEVOTHYROXINE SODIUM 150 UG/1
150 TABLET ORAL DAILY
Qty: 90 TABLET | Refills: 3 | Status: SHIPPED | OUTPATIENT
Start: 2022-07-19 | End: 2023-08-04

## 2022-08-03 ENCOUNTER — OFFICE VISIT (OUTPATIENT)
Dept: NEUROLOGY | Facility: CLINIC | Age: 32
End: 2022-08-03
Payer: COMMERCIAL

## 2022-08-03 DIAGNOSIS — M54.81 OCCIPITAL NEURALGIA OF RIGHT SIDE: ICD-10-CM

## 2022-08-03 DIAGNOSIS — G43.009 MIGRAINE WITHOUT AURA AND WITHOUT STATUS MIGRAINOSUS, NOT INTRACTABLE: Primary | ICD-10-CM

## 2022-08-03 PROCEDURE — 99214 OFFICE O/P EST MOD 30 MIN: CPT | Performed by: INTERNAL MEDICINE

## 2022-08-03 RX ORDER — NORTRIPTYLINE HYDROCHLORIDE 50 MG/1
50 CAPSULE ORAL AT BEDTIME
Qty: 90 CAPSULE | Refills: 3 | Status: SHIPPED | OUTPATIENT
Start: 2022-08-03 | End: 2022-10-03

## 2022-08-03 NOTE — PROGRESS NOTES
[facility] Follow Up Visit    Jennifer Pastrana MRN# [id number]   Age: 30 year old YOB: 1990     Brief history of symptoms: The patient was initially seen in neurologic consultation on 10/5/2021 for evaluation of migraines and head pains. Please see the comprehensive neurologic consultation note from that date in the Epic records for details.     Interval history:   Migraines have been a little more frequent recently for unclear reasons. In the last month she has had about 4 migraines where she took either Maxalt or Zomig/Ibuprofen. The Zomig was not effective by itself. It worked a little bit better in combination with ibuprofen. The Maxalt works the best but doesn't consistently get rid of migraine.     She has also noticed intermittent brief right sided dull head pains lasting for about a minute. This has happen twice about one week apart. After the first brief pain she developed a right sided migraine.     She takes gabapentin 100 mg AM and 200 mg PM. She tried 300 mg at night and it caused drowsiness.     She continues to takes nortriptyline 50 mg.       Past Medical History:     Patient Active Problem List   Diagnosis     CARDIOVASCULAR SCREENING; LDL GOAL LESS THAN 160     Hypothyroidism     Dry eye syndrome- mild     Dysplasia of cervix, low grade (MILO 1)     Generalized anxiety disorder     Supervision of normal first pregnancy     Migraine without aura and without status migrainosus, not intractable     Oral contraceptive pill surveillance     Seasonal allergic rhinitis due to pollen     Pollen-food allergy, subsequent encounter     Past Medical History:   Diagnosis Date     AR (allergic rhinitis)      ASCUS with positive high risk HPV 2/2013    type 53     Cervical high risk HPV (human papillomavirus) test positive 2/25/15     Dysplasia of cervix, low grade (MILO 1)      History of migraine headaches     controlled on Nortriptyline and Maxalt prn      Hypothyroidism      IUD (intrauterine  device) in place     Mirena- placed 10/2019     Mood disorder (H)      Reactive airway disease that is not asthma     on Albuterol prn      Uncomplicated asthma 7/2015    Mild        Past Surgical History:     Past Surgical History:   Procedure Laterality Date     BIOPSY  [id number]    Cervix     HC TOOTH EXTRACTION W/FORCEP          Social History:     Social History     Tobacco Use     Smoking status: Never Smoker     Smokeless tobacco: Never Used     Tobacco comment: Nonsmoking household   Vaping Use     Vaping Use: Never used   Substance Use Topics     Alcohol use: Not Currently     Drug use: No        Family History:     Family History   Problem Relation Age of Onset     Breast Cancer Maternal Grandmother         in her 50s     Heart Disease Maternal Grandfather         50s     Myocardial Infarction Maternal Grandfather      Heart Disease Paternal Grandfather         50s     Myocardial Infarction Paternal Grandfather      Thyroid Disease Mother         graves-decompression and strabismus     Cancer Paternal Grandmother         cervical     Other Cancer Paternal Grandmother         Cervical     Thyroid Disease Paternal Aunt         nine affected in family     Glaucoma No family hx of      Macular Degeneration No family hx of      Cerebrovascular Disease No family hx of      Hypertension No family hx of      Diabetes No family hx of         Medications:     Current Outpatient Medications   Medication Sig     cyclobenzaprine (FLEXERIL) 10 MG tablet Take 0.5-1 tablets (5-10 mg) by mouth 3 times daily as needed for muscle spasms     ferrous sulfate (FE TABS) 325 (65 Fe) MG EC tablet Take 1 tablet (325 mg) by mouth daily     fluticasone (FLONASE) 50 MCG/ACT nasal spray Spray 1-2 sprays into both nostrils daily     gabapentin (NEURONTIN) 100 MG capsule Take 1 capsule in the morning and 2 capsules at night.     levonorgestrel (MIRENA) 20 MCG/24HR IUD 1 each by Intrauterine route once     levothyroxine  (SYNTHROID/LEVOTHROID) 150 MCG tablet Take 1 tablet (150 mcg) by mouth daily     meloxicam (MOBIC) 15 MG tablet Take 1 tablet (15 mg) by mouth daily     metoprolol succinate ER (TOPROL-XL) 25 MG 24 hr tablet Take 1 tablet (25 mg) by mouth daily     nortriptyline (PAMELOR) 50 MG capsule Take 1 capsule (50 mg) by mouth At Bedtime     ZOLMitriptan (ZOMIG) 2.5 MG tablet Take 1 tablet (2.5 mg) by mouth at onset of headache for migraine May repeat in 2 hours. Max 10 tablets per month.     No current facility-administered medications for this visit.        Allergies:   No Known Allergies     Review of Systems:   As above     Physical Exam:   Vitals: There were no vitals taken for this visit.   General: Seated comfortably in no acute distress.  HEENT: Fundoscopic examination without evidence of disc edema  Lungs: breathing comfortably  Neurologic:     Mental Status: Fully alert, attentive. Normal memory and fund of knowledge. Language normal, speech clear and fluent, no paraphasic errors.      Cranial Nerves: Visual fields intact. PERRL. EOMI with normal smooth pursuit. No dysarthria.      Motor: No tremors in the extremities.     Gait: Normal, steady casual gait.      Data reviewed on previous visits    Imaging:  MRA head 2019  IMPRESSION: Mildly motion degraded images. No aneurysm identified.      MRI cervical 2018  IMPRESSION:  1. Minimal C3-C4 disc bulge without stenosis.  2. No evidence for any demyelinating disease.    Pertinent Investigations since last visit:   None         Assessment and Plan:   Assessment:  Jennifer Pastrana is a 30 year old female who presents today for follow-up of episodic migraines without auras, brief head pains. Right sided head pains are possibly secondary to occipital neuralgia versus manifestation of her right sided migraines. These are adequately controlled on gabapentin. Migraines have been a little more frequent as of late with inconsistent relief from abortive medications. She has tried  Maxalt, Zomig, and Imitrex previously. Prescription for Nurtec as an abortive was sent as below. If this is not effective I encouraged her to reach back out and we could consider another agent or switching back to Maxalt.     Plan:  - Continue nortriptyline 50 mg night  - Continue gabapentin 100 mg AM, 200 mg PM  - Nurtec 75 mg daily prn    Follow up in Neurology clinic in 6 months or earlier as needed should new concerns arise.    Patric Duron MD   of Neurology  [facility]

## 2022-08-03 NOTE — PATIENT INSTRUCTIONS
UPPER QUADRANT STRETCHES FOR TENSION HEADACHES:   Do each of these for 30 seconds twice a day.   Breathe deeply in and out as you gently increase the stretch    FLEXION:   Sit in a chair with both hands holding the back of your head, wrists on top of your head.   Gently pull your chin all the way down to your chest, keeping your elbows together.   Lean forward resting your elbows on you thighs, feet flat on the floor.   As you  exhale, slowly sink lower to the ground.   Use your knees on the outside of your elbows to assist and support your body weight.    For the next three stretches, use the opposite arm to stabilize the opposite shoulder blade  by reaching and holding under the chair, sitting upright, and with your stabilizing elbow straight.    If you are stretching to your left, your right arm stabilizes the right shoulder blade, and vice versa.  All the following will be stretching the right side by leaning to the left.  Repeat all 3 and reverse the instructions to also stretch the left side.    EAR TO SHOULDER:   Sit upright.   Reach with your left arm over the top of your head touching your right ear.   Gently pull your left ear toward your left shoulder until your neck is fully stretched.   Maintaining your hold on the chair with your right arm, lean your body to the left.   Breathe deeply in and out as you gently increase the stretch.    EAR TO ARMPIT:   Sit upright.   Reach with your left arm over the top of your head touching your right ear.   Turn your head 30 degrees to the right.   Gently pull your left ear towards your left armpit until your neck is fully stretched .   Maintaining your hold on the chair with your right arm, lean your body to the left.   Breathe deeply in and out as you gently increase the stretch.    NOSE TO ARMPIT:   Sit upright with your head turn to 30 degrees to the left.   Reach over the top of your head with your left arm holding onto the back of your head.   Gently pull your  nose toward your left armpit until your neck is fully stretched.   Maintaining your hold on the chair with your right arm, lean your body to the left.   Breathe deeply in and out as you gently increase the stretch.

## 2022-08-03 NOTE — LETTER
8/3/2022         RE: Jennifer Pastrana  [address] [facility] [address]        Dear Colleague,    Thank you for referring your patient, Jennifer Pastrana, to the [facility]UR[facility] [place]. Please see a copy of my visit note below.    [facility] Neurology Follow Up Visit    Jennifer Pastrana MRN# [id number]   Age: 30 year old YOB: 1990     Brief history of symptoms: The patient was initially seen in neurologic consultation on 10/5/2021 for evaluation of migraines and head pains. Please see the comprehensive neurologic consultation note from that date in the Epic records for details.     Interval history:   Migraines have been a little more frequent recently for unclear reasons. In the last month she has had about 4 migraines where she took either Maxalt or Zomig/Ibuprofen. The Zomig was not effective by itself. It worked a little bit better in combination with ibuprofen. The Maxalt works the best but doesn't consistently get rid of migraine.     She has also noticed intermittent brief right sided dull head pains lasting for about a minute. This has happen twice about one week apart. After the first brief pain she developed a right sided migraine.     She takes gabapentin 100 mg AM and 200 mg PM. She tried 300 mg at night and it caused drowsiness.     She continues to takes nortriptyline 50 mg.       Past Medical History:     Patient Active Problem List   Diagnosis     CARDIOVASCULAR SCREENING; LDL GOAL LESS THAN 160     Hypothyroidism     Dry eye syndrome- mild     Dysplasia of cervix, low grade (MILO 1)     Generalized anxiety disorder     Supervision of normal first pregnancy     Migraine without aura and without status migrainosus, not intractable     Oral contraceptive pill surveillance     Seasonal allergic rhinitis due to pollen     Pollen-food allergy, subsequent encounter     Past Medical History:   Diagnosis Date     AR (allergic rhinitis)      ASCUS with positive high risk HPV  2/2013    type 53     Cervical high risk HPV (human papillomavirus) test positive 2/25/15     Dysplasia of cervix, low grade (MILO 1)      History of migraine headaches     controlled on Nortriptyline and Maxalt prn      Hypothyroidism      IUD (intrauterine device) in place     Mirena- placed 10/2019     Mood disorder (H)      Reactive airway disease that is not asthma     on Albuterol prn      Uncomplicated asthma 7/2015    Mild        Past Surgical History:     Past Surgical History:   Procedure Laterality Date     BIOPSY  [id number]    Cervix     HC TOOTH EXTRACTION W/FORCEP          Social History:     Social History     Tobacco Use     Smoking status: Never Smoker     Smokeless tobacco: Never Used     Tobacco comment: Nonsmoking household   Vaping Use     Vaping Use: Never used   Substance Use Topics     Alcohol use: Not Currently     Drug use: No        Family History:     Family History   Problem Relation Age of Onset     Breast Cancer Maternal Grandmother         in her 50s     Heart Disease Maternal Grandfather         50s     Myocardial Infarction Maternal Grandfather      Heart Disease Paternal Grandfather         50s     Myocardial Infarction Paternal Grandfather      Thyroid Disease Mother         graves-decompression and strabismus     Cancer Paternal Grandmother         cervical     Other Cancer Paternal Grandmother         Cervical     Thyroid Disease Paternal Aunt         nine affected in family     Glaucoma No family hx of      Macular Degeneration No family hx of      Cerebrovascular Disease No family hx of      Hypertension No family hx of      Diabetes No family hx of         Medications:     Current Outpatient Medications   Medication Sig     cyclobenzaprine (FLEXERIL) 10 MG tablet Take 0.5-1 tablets (5-10 mg) by mouth 3 times daily as needed for muscle spasms     ferrous sulfate (FE TABS) 325 (65 Fe) MG EC tablet Take 1 tablet (325 mg) by mouth daily     fluticasone (FLONASE) 50 MCG/ACT nasal  spray Spray 1-2 sprays into both nostrils daily     gabapentin (NEURONTIN) 100 MG capsule Take 1 capsule in the morning and 2 capsules at night.     levonorgestrel (MIRENA) 20 MCG/24HR IUD 1 each by Intrauterine route once     levothyroxine (SYNTHROID/LEVOTHROID) 150 MCG tablet Take 1 tablet (150 mcg) by mouth daily     meloxicam (MOBIC) 15 MG tablet Take 1 tablet (15 mg) by mouth daily     metoprolol succinate ER (TOPROL-XL) 25 MG 24 hr tablet Take 1 tablet (25 mg) by mouth daily     nortriptyline (PAMELOR) 50 MG capsule Take 1 capsule (50 mg) by mouth At Bedtime     ZOLMitriptan (ZOMIG) 2.5 MG tablet Take 1 tablet (2.5 mg) by mouth at onset of headache for migraine May repeat in 2 hours. Max 10 tablets per month.     No current facility-administered medications for this visit.        Allergies:   No Known Allergies     Review of Systems:   As above     Physical Exam:   Vitals: There were no vitals taken for this visit.   General: Seated comfortably in no acute distress.  HEENT: Fundoscopic examination without evidence of disc edema  Lungs: breathing comfortably  Neurologic:     Mental Status: Fully alert, attentive. Normal memory and fund of knowledge. Language normal, speech clear and fluent, no paraphasic errors.      Cranial Nerves: Visual fields intact. PERRL. EOMI with normal smooth pursuit. No dysarthria.      Motor: No tremors in the extremities.     Gait: Normal, steady casual gait.      Data reviewed on previous visits    Imaging:  MRA head 2019  IMPRESSION: Mildly motion degraded images. No aneurysm identified.      MRI cervical 2018  IMPRESSION:  1. Minimal C3-C4 disc bulge without stenosis.  2. No evidence for any demyelinating disease.    Pertinent Investigations since last visit:   None         Assessment and Plan:   Assessment:  Jennifer Pastrana is a 30 year old female who presents today for follow-up of episodic migraines without auras, brief head pains. Right sided head pains are possibly secondary  to occipital neuralgia versus manifestation of her right sided migraines. These are adequately controlled on gabapentin. Migraines have been a little more frequent as of late with inconsistent relief from abortive medications. She has tried Maxalt, Zomig, and Imitrex previously. Prescription for Nurtec as an abortive was sent as below. If this is not effective I encouraged her to reach back out and we could consider another agent or switching back to Maxalt.     Plan:  - Continue nortriptyline 50 mg night  - Continue gabapentin 100 mg AM, 200 mg PM  - Nurtec 75 mg daily prn    Follow up in Neurology clinic in 6 months or earlier as needed should new concerns arise.    Patric Duron MD   of Neurology  [facility]      Again, thank you for allowing me to participate in the care of your patient.        Sincerely,        Patric Duron MD

## 2022-08-19 ENCOUNTER — OFFICE VISIT (OUTPATIENT)
Dept: FAMILY MEDICINE | Facility: CLINIC | Age: 32
End: 2022-08-19
Payer: COMMERCIAL

## 2022-08-19 VITALS
TEMPERATURE: 98.7 F | WEIGHT: 218.8 LBS | OXYGEN SATURATION: 98 % | DIASTOLIC BLOOD PRESSURE: 87 MMHG | SYSTOLIC BLOOD PRESSURE: 122 MMHG | BODY MASS INDEX: 38.15 KG/M2 | RESPIRATION RATE: 16 BRPM | HEART RATE: 88 BPM

## 2022-08-19 DIAGNOSIS — E66.9 OBESITY (BMI 35.0-39.9 WITHOUT COMORBIDITY): ICD-10-CM

## 2022-08-19 DIAGNOSIS — D17.30 LIPOMA OF SKIN AND SUBCUTANEOUS TISSUE: ICD-10-CM

## 2022-08-19 DIAGNOSIS — J98.9 REACTIVE AIRWAY DISEASE WITHOUT ASTHMA: Primary | ICD-10-CM

## 2022-08-19 PROCEDURE — 99214 OFFICE O/P EST MOD 30 MIN: CPT | Performed by: FAMILY MEDICINE

## 2022-08-19 RX ORDER — ALBUTEROL SULFATE 90 UG/1
2 AEROSOL, METERED RESPIRATORY (INHALATION) EVERY 4 HOURS PRN
Qty: 18 G | Refills: 0 | Status: SHIPPED | OUTPATIENT
Start: 2022-08-19 | End: 2023-06-29

## 2022-08-19 ASSESSMENT — PAIN SCALES - GENERAL: PAINLEVEL: MODERATE PAIN (4)

## 2022-08-19 NOTE — PROGRESS NOTES
Assessment & Plan     Reactive airway disease without asthma  - Trial: albuterol (PROAIR HFA/PROVENTIL HFA/VENTOLIN HFA) 108 (90 Base) MCG/ACT inhaler  Dispense: 18 g; Refill: 0    ? Lipoma of skin and subcutaneous tissue - upper back  - Adult General Surg Referral    Obesity (BMI 35.0-39.9 without comorbidity)  - Comprehensive Weight Management  Weight management plan: Discussed healthy diet and exercise guidelines  Weight loss goals:   1. Prevent further weight gain  2.  Aim to lose at least 1- 2 lbs/week.  3.  Eat a well balanced heart healthy diet, cut out soda/sugary drinks and control portion sizes.   4. Avoid missing breakfast.  5. Exercise regularly; increase exercise gradually as tolerated to a goal of  30-45 minutes/5 days a week.      Return in about 2 months (around 11/1/2022) for Physical Exam.    Ashley Fish MD  [facility] ADAM Rodriguez is a 31 year old, presenting for the following health issues:  Mass and Weight Problem          History of Present Illness       Reason for visit:  Lipoma- located top of spine, left side  : x1.5 year.  Symptoms include:  Increase in size, movement, reporting nerve pain that radiates down her left arm  Symptom intensity:  Moderate  Symptom progression:  Worsening  Had these symptoms before:  Yes  Prior treatment description:  Iburpofen taken today for pain, as well as flexeril- no relief  What makes it worse:  Movement  What makes it better:  Prescribed meloxicam in the past which she noticed helped with her discomfort    She eats 2-3 servings of fruits and vegetables daily.She consumes 1 sweetened beverage(s) daily.She exercises with enough effort to increase her heart rate 30 to 60 minutes per day.  She exercises with enough effort to increase her heart rate 4 days per week.   She is taking medications regularly.       Weight Problem   Would like to discuss next plan of action for her weight.   Reporting that she has plateaued-  weight gain of less than 5 lbs from visit in May.  Has tried working out at the Gym 3-4 times a week for 45 minutes and eating healthy. Eliminated sugary foods and drinks.     Wt Readings from Last 4 Encounters:   08/19/22 99.2 kg (218 lb 12.8 oz)   05/17/22 96.7 kg (213 lb 3.2 oz)   05/02/22 98.5 kg (217 lb 1.6 oz)   04/15/22 98.4 kg (217 lb)     Body mass index is 38.15 kg/m .        Throat Discomfort x 1 week   Pain in throat with inhaling, feels like its tightening  States she had this in the past and was dx with RAD- albuterol inhaler would give relief.  Denies any SOB, no other cold or flu symptoms.   Would like a refill of that todat      Review of Systems   Constitutional, HEENT, cardiovascular, pulmonary, gi and gu systems are negative, except as otherwise noted.      Objective    /87   Pulse 88   Temp 98.7  F (37.1  C) (Tympanic)   Resp 16   Wt 99.2 kg (218 lb 12.8 oz)   SpO2 98%   Breastfeeding No   BMI 38.15 kg/m    Body mass index is 38.15 kg/m .  Physical Exam   GENERAL: healthy, alert and no distress  EYES: Eyes grossly normal to inspection, PERRL and conjunctivae and sclerae normal  HENT: ear canals and TM's normal, nose and mouth without ulcers or lesions  RESP: lungs clear to auscultation - no rales, rhonchi or wheezes  CV: regular rate and rhythm, normal S1 S2, no S3 or S4, no murmur, click or rub, no peripheral edema and peripheral pulses strong  SKIN: Fat/diffuse lipomatous mass on the     DATA  Previous labs reviewed                .  ..

## 2022-08-30 ENCOUNTER — TELEPHONE (OUTPATIENT)
Dept: PALLIATIVE MEDICINE | Facility: CLINIC | Age: 32
End: 2022-08-30

## 2022-08-30 NOTE — TELEPHONE ENCOUNTER
Screening Questions for Radiology Injections:    Injection to be done at which interventional clinic site? [facility] [place]    Procedure ordered by Gurjit    Procedure ordered? L5-S1 interlaminar epidural steroid injection     Transforaminal Cervical CHRISTIE - Send to [facility] ([facility] - No [facility] Site providers perform this procedure    What insurance would patient like us to bill for this procedure? [employer]     Worker's comp or MVA (motor vehicle accident) -Any injection DO NOT SCHEDULE and route to Asa Rodriguez.      [employer] insurance - For SI joint injections, DO NOT SCHEDULE and route to Massiel Hemanth.       ALL BCBS, [employer] and [employer] - DO NOT SCHEDULE and route to Massiel Saxena    Is an  needed? No     Patient has a  home? (Review Grid) YES: ok    Any chance of pregnancy? NO   If YES, do NOT schedule and route to RN pool    Is patient actively being treated for cancer or immunocompromised? No  If YES, do NOT schedule and route to RN pool     Does the patient have a bleeding or clotting disorder? No     If YES, okay to schedule AND route to RN nurse pool. (For any patients with platelet count <100, RN must forward to provider)    Is patient taking any Blood Thinners OR Antiplatelet medication?  No   If hold needed, do NOT schedule, route to RN pool    Examples:   o Blood Thinners: (Coumadin, Warfarin, Jantoven, Pradaxa, Xarelto, Eliquis, Edoxaban, Enoxaparin, Lovenox, Heparin, Arixtra, Fondaparinux or Fragmin)  o Antiplatelet Medications: (Plavix, Brilinta or Effient)     Is patient taking any aspirin products (includes Excedrin and Fiorinal)? No     If more than 325mg/day, OK to schedule; Instruct Pt to decrease to less than 325 mg for 7 days AND route to RN pool    For CERVICAL procedures, hold all aspirin products for 6 days.     Tell Pt that if aspirin product is not held for 6 days, the procedure WILL BE cancelled.     Any allergies to contrast dye,  iodine, shellfish, or numbing and steroid medications? No    If YES, schedule and add allergy information to appointment notes AND route to the RN pool     If CHRISTIE and Contrast Dye / Iodine Allergy? DO NOT SCHEDULE, route to RN pool    Allergies: Patient has no known allergies.     Does patient have an active infection or treated for one within the past week? No    Is patient currently taking any antibiotics or steroid medications?  No     For patients on chronic, preventative, or prophylactic antibiotics, procedures may be scheduled.     For patients on antibiotics for active or recent infection, schedule 4 days after completed.    For patients on steroid medications, schedule 4 days after completed.     Has the patient had a flu shot or any other vaccinations within the past 7 days? No  If yes, explain that for the vaccine to work best they need to:       wait 1 week before and 1 week after getting any Vaccine    wait 1 week before and 2 weeks after getting Covid Vaccine #2 or BOOSTER    If patient has concerns about the timing, send to RN pool     Does patient have an MRI/CT?  YES: MRI Include Date and Check Procedure Scheduling Grid to see if required.    Was the MRI/CT done within the last 3 years?  Yes     If no route to RN Pool    If yes, where was the MRI/CT done? [facility]     Refer to PACS Transmissions list for approved external locations and route to RN Pool High Priority    If MRI was not done at approved external location do NOT schedule and route to RN pool.      If patient has an imaging disc, the injection MAY be scheduled but patient must bring disc to appt or appt will be cancelled.    Procedure Specific Instructions:    If celiac plexus block, informed patient NPO for 6 hours and that it is okay to take medications with sips of water, especially blood pressure medications Not Applicable         If this is for a cervical procedure, informed patient that aspirin needs to be held for 6 days.   Not  Applicable      Sedation, If Sedation is ordered for any procedure, patient must be NPO for 6 hours prior to procedure Not Applicable      If IV needed:    Do not schedule procedures requiring IV placement in the first appointment of the day or first appointment after lunch. Do NOT schedule at 0745, 0815 or 1245. ok    Instructed patient to arrive 30 minutes early for IV start if required. (Check Procedure Scheduling Grid)  NO    Reminders:    If you are started on any steroids or antibiotics between now and your appointment, you must contact us because the procedure may need to be cancelled.  Yes      As a reminder, receiving steroids can decrease your body's ability to fight infection.   Would you still like to move forward with scheduling the injection?  Yes      IV Sedation is not provided for procedures. If oral anti-anxiety medication is needed, the patient should request this from their referring provider.      Instruct patient to arrive as directed prior to the scheduled appointment time:  Annette: 30 minutes before; if IV needed 1 hour before       For patients 85 or older we recommend having an adult stay w/ them for the remainder of the day.       If the patient is Diabetic, remind them to bring their glucometer.      Does the patient have any questions?  NO  Veronica Rodriguez  [facility]

## 2022-09-13 NOTE — PROGRESS NOTES
"[facility]    Date of visit: 9/15/2022    Chief complaint:   No chief complaint on file.      Interval history:  Jennifer Pastrana was last seen by me on 5/2/2022 for diagnosis of:  - Lumbar radiculopathy  - Myofascial pain    Recommendations/plan at the last visit included:  1. Physical Therapy:  Continue HEP  2. Clinical Health Psychologist to address issues of relaxation, behavioral change, coping style, and other factors important to improvement.  No  3. Diagnostic Studies:  None  4. Medication Management:  Continue meloxicam and gabapentin  5. Further procedures recommended: L5-S1 ILESI  6. Follow up: 1-2 months after procedure    Since her last visit, Jennifer Pastrana reports:  - L5-S1 IL CHRISTIE on 5/17/22 with excellent relief lasting about 3 months  - Was able to be more active - had no pain at all - took no medications during the 3 months that it was working  - Since early August the pain was gradually coming back  - The pain is getting worse  - Took Meloxicam for the first time in awhile today  - Still in pain but it is manageable after taking the meloxicam   - Pain is the same as before the injection  - Pain is in her low back and left hip  - Pain is radiating down lateral left thigh to mid calf  - Leg pain feels dull and achy, \"like a pinched nerve\"  - She is doing PT home exercised  - She a repeat CHRISTIE in 2 weeks, looking forward to this  - She is weaning off the gabapentin - she has 2 doses left and then she will stop this.   - She is trying to eliminate medication - main reason is she believes the gabapentin and nortryptaline hindering her weight loss  - She is off the nortyptaline as well  - Having more headaches but starting Neurtec  - Sees a neuroogist for her headaches    Current pain treatments:   Meloxicam - H  Gabapentin 200 mg in AM, 100 mg in afternoon (if needed), 100 mg at bedtime (had drowsiness at higher doses) - for headaches    Past pain treatments:  Flexeril - " [facility]  Top    Side Effects: no current side effects    Medications:  Current Outpatient Medications   Medication Sig Dispense Refill     albuterol (PROAIR HFA/PROVENTIL HFA/VENTOLIN HFA) 108 (90 Base) MCG/ACT inhaler Inhale 2 puffs into the lungs every 4 hours as needed for shortness of breath / dyspnea or wheezing 18 g 0     cyclobenzaprine (FLEXERIL) 10 MG tablet Take 0.5-1 tablets (5-10 mg) by mouth 3 times daily as needed for muscle spasms 25 tablet 0     ferrous sulfate (FE TABS) 325 (65 Fe) MG EC tablet Take 1 tablet (325 mg) by mouth daily 90 tablet 1     fluticasone (FLONASE) 50 MCG/ACT nasal spray Spray 1-2 sprays into both nostrils daily 9.9 mL 3     levonorgestrel (MIRENA) 20 MCG/24HR IUD 1 each by Intrauterine route once       levothyroxine (SYNTHROID/LEVOTHROID) 150 MCG tablet Take 1 tablet (150 mcg) by mouth daily 90 tablet 3     meloxicam (MOBIC) 15 MG tablet Take 1 tablet (15 mg) by mouth daily 30 tablet 0     metoprolol succinate ER (TOPROL-XL) 25 MG 24 hr tablet Take 1 tablet (25 mg) by mouth daily 90 tablet 3     Rimegepant Sulfate 75 MG TBDP Take 75 mg by mouth every 48 hours 16 tablet 11     gabapentin (NEURONTIN) 100 MG capsule Take 1 capsule in the morning and 2 capsules at night. 360 capsule 3     nortriptyline (PAMELOR) 50 MG capsule Take 1 capsule (50 mg) by mouth At Bedtime 90 capsule 3       Medical History: any changes in medical history since they were last seen? No    Review of Systems:  The 14 system ROS was reviewed from the intake questionnaire, and is positive for: negative other than noted in HPI  Any bowel or bladder problems: No  Mood: No issues    Physical Exam:   Blood pressure 128/82, pulse 71, not currently breastfeeding.    GENERAL: Healthy, alert and no distress  EYES: Eyes grossly normal to inspection.  No discharge or erythema, or obvious scleral/conjunctival abnormalities.  RESP: No audible wheeze, cough, or visible cyanosis.  No visible retractions or increased work of  breathing.    SKIN: Visible skin clear. No significant rash, abnormal pigmentation or lesions.  NEURO: Cranial nerves grossly intact.  Mentation and speech appropriate for age.  PSYCH: Mentation appears normal, affect normal/bright, judgement and insight intact, normal speech and appearance well-groomed.      Assessment:   Jennifer Pastrana is a 31 year old female who presents with the complaints of acute on chronic left-sided low back pain radiating to her upper buttocks.   Jennifer was seen today for pain.     Diagnoses and all orders for this visit:     Lumbar radiculopathy     Myofascial muscle pain  Diagnoses and all orders for this visit:    Lumbar radiculopathy  -     meloxicam (MOBIC) 15 MG tablet; Take 1 tablet (15 mg) by mouth daily    Myofascial muscle pain  -     meloxicam (MOBIC) 15 MG tablet; Take 1 tablet (15 mg) by mouth daily        Plan:  Physical Therapy:  Continue HEP  Clinical Health Psychologist to address issues of relaxation, behavioral change, coping style, and other factors important to improvement.  No  7. Diagnostic Studies:  None  8. Medication Management:    Continue meloxicam   Continue nurtec   - consider restarting gabapentin vs another option if back pain progresses   9. Further procedures recommended: Repeat L5-S1 ILESI  10. Follow up: 2-3 months after procedure  This patient was seen and discussed with the attending physician Dr. Gurjit Heller MD  Chronic Pain Fellow   [facility]   I saw and examined the patient with the Pain Fellow/Resident. I have reviewed and agree with the resident's note and plan of care and made changes and corrections directly to the body of the note.   TIME SPENT:   BY FELLOW/RESIDENT ALONE 10 MIN   BY MYSELF WITHOUT THE FELLOW/RESIDENT 10 MIN   These times included 10 minutes I spent counseling her about her diagnosis and treatment options and coordination of care with the primary team

## 2022-09-15 ENCOUNTER — OFFICE VISIT (OUTPATIENT)
Dept: PALLIATIVE MEDICINE | Facility: CLINIC | Age: 32
End: 2022-09-15
Payer: COMMERCIAL

## 2022-09-15 VITALS — DIASTOLIC BLOOD PRESSURE: 82 MMHG | SYSTOLIC BLOOD PRESSURE: 128 MMHG | HEART RATE: 71 BPM

## 2022-09-15 DIAGNOSIS — M54.16 LUMBAR RADICULOPATHY: ICD-10-CM

## 2022-09-15 DIAGNOSIS — M79.18 MYOFASCIAL MUSCLE PAIN: ICD-10-CM

## 2022-09-15 PROCEDURE — 99213 OFFICE O/P EST LOW 20 MIN: CPT | Mod: GC | Performed by: PAIN MEDICINE

## 2022-09-15 RX ORDER — MELOXICAM 15 MG/1
15 TABLET ORAL DAILY
Qty: 30 TABLET | Refills: 3 | Status: SHIPPED | OUTPATIENT
Start: 2022-09-15 | End: 2023-02-07

## 2022-09-15 ASSESSMENT — PAIN SCALES - GENERAL: PAINLEVEL: MODERATE PAIN (4)

## 2022-09-15 NOTE — PATIENT INSTRUCTIONS
Physical Therapy:  Continue [facility]  Clinical Health Psychologist to address issues of relaxation, behavioral change, coping style, and other factors important to improvement.  No  Diagnostic Studies:  None  Medication Management:    Continue meloxicam   Continue nurtec   - consider restarting gabapentin vs another option if back pain progresses   Further procedures recommended: Repeat L5-S1 ILESI  Follow up: 2-3 months after procedure    ----------------------------------------------------------------  Clinic Number:  [phone]   Call with any questions about your care and for scheduling assistance.   Calls are returned Monday through Friday between 8 AM and 4:30 PM. We usually get back to you within 2 business days depending on the issue/request.    If we are prescribing your medications:  For opioid medication refills, call the clinic or send a [employer] Virtual message 7 days in advance.  Please include:  Name of requested medication  Name of the pharmacy.  For non-opioid medications, call your pharmacy directly to request a refill. Please allow 3-4 days to be processed.   Per [place] State Law:  All controlled substance prescriptions must be filled within 30 days of being written.    For those controlled substances allowing refills, pickup must occur within 30 days of last fill.      We believe regular attendance is key to your success in our program!    Any time you are unable to keep your appointment we ask that you call us at least 24 hours in advance to cancel.This will allow us to offer the appointment time to another patient.   Multiple missed appointments may lead to dismissal from the clinic.

## 2022-09-29 ENCOUNTER — RADIOLOGY INJECTION OFFICE VISIT (OUTPATIENT)
Dept: PALLIATIVE MEDICINE | Facility: CLINIC | Age: 32
End: 2022-09-29
Attending: PAIN MEDICINE
Payer: COMMERCIAL

## 2022-09-29 VITALS
HEART RATE: 82 BPM | SYSTOLIC BLOOD PRESSURE: 131 MMHG | RESPIRATION RATE: 16 BRPM | OXYGEN SATURATION: 100 % | DIASTOLIC BLOOD PRESSURE: 88 MMHG

## 2022-09-29 DIAGNOSIS — M54.16 LUMBAR RADICULOPATHY: ICD-10-CM

## 2022-09-29 PROCEDURE — 62323 NJX INTERLAMINAR LMBR/SAC: CPT | Performed by: PAIN MEDICINE

## 2022-09-29 RX ORDER — TRIAMCINOLONE ACETONIDE 40 MG/ML
40 INJECTION, SUSPENSION INTRA-ARTICULAR; INTRAMUSCULAR ONCE
Status: COMPLETED | OUTPATIENT
Start: 2022-09-29 | End: 2022-09-29

## 2022-09-29 RX ADMIN — TRIAMCINOLONE ACETONIDE 40 MG: 40 INJECTION, SUSPENSION INTRA-ARTICULAR; INTRAMUSCULAR at 12:17

## 2022-09-29 ASSESSMENT — PAIN SCALES - GENERAL
PAINLEVEL: NO PAIN (1)
PAINLEVEL: MILD PAIN (2)

## 2022-09-29 NOTE — PATIENT INSTRUCTIONS
[facility]   Procedure Discharge Instructions    Today you saw:  Dr. Vu Cagle    You had an:  Lumbar Epidural steroid injection      Medications used:  Lidocaine   Bupivacaine   Dexamethasone Omnipaque    Kenalog            If you were holding your blood thinning medication, please restart taking it: N/A  Be cautious when walking. Numbness and/or weakness in the lower extremities may occur for up to 6-8 hours after the procedure due to effect of the local anesthetic  Do not drive for 6 hours. The effect of the local anesthetic could slow your reflexes.   You may resume your regular activities after 24 hours  Avoid strenuous activity for the first 24 hours  You may shower, however avoid swimming, tub baths or hot tubs for 24 hours following your procedure  You may have a mild to moderate increase in pain for several days following the injection.  It may take up to 14 days for the steroid medication to start working although you may feel the effect as early as a few days after the procedure.     You may use ice packs for 10-15 minutes, 3 to 4 times a day at the injection site for comfort  Do not use heat to painful areas for 6 to 8 hours. This will give the local anesthetic time to wear off and prevent you from accidentally burning your skin.   Unless you have been directed to avoid the use of anti-inflammatory medications (NSAIDS), you may use medications such as ibuprofen, Aleve or Tylenol for pain control if needed.   Possible side effects of steroids that you may experience include flushing, elevated blood pressure, increased appetite, mild headaches and restlessness.  All of these symptoms will get better with time.  If you experience any of the following, call the Pain Clinic during work hours (Mon-Friday 8-4:30 pm) at [phone] or the Provider Line after hours at [phone]:  -Fever over 100 degree F  -Swelling, bleeding, redness, drainage, warmth at the injection  site  -Progressive weakness or numbness in your legs  -Loss of bowel or bladder function  -Unusual new onset of pain that is not improving

## 2022-09-29 NOTE — NURSING NOTE
Pre-procedure Intake  If YES to any questions or NO to having a   Please complete laminated checklist and leave on the computer keyboard for Provider, verbally inform provider if able.    For SCS Trial, RFA's or any sedation procedure:  Have you been fasting? NA    If yes, for how long?     Are you taking any any blood thinners such as Coumadin, Warfarin, Jantoven, Pradaxa Xarelto, Eliquis, Edoxaban, Enoxaparin, Lovenox, Heparin, Arixtra, Fondaparinux, or Fragmin? OR Antiplatelet medication such as Plavix, Brilinta, or Effient?   No     If yes, when did you take your last dose?     Do you take aspirin?  No    If cervical procedure, have you held aspirin for 6 days?   NA    Do you have any allergies to contrast dye, iodine, steroid and/or numbing medications?  NO    Are you currently taking antibiotics or have an active infection?  NO    Have you had a fever/elevated temperature within the past week? NO    Are you currently taking oral steroids? NO    Do you have a ? Yes    Are you pregnant or breastfeeding?  NO    Have you received the COVID-19 vaccine? Yes    If yes, was it your 1st, 2nd or only dose needed?     Date of most recent vaccine: 11/17/21    Notify provider and RNs if systolic BP >170, diastolic BP >100, P >100 or O2 sats < 90%

## 2022-09-29 NOTE — PROGRESS NOTES
Pre procedure Diagnosis: Lumbar radiculopathy  Post procedure Diagnosis: Same  Procedure performed: L5-S1 interlaminar epidural steroid injection   Anesthesia: None  Complications: None  Operators: Vu Cagle MD     Indications:   Jennifer Pastrana is a 31 year old female.  The patient has a history of left-sided low back pain radiating to her lower extremity.  Examination shows positive slump.  she has tried conservative treatment including meds/PT/injections.    MRI reviewed  Options/alternatives, benefits and risks were discussed with the patient including but not limited to bleeding, infection, no pain relief, tissue trauma, exposure to radiation, reaction to medications, spinal cord injury, dural puncture, weakness, numbness and headache.  Questions were answered to her satisfaction and she wishes to proceed. Voluntary informed consent was obtained and signed.     Vitals were reviewed: Yes  Allergies were reviewed:  Yes   Medications were reviewed:  Yes   Pre-procedure pain score: 2/10    Procedure:  The patient's medical history, medications, and allergies were reviewed and reconciled.  After obtaining signed informed consent, the patient was brought into the procedure suite and was placed in a prone position on the procedure table.   A Pause for the Cause was performed.  Patient was prepped and draped in the usual sterile fashion.     The L5-S1 interspace was identified with AP fluoroscopy.  A total of 4ml of 1% lidocaine was used to anesthetize the skin and subcutaneous tissues for a  midline approach.    A 20gauge 3.5inch Touhy needle was advanced utilizing intermittent AP and Lateral fluoroscopy and air for loss of resistance.  The epidural space was encountered on the first pass without difficulties.  Aspiration for blood and CSF was negative.  Needle position was verified by injecting 1 ml of Omnipaque utilizing real-time fluoroscopy that showed good needle placement and epidural spread without signs  of intravascular or intrathecal uptake.  Omnipaque wasted:  9 ml.    Then, after repeated negative aspiration for blood or CSF, a combination of Kenalog 40 mg, Bupivicaine 0.25% 2 ml, diluted with 3 ml of normal saline to a total injectate volume of 6 ml was injected into the epidural space in a slow and incremental fashion and the needle was restyletted and withdrawn.  All injected medications were preservative free.    The injection site was cleaned and a sterile dressing was applied.    The patient tolerated the procedure well without complications and was taken to the recovery room for continued observation.    Images were saved to PACS.    Post-procedure pain score: 1/10  Follow-up includes:   -f/u phone call in one week  -f/u with referring provider     Vu Cagle MD  [facility]

## 2022-09-29 NOTE — NURSING NOTE
Discharge Information    IV Discontiued Time:  NA    Amount of Fluid Infused:  NA    Discharge Criteria = When patient returns to baseline or as per MD order    Consciousness:  Pt is fully awake    Circulation:  BP +/- 20% of pre-procedure level    Respiration:  Patient is able to breathe deeply    O2 Sat:  Patient is able to maintain O2 Sat >92% on room air    Activity:  Moves 4 extremities on command    Ambulation:  Patient is able to stand and walk or stand and pivot into wheelchair    Dressing:  Clean/dry or No Dressing    Notes:   Discharge instructions and AVS given to patient    Patient meets criteria for discharge?  YES    Admitted to PCU?  No    Responsible adult present to accompany patient home?  Yes    Signature/Title:    Deon Welch RN  RN Care Coordinator  [facility] [place]

## 2022-10-03 ENCOUNTER — OFFICE VISIT (OUTPATIENT)
Dept: SURGERY | Facility: CLINIC | Age: 32
End: 2022-10-03
Payer: COMMERCIAL

## 2022-10-03 VITALS
DIASTOLIC BLOOD PRESSURE: 86 MMHG | OXYGEN SATURATION: 97 % | HEART RATE: 92 BPM | WEIGHT: 213.8 LBS | SYSTOLIC BLOOD PRESSURE: 131 MMHG | BODY MASS INDEX: 37.28 KG/M2

## 2022-10-03 DIAGNOSIS — D17.0 LIPOMA OF NECK: Primary | ICD-10-CM

## 2022-10-03 PROCEDURE — 99203 OFFICE O/P NEW LOW 30 MIN: CPT | Performed by: SURGERY

## 2022-10-03 NOTE — LETTER
10/3/2022         RE: Jennifer Pastrana  [address] [facility] [address]        Dear Colleague,    Thank you for referring your patient, Jennifer Pastrana, to the [facility]. Please see a copy of my visit note below.    Patient seen in consultation for lipoma by Ashley Fish    HPI:  Patient is a 31 year old female  with complaints of enlarging and tender lump lower back of neck  The patient noticed the symptoms about 1 year plus ago.    The pain/discomfort is newer  Nothing similar before  nothing makes the episode better.  Patient has not family history of similar problems    Review Of Systems    Skin: as above  Ears/Nose/Throat: negative  Respiratory: No shortness of breath, dyspnea on exertion, cough, or hemoptysis  Cardiovascular: palpitations and tachycardia, uses metoprolol for that  Gastrointestinal: negative  Genitourinary: negative  Musculoskeletal: negative  Neurologic: negative  Hematologic/Lymphatic/Immunologic: negative  Endocrine: negative      Past Medical History:   Diagnosis Date     AR (allergic rhinitis)      ASCUS with positive high risk HPV 2/2013    type 53     Cervical high risk HPV (human papillomavirus) test positive 2/25/15     Dysplasia of cervix, low grade (MILO 1)      History of migraine headaches     controlled on Nortriptyline and Maxalt prn      Hypothyroidism      IUD (intrauterine device) in place     Mirena- placed 10/2019     Mood disorder (H)      Reactive airway disease that is not asthma     on Albuterol prn      Uncomplicated asthma 7/2015    Mild       Past Surgical History:   Procedure Laterality Date     BIOPSY  [id number]    Cervix     HC TOOTH EXTRACTION W/FORCEP         Social History     Socioeconomic History     Marital status:      Spouse name: Not on file     Number of children: Not on file     Years of education: Not on file     Highest education level: Not on file   Occupational History     Not on file   Tobacco Use     Smoking  status: Never Smoker     Smokeless tobacco: Never Used     Tobacco comment: Nonsmoking household   Vaping Use     Vaping Use: Never used   Substance and Sexual Activity     Alcohol use: Not Currently     Drug use: No     Sexual activity: Yes     Partners: Male     Birth control/protection: I.U.D.   Other Topics Concern     Parent/sibling w/ CABG, MI or angioplasty before 65F 55M? Yes      Service No     Blood Transfusions No     Caffeine Concern No     Occupational Exposure No     Hobby Hazards No     Sleep Concern No     Stress Concern No     Weight Concern Yes     Special Diet No     Back Care Yes     Exercise No     Bike Helmet No     Seat Belt Yes     Self-Exams No   Social History Narrative     Not on file     Social Determinants of Health     Financial Resource Strain: Not on file   Food Insecurity: Not on file   Transportation Needs: Not on file   Physical Activity: Not on file   Stress: Not on file   Social Connections: Not on file   Intimate Partner Violence: Not on file   Housing Stability: Not on file       Current Outpatient Medications   Medication Sig Dispense Refill     albuterol (PROAIR HFA/PROVENTIL HFA/VENTOLIN HFA) 108 (90 Base) MCG/ACT inhaler Inhale 2 puffs into the lungs every 4 hours as needed for shortness of breath / dyspnea or wheezing 18 g 0     cyclobenzaprine (FLEXERIL) 10 MG tablet Take 0.5-1 tablets (5-10 mg) by mouth 3 times daily as needed for muscle spasms 25 tablet 0     ferrous sulfate (FE TABS) 325 (65 Fe) MG EC tablet Take 1 tablet (325 mg) by mouth daily 90 tablet 1     fluticasone (FLONASE) 50 MCG/ACT nasal spray Spray 1-2 sprays into both nostrils daily 9.9 mL 3     levonorgestrel (MIRENA) 20 MCG/24HR IUD 1 each by Intrauterine route once       levothyroxine (SYNTHROID/LEVOTHROID) 150 MCG tablet Take 1 tablet (150 mcg) by mouth daily 90 tablet 3     meloxicam (MOBIC) 15 MG tablet Take 1 tablet (15 mg) by mouth daily 30 tablet 3     metoprolol succinate ER (TOPROL-XL)  25 MG 24 hr tablet Take 1 tablet (25 mg) by mouth daily 90 tablet 3     Rimegepant Sulfate 75 MG TBDP Take 75 mg by mouth every 48 hours 16 tablet 11       Medications and history reviewed    Physical exam:  Vitals: /86   Pulse 92   Wt 97 kg (213 lb 12.8 oz)   SpO2 97%   BMI 37.28 kg/m    BMI= Body mass index is 37.28 kg/m .    Constitutional: healthy, alert and no distress  Head: Normocephalic. No masses, lesions, tenderness or abnormalities  Neck: Normal range of motion no palpable abnormalities, no lipoma or other subcutaneous mass in the area of concern.  Skin: no suspicious lesions or rashes  Psychiatric: mentation appears normal and affect normal/bright  Hematologic/Lymphatic/Immunologic: Normal cervical lymph nodes      Assessment:     ICD-10-CM    1. Lipoma of neck  D17.0 CT Soft Tissue Neck w Contrast     Plan: Patient able to feel some mass or possible lipoma in the base of her neck but nothing on exam here today.  Discuss getting some imaging to see if it happens to  an abnormal mass of some sort like lipoma.  Ultrasound would be able to look at that but only superficially.  CAT scan can look as well but also potentially get some information on the bones therefore we will go ahead and order that to get extra information.  I will let her know once have had a chance to review the imaging when it is completed.    Montana Bates MD        Again, thank you for allowing me to participate in the care of your patient.        Sincerely,        Montana Bates MD

## 2022-10-03 NOTE — PROGRESS NOTES
Patient seen in consultation for lipoma by Ashley Fish    HPI:  Patient is a 31 year old female  with complaints of enlarging and tender lump lower back of neck  The patient noticed the symptoms about 1 year plus ago.    The pain/discomfort is newer  Nothing similar before  nothing makes the episode better.  Patient has not family history of similar problems    Review Of Systems    Skin: as above  Ears/Nose/Throat: negative  Respiratory: No shortness of breath, dyspnea on exertion, cough, or hemoptysis  Cardiovascular: palpitations and tachycardia, uses metoprolol for that  Gastrointestinal: negative  Genitourinary: negative  Musculoskeletal: negative  Neurologic: negative  Hematologic/Lymphatic/Immunologic: negative  Endocrine: negative      Past Medical History:   Diagnosis Date     AR (allergic rhinitis)      ASCUS with positive high risk HPV 2/2013    type 53     Cervical high risk HPV (human papillomavirus) test positive 2/25/15     Dysplasia of cervix, low grade (MILO 1)      History of migraine headaches     controlled on Nortriptyline and Maxalt prn      Hypothyroidism      IUD (intrauterine device) in place     Mirena- placed 10/2019     Mood disorder (H)      Reactive airway disease that is not asthma     on Albuterol prn      Uncomplicated asthma 7/2015    Mild       Past Surgical History:   Procedure Laterality Date     BIOPSY  0206-3113    Cervix     HC TOOTH EXTRACTION W/FORCEP         Social History     Socioeconomic History     Marital status:      Spouse name: Not on file     Number of children: Not on file     Years of education: Not on file     Highest education level: Not on file   Occupational History     Not on file   Tobacco Use     Smoking status: Never Smoker     Smokeless tobacco: Never Used     Tobacco comment: Nonsmoking household   Vaping Use     Vaping Use: Never used   Substance and Sexual Activity     Alcohol use: Not Currently     Drug use: No     Sexual activity: Yes      Partners: Male     Birth control/protection: I.U.D.   Other Topics Concern     Parent/sibling w/ CABG, MI or angioplasty before 65F 55M? Yes      Service No     Blood Transfusions No     Caffeine Concern No     Occupational Exposure No     Hobby Hazards No     Sleep Concern No     Stress Concern No     Weight Concern Yes     Special Diet No     Back Care Yes     Exercise No     Bike Helmet No     Seat Belt Yes     Self-Exams No   Social History Narrative     Not on file     Social Determinants of Health     Financial Resource Strain: Not on file   Food Insecurity: Not on file   Transportation Needs: Not on file   Physical Activity: Not on file   Stress: Not on file   Social Connections: Not on file   Intimate Partner Violence: Not on file   Housing Stability: Not on file       Current Outpatient Medications   Medication Sig Dispense Refill     albuterol (PROAIR HFA/PROVENTIL HFA/VENTOLIN HFA) 108 (90 Base) MCG/ACT inhaler Inhale 2 puffs into the lungs every 4 hours as needed for shortness of breath / dyspnea or wheezing 18 g 0     cyclobenzaprine (FLEXERIL) 10 MG tablet Take 0.5-1 tablets (5-10 mg) by mouth 3 times daily as needed for muscle spasms 25 tablet 0     ferrous sulfate (FE TABS) 325 (65 Fe) MG EC tablet Take 1 tablet (325 mg) by mouth daily 90 tablet 1     fluticasone (FLONASE) 50 MCG/ACT nasal spray Spray 1-2 sprays into both nostrils daily 9.9 mL 3     levonorgestrel (MIRENA) 20 MCG/24HR IUD 1 each by Intrauterine route once       levothyroxine (SYNTHROID/LEVOTHROID) 150 MCG tablet Take 1 tablet (150 mcg) by mouth daily 90 tablet 3     meloxicam (MOBIC) 15 MG tablet Take 1 tablet (15 mg) by mouth daily 30 tablet 3     metoprolol succinate ER (TOPROL-XL) 25 MG 24 hr tablet Take 1 tablet (25 mg) by mouth daily 90 tablet 3     Rimegepant Sulfate 75 MG TBDP Take 75 mg by mouth every 48 hours 16 tablet 11       Medications and history reviewed    Physical exam:  Vitals: /86   Pulse 92   Wt 97  kg (213 lb 12.8 oz)   SpO2 97%   BMI 37.28 kg/m    BMI= Body mass index is 37.28 kg/m .    Constitutional: healthy, alert and no distress  Head: Normocephalic. No masses, lesions, tenderness or abnormalities  Neck: Normal range of motion no palpable abnormalities, no lipoma or other subcutaneous mass in the area of concern.  Skin: no suspicious lesions or rashes  Psychiatric: mentation appears normal and affect normal/bright  Hematologic/Lymphatic/Immunologic: Normal cervical lymph nodes      Assessment:     ICD-10-CM    1. Lipoma of neck  D17.0 CT Soft Tissue Neck w Contrast     Plan: Patient able to feel some mass or possible lipoma in the base of her neck but nothing on exam here today.  Discuss getting some imaging to see if it happens to  an abnormal mass of some sort like lipoma.  Ultrasound would be able to look at that but only superficially.  CAT scan can look as well but also potentially get some information on the bones therefore we will go ahead and order that to get extra information.  I will let her know once have had a chance to review the imaging when it is completed.    Montana Bates MD

## 2022-10-08 ENCOUNTER — E-VISIT (OUTPATIENT)
Dept: URGENT CARE | Facility: URGENT CARE | Age: 32
End: 2022-10-08
Payer: COMMERCIAL

## 2022-10-08 DIAGNOSIS — J01.90 ACUTE SINUSITIS WITH SYMPTOMS > 10 DAYS: Primary | ICD-10-CM

## 2022-10-08 PROCEDURE — 99421 OL DIG E/M SVC 5-10 MIN: CPT | Performed by: PHYSICIAN ASSISTANT

## 2022-10-08 NOTE — PATIENT INSTRUCTIONS
Sinusitis (Antibiotic Treatment)    The sinuses are air-filled spaces within the bones of the face. They connect to the inside of the nose. Sinusitis is an inflammation of the tissue that lines the sinuses. Sinusitis can occur during a cold. It can also happen due to allergies to pollens and other particles in the air. Sinusitis can cause symptoms of sinus congestion and a feeling of fullness. A sinus infection causes fever, headache, and facial pain. There is often green or yellow fluid draining from the nose or into the back of the throat (post-nasal drip). You have been given antibiotics to treat this condition.   Home care  Take the full course of antibiotics as instructed. Don't stop taking them, even when you feel better.  Drink plenty of water, hot tea, and other liquids as directed by the healthcare provider. This may help thin nasal mucus. It also may help your sinuses drain fluids.  Heat may help soothe painful areas of your face. Use a towel soaked in hot water. Or,  the shower and direct the warm spray onto your face. Using a vaporizer along with a menthol rub at night may also help soothe symptoms.   An expectorant with guaifenesin may help thin nasal mucus and help your sinuses drain fluids. Talk with your provider or pharmacists before taking an over-the-counter (OTC) medicine if you have any questions about it or its side effects..  You can use an OTC decongestant, unless a similar medicine was prescribed to you. Nasal sprays work the fastest. Use one that contains phenylephrine or oxymetazoline. First blow your nose gently. Then use the spray. Don't use these medicines more often than directed on the label. If you do, your symptoms may get worse. You may also take pills that contain pseudoephedrine. Don t use products that combine multiple medicines. This is because side effects may be increased. Read labels. You can also ask the pharmacist for help. (People with high blood pressure  should not use decongestants. They can raise blood pressure.) Talk with your provider or pharmacist if you have any questions about the medicine..  OTC antihistamines may help if allergies contributed to your sinusitis. Talk with your provider or pharmacist if you have any questions about the medicine..  Don't use nasal rinses or irrigation during an acute sinus infection, unless your healthcare provider tells you to. Rinsing may spread the infection to other areas in your sinuses.  Use acetaminophen or ibuprofen to control pain, unless another pain medicine was prescribed to you. If you have chronic liver or kidney disease or ever had a stomach ulcer, talk with your healthcare provider before using these medicines. Never give aspirin to anyone under age 18 who is ill with a fever. It may cause severe liver damage.  Don't smoke. This can make symptoms worse.    Follow-up care  Follow up with your healthcare provider, or as advised.   When to seek medical advice  Call your healthcare provider if any of these occur:   Facial pain or headache that gets worse  Stiff neck  Unusual drowsiness or confusion  Swelling of your forehead or eyelids  Symptoms don't go away in 10 days  Vision problems, such as blurred or double vision  Fever of 100.4 F (38 C) or higher, or as directed by your healthcare provider  Call 911  Call 911 if any of these occur:   Seizure  Trouble breathing  Feeling dizzy or faint  Fingernails, skin or lips look blue, purple , or gray  Prevention  Here are steps you can take to help prevent an infection:   Keep good hand washing habits.  Don t have close contact with people who have sore throats, colds, or other upper respiratory infections.  Don t smoke, and stay away from secondhand smoke.  Stay up to date with of your vaccines.  [employer] last reviewed this educational content on 12/1/2019 2000-2021 [employer]. All rights reserved. This information is not intended as a substitute for  professional medical care. Always follow your healthcare professional's instructions.        Dear Jennifer Pastrana    After reviewing your responses, I've been able to diagnose you with?a sinus infection caused by bacteria.?     Based on your responses and diagnosis, I have prescribed augmentin to treat your symptoms. I have sent this to your pharmacy.?     It is also important to stay well hydrated, get lots of rest and take over-the-counter decongestants,?tylenol?or ibuprofen if you?are able to?take those medications per your primary care provider to help relieve discomfort.?     It is important that you take?all of?your prescribed medication even if your symptoms are improving after a few doses.? Taking?all of?your medicine helps prevent the symptoms from returning.?     If your symptoms worsen, you develop severe headache, vomiting, high fever (>102), or are not improving in 7 days, please contact your primary care provider for an appointment or visit any of our convenient Walk-in Care or Urgent Care Centers to be seen which can be found on our website?here.?     Thanks again for choosing?us?as your health care partner,?   ?  Brant Solorio, [facility], [facility]?

## 2022-10-17 ENCOUNTER — ANCILLARY PROCEDURE (OUTPATIENT)
Dept: CT IMAGING | Facility: CLINIC | Age: 32
End: 2022-10-17
Attending: SURGERY
Payer: COMMERCIAL

## 2022-10-17 DIAGNOSIS — D17.0 LIPOMA OF NECK: ICD-10-CM

## 2022-10-17 PROCEDURE — 70491 CT SOFT TISSUE NECK W/DYE: CPT | Mod: TC | Performed by: RADIOLOGY

## 2022-10-17 RX ORDER — IOPAMIDOL 755 MG/ML
500 INJECTION, SOLUTION INTRAVASCULAR ONCE
Status: COMPLETED | OUTPATIENT
Start: 2022-10-17 | End: 2022-10-17

## 2022-10-17 RX ADMIN — IOPAMIDOL 100 ML: 755 INJECTION, SOLUTION INTRAVASCULAR at 12:57

## 2022-10-17 RX ADMIN — Medication 50 ML: at 12:57

## 2022-10-28 ASSESSMENT — ENCOUNTER SYMPTOMS
MYALGIAS: 0
DIARRHEA: 0
FREQUENCY: 0
PARESTHESIAS: 0
CONSTIPATION: 0
JOINT SWELLING: 0
HEMATOCHEZIA: 0
ARTHRALGIAS: 0
FEVER: 0
DYSURIA: 0
ABDOMINAL PAIN: 0
WEAKNESS: 0
PALPITATIONS: 0
SORE THROAT: 0
HEARTBURN: 0
BREAST MASS: 0
NAUSEA: 0
EYE PAIN: 0
HEMATURIA: 0
HEADACHES: 0
CHILLS: 0
DIZZINESS: 0
COUGH: 0
NERVOUS/ANXIOUS: 0
SHORTNESS OF BREATH: 0

## 2022-11-01 ENCOUNTER — OFFICE VISIT (OUTPATIENT)
Dept: FAMILY MEDICINE | Facility: CLINIC | Age: 32
End: 2022-11-01
Payer: COMMERCIAL

## 2022-11-01 VITALS
OXYGEN SATURATION: 98 % | DIASTOLIC BLOOD PRESSURE: 84 MMHG | WEIGHT: 215.2 LBS | HEIGHT: 63 IN | RESPIRATION RATE: 20 BRPM | SYSTOLIC BLOOD PRESSURE: 122 MMHG | TEMPERATURE: 98.2 F | HEART RATE: 84 BPM | BODY MASS INDEX: 38.13 KG/M2

## 2022-11-01 DIAGNOSIS — Z00.00 ROUTINE GENERAL MEDICAL EXAMINATION AT A HEALTH CARE FACILITY: Primary | ICD-10-CM

## 2022-11-01 DIAGNOSIS — Z13.220 LIPID SCREENING: ICD-10-CM

## 2022-11-01 DIAGNOSIS — F40.240 CLAUSTROPHOBIA: ICD-10-CM

## 2022-11-01 PROCEDURE — 99213 OFFICE O/P EST LOW 20 MIN: CPT | Mod: 25 | Performed by: FAMILY MEDICINE

## 2022-11-01 PROCEDURE — 96127 BRIEF EMOTIONAL/BEHAV ASSMT: CPT | Mod: 59 | Performed by: FAMILY MEDICINE

## 2022-11-01 PROCEDURE — 99395 PREV VISIT EST AGE 18-39: CPT | Performed by: FAMILY MEDICINE

## 2022-11-01 RX ORDER — LORATADINE 10 MG/1
10 TABLET ORAL DAILY
COMMUNITY
End: 2023-06-29

## 2022-11-01 RX ORDER — HYDROXYZINE HYDROCHLORIDE 25 MG/1
25 TABLET, FILM COATED ORAL 3 TIMES DAILY PRN
Qty: 42 TABLET | Refills: 1 | Status: ON HOLD | OUTPATIENT
Start: 2022-11-01 | End: 2023-07-13

## 2022-11-01 ASSESSMENT — ANXIETY QUESTIONNAIRES
GAD7 TOTAL SCORE: 2
1. FEELING NERVOUS, ANXIOUS, OR ON EDGE: NOT AT ALL
IF YOU CHECKED OFF ANY PROBLEMS ON THIS QUESTIONNAIRE, HOW DIFFICULT HAVE THESE PROBLEMS MADE IT FOR YOU TO DO YOUR WORK, TAKE CARE OF THINGS AT HOME, OR GET ALONG WITH OTHER PEOPLE: NOT DIFFICULT AT ALL
GAD7 TOTAL SCORE: 2
5. BEING SO RESTLESS THAT IT IS HARD TO SIT STILL: SEVERAL DAYS
8. IF YOU CHECKED OFF ANY PROBLEMS, HOW DIFFICULT HAVE THESE MADE IT FOR YOU TO DO YOUR WORK, TAKE CARE OF THINGS AT HOME, OR GET ALONG WITH OTHER PEOPLE?: NOT DIFFICULT AT ALL
7. FEELING AFRAID AS IF SOMETHING AWFUL MIGHT HAPPEN: NOT AT ALL
3. WORRYING TOO MUCH ABOUT DIFFERENT THINGS: NOT AT ALL
4. TROUBLE RELAXING: SEVERAL DAYS
2. NOT BEING ABLE TO STOP OR CONTROL WORRYING: NOT AT ALL
6. BECOMING EASILY ANNOYED OR IRRITABLE: NOT AT ALL
7. FEELING AFRAID AS IF SOMETHING AWFUL MIGHT HAPPEN: NOT AT ALL

## 2022-11-01 ASSESSMENT — ENCOUNTER SYMPTOMS
SHORTNESS OF BREATH: 0
HEARTBURN: 0
PALPITATIONS: 0
HEADACHES: 0
DIARRHEA: 0
MYALGIAS: 0
BREAST MASS: 0
ABDOMINAL PAIN: 0
DYSURIA: 0
SORE THROAT: 0
CONSTIPATION: 0
FEVER: 0
HEMATURIA: 0
CHILLS: 0
JOINT SWELLING: 0
WEAKNESS: 0
FREQUENCY: 0
NAUSEA: 0
PARESTHESIAS: 0
NERVOUS/ANXIOUS: 0
COUGH: 0
ARTHRALGIAS: 0
HEMATOCHEZIA: 0
DIZZINESS: 0
EYE PAIN: 0

## 2022-11-01 ASSESSMENT — PAIN SCALES - GENERAL: PAINLEVEL: NO PAIN (0)

## 2022-11-01 ASSESSMENT — PATIENT HEALTH QUESTIONNAIRE - PHQ9: SUM OF ALL RESPONSES TO PHQ QUESTIONS 1-9: 2

## 2022-11-01 NOTE — PROGRESS NOTES
SUBJECTIVE:   CC: Jennifer is an 31 year old who presents for preventive health visit.       Patient has been advised of split billing requirements and indicates understanding: Yes  Healthy Habits:     Getting at least 3 servings of Calcium per day:  Yes    Bi-annual eye exam:  Yes    Dental care twice a year:  Yes    Sleep apnea or symptoms of sleep apnea:  Daytime drowsiness    Diet:  Regular (no restrictions)    Frequency of exercise:  4-5 days/week    Duration of exercise:  30-45 minutes    Taking medications regularly:  Yes    Medication side effects:  None    PHQ-2 Total Score: 0    Additional concerns today:  Yes      Claustrophobia   Reporting increase in anxiety/ claustrophobia with tightness of clothes or feeling warm in blankets- reports about 4 episodes a week.    Has noticed this over the past year.     States that claustrophobia was first triggered at age 6 years.   Often needs a benzodiazepine when she goes for an MRI.     Last PHQ-9 11/1/2022   1.  Little interest or pleasure in doing things 0   2.  Feeling down, depressed, or hopeless 0   3.  Trouble falling or staying asleep, or sleeping too much 1   4.  Feeling tired or having little energy 1   5.  Poor appetite or overeating 0   6.  Feeling bad about yourself 0   7.  Trouble concentrating 0   8.  Moving slowly or restless 0   Q9: Thoughts of better off dead/self-harm past 2 weeks 0   PHQ-9 Total Score 2   Difficulty at work, home, or with people Not difficult at all     ARNIE-7  11/1/2022   1. Feeling nervous, anxious, or on edge 0   2. Not being able to stop or control worrying 0   3. Worrying too much about different things 0   4. Trouble relaxing 1   5. Being so restless that it is hard to sit still 1   6. Becoming easily annoyed or irritable 0   7. Feeling afraid, as if something awful might happen 0   ARNIE-7 Total Score 2   If you checked any problems, how difficult have they made it for you to do your work, take care of things at home, or  get along with other people? Not difficult at all     In the past two weeks have you had thoughts of suicide or self-harm?  No.    Do you have concerns about your personal safety or the safety of others?   No     HYPOTHYROIDISM - Patient has a longstanding history of chronic Hypothyroidism. Patient has been doing well, noting no tremor, insomnia, hair loss or changes in skin texture. Continues to take medications as directed, without adverse reactions or side effects. Last TSH   Lab Results   Component Value Date    TSH 3.40 07/18/2022   Currently on levothyroxine 150 mcg.  No refills needed at this time.      Following with neurology for management of her migraine headaches-states that the rimegepant have been very effective.        HEALTH CARE MAINTENANCE: Patient will be getting covid vaccine at work this next week. Will get pneumoniae at her next office visit.  Due for lipid panel.    Today's PHQ-2 Score:   PHQ-2 ( 1999 Pfizer) 10/28/2022   Q1: Little interest or pleasure in doing things 0   Q2: Feeling down, depressed or hopeless 0   PHQ-2 Score 0   PHQ-2 Total Score (12-17 Years)- Positive if 3 or more points; Administer PHQ-A if positive -   Q1: Little interest or pleasure in doing things Not at all   Q2: Feeling down, depressed or hopeless Not at all   PHQ-2 Score 0       Abuse: Current or Past (Physical, Sexual or Emotional) - No  Do you feel safe in your environment? yes        Social History     Tobacco Use     Smoking status: Never     Smokeless tobacco: Never     Tobacco comments:     Nonsmoking household   Substance Use Topics     Alcohol use: Not Currently     If you drink alcohol do you typically have >3 drinks per day or >7 drinks per week? No    No flowsheet data found.    Reviewed orders with patient.  Reviewed health maintenance and updated orders accordingly - Yes  Lab work is in process  Labs reviewed in EPIC  BP Readings from Last 3 Encounters:   11/01/22 122/84   10/03/22 131/86   09/29/22  131/88    Wt Readings from Last 3 Encounters:   11/01/22 97.6 kg (215 lb 3.2 oz)   10/03/22 97 kg (213 lb 12.8 oz)   08/19/22 99.2 kg (218 lb 12.8 oz)                  Patient Active Problem List   Diagnosis     CARDIOVASCULAR SCREENING; LDL GOAL LESS THAN 160     Hypothyroidism     Dry eye syndrome- mild     Dysplasia of cervix, low grade (MILO 1)     Generalized anxiety disorder     Supervision of normal first pregnancy     Migraine without aura and without status migrainosus, not intractable     Oral contraceptive pill surveillance     Seasonal allergic rhinitis due to pollen     Pollen-food allergy, subsequent encounter     Past Surgical History:   Procedure Laterality Date     BIOPSY  [id number]    Cervix     HC TOOTH EXTRACTION W/FORCEP         Social History     Tobacco Use     Smoking status: Never     Smokeless tobacco: Never     Tobacco comments:     Nonsmoking household   Substance Use Topics     Alcohol use: Not Currently     Family History   Problem Relation Age of Onset     Breast Cancer Maternal Grandmother         in her 50s     Heart Disease Maternal Grandfather         50s     Myocardial Infarction Maternal Grandfather      Heart Disease Paternal Grandfather         50s     Myocardial Infarction Paternal Grandfather      Thyroid Disease Mother         graves-decompression and strabismus     Cancer Paternal Grandmother         cervical     Other Cancer Paternal Grandmother         Cervical     Thyroid Disease Paternal Aunt         nine affected in family     Glaucoma No family hx of      Macular Degeneration No family hx of      Cerebrovascular Disease No family hx of      Hypertension No family hx of      Diabetes No family hx of          Current Outpatient Medications   Medication Sig Dispense Refill     albuterol (PROAIR HFA/PROVENTIL HFA/VENTOLIN HFA) 108 (90 Base) MCG/ACT inhaler Inhale 2 puffs into the lungs every 4 hours as needed for shortness of breath / dyspnea or wheezing 18 g 0      cyclobenzaprine (FLEXERIL) 10 MG tablet Take 0.5-1 tablets (5-10 mg) by mouth 3 times daily as needed for muscle spasms 25 tablet 0     ferrous sulfate (FE TABS) 325 (65 Fe) MG EC tablet Take 1 tablet (325 mg) by mouth daily 90 tablet 1     fluticasone (FLONASE) 50 MCG/ACT nasal spray Spray 1-2 sprays into both nostrils daily 9.9 mL 3     hydrOXYzine (ATARAX) 25 MG tablet Take 1 tablet (25 mg) by mouth 3 times daily as needed for anxiety 42 tablet 1     levonorgestrel (MIRENA) 20 MCG/24HR IUD 1 each by Intrauterine route once       levothyroxine (SYNTHROID/LEVOTHROID) 150 MCG tablet Take 1 tablet (150 mcg) by mouth daily 90 tablet 3     loratadine (CLARITIN) 10 MG tablet Take 10 mg by mouth daily       meloxicam (MOBIC) 15 MG tablet Take 1 tablet (15 mg) by mouth daily 30 tablet 3     metoprolol succinate ER (TOPROL-XL) 25 MG 24 hr tablet Take 1 tablet (25 mg) by mouth daily 90 tablet 3     Rimegepant Sulfate 75 MG TBDP Take 75 mg by mouth every 48 hours 16 tablet 11     No Known Allergies    Breast Cancer Screening:    FHS-7:   Breast CA Risk Assessment (FHS-7) 9/24/2021 10/28/2022   Did any of your first-degree relatives have breast or ovarian cancer? No No   Did any of your relatives have bilateral breast cancer? Unknown No   Did any man in your family have breast cancer? No No   Did any woman in your family have breast and ovarian cancer? No No   Did any woman in your family have breast cancer before age 50 y? No No   Do you have 2 or more relatives with breast and/or ovarian cancer? Yes No   Do you have 2 or more relatives with breast and/or bowel cancer? No No     click delete button to remove this line now    Pertinent mammograms are reviewed under the imaging tab.    History of abnormal Pap smear: NO - age 30-65 PAP every 5 years with negative HPV co-testing recommended  PAP / HPV Latest Ref Rng & Units 9/25/2020 10/26/2017 2/25/2015   PAP (Historical) - NIL NIL NIL   HPV16 NEG:Negative Negative - -   HPV18  "NEG:Negative Negative - -   HRHPV NEG:Negative Negative - -     Reviewed and updated as needed this visit by clinical staff   Tobacco  Allergies    Med Hx  Surg Hx  Fam Hx          Reviewed and updated as needed this visit by Provider   Tobacco     Med Hx  Surg Hx  Fam Hx             Review of Systems   Constitutional: Negative for chills and fever.   HENT: Negative for congestion, ear pain, hearing loss and sore throat.    Eyes: Negative for pain and visual disturbance.   Respiratory: Negative for cough and shortness of breath.    Cardiovascular: Negative for chest pain, palpitations and peripheral edema.   Gastrointestinal: Negative for abdominal pain, constipation, diarrhea, heartburn, hematochezia and nausea.   Breasts:  Negative for tenderness, breast mass and discharge.   Genitourinary: Negative for dysuria, frequency, genital sores, hematuria, pelvic pain, urgency, vaginal bleeding and vaginal discharge.   Musculoskeletal: Negative for arthralgias, joint swelling and myalgias.   Skin: Negative for rash.   Neurological: Negative for dizziness, weakness, headaches and paresthesias.   Psychiatric/Behavioral: Negative for mood changes. The patient is not nervous/anxious.           OBJECTIVE:   /84   Pulse 84   Temp 98.2  F (36.8  C) (Tympanic)   Resp 20   Ht 1.59 m (5' 2.6\")   Wt 97.6 kg (215 lb 3.2 oz)   SpO2 98%   BMI 38.61 kg/m    Physical Exam  GENERAL: healthy, alert and no distress  EYES: Eyes grossly normal to inspection, PERRL and conjunctivae and sclerae normal  HENT: ear canals and TM's normal, nose and mouth without ulcers or lesions  NECK: no adenopathy, no asymmetry, masses, or scars and thyroid normal to palpation  RESP: lungs clear to auscultation - no rales, rhonchi or wheezes  BREAST: normal without masses, tenderness or nipple discharge and no palpable axillary masses or adenopathy  CV: regular rate and rhythm, normal S1 S2, no S3 or S4, no murmur, click or rub, no " "peripheral edema and peripheral pulses strong  ABDOMEN: soft, nontender, no hepatosplenomegaly, no masses and bowel sounds normal  MS: no gross musculoskeletal defects noted, no edema  SKIN: no suspicious lesions or rashes  NEURO: Normal strength and tone, mentation intact and speech normal  PSYCH: mentation appears normal, affect normal/bright    Diagnostic Test Results:  Recent Labs reviewed in Epic    ASSESSMENT/PLAN:   Jennifer was seen today for physical.    Diagnoses and all orders for this visit:    Routine general medical examination at a health care facility  -     CBC with platelets; Future  -     Comprehensive metabolic panel (BMP + Alb, Alk Phos, ALT, AST, Total. Bili, TP); Future    Lipid screening  -     Lipid panel reflex to direct LDL Fasting; Future    Claustrophobia with more episodes   -     Adult Mental Health  Referral; Future  -     hydrOXYzine (ATARAX) 25 MG tablet; Take 1 tablet (25 mg) by mouth 3 times daily as needed for anxiety        Patient has been advised of split billing requirements and indicates understanding: Yes      COUNSELING:  Reviewed preventive health counseling, as reflected in patient instructions       Regular exercise       Healthy diet/nutrition    Estimated body mass index is 38.61 kg/m  as calculated from the following:    Height as of this encounter: 1.59 m (5' 2.6\").    Weight as of this encounter: 97.6 kg (215 lb 3.2 oz).    Weight management plan: Discussed healthy diet and exercise guidelines    She reports that she has never smoked. She has never used smokeless tobacco.      Counseling Resources:  ATP IV Guidelines  Pooled Cohorts Equation Calculator  Breast Cancer Risk Calculator  BRCA-Related Cancer Risk Assessment: FHS-7 Tool  FRAX Risk Assessment  ICSI Preventive Guidelines  Dietary Guidelines for Americans, 2010  USDA's MyPlate  ASA Prophylaxis  Lung CA Screening    Follow up in 6 months- med check, sooner if needed.    Next Annual Physical due in " 10/2023    Ashley Fish MD  [facility]

## 2022-12-04 ENCOUNTER — MYC REFILL (OUTPATIENT)
Dept: FAMILY MEDICINE | Facility: CLINIC | Age: 32
End: 2022-12-04

## 2022-12-04 DIAGNOSIS — M54.50 ACUTE BILATERAL LOW BACK PAIN WITHOUT SCIATICA: ICD-10-CM

## 2022-12-07 RX ORDER — CYCLOBENZAPRINE HCL 10 MG
5-10 TABLET ORAL 3 TIMES DAILY PRN
Qty: 25 TABLET | Refills: 0 | Status: SHIPPED | OUTPATIENT
Start: 2022-12-07 | End: 2023-06-29

## 2022-12-08 ENCOUNTER — LAB (OUTPATIENT)
Dept: LAB | Facility: CLINIC | Age: 32
End: 2022-12-08
Payer: COMMERCIAL

## 2022-12-08 DIAGNOSIS — Z13.220 LIPID SCREENING: ICD-10-CM

## 2022-12-08 DIAGNOSIS — Z00.00 ROUTINE GENERAL MEDICAL EXAMINATION AT A HEALTH CARE FACILITY: ICD-10-CM

## 2022-12-08 LAB
ALBUMIN SERPL-MCNC: 3.9 G/DL (ref 3.4–5)
ALP SERPL-CCNC: 83 U/L (ref 40–150)
ALT SERPL W P-5'-P-CCNC: 21 U/L (ref 0–50)
ANION GAP SERPL CALCULATED.3IONS-SCNC: 5 MMOL/L (ref 3–14)
AST SERPL W P-5'-P-CCNC: 12 U/L (ref 0–45)
BILIRUB SERPL-MCNC: 0.6 MG/DL (ref 0.2–1.3)
BUN SERPL-MCNC: 12 MG/DL (ref 7–30)
CALCIUM SERPL-MCNC: 9.3 MG/DL (ref 8.5–10.1)
CHLORIDE BLD-SCNC: 105 MMOL/L (ref 94–109)
CHOLEST SERPL-MCNC: 144 MG/DL
CO2 SERPL-SCNC: 27 MMOL/L (ref 20–32)
CREAT SERPL-MCNC: 0.75 MG/DL (ref 0.52–1.04)
ERYTHROCYTE [DISTWIDTH] IN BLOOD BY AUTOMATED COUNT: 12.4 % (ref 10–15)
FASTING STATUS PATIENT QL REPORTED: YES
GFR SERPL CREATININE-BSD FRML MDRD: >90 ML/MIN/1.73M2
GLUCOSE BLD-MCNC: 91 MG/DL (ref 70–99)
HCT VFR BLD AUTO: 44.2 % (ref 35–47)
HDLC SERPL-MCNC: 46 MG/DL
HGB BLD-MCNC: 14.3 G/DL (ref 11.7–15.7)
LDLC SERPL CALC-MCNC: 82 MG/DL
MCH RBC QN AUTO: 29.3 PG (ref 26.5–33)
MCHC RBC AUTO-ENTMCNC: 32.4 G/DL (ref 31.5–36.5)
MCV RBC AUTO: 91 FL (ref 78–100)
NONHDLC SERPL-MCNC: 98 MG/DL
PLATELET # BLD AUTO: 289 10E3/UL (ref 150–450)
POTASSIUM BLD-SCNC: 4.7 MMOL/L (ref 3.4–5.3)
PROT SERPL-MCNC: 7.6 G/DL (ref 6.8–8.8)
RBC # BLD AUTO: 4.88 10E6/UL (ref 3.8–5.2)
SODIUM SERPL-SCNC: 137 MMOL/L (ref 133–144)
TRIGL SERPL-MCNC: 81 MG/DL
WBC # BLD AUTO: 7.4 10E3/UL (ref 4–11)

## 2022-12-08 PROCEDURE — 85027 COMPLETE CBC AUTOMATED: CPT

## 2022-12-08 PROCEDURE — 36415 COLL VENOUS BLD VENIPUNCTURE: CPT

## 2022-12-08 PROCEDURE — 80061 LIPID PANEL: CPT

## 2022-12-08 PROCEDURE — 80053 COMPREHEN METABOLIC PANEL: CPT

## 2022-12-27 ENCOUNTER — MYC MEDICAL ADVICE (OUTPATIENT)
Dept: FAMILY MEDICINE | Facility: CLINIC | Age: 32
End: 2022-12-27
Payer: COMMERCIAL

## 2022-12-27 ENCOUNTER — MYC MEDICAL ADVICE (OUTPATIENT)
Dept: PALLIATIVE MEDICINE | Facility: CLINIC | Age: 32
End: 2022-12-27

## 2022-12-27 DIAGNOSIS — U07.1 INFECTION DUE TO 2019 NOVEL CORONAVIRUS: ICD-10-CM

## 2022-12-27 DIAGNOSIS — B34.9 VIRAL ILLNESS: ICD-10-CM

## 2022-12-27 DIAGNOSIS — M79.10 MYALGIA: ICD-10-CM

## 2022-12-27 DIAGNOSIS — R50.9 FEVER, UNSPECIFIED FEVER CAUSE: ICD-10-CM

## 2022-12-27 DIAGNOSIS — Z20.822 EXPOSURE TO COVID-19 VIRUS: ICD-10-CM

## 2022-12-27 DIAGNOSIS — R05.1 ACUTE COUGH: ICD-10-CM

## 2022-12-27 PROCEDURE — 99207 PR NO CHARGE LOS: CPT | Performed by: FAMILY MEDICINE

## 2022-12-27 NOTE — TELEPHONE ENCOUNTER
Per patient [employer] message:  From: Jennifer Pastrana      Created: 12/27/2022 12:39 PM      I am requesting a letter to state that I am in need of a sit/stand desk for work due to my herniated disc. Sitting for prolonged periods of time will aggravate my pain symptoms every 2-3 months when my steroid injections start to wear off. I can print this letter out from [employer].        9/15/22: last visit with Dr. Cagle  9/29/22: lumbar epidural steroid injection       Letter prepped.    Routed ot Dr. Cagle.

## 2022-12-27 NOTE — LETTER
[facility]  [address]  [place] [address]  Phone: [phone]  Fax: [phone]    January 2, 2023        Jennifer Pastrana  [address] [facility] [address]          To whom it may concern:    RE: Jennifer Pastrana    Patient is seen and treated at our clinic.  Patient may return to work. Would highly recommend making ergonomic accommodations at work. Specifically would recommend a sit/stand desk.     Please contact me for questions or concerns.      Sincerely,        Vu Cagle MD

## 2022-12-28 NOTE — TELEPHONE ENCOUNTER
Called patient.  She stated that she has the below symptoms of covid.  Denies any symptoms needed for a visit.  [employer] message also sent to patient, with isolation information, and when to seek medical help, or go to ER.    RN COVID TREATMENT VISIT  12/28/22    Jennifer Pastrana  32 year old  Current weight? 215 pounds    Has the patient been seen by a primary care provider at an [facility] or [facility] within the past two years? Yes.   Have you been in close proximity to/do you have a known exposure to a person with a confirmed case of influenza? No.     Date of positive COVID test (PCR or at home)?  12/27/22, at home test    Current COVID symptoms: fever or chills, cough, fatigue, muscle or body aches, headache, sore throat, congestion or runny nose and diarrhea    Date COVID symptoms began: 12/26/22    Do you have any of the following conditions that place you at risk of being very sick from COVID-19? mental health conditions and overweight (BMI>25)    Is patient eligible to continue? Yes, established patient, 12 years or older weighing at least 88.2 lbs, who has COVID symptoms that started in the past 5 days and is at risk for being very sick from COVID-19.       Have you received monoclonal antibodies or oral antiviral medications since testing positive to COVID-19? No    Are you currently hospitalized for COVID-19? No    Do you have a history of hepatitis? No    Are you currently pregnant or nursing? No    Do you have a clinically significant hypersensitivity to nirmatrelvir, ritonavir, or molnupiravir? No    Do you have any history of severe renal impairment (eGFR < 30mL/min)? No    Do you have any history of hepatic impairment or abnormalities (e.g. hepatic panel, ALT, AST, ALK Phos, bilirubin)? No    Have you had a coronary stent placed in the previous 6 months? No    Is patient eligible to continue?   Yes, patient meets all eligibility requirements for the RN COVID treatment (as  denoted by all no responses above).     Current Outpatient Medications   Medication Sig Dispense Refill     albuterol (PROAIR HFA/PROVENTIL HFA/VENTOLIN HFA) 108 (90 Base) MCG/ACT inhaler Inhale 2 puffs into the lungs every 4 hours as needed for shortness of breath / dyspnea or wheezing 18 g 0     cyclobenzaprine (FLEXERIL) 10 MG tablet Take 0.5-1 tablets (5-10 mg) by mouth 3 times daily as needed for muscle spasms 25 tablet 0     ferrous sulfate (FE TABS) 325 (65 Fe) MG EC tablet Take 1 tablet (325 mg) by mouth daily 90 tablet 1     fluticasone (FLONASE) 50 MCG/ACT nasal spray Spray 1-2 sprays into both nostrils daily 9.9 mL 3     hydrOXYzine (ATARAX) 25 MG tablet Take 1 tablet (25 mg) by mouth 3 times daily as needed for anxiety 42 tablet 1     levonorgestrel (MIRENA) 20 MCG/24HR IUD 1 each by Intrauterine route once       levothyroxine (SYNTHROID/LEVOTHROID) 150 MCG tablet Take 1 tablet (150 mcg) by mouth daily 90 tablet 3     loratadine (CLARITIN) 10 MG tablet Take 10 mg by mouth daily       meloxicam (MOBIC) 15 MG tablet Take 1 tablet (15 mg) by mouth daily 30 tablet 3     metoprolol succinate ER (TOPROL-XL) 25 MG 24 hr tablet Take 1 tablet (25 mg) by mouth daily 90 tablet 3     Rimegepant Sulfate 75 MG TBDP Take 75 mg by mouth every 48 hours 16 tablet 11       Medications from List 1 of the standing order (on medications that exclude the use of Paxlovid) that patient is taking: NONE. Is patient taking Meenu's Wort? No  Is patient taking Dargan's Wort or any meds from List 1? No.   Medications from List 2 of the standing order (on meds that provider needs to adjust) that patient is taking: NONE. Is patient on any of the meds from List 2? No.   Medications from List 3 of standing order (on meds that a RN needs to adjust) that patient is taking: NONE. Is patient on any meds from List 3? No.   In order of efficacy, Paxlovid has an approximate 90% reduction in hospitalization. Paxlovid can possibly cause  altered sense of taste, diarrhea (loose, watery stools), high blood pressure, muscle aches.  The other option is molnupiravir which has an approximate 30% reduction in hospitalization. Molnupirarivr can possibly cause diarrhea (loose, watery stools), nausea (feeling sick to your stomach), dizziness, headaches.    Which treatment option does the patient prefer?   Paxlovid.   Lab Results   Component Value Date    GFRESTIMATED >90 12/08/2022       Was last eGFR reduced? No, eGFR 60 or greater/ No Result on record. Patient can receive the normal renal function dose. Paxlovid Rx sent to [facility]   [facility] [phone]  [address], [address]  [place] [address]    Hours:  Mon-Fri: 7:00a - 7:00p    Drive-thru services available.    Temporary change to home medications: None    All medication adjustments (holds, etc) were discussed with the patient and patient was asked to repeat back (teachback) their med adjustment.  Did patient understand med adjustment? No medication adjustments needed.         Reviewed the following instructions with the patient:    Paxlovid (nimatrelvir and ritonavir)    How it works  Two medicines (nirmatrelvir and ritonavir) are taken together. They stop the virus from growing. Less amount of virus is easier for your body to fight.    How to take    Medicine comes in a daily container with both medicine tablets. Take by mouth twice daily (once in the morning, once at night) for 5 days.    The number of tablets to take varies by patient.    Don't chew or break capsules. Swallow whole.    When to take  Take as soon as possible after positive COVID-19 test result, and within 5 days of your first symptoms.    Possible side effects  Can cause altered sense of taste, diarrhea (loose, watery stools), high blood pressure, muscle aches.    Ginna Sweeney RN

## 2022-12-28 NOTE — PATIENT INSTRUCTIONS
COVID-19 Outpatient Treatments  Your care team can help you find the best treatments for COVID-19. Talk to a health care provider or refer to the FDA medicine fact sheets below.    Important: You can't have Paxlovid or molnupiravir if you're starting the medicine more than 5 days after your symptoms have started.  Paxlovid: https://www.fda.gov/media/008703/download  Molnupiravir (Lagevrio): https://www.fda.gov/media/413042/download  Paxlovid (nimatrelvir and ritonavir)  How it works  Two medicines (nirmatrelvir and ritonavir) are taken together. They stop the virus from growing. Less amount of virus is easier for your body to fight.  Benefits  Lowers risk of a hospital stay or death from COVID-19.  How to take    Medicine comes in a daily container with both medicine tablets. Take by mouth twice daily (once in the morning, once at night) for 5 days.    The number of tablets to take varies by patient.    Don't chew or break capsules. Swallow whole.  When to take  Take as soon as possible after positive COVID-19 test result, and within 5 days of your first symptoms.  Who can take it  Patients must be 12 years or older, weigh at least 88 pounds, and have tested positive for COVID-19. Paxlovid is the preferred treatment for pregnant patients.  Possible side effects  Can cause altered sense of taste, diarrhea (loose, watery stools), high blood pressure, muscle aches.  Medicine conflicts    Some medicines may conflict with Paxlovid and may cause serious side effects.    Tell your care team about all the medicines you take, including prescription and over-the-counter medicines, vitamins, and herbal supplements.    Your care team will review your medicines to make sure that you can safely take Paxlovid.  Cautions    Paxlovid is not advised for patients with severe kidney or liver disease. If you have kidney or liver problems, the dose may need to be changed.    If you're pregnant or breastfeeding, talk to your care team  about your options.    If you take hormonal birth control (such as the Pill), then you or your partner should also use a non-hormonal form of birth control (such as a condom). Keep doing this for 1 menstrual cycle after your last dose of Paxlovid.  Molnupiravir (Lagevrio)  How it works  Stops the virus from growing. Less amount of virus is easier for your body to fight.  Benefits  Lowers risk of a hospital stay or death from COVID-19.  How to take  Take 4 capsules by mouth every 12 hours (4 in the morning and 4 at night) for 5 days. Don't chew or break capsules. Swallow whole.  When to take  Take as soon as possible after positive COVID-19 test result, and within 5 days of your first symptoms.  Who can take it  Patients must be 18 years or older and have tested positive for COVID-19.  Possible side effects  Diarrhea (loose, watery stools), nausea (feeling sick to your stomach), dizziness, headaches.  Medicine conflicts  Right now, there are no known conflicts with other drugs. But tell your care team about all medicines you take.  Cautions    This medicine is not advised for patients who are pregnant.    If you are someone who could become pregnant, use trusted birth control until 4 days after your last dose of molnupiravir.    If your partner could become pregnant, you should use trusted birth control until 3 months after your last dose of molnupiravir.  For informational purposes only. Not to replace the advice of your health care provider. Copyright   2022 [facility]. All rights reserved. Clinically reviewed by Dotty Vásquez, PharmD, BCACP. [employer] 610992 - REV 12/22.

## 2022-12-28 NOTE — TELEPHONE ENCOUNTER
Patient notified of the below orders per PCP.  Pharmacy also called and advised of the orders for Paxlovid per below message from provider.    Ginna RN,BSN  Triage Nurse  [facility]: [facility]  Ph: [phone]

## 2022-12-28 NOTE — TELEPHONE ENCOUNTER
Pharmacy called and stated that there is an interaction between Paxlovid and Rimagepent sulfate as it is a category x interaction.  This was not listed as a current interaction on RN protocols for this.    She is asking if this can be changed to lagevrio? Should patient schedule an appointment?    Please see protocols and above message.    Ginna RN,BSN  Triage Nurse  [facility]: [facility]  Ph: [phone]

## 2022-12-28 NOTE — TELEPHONE ENCOUNTER
Jennifer-     Am sorry to hear that you have COVID.      You can proceed with Paxlovid but please hold the Rimegepant while taking the Paxlovid.  You can resume taking it after.      Keep me updated.      Please contact me if you have any additional questions or concerns.     Ashley Fish MD       This [employer] message has not been read.

## 2023-01-20 ENCOUNTER — MYC MEDICAL ADVICE (OUTPATIENT)
Dept: PALLIATIVE MEDICINE | Facility: CLINIC | Age: 33
End: 2023-01-20
Payer: COMMERCIAL

## 2023-01-20 NOTE — TELEPHONE ENCOUNTER
Per patient [employer] message:  From: Jennifer Pastrana      Created: 1/20/2023 9:26 AM      My work is requiring this accommodation form to also be filled out (in addition to the letter I received earlier this month) to be able to get a sit/stand desk installed.      Pages 2-4 need to be completed by the provider. I have filled in my work limitations and how a sit/stand desk would help at work. Please fill in questions 2 and 9 (ok to review and add to the other questions if you deem necessary) and sign the last page.     The completed form can either be sent back to me via [employer], or you can send directly to the [facility] [employer] service center via fax or email on page 4 of the document.      And   From: Jennifer Pastrana      Created: 1/20/2023 9:28 AM      This is the document that needs to be completed

## 2023-01-20 NOTE — TELEPHONE ENCOUNTER
Routed to the MA pool to print out forms and place them in Dr. Cagle's bin to be filled out.      Natalia RN-BSN  [facility]

## 2023-01-24 ENCOUNTER — MYC MEDICAL ADVICE (OUTPATIENT)
Dept: PALLIATIVE MEDICINE | Facility: CLINIC | Age: 33
End: 2023-01-24
Payer: COMMERCIAL

## 2023-01-24 DIAGNOSIS — M54.16 LUMBAR RADICULOPATHY: Primary | ICD-10-CM

## 2023-01-24 NOTE — TELEPHONE ENCOUNTER
Completed forms faxed to [facility] at [phone]. RightFax confirmed. Copy mailed to patient's home address. [employer] message sent to patient. Sent to scanning.

## 2023-01-25 NOTE — TELEPHONE ENCOUNTER
Per patient myChart message:  From: Jennifer Pastrana      Created: 1/25/2023 12:13 PM      I experienced 90% relief and it lasted about 3 months         The injection order was prepped for Dr. Calge to review and sign.      RAZ Fisher-BSN  [facility]

## 2023-01-25 NOTE — TELEPHONE ENCOUNTER
Per patient myChart message:  From: Jennifer Pastrana      Created: 1/24/2023 7:04 PM      I would like to get a follow up appointment scheduled and also set up another injection if able       ____________________    9/29/22: L5-S1 interlaminar epidural steroid injection   9/15/22: last visit with Dr. Cagle   _________________________________    Mychart sent to pt:  From: Natalia Thomas RN      Created: 1/25/2023 10:56 AM      French Rodriguez,  You can call to schedule your next visit with Dr. Cagle: [phone].     Your last lumbar epidural steroid injection was 9/2022.  How many months of relief did you have from it and during that time what % of relief did you have?     RAZ Fisher-BSN  [facility]

## 2023-01-27 ENCOUNTER — TELEPHONE (OUTPATIENT)
Dept: PALLIATIVE MEDICINE | Facility: CLINIC | Age: 33
End: 2023-01-27
Payer: COMMERCIAL

## 2023-01-27 NOTE — TELEPHONE ENCOUNTER
Screening Questions for Radiology Injections:    Injection to be done at which interventional clinic site? [facility] [place]    Procedure ordered by Gurjit    Procedure ordered?  repeat L5-S1 interlaminar epidural steroid injection     Transforaminal Cervical CHRISTIE - Send to [facility]) - No  Community Site providers perform this procedure    What insurance would patient like us to bill for this procedure? [facility]    Worker's comp or MVA (motor vehicle accident) -Any injection DO NOT SCHEDULE and route to Asa Rodriguez.      [employer] insurance - For SI joint injections, DO NOT SCHEDULE and route to Massiel Saxena.       ALL BCBS, [employer] and [employer] - DO NOT SCHEDULE and route to Massiel Saxena    [employer]- facet joint injections, route to Massiel Hemanth    IF SCHEDULING IN [place] PLEASE SCHEDULE AT LEAST 7-10 BUSINESS DAYS OUT SO A PA CAN BE OBTAINED    Is an  needed? No     Patient has a  home? (Review Grid) YES: ok    Any chance of pregnancy? NO   If YES, do NOT schedule and route to RN pool  - Dr. Aleman route to Alida Cunningham and PM&R Nurse  [[id number]]      Is patient actively being treated for cancer or immunocompromised? No    If YES, do NOT schedule and route to RN pool/ Dr. Aleman's Team    Does the patient have a bleeding or clotting disorder? No     If YES, okay to schedule AND route to RN nurse rocael/ Dr. Aleman's Team     (For any patients with platelet count <100, RN must forward to provider)    Is patient taking any Blood Thinners OR Antiplatelet medication?  No   If hold needed, do NOT schedule, route to RN pool/ Dr. Aleman's Team    Examples:   o Blood Thinners: (Coumadin, Warfarin, Jantoven, Pradaxa, Xarelto, Eliquis, Edoxaban, Enoxaparin, Lovenox, Heparin, Arixtra, Fondaparinux or Fragmin)  o Antiplatelet Medications: (Plavix, Brilinta or Effient)     Is patient taking any aspirin products (includes Excedrin and Fiorinal)? No     If more than 325mg/day, OK  to schedule; Instruct Pt to decrease to less than 325 mg for 7 days AND route to RN pool/ Dr. Aleman's Team     For CERVICAL procedures, hold all aspirin products for 6 days.     Tell Pt that if aspirin product is not held for 6 days, the procedure WILL BE cancelled.     Any allergies to contrast dye, iodine, shellfish, or numbing and steroid medications? No    If YES, schedule and add allergy information to appointment notes AND route to the RN pool/ Dr. Aleman's Team    If CHRISTIE and Contrast Dye / Iodine Allergy? DO NOT SCHEDULE, route to RN pool/ Dr. Aleman's Team    Allergies: Patient has no known allergies.     Does patient have an active infection or treated for one within the past week? No    Is patient currently taking any antibiotics or steroid medications?  No     For patients on chronic, preventative, or prophylactic antibiotics, procedures may be scheduled.     For patients on antibiotics for active or recent infection, schedule 4 days after completed.    For patients on steroid medications, schedule 4 days after completed.     Has the patient had a flu shot or any other vaccinations within the past 7 days? No  If yes, explain that for the vaccine to work best they need to:       wait 1 week before and 1 week after getting any Vaccine    wait 1 week before and 2 weeks after getting Covid Vaccine #2 or BOOSTER    If patient has concerns about the timing, send to RN pool/ Dr. Aleman's Team    Does patient have an MRI/CT?  YES: MRI Include Date and Check Procedure Scheduling Grid to see if required.    Was the MRI/CT done within the last 3 years?  Yes     If no route to RN Pool/ Dr. Acostas Team    If yes, where was the MRI/CT done? [facility]     Refer to PACS Transmissions list for approved external locations and route to RN Pool High Priority/ Dr. Acostas Team    If MRI was not done at approved external location do NOT schedule and route to RN pool/ Dr. Acostas Team      If patient has an imaging disc,  the injection MAY be scheduled but patient must bring disc to appt or appt will be cancelled.    Is patient able to transfer to a procedure table with minimal or no assistance? Yes     If no, do NOT schedule and route to RN Pool/ Dr. Aleman's Team    Procedure Specific Instructions:    If celiac plexus block, informed patient NPO for 6 hours and that it is okay to take medications with sips of water, especially blood pressure medications Not Applicable         If this is for a cervical procedure, informed patient that aspirin needs to be held for 6 days.   Not Applicable      Sedation, If Sedation is ordered for any procedure, patient must be NPO for 6 hours prior to procedure Not Applicable      If IV needed:    Do not schedule procedures requiring IV placement in the first appointment of the day or first appointment after lunch. Do NOT schedule at 0745, 0815 or 1245. ok    Instructed patient to arrive 30 minutes early for IV start if required. (Check Procedure Scheduling Grid)  Not Applicable    Reminders:    If you are started on any steroids or antibiotics between now and your appointment, you must contact us because the procedure may need to be cancelled.  Yes      As a reminder, receiving steroids can decrease your body's ability to fight infection.   Would you still like to move forward with scheduling the injection?  Yes      IV Sedation is not provided for procedures. If oral anti-anxiety medication is needed, the patient should request this from their referring provider.      Instruct patient to arrive as directed prior to the scheduled appointment time:  If IV needed 30 minutes before appointment time       For patients 85 or older we recommend having an adult stay w/ them for the remainder of the day.       If the patient is Diabetic, remind them to bring their glucometer.      Does the patient have any questions?  NO  Veronica Rodriguez  [facility]

## 2023-02-07 DIAGNOSIS — M54.16 LUMBAR RADICULOPATHY: ICD-10-CM

## 2023-02-07 DIAGNOSIS — M79.18 MYOFASCIAL MUSCLE PAIN: ICD-10-CM

## 2023-02-07 RX ORDER — MELOXICAM 15 MG/1
15 TABLET ORAL DAILY
Qty: 30 TABLET | Refills: 3 | Status: ON HOLD | OUTPATIENT
Start: 2023-02-07 | End: 2023-07-13

## 2023-02-08 ENCOUNTER — OFFICE VISIT (OUTPATIENT)
Dept: NEUROLOGY | Facility: CLINIC | Age: 33
End: 2023-02-08
Payer: COMMERCIAL

## 2023-02-08 VITALS
DIASTOLIC BLOOD PRESSURE: 79 MMHG | SYSTOLIC BLOOD PRESSURE: 120 MMHG | BODY MASS INDEX: 38.22 KG/M2 | WEIGHT: 213 LBS | HEART RATE: 66 BPM

## 2023-02-08 DIAGNOSIS — G43.009 MIGRAINE WITHOUT AURA AND WITHOUT STATUS MIGRAINOSUS, NOT INTRACTABLE: Primary | ICD-10-CM

## 2023-02-08 PROCEDURE — 99213 OFFICE O/P EST LOW 20 MIN: CPT | Performed by: INTERNAL MEDICINE

## 2023-02-08 RX ORDER — CETIRIZINE HYDROCHLORIDE 10 MG/1
TABLET ORAL
COMMUNITY
Start: 2023-02-07

## 2023-02-08 NOTE — LETTER
2/8/2023         RE: Jennifer Pastrana  [address] [place] [place] [address]        Dear Colleague,    Thank you for referring your patient, Jennifer Pastrana, to the [facility] [place]. Please see a copy of my visit note below.    [facility] Neurology Follow Up Visit    Jennifer Pastrana MRN# [id number]   Age: 30 year old YOB: 1990     Brief history of symptoms: The patient was initially seen in neurologic consultation on 10/5/2021 for evaluation of migraines and head pains. Please see the comprehensive neurologic consultation note from that date in the Epic records for details.     Interval history:   Patient is now off of gabapentin and nortriptyline. She is taking Nurtec as a preventative and headaches are much improved. She has only been averaging about 1 migraine a month (still left sided) and they are abortive with OTC medications.       Past Medical History:     Patient Active Problem List   Diagnosis     CARDIOVASCULAR SCREENING; LDL GOAL LESS THAN 160     Hypothyroidism     Dry eye syndrome- mild     Dysplasia of cervix, low grade (MILO 1)     Generalized anxiety disorder     Supervision of normal first pregnancy     Migraine without aura and without status migrainosus, not intractable     Oral contraceptive pill surveillance     Seasonal allergic rhinitis due to pollen     Pollen-food allergy, subsequent encounter     Past Medical History:   Diagnosis Date     AR (allergic rhinitis)      ASCUS with positive high risk HPV 2/2013    type 53     Cervical high risk HPV (human papillomavirus) test positive 2/25/15     Dysplasia of cervix, low grade (MILO 1)      History of migraine headaches     controlled on Nortriptyline and Maxalt prn      Hypothyroidism      IUD (intrauterine device) in place     Mirena- placed 10/2019     Mood disorder (H)      Reactive airway disease that is not asthma     on Albuterol prn      Uncomplicated asthma 7/2015    Mild        Past Surgical  History:     Past Surgical History:   Procedure Laterality Date     BIOPSY  [id number]    Cervix     HC TOOTH EXTRACTION W/FORCEP          Social History:     Social History     Tobacco Use     Smoking status: Never     Smokeless tobacco: Never     Tobacco comments:     Nonsmoking household   Vaping Use     Vaping Use: Never used   Substance Use Topics     Alcohol use: Not Currently     Drug use: No        Family History:     Family History   Problem Relation Age of Onset     Breast Cancer Maternal Grandmother         in her 50s     Heart Disease Maternal Grandfather         50s     Myocardial Infarction Maternal Grandfather      Heart Disease Paternal Grandfather         50s     Myocardial Infarction Paternal Grandfather      Thyroid Disease Mother         graves-decompression and strabismus     Cancer Paternal Grandmother         cervical     Other Cancer Paternal Grandmother         Cervical     Thyroid Disease Paternal Aunt         nine affected in family     Glaucoma No family hx of      Macular Degeneration No family hx of      Cerebrovascular Disease No family hx of      Hypertension No family hx of      Diabetes No family hx of         Medications:     Current Outpatient Medications   Medication Sig     albuterol (PROAIR HFA/PROVENTIL HFA/VENTOLIN HFA) 108 (90 Base) MCG/ACT inhaler Inhale 2 puffs into the lungs every 4 hours as needed for shortness of breath / dyspnea or wheezing     cyclobenzaprine (FLEXERIL) 10 MG tablet Take 0.5-1 tablets (5-10 mg) by mouth 3 times daily as needed for muscle spasms     ferrous sulfate (FE TABS) 325 (65 Fe) MG EC tablet Take 1 tablet (325 mg) by mouth daily     fluticasone (FLONASE) 50 MCG/ACT nasal spray Spray 1-2 sprays into both nostrils daily     hydrOXYzine (ATARAX) 25 MG tablet Take 1 tablet (25 mg) by mouth 3 times daily as needed for anxiety     levonorgestrel (MIRENA) 20 MCG/24HR IUD 1 each by Intrauterine route once     levothyroxine (SYNTHROID/LEVOTHROID) 150 MCG  tablet Take 1 tablet (150 mcg) by mouth daily     loratadine (CLARITIN) 10 MG tablet Take 10 mg by mouth daily     meloxicam (MOBIC) 15 MG tablet Take 1 tablet (15 mg) by mouth daily     metoprolol succinate ER (TOPROL-XL) 25 MG 24 hr tablet Take 1 tablet (25 mg) by mouth daily     Rimegepant Sulfate 75 MG TBDP Take 75 mg by mouth every 48 hours     No current facility-administered medications for this visit.        Allergies:   No Known Allergies     Review of Systems:   As above     Physical Exam:   Vitals: /79 (BP Location: Right arm, Patient Position: Sitting, Cuff Size: Adult Large)   Pulse 66   Wt 96.6 kg (213 lb)   BMI 38.22 kg/m     General: Seated comfortably in no acute distress.  HEENT: Fundoscopic examination without evidence of disc edema  Lungs: breathing comfortably  Neurologic:     Mental Status: Fully alert, attentive. Normal memory and fund of knowledge. Language normal, speech clear and fluent, no paraphasic errors.      Cranial Nerves: Visual fields intact. PERRL. EOMI with normal smooth pursuit. No dysarthria.      Motor: No tremors in the extremities.     Gait: Normal, steady casual gait.      Data reviewed on previous visits    Imaging:  MRA head 2019  IMPRESSION: Mildly motion degraded images. No aneurysm identified.      MRI cervical 2018  IMPRESSION:  1. Minimal C3-C4 disc bulge without stenosis.  2. No evidence for any demyelinating disease.    Pertinent Investigations since last visit:   None         Assessment and Plan:   Assessment:  Jennifer Pastrana is a 32 year old female who presents today for follow-up of episodic migraines without auras. Migraines are currently well controlled on Nurtec as preventative medication and OTC abortive. She would like to continue the same regimen today and will reach out before next follow-up if migraines worsen.     Plan:  - Nurtec 75 mg q48 hours  - OTC medications (Excedrin, tylenol, ibuprofen) as needed for migraines  - Call with worsening  symptoms    Follow up in Neurology clinic in 12 months or earlier as needed should new concerns arise.    Patric Duron MD   of Neurology  [facility]      Again, thank you for allowing me to participate in the care of your patient.        Sincerely,        Patric Duron MD

## 2023-02-08 NOTE — PROGRESS NOTES
[facility] Neurology Follow Up Visit    Jennifer Pastrana MRN# [id number]   Age: 30 year old YOB: 1990     Brief history of symptoms: The patient was initially seen in neurologic consultation on 10/5/2021 for evaluation of migraines and head pains. Please see the comprehensive neurologic consultation note from that date in the Epic records for details.     Interval history:   Patient is now off of gabapentin and nortriptyline. She is taking Nurtec as a preventative and headaches are much improved. She has only been averaging about 1 migraine a month (still left sided) and they are abortive with OTC medications.       Past Medical History:     Patient Active Problem List   Diagnosis     CARDIOVASCULAR SCREENING; LDL GOAL LESS THAN 160     Hypothyroidism     Dry eye syndrome- mild     Dysplasia of cervix, low grade (MILO 1)     Generalized anxiety disorder     Supervision of normal first pregnancy     Migraine without aura and without status migrainosus, not intractable     Oral contraceptive pill surveillance     Seasonal allergic rhinitis due to pollen     Pollen-food allergy, subsequent encounter     Past Medical History:   Diagnosis Date     AR (allergic rhinitis)      ASCUS with positive high risk HPV 2/2013    type 53     Cervical high risk HPV (human papillomavirus) test positive 2/25/15     Dysplasia of cervix, low grade (MILO 1)      History of migraine headaches     controlled on Nortriptyline and Maxalt prn      Hypothyroidism      IUD (intrauterine device) in place     Mirena- placed 10/2019     Mood disorder (H)      Reactive airway disease that is not asthma     on Albuterol prn      Uncomplicated asthma 7/2015    Mild        Past Surgical History:     Past Surgical History:   Procedure Laterality Date     BIOPSY  [id number]    Cervix     HC TOOTH EXTRACTION W/FORCEP          Social History:     Social History     Tobacco Use     Smoking status: Never     Smokeless tobacco: Never     Tobacco comments:      Nonsmoking household   Vaping Use     Vaping Use: Never used   Substance Use Topics     Alcohol use: Not Currently     Drug use: No        Family History:     Family History   Problem Relation Age of Onset     Breast Cancer Maternal Grandmother         in her 50s     Heart Disease Maternal Grandfather         50s     Myocardial Infarction Maternal Grandfather      Heart Disease Paternal Grandfather         50s     Myocardial Infarction Paternal Grandfather      Thyroid Disease Mother         graves-decompression and strabismus     Cancer Paternal Grandmother         cervical     Other Cancer Paternal Grandmother         Cervical     Thyroid Disease Paternal Aunt         nine affected in family     Glaucoma No family hx of      Macular Degeneration No family hx of      Cerebrovascular Disease No family hx of      Hypertension No family hx of      Diabetes No family hx of         Medications:     Current Outpatient Medications   Medication Sig     albuterol (PROAIR HFA/PROVENTIL HFA/VENTOLIN HFA) 108 (90 Base) MCG/ACT inhaler Inhale 2 puffs into the lungs every 4 hours as needed for shortness of breath / dyspnea or wheezing     cyclobenzaprine (FLEXERIL) 10 MG tablet Take 0.5-1 tablets (5-10 mg) by mouth 3 times daily as needed for muscle spasms     ferrous sulfate (FE TABS) 325 (65 Fe) MG EC tablet Take 1 tablet (325 mg) by mouth daily     fluticasone (FLONASE) 50 MCG/ACT nasal spray Spray 1-2 sprays into both nostrils daily     hydrOXYzine (ATARAX) 25 MG tablet Take 1 tablet (25 mg) by mouth 3 times daily as needed for anxiety     levonorgestrel (MIRENA) 20 MCG/24HR IUD 1 each by Intrauterine route once     levothyroxine (SYNTHROID/LEVOTHROID) 150 MCG tablet Take 1 tablet (150 mcg) by mouth daily     loratadine (CLARITIN) 10 MG tablet Take 10 mg by mouth daily     meloxicam (MOBIC) 15 MG tablet Take 1 tablet (15 mg) by mouth daily     metoprolol succinate ER (TOPROL-XL) 25 MG 24 hr tablet Take 1 tablet (25 mg)  by mouth daily     Rimegepant Sulfate 75 MG TBDP Take 75 mg by mouth every 48 hours     No current facility-administered medications for this visit.        Allergies:   No Known Allergies     Review of Systems:   As above     Physical Exam:   Vitals: /79 (BP Location: Right arm, Patient Position: Sitting, Cuff Size: Adult Large)   Pulse 66   Wt 96.6 kg (213 lb)   BMI 38.22 kg/m     General: Seated comfortably in no acute distress.  HEENT: Fundoscopic examination without evidence of disc edema  Lungs: breathing comfortably  Neurologic:     Mental Status: Fully alert, attentive. Normal memory and fund of knowledge. Language normal, speech clear and fluent, no paraphasic errors.      Cranial Nerves: Visual fields intact. PERRL. EOMI with normal smooth pursuit. No dysarthria.      Motor: No tremors in the extremities.     Gait: Normal, steady casual gait.      Data reviewed on previous visits    Imaging:  MRA head 2019  IMPRESSION: Mildly motion degraded images. No aneurysm identified.      MRI cervical 2018  IMPRESSION:  1. Minimal C3-C4 disc bulge without stenosis.  2. No evidence for any demyelinating disease.    Pertinent Investigations since last visit:   None         Assessment and Plan:   Assessment:  Jennifer Pastrana is a 32 year old female who presents today for follow-up of episodic migraines without auras. Migraines are currently well controlled on Nurtec as preventative medication and OTC abortive. She would like to continue the same regimen today and will reach out before next follow-up if migraines worsen.     Plan:  - Nurtec 75 mg q48 hours  - OTC medications (Excedrin, tylenol, ibuprofen) as needed for migraines  - Call with worsening symptoms    Follow up in Neurology clinic in 12 months or earlier as needed should new concerns arise.    Patric Duron MD   of Neurology  [facility]

## 2023-02-14 ENCOUNTER — ANCILLARY ORDERS (OUTPATIENT)
Dept: PALLIATIVE MEDICINE | Facility: CLINIC | Age: 33
End: 2023-02-14

## 2023-02-14 DIAGNOSIS — M54.16 LUMBAR RADICULOPATHY: ICD-10-CM

## 2023-02-18 ENCOUNTER — HOSPITAL ENCOUNTER (EMERGENCY)
Facility: CLINIC | Age: 33
Discharge: HOME | End: 2023-02-18
Attending: PHYSICIAN ASSISTANT | Admitting: PHYSICIAN ASSISTANT
Payer: COMMERCIAL

## 2023-02-18 VITALS
TEMPERATURE: 98 F | DIASTOLIC BLOOD PRESSURE: 79 MMHG | SYSTOLIC BLOOD PRESSURE: 123 MMHG | RESPIRATION RATE: 20 BRPM | HEART RATE: 80 BPM | OXYGEN SATURATION: 100 %

## 2023-02-18 DIAGNOSIS — M54.42 ACUTE BILATERAL LOW BACK PAIN WITH BILATERAL SCIATICA: ICD-10-CM

## 2023-02-18 DIAGNOSIS — M54.41 ACUTE BILATERAL LOW BACK PAIN WITH BILATERAL SCIATICA: ICD-10-CM

## 2023-02-18 PROCEDURE — G0463 HOSPITAL OUTPT CLINIC VISIT: HCPCS | Performed by: PHYSICIAN ASSISTANT

## 2023-02-18 PROCEDURE — 99213 OFFICE O/P EST LOW 20 MIN: CPT | Performed by: PHYSICIAN ASSISTANT

## 2023-02-18 RX ORDER — OXYCODONE HYDROCHLORIDE 5 MG/1
5 TABLET ORAL EVERY 6 HOURS PRN
Qty: 12 TABLET | Refills: 0 | Status: SHIPPED | OUTPATIENT
Start: 2023-02-18 | End: 2023-06-29

## 2023-02-18 ASSESSMENT — ENCOUNTER SYMPTOMS
GASTROINTESTINAL NEGATIVE: 1
NEUROLOGICAL NEGATIVE: 1
RESPIRATORY NEGATIVE: 1
BACK PAIN: 1

## 2023-02-18 NOTE — ED PROVIDER NOTES
History     Chief Complaint   Patient presents with     Back Pain     HPI  Jennifer Pastrana is a 32 year old female who presents the urgent care today with lower back pain flareup.  Patient does have a history of bulging disc and back pain issues and is scheduled for steroid injection to the back within the next week.  Patient states she was at the gym 4 days ago and did a back a back exercise that she was not able to do so she stopped doing it moved on.  She states on the car ride home she noticed that the back started spasming and causing pain.  She has been in pain since and now having difficulty ambulating due to the pain.  She has been using medications that were prescribed for her back issues.  She denies any loss of bowel or bladder function, saddle anesthesia.  She states that the pain radiates down both legs.  She denies any shortness of breath, chest pain, abdominal pain, nausea or vomiting or rash.  She denies any fevers.     Allergies:  No Known Allergies    Problem List:    Patient Active Problem List    Diagnosis Date Noted     Seasonal allergic rhinitis due to pollen 04/01/2021     Priority: Medium     Pollen-food allergy, subsequent encounter 04/01/2021     Priority: Medium     Oral contraceptive pill surveillance 09/21/2018     Priority: Medium     Migraine without aura and without status migrainosus, not intractable 02/15/2018     Priority: Medium     Supervision of normal first pregnancy 04/10/2017     Priority: Medium     Generalized anxiety disorder 10/03/2013     Priority: Medium     Diagnosis updated by automated process. Provider to review and confirm.       Dysplasia of cervix, low grade (MILO 1) 01/10/2012     Priority: Medium     1/10/12:Pap - LSIL. Plan colp.  1/27/12:[place] - mild dysplasia. Repeat pap 6 months. Reminder in epic.   10/30/12: Patient failed follow-up. Pap tracking file closed.   2/7/13: ASCUS pap, + HR HPV 53. Repeat pap in 6 months. Reminder in [facility].   8/19/13: ASCUS pap,  Negative HPV. Repeat pap 6 months with AFE. Reminder in epic.   2/24/14: NIL pap, Neg HPV. Repeat pap 1 year. Reminder in epic.   2/25/15: NIL pap, + HR HPV. Plan cotest in 1 year. If NIL/Neg then routine screening.  6/1/16: NIL pap (abstracted from [place]). Not known if HPV was done. Plan cotest at pp visit. FARHAN 9/10/17  10/26/17 NIL pap, neg HR HPV. Plan pap in 3 years  9/25/20 NIL Pap, Neg HR HPV. Plan: cotest in 5 years.           Dry eye syndrome- mild 04/25/2011     Priority: Medium     Hypothyroidism 12/28/2010     Priority: Medium     CARDIOVASCULAR SCREENING; LDL GOAL LESS THAN 160 10/31/2010     Priority: Medium        Past Medical History:    Past Medical History:   Diagnosis Date     AR (allergic rhinitis)      ASCUS with positive high risk HPV 2/2013     Cervical high risk HPV (human papillomavirus) test positive 2/25/15     Dysplasia of cervix, low grade (MILO 1)      History of migraine headaches      Hypothyroidism      IUD (intrauterine device) in place      Mood disorder (H)      Reactive airway disease that is not asthma      Uncomplicated asthma 7/2015       Past Surgical History:    Past Surgical History:   Procedure Laterality Date     BIOPSY  [id number]    Cervix     HC TOOTH EXTRACTION W/FORCEP         Family History:    Family History   Problem Relation Age of Onset     Breast Cancer Maternal Grandmother         in her 50s     Heart Disease Maternal Grandfather         50s     Myocardial Infarction Maternal Grandfather      Heart Disease Paternal Grandfather         50s     Myocardial Infarction Paternal Grandfather      Thyroid Disease Mother         graves-decompression and strabismus     Cancer Paternal Grandmother         cervical     Other Cancer Paternal Grandmother         Cervical     Thyroid Disease Paternal Aunt         nine affected in family     Glaucoma No family hx of      Macular Degeneration No family hx of      Cerebrovascular Disease No family hx of      Hypertension  No family hx of      Diabetes No family hx of        Social History:  Marital Status:   [2]  Social History     Tobacco Use     Smoking status: Never     Smokeless tobacco: Never     Tobacco comments:     Nonsmoking household   Vaping Use     Vaping Use: Never used   Substance Use Topics     Alcohol use: Not Currently     Drug use: No        Medications:    cetirizine (ZYRTEC) 10 MG tablet  cyclobenzaprine (FLEXERIL) 10 MG tablet  ferrous sulfate (FE TABS) 325 (65 Fe) MG EC tablet  levothyroxine (SYNTHROID/LEVOTHROID) 150 MCG tablet  meloxicam (MOBIC) 15 MG tablet  metoprolol succinate ER (TOPROL-XL) 25 MG 24 hr tablet  oxyCODONE (ROXICODONE) 5 MG tablet  Rimegepant Sulfate 75 MG TBDP  albuterol (PROAIR HFA/PROVENTIL HFA/VENTOLIN HFA) 108 (90 Base) MCG/ACT inhaler  fluticasone (FLONASE) 50 MCG/ACT nasal spray  hydrOXYzine (ATARAX) 25 MG tablet  levonorgestrel (MIRENA) 20 MCG/24HR IUD  loratadine (CLARITIN) 10 MG tablet      Review of Systems   HENT: Negative.    Respiratory: Negative.    Gastrointestinal: Negative.    Genitourinary: Negative.    Musculoskeletal: Positive for back pain.   Skin: Negative.    Neurological: Negative.    All other systems reviewed and are negative.      Physical Exam   BP: 123/79  Pulse: 80  Temp: 98  F (36.7  C)  Resp: 20  SpO2: 100 %      Physical Exam  Vitals and nursing note reviewed.   Constitutional:       General: She is not in acute distress.     Appearance: Normal appearance. She is not ill-appearing, toxic-appearing or diaphoretic.   Eyes:      General: No scleral icterus.     Extraocular Movements: Extraocular movements intact.      Conjunctiva/sclera: Conjunctivae normal.      Pupils: Pupils are equal, round, and reactive to light.   Cardiovascular:      Rate and Rhythm: Normal rate and regular rhythm.      Pulses: Normal pulses.      Heart sounds: Normal heart sounds.   Pulmonary:      Effort: Pulmonary effort is normal.      Breath sounds: Normal breath sounds.    Musculoskeletal:      Cervical back: Normal range of motion and neck supple.        Back:       Comments: Positive lower back pain with palpation.  Patient has normal muscle strength and neurovascular intact bilateral lower extremities.  Normal reflexes.  Negative straight leg raise bilaterally and no pain with dorsiflexion of great toes bilaterally.   Skin:     General: Skin is warm.      Capillary Refill: Capillary refill takes less than 2 seconds.      Findings: No bruising, erythema or rash.   Neurological:      General: No focal deficit present.      Mental Status: She is alert and oriented to person, place, and time.      Sensory: No sensory deficit.      Motor: No weakness.      Gait: Gait normal.      Deep Tendon Reflexes: Reflexes normal.   Psychiatric:         Mood and Affect: Mood normal.         Behavior: Behavior normal.         Thought Content: Thought content normal.         Judgment: Judgment normal.         ED Course                 Procedures             Critical Care time:  none               No results found for this or any previous visit (from the past 24 hour(s)).    Medications - No data to display    Assessments & Plan (with Medical Decision Making)     I have reviewed the nursing notes.    I have reviewed the findings, diagnosis, plan and need for follow up with the patient.    Jennifer Pastrana is a 32 year old female who presents the urgent care today with lower back pain flareup.  Patient does have a history of bulging disc and back pain issues and is scheduled for steroid injection to the back within the next week.  Patient states she was at the gym 4 days ago and did a back a back exercise that she was not able to do so she stopped doing it moved on.  She states on the car ride home she noticed that the back started spasming and causing pain.  She has been in pain since and now having difficulty ambulating due to the pain.  She has been using medications that were prescribed for her back  issues.  She denies any loss of bowel or bladder function, saddle anesthesia.  She states that the pain radiates down both legs.  She denies any shortness of breath, chest pain, abdominal pain, nausea or vomiting or rash.  She denies any fevers.     Due to patient having steroid injection later in the week we will hold off on oral steroids.  Discussed imaging today, but patient does not meet criteria for emergent MRI imaging.  X-ray of was discussed but patient declined.  We will send patient home prescription oxycodone to use as directed.  Side effects and cautions discussed with patient and given on discharge paperwork.  Patient discharged in stable condition informed return if symptoms worsen or change patient does follow-up with spine specialist soon as possible.      Discharge Medication List as of 2/18/2023 12:52 PM      START taking these medications    Details   oxyCODONE (ROXICODONE) 5 MG tablet Take 1 tablet (5 mg) by mouth every 6 hours as needed for severe pain (7-10) or breakthrough pain, Disp-12 tablet, R-0, E-Prescribe             Final diagnoses:   Acute bilateral low back pain with bilateral sciatica       2/18/2023   [facility] EMERGENCY DEPT

## 2023-02-18 NOTE — ED TRIAGE NOTES
Pt reports that she has a herniated disc L5 S1 in lower back discovered about a year ago, has been receiving injections as well. Current back pain started 2/15/23 and is affecting mobility.

## 2023-02-18 NOTE — DISCHARGE INSTRUCTIONS
Call your spine specialist for further evaluation and treatment management on Monday.    Use medication as directed.  Caution with medication can cause sedation.  Do not drive within 8 hours of taking this medication.    Go to the emergency department if loss of bowel or bladder function, saddle anesthesia, weakness in the legs, change or worsening symptoms occur.

## 2023-02-19 ENCOUNTER — MYC MEDICAL ADVICE (OUTPATIENT)
Dept: PALLIATIVE MEDICINE | Facility: CLINIC | Age: 33
End: 2023-02-19
Payer: COMMERCIAL

## 2023-02-20 NOTE — TELEPHONE ENCOUNTER
Per patient Tealeafhart message:  From: Jennifer M Victoriano      Created: 2/20/2023 12:01 PM      My pain is worse and spans across my entire lower back, and at times will reach to my pelvis. The pain is worse on my left side into my hip. I have progressive nerve pain radiating down both of my legs down to my ankles. The pain in my back is severe at times and is made worse with sitting for any period of time. The pain effects sitting, standing, walking, stairs, bending, reaching, lifting, etc.      I am not experiencing any numbness, tingling, or weakness. Also no bowel or bladder issues.       _________________________________    ZALORAhart sent to pt:  From: Natalia Thomas RN      Created: 2/20/2023 12:05 PM      Is this NEW pain or same pain but worsened?    Is the radiating nerve pain new? If not did it always radiate down to your ankles and was it always on both sides?  Is there a side that is worse?     RAZ Fisher-BSN  [facility]

## 2023-02-20 NOTE — TELEPHONE ENCOUNTER
I cant find any contraindication to doing the epidural on Thursday.  She had no red flag symptoms and a normal neuro exam on 2/18/2023.  Dr. Cagle can decide on his return whether or not he wants to order another lumbar MRI.  This is most likely myofascial back pain.     Katlin Alfonso MD

## 2023-02-20 NOTE — TELEPHONE ENCOUNTER
Sophia sent to pt:  From: Natalia Thomas RN      Created: 2/20/2023 4:23 PM      French Rodriguez,  One of the covering providers reviewed.       Good news!  It is okay to proceed w/ the injection on Thursday.     She says she cannot find any contraindication to doing the injection.  She reviewed the ED notes and there were no red flag symptoms and you had a normal neuro exam.  Dr. Cagle can decide on his return whether or not to order imaging.       See you Thursday,     RAZ Fisher-BSN  [facility]

## 2023-02-20 NOTE — TELEPHONE ENCOUNTER
Per patient myChart message:  From: Jennifer Pastrana      Created: 2/20/2023 12:23 PM      The sharp and severe pain I am experiencing is new. The pain I used to have was more dull and was not constant.   In the past, I did have nerve pain radiating down to my left knee at times. The nerve pain radiating to my ankles on both sides is new.   My left side is slightly worse than my right.         Dr. Cagle is out of office until 2/23/23. CHRISTIE is scheduled on 2/23.    Routed to the covering providers.      Natalia, RN-BSN  [facility]

## 2023-02-20 NOTE — TELEPHONE ENCOUNTER
Per patient myChart message:  From: Jennifer Pastrana      Created: 2/19/2023 11:18 AM      I think I may have injured my back on Wednesday evening from attempting an exercise at the gym. My lower back has been in more pain since then. The pain is now almost constant, worsening at times with spasms. All movement makes the pain worse. I have been taking Meloxicam and using ice with no relief.      I went to urgent care on Saturday morning because the pain has not subsided in any way. They held off on prescribing oral steroids since I have my injection scheduled for Thursday. They did recommend me to reach out to you to get an MRI ordered and scheduled as soon as possible. If this MRI can be ordered, I would also need a prescription for a sedative because I am claustrophobic.           2/22/22: last lumbar MRI    2/18/23: ED visit for this. Pt did not meet the criteria for an emergency MRI. X-ray offered but declined. Oxycodone prescribed     2/23/23: lumbar epidural steroid injection scheduled.   _________________________________    Mychart sent to pt:  From: Natalia Thomas RN      Created: 2/20/2023 10:23 AM      Dr. Gurjit Crystal is out of office until 2/23/23.  I am thinking the injection will probably need to be postponed since you have a new issue w/ your back. I will check w/ covering providers once you response to the questions.     Since your last MRI which was 2/2022 do you have any NEW symptoms of:  Pain location change. If so where?  Numbness/tingling  Weakness  New bowel/bladder issues  New radiating pain. If so where.        RAZ Fisher-BSN  [facility]

## 2023-02-23 ENCOUNTER — HOSPITAL ENCOUNTER (EMERGENCY)
Facility: CLINIC | Age: 33
Discharge: HOME | End: 2023-02-23
Attending: PHYSICIAN ASSISTANT | Admitting: PHYSICIAN ASSISTANT
Payer: COMMERCIAL

## 2023-02-23 VITALS
DIASTOLIC BLOOD PRESSURE: 79 MMHG | RESPIRATION RATE: 20 BRPM | OXYGEN SATURATION: 100 % | SYSTOLIC BLOOD PRESSURE: 131 MMHG | HEART RATE: 75 BPM | TEMPERATURE: 96.9 F

## 2023-02-23 DIAGNOSIS — M54.42 BILATERAL LOW BACK PAIN WITH LEFT-SIDED SCIATICA: ICD-10-CM

## 2023-02-23 PROCEDURE — 99213 OFFICE O/P EST LOW 20 MIN: CPT | Performed by: PHYSICIAN ASSISTANT

## 2023-02-23 PROCEDURE — G0463 HOSPITAL OUTPT CLINIC VISIT: HCPCS | Performed by: PHYSICIAN ASSISTANT

## 2023-02-23 RX ORDER — METHYLPREDNISOLONE 4 MG
TABLET, DOSE PACK ORAL
Qty: 21 TABLET | Refills: 0 | Status: SHIPPED | OUTPATIENT
Start: 2023-02-23 | End: 2023-06-29

## 2023-02-23 RX ORDER — OXYCODONE HYDROCHLORIDE 5 MG/1
5 TABLET ORAL EVERY 6 HOURS PRN
Qty: 6 TABLET | Refills: 0 | Status: SHIPPED | OUTPATIENT
Start: 2023-02-23 | End: 2023-02-26

## 2023-02-23 RX ORDER — DIAZEPAM 5 MG
TABLET ORAL
Qty: 1 TABLET | Refills: 0 | Status: SHIPPED | OUTPATIENT
Start: 2023-02-23 | End: 2023-06-29

## 2023-02-23 ASSESSMENT — ENCOUNTER SYMPTOMS
GASTROINTESTINAL NEGATIVE: 1
NEUROLOGICAL NEGATIVE: 1
CONSTITUTIONAL NEGATIVE: 1
BACK PAIN: 1

## 2023-02-23 ASSESSMENT — ACTIVITIES OF DAILY LIVING (ADL): ADLS_ACUITY_SCORE: 35
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
## 2023-02-24 ENCOUNTER — MYC MEDICAL ADVICE (OUTPATIENT)
Dept: PALLIATIVE MEDICINE | Facility: CLINIC | Age: 33
End: 2023-02-24
Payer: COMMERCIAL

## 2023-02-27 NOTE — TELEPHONE ENCOUNTER
Per patient [employer] message:  From: Jennifer JEFFY Victoriano      Created: 2/24/2023 1:40 PM      I had to go back to urgent care at [facility] since my ILESI had to be rescheduled. My pain was too unbearable to wait a month for some kind of treatment. I was prescribed a 6-day course of steroids (Medrol Dosepack). I am feeling some relief from the oral steroids. How long should I expect this relief to last?  They also ordered a lumbar MRI for me that I have scheduled at [place] in a couple weeks. Can the results be reviewed before my ILESI next month?      ______________    3/10/23: lumbar MRI @ [facility]  3/21/23: lumbar [facility]  _________________________________    Mychart sent to pt:  From: Natalia Thomas RN      Created: 2/27/2023 11:03 AM      French Rodriguez,  So sorry about the injection being cancelled!     Glad to hear that you are getting relief from the oral steroids!  It is hard to say how long that relief will last as everyone has a different response.       Yes Dr. Cagle will be able to review your imaging.  I will have a message sent to me on 3/11/23 as a reminder to sent the results on to Dr. Cagle to review for you.  You can also send us a message after the imaging as a reminder.          Routed to Dr. Cagle as an FYI.    RAZ Fisher-BSN  [facility]

## 2023-02-28 NOTE — TELEPHONE ENCOUNTER
Recommend waiting at least 2wks after medroldosepak completion. If feeling better would rec not having the ILESI

## 2023-03-01 NOTE — TELEPHONE ENCOUNTER
Salot sent to pt:  From: Natalia Thomas RN      Created: 3/1/2023 10:30 AM      Dr. Gurjit Crystal reviewed and recommends you wait at least 2 weeks after the medrol dose breezy completion and if you still have adequate relief at that time you can postpone the lumbar epidural steroid injection.       Let us know if you have questions about that.  We will review your lumbar MRI results when done.  Give us a heads up for a reminder.     Kind regards,     RAZ Fisher-BSN  [facility]

## 2023-03-10 ENCOUNTER — ANCILLARY PROCEDURE (OUTPATIENT)
Dept: MRI IMAGING | Facility: CLINIC | Age: 33
End: 2023-03-10
Attending: PHYSICIAN ASSISTANT
Payer: COMMERCIAL

## 2023-03-10 DIAGNOSIS — M54.42 BILATERAL LOW BACK PAIN WITH LEFT-SIDED SCIATICA: ICD-10-CM

## 2023-03-10 PROCEDURE — 72148 MRI LUMBAR SPINE W/O DYE: CPT | Mod: TC | Performed by: RADIOLOGY

## 2023-03-13 NOTE — TELEPHONE ENCOUNTER
Injection already scheduled for 3/21/23 for a repeat epidural steroid injection.    Dr. Cagle is recommending a different approach per recent imaging that was done:  Slight increase in herniation. Could consider different approach left l5, S1 TRANSFORAMINAL EPIDURAL STEROID INJECTION      Massiel is a prior authorization required to change the epidural steroid injection to the above?    Natalia RN-BSN  [facility]

## 2023-03-13 NOTE — TELEPHONE ENCOUNTER
No PA is required     Massiel ASHLEY    [facility]

## 2023-03-21 ENCOUNTER — ANCILLARY ORDERS (OUTPATIENT)
Dept: PALLIATIVE MEDICINE | Facility: CLINIC | Age: 33
End: 2023-03-21

## 2023-03-21 ENCOUNTER — RADIOLOGY INJECTION OFFICE VISIT (OUTPATIENT)
Dept: PALLIATIVE MEDICINE | Facility: CLINIC | Age: 33
End: 2023-03-21
Attending: PAIN MEDICINE
Payer: COMMERCIAL

## 2023-03-21 VITALS
OXYGEN SATURATION: 97 % | DIASTOLIC BLOOD PRESSURE: 83 MMHG | RESPIRATION RATE: 16 BRPM | SYSTOLIC BLOOD PRESSURE: 123 MMHG | HEART RATE: 75 BPM

## 2023-03-21 DIAGNOSIS — M54.16 LUMBAR RADICULOPATHY: ICD-10-CM

## 2023-03-21 PROCEDURE — 64483 NJX AA&/STRD TFRM EPI L/S 1: CPT | Mod: LT | Performed by: PAIN MEDICINE

## 2023-03-21 PROCEDURE — 64484 NJX AA&/STRD TFRM EPI L/S EA: CPT | Mod: LT | Performed by: PAIN MEDICINE

## 2023-03-21 RX ORDER — DEXAMETHASONE SODIUM PHOSPHATE 10 MG/ML
10 INJECTION, SOLUTION INTRAMUSCULAR; INTRAVENOUS ONCE
Status: COMPLETED | OUTPATIENT
Start: 2023-03-21 | End: 2023-03-21

## 2023-03-21 RX ADMIN — DEXAMETHASONE SODIUM PHOSPHATE 10 MG: 10 INJECTION, SOLUTION INTRAMUSCULAR; INTRAVENOUS at 13:47

## 2023-03-21 ASSESSMENT — PAIN SCALES - GENERAL
PAINLEVEL: MILD PAIN (3)
PAINLEVEL: MODERATE PAIN (5)

## 2023-03-21 NOTE — PROGRESS NOTES
Pre procedure Diagnosis: lumbar radiculopathy, lumbar degenerative disc disease   Post procedure Diagnosis: Same  Procedure performed: lumbar transforaminal epidural steroid injection at left L5,S1, fluoroscopically guided, contrast controlled  Anesthesia: none  Complications: darinel  Operators: Vu Cagle MD     Indications:   Jennifer Pastrana is a 32 year old female.  They have a history of left LBP radiating to lower extremity.  Exam shows positive slump and they have tried conservative treatment including meds/PT/injections.    MRI  which showed   Reviewed  Options/alternatives, benefits and risks were discussed with the patient including bleeding, infection, tissue trauma, numbness, weakness, paralysis, spinal cord injury, radiation exposure, headache and reaction to medications. Questions were answered to her satisfaction and she agrees to proceed. Voluntary informed consent was obtained and signed.     Vitals were reviewed: Yes  /83   Pulse 75   Resp 16   SpO2 97%   Allergies were reviewed:  Yes   Medications were reviewed:  Yes   Pre-procedure pain score: 5/10    Procedure:  After getting informed consent, patient was brought into the procedure suite and was placed in a prone position on the procedure table.   A Pause for the Cause was performed.  Patient was prepped and draped in sterile fashion.     After identifying the left L 5, S1 neuroforamen, the C-arm was rotated to a left lateral oblique angle.  A total of 4ml of Lidocaine 1% was used to anesthetize the skin and the needle track at a skin entry site coaxial with the fluoroscopy beam, and overriding the superior aspect of the neuroforamen.  A 22 gauge 3.5 inch spinal needle was advanced under intermittent fluoroscopy until it entered the foramen superiorly.    The position was then inspected from anteroposterior and lateral views, and the needle adjusted appropriately.  A total of 1ml of Omnipaque-300 was injected, confirming appropriate  position, with spread into the nerve root sheath and the epidural space, with no intravascular uptake. 9ml was wasted    Then, after repeated negative aspiration, a combination of Decadron 10 mg, 0.25% bupivacaine 2 ml, diluted with 3ml of normal saline was injected.     Hemostasis was achieved, the area was cleaned, and bandaids were placed when appropriate.  The patient tolerated the procedure well, and was taken to the recovery room.    Images were saved to PACS.    Post-procedure pain score: 3/10  Follow-up includes:   -f/u phone call in one week  -f/u with referring provider    Vu Cagle MD  [facility] [place]

## 2023-03-21 NOTE — PATIENT INSTRUCTIONS
[facility]   Procedure Discharge Instructions    Today you saw:  Dr. Vu Cagle      You had an:  Epidural steroid injection           If you were holding your blood thinning medication, please restart taking it: N/A  Be cautious when walking. Numbness and/or weakness in the lower extremities may occur for up to 6-8 hours after the procedure due to effect of the local anesthetic  Do not drive for 6 hours. The effect of the local anesthetic could slow your reflexes.   You may resume your regular activities after 24 hours  Avoid strenuous activity for the first 24 hours  You may shower, however avoid swimming, tub baths or hot tubs for 24 hours following your procedure  You may have a mild to moderate increase in pain for several days following the injection.  It may take up to 14 days for the steroid medication to start working although you may feel the effect as early as a few days after the procedure.     You may use ice packs for 10-15 minutes, 3 to 4 times a day at the injection site for comfort  Do not use heat to painful areas for 6 to 8 hours. This will give the local anesthetic time to wear off and prevent you from accidentally burning your skin.   Unless you have been directed to avoid the use of anti-inflammatory medications (NSAIDS), you may use medications such as ibuprofen, Aleve or Tylenol for pain control if needed.   Possible side effects of steroids that you may experience include flushing, elevated blood pressure, increased appetite, mild headaches and restlessness.  All of these symptoms will get better with time.  If you experience any of the following, call the Pain Clinic during work hours (Mon-Friday 8-4:30 pm) at [phone] or the Provider Line after hours at [phone]:  -Fever over 100 degree F  -Swelling, bleeding, redness, drainage, warmth at the injection site  -Progressive weakness or numbness in your legs   -Loss of bowel or bladder  function  -Unusual new onset of pain that is not improving

## 2023-03-21 NOTE — NURSING NOTE
Discharge Information    IV Discontiued Time:  NA    Amount of Fluid Infused:  NA    Discharge Criteria = When patient returns to baseline or as per MD order    Consciousness:  Pt is fully awake    Circulation:  BP +/- 20% of pre-procedure level    Respiration:  Patient is able to breathe deeply    O2 Sat:  Patient is able to maintain O2 Sat >92% on room air    Activity:  Moves 4 extremities on command    Ambulation:  Patient is able to stand and walk or stand and pivot into wheelchair    Dressing:  Clean/dry or No Dressing    Notes:   Discharge instructions and AVS given to patient    Patient meets criteria for discharge?  YES    Admitted to PCU?  No    Responsible adult present to accompany patient home?  Yes    Signature/Title:    Deon Welch RN  RN Care Coordinator  [facility] [place]

## 2023-03-21 NOTE — NURSING NOTE
Pre-procedure Intake  If YES to any questions or NO to having a   Please complete laminated checklist and leave on the computer keyboard for Provider, verbally inform provider if able.    For SCS Trial, RFA's or any sedation procedure:  Have you been fasting? NA    If yes, for how long?     Are you taking any any blood thinners such as Coumadin, Warfarin, Jantoven, Pradaxa Xarelto, Eliquis, Edoxaban, Enoxaparin, Lovenox, Heparin, Arixtra, Fondaparinux, or Fragmin? OR Antiplatelet medication such as Plavix, Brilinta, or Effient?   No     If yes, when did you take your last dose?     Do you take aspirin?  No    If cervical procedure, have you held aspirin for 6 days?    Do you have any allergies to contrast dye, iodine, steroid and/or numbing medications?  NO    Are you currently taking antibiotics or have an active infection?  NO    Have you had a fever/elevated temperature within the past week? NO    Are you currently taking oral steroids? NO    Do you have a ? Yes    Are you pregnant or breastfeeding?  NO    Have you received the COVID-19 vaccine? Yes    If yes, was it your 1st, 2nd or only dose needed?     Date of most recent vaccine: 11/17/21    Notify provider and RNs if systolic BP >170, diastolic BP >100, P >100 or O2 sats < 90%

## 2023-05-08 ENCOUNTER — OFFICE VISIT (OUTPATIENT)
Dept: PALLIATIVE MEDICINE | Facility: CLINIC | Age: 33
End: 2023-05-08
Payer: COMMERCIAL

## 2023-05-08 VITALS — HEART RATE: 81 BPM | SYSTOLIC BLOOD PRESSURE: 121 MMHG | DIASTOLIC BLOOD PRESSURE: 81 MMHG

## 2023-05-08 DIAGNOSIS — M79.18 MYOFASCIAL MUSCLE PAIN: ICD-10-CM

## 2023-05-08 DIAGNOSIS — M54.16 LUMBAR RADICULOPATHY: Primary | ICD-10-CM

## 2023-05-08 PROCEDURE — 99214 OFFICE O/P EST MOD 30 MIN: CPT | Performed by: PAIN MEDICINE

## 2023-05-08 RX ORDER — GABAPENTIN 100 MG/1
100 CAPSULE ORAL 2 TIMES DAILY
Qty: 90 CAPSULE | Refills: 1 | Status: SHIPPED | OUTPATIENT
Start: 2023-05-08 | End: 2023-05-31

## 2023-05-08 NOTE — PATIENT INSTRUCTIONS
Physical Therapy:  Continue HEP  Clinical Health Psychologist to address issues of relaxation, behavioral change, coping style, and other factors important to improvement.  No  Diagnostic Studies:  None  Medication Management:    Continue meloxicam   Continue nurtec   - consider restarting gabapentin vs another option if back pain progresses    - - would start gabapentin low dose 100mg twice a day. 100mg in the pm for 3 days--> add 100mg in the am for 3 days. Will continue to titrate as tolerated.  Further procedures recommended: not at this time  Follow up:3 months    - Please check in 2-4 weeks for further titration of gabapentin    - surgery referral given continued symptoms after significant trial of conservation therapy     ----------------------------------------------------------------  Clinic Number:  [phone]   Call with any questions about your care and for scheduling assistance.   Calls are returned Monday through Friday between 8 AM and 4:30 PM. We usually get back to you within 2 business days depending on the issue/request.    If we are prescribing your medications:  For opioid medication refills, call the clinic or send a [employer] message 7 days in advance.  Please include:  Name of requested medication  Name of the pharmacy.  For non-opioid medications, call your pharmacy directly to request a refill. Please allow 3-4 days to be processed.   Per [place] State Law:  All controlled substance prescriptions must be filled within 30 days of being written.    For those controlled substances allowing refills, pickup must occur within 30 days of last fill.      We believe regular attendance is key to your success in our program!    Any time you are unable to keep your appointment we ask that you call us at least 24 hours in advance to cancel.This will allow us to offer the appointment time to another patient.   Multiple missed appointments may lead to dismissal from the clinic.

## 2023-05-08 NOTE — PROGRESS NOTES
"[facility] Follow Up    Date of visit: 5/8/23    Chief complaint:   Chief Complaint   Patient presents with     Pain       Interval history:    Recommendations/plan at the last visit included:  Physical Therapy:  Continue HEP  Clinical Health Psychologist to address issues of relaxation, behavioral change, coping style, and other factors important to improvement.  No  1. Diagnostic Studies:  None  2. Medication Management:    Continue meloxicam   Continue nurtec   - consider restarting gabapentin vs another option if back pain progresses   3. Further procedures recommended: Repeat L5-S1 ILESI  4. Follow up: 2-3 months after procedure      Interval   Some improvement with injections- although not sustained   Denies numb ting   Cont to have a deep dull ache pain   Pain bilat  Pain radiates to her L calf  Worse with prolonged sitting or standing  Worse with stairs  Worse with flexion   Denies fal;s  Denies weak  Does PHYSICAL THERAPY  Regimen 3 times a wk  Otherwise limited acitivity   Multiple ED visits  Migraines controlled with nurtec approx 16 a month     Since her last visit, Jennifer Pastrana reports:  lumbar transforaminal epidural steroid injection at left L5,S1, fluoroscopically guided, contrast controlled  - L5-S1 IL CHRISTIE on 5/17/22 with excellent relief lasting about 3 months  - Was able to be more active - had no pain at all - took no medications during the 3 months that it was working  - Since early August the pain was gradually coming back  - The pain is getting worse  - Took Meloxicam for the first time in awhile today  - Still in pain but it is manageable after taking the meloxicam   - Pain is the same as before the injection  - Pain is in her low back and left hip  - Pain is radiating down lateral left thigh to mid calf  - Leg pain feels dull and achy, \"like a pinched nerve\"  - She is doing PT home exercised  - She a repeat CHRISTIE in 2 weeks, looking forward to this  - She is weaning off " the gabapentin - she has 2 doses left and then she will stop this.   - She is trying to eliminate medication - main reason is she believes the gabapentin and nortryptaline hindering her weight loss  - She is off the nortyptaline as well  - Having more headaches but starting Neurtec  - Sees a neuroogist for her headaches    Current pain treatments:   Meloxicam - H  Gabapentin 200 mg in AM, 100 mg in afternoon (if needed), 100 mg at bedtime (had drowsiness at higher doses) - for headaches  nurtec  toprol   Past pain treatments:  Flexeril - [place]  Top    Side Effects: no current side effects    Medications:  Current Outpatient Medications   Medication Sig Dispense Refill     albuterol (PROAIR HFA/PROVENTIL HFA/VENTOLIN HFA) 108 (90 Base) MCG/ACT inhaler Inhale 2 puffs into the lungs every 4 hours as needed for shortness of breath / dyspnea or wheezing 18 g 0     cetirizine (ZYRTEC) 10 MG tablet        cyclobenzaprine (FLEXERIL) 10 MG tablet Take 0.5-1 tablets (5-10 mg) by mouth 3 times daily as needed for muscle spasms 25 tablet 0     ferrous sulfate (FE TABS) 325 (65 Fe) MG EC tablet Take 1 tablet (325 mg) by mouth daily 90 tablet 1     fluticasone (FLONASE) 50 MCG/ACT nasal spray Spray 1-2 sprays into both nostrils daily 9.9 mL 3     gabapentin (NEURONTIN) 100 MG capsule Take 1 capsule (100 mg) by mouth 2 times daily 90 capsule 1     hydrOXYzine (ATARAX) 25 MG tablet Take 1 tablet (25 mg) by mouth 3 times daily as needed for anxiety 42 tablet 1     levonorgestrel (MIRENA) 20 MCG/24HR IUD 1 each by Intrauterine route once       levothyroxine (SYNTHROID/LEVOTHROID) 150 MCG tablet Take 1 tablet (150 mcg) by mouth daily 90 tablet 3     meloxicam (MOBIC) 15 MG tablet Take 1 tablet (15 mg) by mouth daily 30 tablet 3     metoprolol succinate ER (TOPROL-XL) 25 MG 24 hr tablet Take 1 tablet (25 mg) by mouth daily 90 tablet 3     oxyCODONE (ROXICODONE) 5 MG tablet Take 1 tablet (5 mg) by mouth every 6 hours as needed for severe  pain (7-10) or breakthrough pain 12 tablet 0     Rimegepant Sulfate 75 MG TBDP Take 75 mg by mouth every 48 hours 16 tablet 11     diazepam (VALIUM) 5 MG tablet One tablet one half hour before MRI 1 tablet 0     loratadine (CLARITIN) 10 MG tablet Take 10 mg by mouth daily       methylPREDNISolone (MEDROL DOSEPAK) 4 MG tablet therapy pack Follow Package Directions 21 tablet 0       Medical History: any changes in medical history since they were last seen? No        Physical Exam:   Blood pressure 121/81, pulse 81, not currently breastfeeding.    GENERAL: Healthy, alert and no distress    RESP: NNo visible retractions or increased work of breathing.    SKIN: Visible skin clear  NEURO: Mentation and speech appropriate for age.  PSYCH: Mentation appears  Mild slump on the left  5/5 LE   + 2 achillis       Assessment:   Jennifer Pastrana is a 31 year old female who presents with the complaints of acute on chronic left-sided low back pain radiating to her calf   Jennifer was seen today for pain.     Diagnoses and all orders for this visit:     Lumbar radiculopathy     Myofascial muscle pain  Jennifer was seen today for pain.    Diagnoses and all orders for this visit:    Lumbar radiculopathy  -     Spine  Referral; Future  -     gabapentin (NEURONTIN) 100 MG capsule; Take 1 capsule (100 mg) by mouth 2 times daily  -     Adult Pain Clinic Follow-Up Order; Future    Myofascial muscle pain        Plan:  Physical Therapy:  Continue HEP  Clinical Health Psychologist to address issues of relaxation, behavioral change, coping style, and other factors important to improvement.  No  Diagnostic Studies:  None  Medication Management:    Continue meloxicam   Continue nurtec   - consider restarting gabapentin vs another option if back pain progresses    - - would start gabapentin low dose 100mg twice a day. 100mg in the pm for 3 days--> add 100mg in the am for 3 days. Will continue to titrate as tolerated.    Further procedures  recommended: not at this time    Follow up:3 months    - Please check in 2-4 weeks for further titration of gabapentin    - surgery referral given continued symptoms after significant trial of conservation therapy     The total TIME spent on this patient on the day of the appointment was 25 minutes.   Time spent preparing to see the patient (reviewing records and tests)  Time spend face to face with the patient  Time spent ordering tests, medications, procedures and referrals  Time spent Referring and communicating with other healthcare professionals  Documenting clinical information in Epic

## 2023-05-09 ENCOUNTER — TELEPHONE (OUTPATIENT)
Dept: PALLIATIVE MEDICINE | Facility: CLINIC | Age: 33
End: 2023-05-09
Payer: COMMERCIAL

## 2023-05-09 NOTE — TELEPHONE ENCOUNTER
Called pt. Original form was mailed to [place] [employer]. Firist Copy was mailed to patient and second copy was placed in to area to be scan to Epic.     Trish Paz MA

## 2023-05-09 NOTE — TELEPHONE ENCOUNTER
Form received from pt asking to fill out and mail to [place] [employer].  [address] [place] [address]    Placed form in Dr. Gurjit solano.

## 2023-05-12 NOTE — TELEPHONE ENCOUNTER
DIAGNOSIS: Lumbar Radiculopathy   APPOINTMENT DATE: 05/22/2023   NOTES STATUS DETAILS   OFFICE NOTE from referring provider Internal 05/08/2023 -Vu Cagle MD - [facility] Pain   OFFICE NOTE from other specialist Internal 02/08/2023 - Patric Duron MD - [facility] Neurology   DISCHARGE REPORT from the ER Internal 02/23/2023 - [facility] [place]  02/18/2023 - [facility]     MEDICATION LIST Internal    LABS     INJECTIONS DONE IN RADIOLOGY Internal    MRI Internal

## 2023-05-15 DIAGNOSIS — M54.50 LOW BACK PAIN: Primary | ICD-10-CM

## 2023-05-22 ENCOUNTER — ANCILLARY PROCEDURE (OUTPATIENT)
Dept: GENERAL RADIOLOGY | Facility: CLINIC | Age: 33
End: 2023-05-22
Attending: ORTHOPAEDIC SURGERY
Payer: COMMERCIAL

## 2023-05-22 ENCOUNTER — OFFICE VISIT (OUTPATIENT)
Dept: ORTHOPEDICS | Facility: CLINIC | Age: 33
End: 2023-05-22
Payer: COMMERCIAL

## 2023-05-22 ENCOUNTER — PRE VISIT (OUTPATIENT)
Dept: ORTHOPEDICS | Facility: CLINIC | Age: 33
End: 2023-05-22

## 2023-05-22 VITALS — HEIGHT: 63 IN | BODY MASS INDEX: 36.5 KG/M2 | WEIGHT: 206 LBS

## 2023-05-22 DIAGNOSIS — M51.26 HERNIATED LUMBAR INTERVERTEBRAL DISC: ICD-10-CM

## 2023-05-22 DIAGNOSIS — M54.16 LUMBAR RADICULOPATHY: Primary | ICD-10-CM

## 2023-05-22 PROCEDURE — 72110 X-RAY EXAM L-2 SPINE 4/>VWS: CPT | Mod: TC | Performed by: RADIOLOGY

## 2023-05-22 PROCEDURE — 99205 OFFICE O/P NEW HI 60 MIN: CPT | Performed by: ORTHOPAEDIC SURGERY

## 2023-05-22 NOTE — LETTER
5/22/2023         RE: Jennifer Pastrana  [address] [place] [address]        Dear Colleague,    Thank you for referring your patient, Jennifer Pastrana, to the [facility]. Please see a copy of my visit note below.    Chief Concern: Lumbar radiculopathy    HPI  Patient is very pleasant 32 year old female with lumbar disc herniation who with initial onset in January or February of 2022. She has had extensive nonoperative management including physical therapy, oral NSAIDs and oral steroid tapers, muscle relaxants, gabapentin, 3 epidural steroid injections. She continues to have substantial limitations in ability to sleep, perform ADLs and participate in occupational tasks due to her symptoms of back pain and lumbar radiculopathy.     She describes pain that starts in low back and left hip but does radiate to left lateral thigh and calf in L5 distribution. . She has constant aching pain with sharp, lancinating exacerbations.  Symptoms are exacerbated with prolonged sitting or standing, walking up stairs or lumbar flexion.  Symptoms are relieved with lying flat changing position. Meloxicam and medrol dose pack previously provided near complete symptom relief but now only minimal improvement.     Denies cauda equina symptoms.    She had a prior epidural steroid injection on 5/17/2022 which provided reasonable symptomatic relief for approximately three months.  Beginning in August of 2022 her symptoms returned and have become progressively more disabling.  She continues to perform physical therapy exercises three times weekly. Had another injeciton 3/21/2023 with Dr Cagle which provided quite short lived relief.     Her symptoms are severe enough that she has presented to the emergency department for pain control on three occasions.     On exam today she is alert and oriented, NAD  Walks with antalgic gait. Difficulty with left sided toe walk or heel walk.  She has 4-/5 strength with resisted  ankle dorsiflexion, great toe extension and ankle eversion; otherwise 5/5 in bilateral lower extremities.  Left lateral thigh and lateral calf are subjectively numb (L5)    AP, lateral, flexion and extension views of lumbar spine show transitional lumbosacral anatomy with sacralizaiton of L5 vertebral body (Castelvi Sánchez IIB), no spondylolisthesis or instability    The numbering convention I am using differs from the radiology numbering convention on her MRI.    MRI shows a large predominantly left sided disc herniation causing severe lateral recess stenosis impinging the traversing L5 nerve root.  In addition there is a smaller right sided disc herniation which causes right sided foraminal stenosis compressing the exiting L4 root.   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 The radiologist's numbering on the MRI lists the level of disc herniation as L5-S1 with L1 vertebral body with the rudimentary rib.     Impression and plan:  32 year old female with 17 months of symptoms secondary to lumbar disc herniation at left L4-5 (first normal disc space from sacralized L5-S1).  She has failed extensive nonoperative treatments and remains debilitated. She would like to proceed with surgery to decompress the nerve root. I counseled patient on risks and expected benefits after lumbar microdiscectomy. In particular we discussed the risk of recurrent disc herniation which is as high as 10-14% in the literature. Also discussed potential for incomplete symptom relief given how long she has had motor and sensory changes. Discussed possibility of disc degeneration or symptoms which could require additional surgery in the future. Discussed risk of spinal fluid leak due to dural tear.     She would like to proceed with scheduling a surgery time in the next 1-2 months. Case request placed.     Time spent on this clinical encounter including previsit chart review, history and physical  examination, patient counseling and documentation was 60 minutes on the date of encounter.    Sergo Estrada MD  Orthopedic Spine Surgeon  , Department of Orthopedic Surgery    Answers for HPI/ROS submitted by the patient on 5/11/2023  General Symptoms: No  Skin Symptoms: No  HENT Symptoms: No  EYE SYMPTOMS: No  HEART SYMPTOMS: No  LUNG SYMPTOMS: No  INTESTINAL SYMPTOMS: No  URINARY SYMPTOMS: No  GYNECOLOGIC SYMPTOMS: No  BREAST SYMPTOMS: No  SKELETAL SYMPTOMS: No  BLOOD SYMPTOMS: No  NERVOUS SYSTEM SYMPTOMS: No  MENTAL HEALTH SYMPTOMS: No

## 2023-05-22 NOTE — NURSING NOTE
"Reason For Visit:   Chief Complaint   Patient presents with     Consult     continued LBP radiating to her LLE after >trial of conservative therapy /Vu Cagle MD in [facility] /        Primary MD: Ashley Fish  Ref. MD: Dr. Cagle    ?  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    Date of injury: No  Type of injury: No.    Date of surgery: No  Type of surgery: No.    Smoker: No  Request smoking cessation information: No    Ht 1.595 m (5' 2.8\")   Wt 93.4 kg (206 lb)   BMI 36.73 kg/m      Pain Assessment  Patient Currently in Pain: Yes  0-10 Pain Scale: 4  Primary Pain Location: Back    Oswestry (ERINN) Questionnaire        5/11/2023     6:00 PM   OSWESTRY DISABILITY INDEX   Count 10   Sum 16   Oswestry Score (%) 32 %          Visual Analog Pain Scale  Back Pain Scale 0-10: 4  Right leg pain: 0  Left leg pain: 3.5  Neck Pain Scale 0-10: 0  Right arm pain: 0  Left arm pain: 0    Promis 10 Assessment        5/11/2023     6:04 PM   PROMIS 10   In general, would you say your health is: Very good   In general, would you say your quality of life is: Very good   In general, how would you rate your physical health? Very good   In general, how would you rate your mental health, including your mood and your ability to think? Excellent   In general, how would you rate your satisfaction with your social activities and relationships? Excellent   In general, please rate how well you carry out your usual social activities and roles Excellent   To what extent are you able to carry out your everyday physical activities such as walking, climbing stairs, carrying groceries, or moving a chair? Moderately   In the past 7 days, how often have you been bothered by emotional problems such as feeling anxious, depressed, or irritable? Never   In the past 7 days, how would you rate your fatigue on average? None   In the past 7 days, how would you rate your pain on " average, where 0 means no pain, and 10 means worst imaginable pain? 5   In general, would you say your health is: 4   In general, would you say your quality of life is: 4   In general, how would you rate your physical health? 4   In general, how would you rate your mental health, including your mood and your ability to think? 5   In general, how would you rate your satisfaction with your social activities and relationships? 5   In general, please rate how well you carry out your usual social activities and roles. (This includes activities at home, at work and in your community, and responsibilities as a parent, child, spouse, employee, friend, etc.) 5   To what extent are you able to carry out your everyday physical activities such as walking, climbing stairs, carrying groceries, or moving a chair? 3   In the past 7 days, how often have you been bothered by emotional problems such as feeling anxious, depressed, or irritable? 1   In the past 7 days, how would you rate your fatigue on average? 1   In the past 7 days, how would you rate your pain on average, where 0 means no pain, and 10 means worst imaginable pain? 5   Global Mental Health Score 19   Global Physical Health Score 15   PROMIS TOTAL - SUBSCORES 34                Kirstin Medina LPN

## 2023-05-23 ENCOUNTER — MYC MEDICAL ADVICE (OUTPATIENT)
Dept: PALLIATIVE MEDICINE | Facility: CLINIC | Age: 33
End: 2023-05-23
Payer: COMMERCIAL

## 2023-05-23 DIAGNOSIS — M54.16 LUMBAR RADICULOPATHY: ICD-10-CM

## 2023-05-23 NOTE — TELEPHONE ENCOUNTER
"Per Dr. Cagle's LOV note: \"- consider restarting gabapentin vs another option if back pain progresses               - - would start gabapentin low dose 100mg twice a day. 100mg in the pm for 3 days--> add 100mg in the am for 3 days. Will continue to titrate as tolerated.\"    Routing to provider to advise on titration instructions.    Leyla Drew RN  [facility]  [phone]    "

## 2023-05-23 NOTE — TELEPHONE ENCOUNTER
_________________________________    Mychart sent to pt:  You  Jennifer Pastrana Just now (1:24 PM)     French Rodriguez,  Good to hear that you are having some relief with the gabapentin.       Dr. Cagle says to increase to  gabapentin 200mg three times a day for 2 weeks.  After that give us a call w/ an update. If indicated, he will increase tod 300mg three times a day.        Natalia Moreno, RN-BSN  [facility]   [phone]

## 2023-05-26 PROBLEM — M54.16 LUMBAR RADICULOPATHY: Status: ACTIVE | Noted: 2023-05-26

## 2023-05-26 PROBLEM — M51.26 HERNIATED LUMBAR INTERVERTEBRAL DISC: Status: ACTIVE | Noted: 2023-05-26

## 2023-05-26 RX ORDER — OXYCODONE HYDROCHLORIDE 5 MG/1
10 TABLET ORAL
Status: DISCONTINUED | OUTPATIENT
Start: 2023-05-26 | End: 2023-07-13

## 2023-05-26 RX ORDER — BUPIVACAINE HYDROCHLORIDE AND EPINEPHRINE 5; 5 MG/ML; UG/ML
15 INJECTION, SOLUTION PERINEURAL
Status: DISCONTINUED | OUTPATIENT
Start: 2023-05-26 | End: 2023-07-13

## 2023-05-26 RX ORDER — CELECOXIB 200 MG/1
400 CAPSULE ORAL ONCE
Status: CANCELLED | OUTPATIENT
Start: 2023-05-26 | End: 2023-05-26

## 2023-05-26 RX ORDER — ACETAMINOPHEN 325 MG/1
975 TABLET ORAL ONCE
Status: CANCELLED | OUTPATIENT
Start: 2023-05-26 | End: 2023-05-26

## 2023-05-26 RX ORDER — DEXAMETHASONE SODIUM PHOSPHATE 10 MG/ML
4 INJECTION, SOLUTION INTRAMUSCULAR; INTRAVENOUS
Status: DISCONTINUED | OUTPATIENT
Start: 2023-05-26 | End: 2023-07-13

## 2023-05-26 NOTE — PROGRESS NOTES
Chief Concern: Lumbar radiculopathy    HPI  Patient is very pleasant 32 year old female with lumbar disc herniation who with initial onset in January or February of 2022. She has had extensive nonoperative management including physical therapy, oral NSAIDs and oral steroid tapers, muscle relaxants, gabapentin, 3 epidural steroid injections. She continues to have substantial limitations in ability to sleep, perform ADLs and participate in occupational tasks due to her symptoms of back pain and lumbar radiculopathy.     She describes pain that starts in low back and left hip but does radiate to left lateral thigh and calf in L5 distribution. . She has constant aching pain with sharp, lancinating exacerbations.  Symptoms are exacerbated with prolonged sitting or standing, walking up stairs or lumbar flexion.  Symptoms are relieved with lying flat changing position. Meloxicam and medrol dose pack previously provided near complete symptom relief but now only minimal improvement.     Denies cauda equina symptoms.    She had a prior epidural steroid injection on 5/17/2022 which provided reasonable symptomatic relief for approximately three months.  Beginning in August of 2022 her symptoms returned and have become progressively more disabling.  She continues to perform physical therapy exercises three times weekly. Had another injeciton 3/21/2023 with Dr Cagle which provided quite short lived relief.     Her symptoms are severe enough that she has presented to the emergency department for pain control on three occasions.     On exam today she is alert and oriented, NAD  Walks with antalgic gait. Difficulty with left sided toe walk or heel walk.  She has 4-/5 strength with resisted ankle dorsiflexion, great toe extension and ankle eversion; otherwise 5/5 in bilateral lower extremities.  Left lateral thigh and lateral calf are subjectively numb (L5)    AP, lateral, flexion and extension views of lumbar spine show  transitional lumbosacral anatomy with sacralizaiton of L5 vertebral body (Castelvi Sánchez IIB), no spondylolisthesis or instability    The numbering convention I am using differs from the radiology numbering convention on her MRI.    MRI shows a large predominantly left sided disc herniation causing severe lateral recess stenosis impinging the traversing L5 nerve root.  In addition there is a smaller right sided disc herniation which causes right sided foraminal stenosis compressing the exiting L4 root.   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 The radiologist's numbering on the MRI lists the level of disc herniation as L5-S1 with L1 vertebral body with the rudimentary rib.     Impression and plan:  32 year old female with 17 months of symptoms secondary to lumbar disc herniation at left L4-5 (first normal disc space from sacralized L5-S1).  She has failed extensive nonoperative treatments and remains debilitated. She would like to proceed with surgery to decompress the nerve root. I counseled patient on risks and expected benefits after lumbar microdiscectomy. In particular we discussed the risk of recurrent disc herniation which is as high as 10-14% in the literature. Also discussed potential for incomplete symptom relief given how long she has had motor and sensory changes. Discussed possibility of disc degeneration or symptoms which could require additional surgery in the future. Discussed risk of spinal fluid leak due to dural tear.     She would like to proceed with scheduling a surgery time in the next 1-2 months. Case request placed.     Time spent on this clinical encounter including previsit chart review, history and physical examination, patient counseling and documentation was 60 minutes on the date of encounter.    Sergo Estrada MD  Orthopedic Spine Surgeon  , Department of Orthopedic Surgery    Answers for HPI/ROS submitted by  the patient on 5/11/2023  General Symptoms: No  Skin Symptoms: No  HENT Symptoms: No  EYE SYMPTOMS: No  HEART SYMPTOMS: No  LUNG SYMPTOMS: No  INTESTINAL SYMPTOMS: No  URINARY SYMPTOMS: No  GYNECOLOGIC SYMPTOMS: No  BREAST SYMPTOMS: No  SKELETAL SYMPTOMS: No  BLOOD SYMPTOMS: No  NERVOUS SYSTEM SYMPTOMS: No  MENTAL HEALTH SYMPTOMS: No

## 2023-05-30 ENCOUNTER — TELEPHONE (OUTPATIENT)
Dept: ORTHOPEDICS | Facility: CLINIC | Age: 33
End: 2023-05-30
Payer: COMMERCIAL

## 2023-05-30 NOTE — TELEPHONE ENCOUNTER
Patient is scheduled for surgery with Dr. Estrada    Spoke with: Jennifer    Date of Surgery: 7/27/23    Location: [facility]    Informed patient they will need an adult  Yes    Pre op with Provider PAC    Additional imaging/appointments: POP Made    Surgery packet: Received     Additional comments: Added to cx list/waiting list.         Sonia Ricks on 5/30/2023 at 9:31 AM

## 2023-05-31 RX ORDER — GABAPENTIN 100 MG/1
200 CAPSULE ORAL 2 TIMES DAILY
Qty: 120 CAPSULE | Refills: 0 | Status: SHIPPED | OUTPATIENT
Start: 2023-05-31 | End: 2023-08-14

## 2023-05-31 NOTE — TELEPHONE ENCOUNTER
FUTURE VISIT INFORMATION      SURGERY INFORMATION:    Date: 7/27/23    [facility]    Surgeon:  Sergo Estrada MD    Anesthesia Type:  general    Procedure: LEFT Lumbar 4-Lumbar 5 MICRODISCECTOMY, SPINE, LUMBAR, 1 LEVEL, POSTERIOR APPROACH    Consult: ov 5/22/23    RECORDS REQUESTED FROM:       Primary Care Provider:Ashley Fish MD- [facility]    Most recent EKG+ Tracing: 3/3/20    Most recent ECHO: 3/3/20

## 2023-05-31 NOTE — TELEPHONE ENCOUNTER
Patient identifying 200mg BID dosing is effective and she is not seeking further dose increase.     Patient requesting refill of Gabapentin 200mg twice daily to be sent to [place] mail order pharmacy    Order prepped.  Routing to provider to approve dosing.    Leyla Drew RN  [facility] [place]  [phone]

## 2023-06-08 ENCOUNTER — DOCUMENTATION ONLY (OUTPATIENT)
Dept: ORTHOPEDICS | Facility: CLINIC | Age: 33
End: 2023-06-08
Payer: COMMERCIAL

## 2023-06-08 NOTE — PROGRESS NOTES
Received Completed forms Yes   Faxed Forms Faxed To: [facility]  Fax Number: [phone]   Sent to [facility] (Date) 6/7/23

## 2023-06-15 NOTE — TELEPHONE ENCOUNTER
Rescheduled  Patient is scheduled for surgery with Dr. Estrada    Spoke with: Jennifer    Date of Surgery: 7/13/23    Location[facility]    Informed patient they will need an adult  Yes    Pre op with Provider PAC 6/29/23    Additional imaging/appointments: POP Made     Additional comments: Rescheduled per cx list/waiting list.        Sonia Ricks on 6/15/2023 at 12:50 PM

## 2023-06-22 ENCOUNTER — DOCUMENTATION ONLY (OUTPATIENT)
Dept: ORTHOPEDICS | Facility: CLINIC | Age: 33
End: 2023-06-22
Payer: COMMERCIAL

## 2023-06-22 NOTE — PROGRESS NOTES
Received Completed forms Yes   Faxed Forms Faxed To: [employer].   Fax Number: [phone]   Sent to [facility] (Date) 6/22/23

## 2023-06-29 ENCOUNTER — PRE VISIT (OUTPATIENT)
Dept: SURGERY | Facility: CLINIC | Age: 33
End: 2023-06-29

## 2023-06-29 ENCOUNTER — VIRTUAL VISIT (OUTPATIENT)
Dept: SURGERY | Facility: CLINIC | Age: 33
End: 2023-06-29
Payer: COMMERCIAL

## 2023-06-29 ENCOUNTER — ANESTHESIA EVENT (OUTPATIENT)
Dept: SURGERY | Facility: CLINIC | Age: 33
End: 2023-06-29
Payer: COMMERCIAL

## 2023-06-29 DIAGNOSIS — M51.26 LUMBAR HERNIATED DISC: ICD-10-CM

## 2023-06-29 DIAGNOSIS — M54.16 LUMBAR RADICULOPATHY: ICD-10-CM

## 2023-06-29 DIAGNOSIS — Z01.818 PREOP EXAMINATION: Primary | ICD-10-CM

## 2023-06-29 PROCEDURE — 99203 OFFICE O/P NEW LOW 30 MIN: CPT | Mod: VID | Performed by: PHYSICIAN ASSISTANT

## 2023-06-29 ASSESSMENT — PAIN SCALES - GENERAL: PAINLEVEL: MODERATE PAIN (4)

## 2023-06-29 ASSESSMENT — ENCOUNTER SYMPTOMS: SEIZURES: 0

## 2023-06-29 NOTE — H&P
Pre-Operative H & P     CC:  Preoperative exam to assess for increased cardiopulmonary risk while undergoing surgery and anesthesia.    Date of Encounter: 6/29/2023  Primary Care Physician:  Ashley Fish     Reason for visit:   Encounter Diagnoses   Name Primary?     Preop examination Yes     Lumbar radiculopathy      Lumbar herniated disc        HPI  Jennifer Pastrana is a 32 year old female who presents for pre-operative H & P in preparation for  Procedure Information     Case: [id number] Date/Time: 07/13/23 0730    Procedure: LEFT Lumbar 4-Lumbar 5 MICRODISCECTOMY, SPINE, LUMBAR, 1 LEVEL, POSTERIOR APPROACH (Left: Spine)    Anesthesia type: General    Diagnosis:       Lumbar radiculopathy [M54.16]      Herniated lumbar intervertebral disc [M51.26]    Pre-op diagnosis:       Lumbar radiculopathy [M54.16]      Herniated lumbar intervertebral disc [M51.26]    [facility]    Providers: Sergo Estrada MD          Ms. Pastrana has a PMH significant for asthma, allergic rhinitis, migraines, hypothyroidism, obesity, and anxiety.  She has a lumbar disc herniation with onset January 2022.  She has attempted nonoperative management to include physical therapy, over-the-counter analgesics, steroid tapers, muscle relaxants, gabapentin, and corticosteroid injections.  She is limited in her ability to be as active as she would like to be due to her pain and she also has difficulty sleeping.  She was evaluated by Dr. Estrada and the above surgery was offered and the patient wishes to proceed.    History is obtained from the patient and chart review    Hx of abnormal bleeding or anti-platelet use: Denies    Menstrual history: No LMP recorded. (Menstrual status: IUD).:      Past Medical History  Past Medical History:   Diagnosis Date     AR (allergic rhinitis)      ASCUS with positive high risk HPV 2/2013    type 53     Cervical high risk HPV (human papillomavirus) test positive 2/25/15     Dysplasia of  cervix, low grade (MILO 1)      History of migraine headaches     controlled on Nortriptyline and Maxalt prn      Hypothyroidism      IUD (intrauterine device) in place     Mirena- placed 10/2019     Mood disorder (H)      Reactive airway disease that is not asthma     on Albuterol prn      Uncomplicated asthma 7/2015    Mild       Past Surgical History  Past Surgical History:   Procedure Laterality Date     BIOPSY  [id number]    Cervix     HC TOOTH EXTRACTION W/FORCEP         Prior to Admission Medications  Current Outpatient Medications   Medication Sig Dispense Refill     cetirizine (ZYRTEC) 10 MG tablet        ferrous sulfate (FE TABS) 325 (65 Fe) MG EC tablet Take 1 tablet (325 mg) by mouth daily 90 tablet 1     fluticasone (FLONASE) 50 MCG/ACT nasal spray Spray 1-2 sprays into both nostrils daily 9.9 mL 3     gabapentin (NEURONTIN) 100 MG capsule Take 2 capsules (200 mg) by mouth 2 times daily 120 capsule 0     hydrOXYzine (ATARAX) 25 MG tablet Take 1 tablet (25 mg) by mouth 3 times daily as needed for anxiety (Patient taking differently: Take 25 mg by mouth as needed for anxiety) 42 tablet 1     levonorgestrel (MIRENA) 20 MCG/24HR IUD 1 each by Intrauterine route once       levothyroxine (SYNTHROID/LEVOTHROID) 150 MCG tablet Take 1 tablet (150 mcg) by mouth daily 90 tablet 3     meloxicam (MOBIC) 15 MG tablet Take 1 tablet (15 mg) by mouth daily (Patient taking differently: Take 15 mg by mouth as needed) 30 tablet 3     metoprolol succinate ER (TOPROL XL) 25 MG 24 hr tablet Take 1 tablet (25 mg) by mouth daily +++NEED APPOINTMENT+++ 90 tablet 0     Rimegepant Sulfate 75 MG TBDP Take 75 mg by mouth every 48 hours 16 tablet 11       Allergies  No Known Allergies    Social History  Social History     Socioeconomic History     Marital status:      Spouse name: Not on file     Number of children: Not on file     Years of education: Not on file     Highest education level: Not on file   Occupational History      Not on file   Tobacco Use     Smoking status: Never     Smokeless tobacco: Never     Tobacco comments:     Nonsmoking household   Vaping Use     Vaping Use: Never used   Substance and Sexual Activity     Alcohol use: Not Currently     Drug use: No     Sexual activity: Yes     Partners: Male     Birth control/protection: I.U.D.   Other Topics Concern     Parent/sibling w/ CABG, MI or angioplasty before 65F 55M? Yes      Service No     Blood Transfusions No     Caffeine Concern No     Occupational Exposure No     Hobby Hazards No     Sleep Concern No     Stress Concern No     Weight Concern Yes     Special Diet No     Back Care Yes     Exercise No     Bike Helmet No     Seat Belt Yes     Self-Exams No   Social History Narrative     Not on file     Social Determinants of Health     Financial Resource Strain: Not on file   Food Insecurity: Not on file   Transportation Needs: Not on file   Physical Activity: Not on file   Stress: Not on file   Social Connections: Not on file   Intimate Partner Violence: Not on file   Housing Stability: Not on file       Family History  Family History   Problem Relation Age of Onset     Thyroid Disease Mother         graves-decompression and strabismus     Deep Vein Thrombosis Father      Breast Cancer Maternal Grandmother         in her 50s     Heart Disease Maternal Grandfather         50s     Myocardial Infarction Maternal Grandfather      Cancer Paternal Grandmother         cervical     Other Cancer Paternal Grandmother         Cervical     Heart Disease Paternal Grandfather         50s     Myocardial Infarction Paternal Grandfather      Thyroid Disease Paternal Aunt         nine affected in family     Glaucoma No family hx of      Macular Degeneration No family hx of      Cerebrovascular Disease No family hx of      Hypertension No family hx of      Diabetes No family hx of      Anesthesia Reaction No family hx of        Review of Systems  The complete review of systems  is negative other than noted in the HPI or here.     Anesthesia Evaluation   Pt has had prior anesthetic.     No history of anesthetic complications       ROS/MED HX  ENT/Pulmonary:     (+) SMITA risk factors, snores loudly, obese, allergic rhinitis, Intermittent, asthma Treatment: Inhaler prn,   (-) recent URI   Neurologic:     (+) migraines,  (-) no seizures and no CVA   Cardiovascular:  - neg cardiovascular ROS   (+) -----Previous cardiac testing   Echo: Date: 2020 Results:  Interpretation Summary     Global and regional left ventricular function is normal with an EF of 55-60%.  Right ventricular function, chamber size, wall motion, and thickness are  normal.  The inferior vena cava is normal.  No pericardial effusion is present.  Previous study not available for comparison.  Stress Test: Date: Results:    ECG Reviewed: Date: Results:    Cath: Date: Results:      METS/Exercise Tolerance: >4 METS    Hematologic:  - neg hematologic  ROS  (-) history of blood clots and history of blood transfusion   Musculoskeletal:  - neg musculoskeletal ROS     GI/Hepatic:  - neg GI/hepatic ROS  (-) GERD   Renal/Genitourinary:  - neg Renal ROS     Endo:     (+) thyroid problem, hypothyroidism, Obesity,  (-) Type I DM and Type II DM   Psychiatric/Substance Use:     (+) psychiatric history anxiety     Infectious Disease:  - neg infectious disease ROS     Malignancy:  - neg malignancy ROS     Other:  - neg other ROS          Virtual visit -  No vitals were obtained    Physical Exam  Constitutional: Awake, alert, cooperative, no apparent distress, and appears stated age.  HENT: Normocephalic  Respiratory: non labored breathing   Neurologic: Awake, alert, oriented to name, place and time.   Neuropsychiatric: Calm, cooperative. Normal affect.      Prior Labs/Diagnostic Studies   All labs and imaging personally reviewed     Component      Latest Ref Rng 12/8/2022  10:35 AM   WBC      4.0 - 11.0 10e3/uL 7.4    RBC Count      3.80 - 5.20  10e6/uL 4.88    Hemoglobin      11.7 - 15.7 g/dL 14.3    Hematocrit      35.0 - 47.0 % 44.2    MCV      78 - 100 fL 91    MCH      26.5 - 33.0 pg 29.3    MCHC      31.5 - 36.5 g/dL 32.4    RDW      10.0 - 15.0 % 12.4    Platelet Count      150 - 450 10e3/uL 289        Component      Latest Ref Rng 12/8/2022  10:35 AM   Sodium      133 - 144 mmol/L 137    Potassium      3.4 - 5.3 mmol/L 4.7    Chloride      94 - 109 mmol/L 105    Carbon Dioxide      20 - 32 mmol/L 27    Anion Gap      3 - 14 mmol/L 5    Urea Nitrogen      7 - 30 mg/dL 12    Creatinine      0.52 - 1.04 mg/dL 0.75    Calcium      8.5 - 10.1 mg/dL 9.3    Glucose      70 - 99 mg/dL 91    Alkaline Phosphatase      40 - 150 U/L 83    AST      0 - 45 U/L 12    ALT      0 - 50 U/L 21    Protein Total      6.8 - 8.8 g/dL 7.6    Albumin      3.4 - 5.0 g/dL 3.9    Bilirubin Total      0.2 - 1.3 mg/dL 0.6    GFR Estimate      >60 mL/min/1.73m2 >90              EKG/ stress test - if available please see in ROS above   Echo result w/o MOPS: Interpretation Summary Global and regional left ventricular function is normal with an EF of 55-60%.Right ventricular function, chamber size, wall motion, and thickness arenormal.The inferior vena cava is normal.No pericardial effusion is present.Previous study not available for comparison.      The patient's records and results personally reviewed by this provider.     Outside records reviewed from: Care Everywhere  Reviewed orthopedic surgery notes, labs, and testing above.    Assessment      Jennifer Pastrana is a 32 year old female seen as a PAC referral for risk assessment and optimization for anesthesia.    Plan/Recommendations  Pt will be optimized for the proposed procedure.  See below for details on the assessment, risk, and preoperative recommendations    NEUROLOGY  - No history of TIA, CVA or seizure    -Post Op delirium risk factors:  No risk identified    ENT  - No current airway concerns.  Will need to be reassessed  "day of surgery.  Mallampati: Unable to assess  TM: Unable to assess    CARDIAC  - No history of CAD, Hypertension and Afib  - METS (Metabolic Equivalents)      Patient performs 4 or more METS exercise without symptoms            Total Score: 0      RCRI-Very low risk: Class 1 0.4% complication rate            Total Score: 0        PULMONARY  SMITA Low Risk            Total Score: 1    SMITA: Snores loudly      - Asthma  Well controlled  - Tobacco History      History   Smoking Status     Never   Smokeless Tobacco     Never       GI  - Denies GERD  PONV High Risk  Total Score: 3           1 AN PONV: Pt is Female    1 AN PONV: Patient is not a current smoker    1 AN PONV: Intended Post Op Opioids          ENDOCRINE    - BMI: Estimated body mass index is 36.73 kg/m  as calculated from the following:    Height as of 5/22/23: 1.595 m (5' 2.8\").    Weight as of 5/22/23: 93.4 kg (206 lb).  Obesity (BMI >30)  - Thyroid disorder  Continue home replacement while hospitalized.  - No history of diabetes    HEME  VTE Low Risk 0.5%            Total Score: 4    VTE: Family Hx of VTE      - No history of abnormal bleeding or antiplatelet use.      PSYCH  -History of anxiety    Different anesthesia methods/types have been discussed with the patient, but they are aware that the final plan will be decided by the assigned anesthesia provider on the date of service.      The patient is optimized for their procedure. AVS with information on surgery time/arrival time, meds and NPO status given by nursing staff. No further diagnostic testing indicated.    Please refer to the physical examination documented by the anesthesiologist in the anesthesia record on the day of surgery.    Video-Visit Details    Type of service:  Video Visit    Provider received verbal consent for a Video Visit from the patient? Yes     Originating Location (pt. Location): Home    Distant Location (provider location):  Off-site  Mode of Communication:  Video Conference " via Gabriela Fontenot PA-C  Preoperative Assessment Center  [facility]  [facility]  Phone: [phone]  Fax: [phone]

## 2023-06-29 NOTE — H&P (VIEW-ONLY)
Pre-Operative H & P     CC:  Preoperative exam to assess for increased cardiopulmonary risk while undergoing surgery and anesthesia.    Date of Encounter: 6/29/2023  Primary Care Physician:  Ashley Fish     Reason for visit:   Encounter Diagnoses   Name Primary?     Preop examination Yes     Lumbar radiculopathy      Lumbar herniated disc        HPI  Jennifer Pastrana is a 32 year old female who presents for pre-operative H & P in preparation for  Procedure Information     Case: [id number] Date/Time: 07/13/23 0730    Procedure: LEFT Lumbar 4-Lumbar 5 MICRODISCECTOMY, SPINE, LUMBAR, 1 LEVEL, POSTERIOR APPROACH (Left: Spine)    Anesthesia type: General    Diagnosis:       Lumbar radiculopathy [M54.16]      Herniated lumbar intervertebral disc [M51.26]    Pre-op diagnosis:       Lumbar radiculopathy [M54.16]      Herniated lumbar intervertebral disc [M51.26]    [facility]    Providers: Sergo Estrada MD          Ms. Pastrana has a PMH significant for asthma, allergic rhinitis, migraines, hypothyroidism, obesity, and anxiety.  She has a lumbar disc herniation with onset January 2022.  She has attempted nonoperative management to include physical therapy, over-the-counter analgesics, steroid tapers, muscle relaxants, gabapentin, and corticosteroid injections.  She is limited in her ability to be as active as she would like to be due to her pain and she also has difficulty sleeping.  She was evaluated by Dr. Estrada and the above surgery was offered and the patient wishes to proceed.    History is obtained from the patient and chart review    Hx of abnormal bleeding or anti-platelet use: Denies    Menstrual history: No LMP recorded. (Menstrual status: IUD).:      Past Medical History  Past Medical History:   Diagnosis Date     AR (allergic rhinitis)      ASCUS with positive high risk HPV 2/2013    type 53     Cervical high risk HPV (human papillomavirus) test positive 2/25/15     Dysplasia of  cervix, low grade (MILO 1)      History of migraine headaches     controlled on Nortriptyline and Maxalt prn      Hypothyroidism      IUD (intrauterine device) in place     Mirena- placed 10/2019     Mood disorder (H)      Reactive airway disease that is not asthma     on Albuterol prn      Uncomplicated asthma 7/2015    Mild       Past Surgical History  Past Surgical History:   Procedure Laterality Date     BIOPSY  [id number]    Cervix     HC TOOTH EXTRACTION W/FORCEP         Prior to Admission Medications  Current Outpatient Medications   Medication Sig Dispense Refill     cetirizine (ZYRTEC) 10 MG tablet        ferrous sulfate (FE TABS) 325 (65 Fe) MG EC tablet Take 1 tablet (325 mg) by mouth daily 90 tablet 1     fluticasone (FLONASE) 50 MCG/ACT nasal spray Spray 1-2 sprays into both nostrils daily 9.9 mL 3     gabapentin (NEURONTIN) 100 MG capsule Take 2 capsules (200 mg) by mouth 2 times daily 120 capsule 0     hydrOXYzine (ATARAX) 25 MG tablet Take 1 tablet (25 mg) by mouth 3 times daily as needed for anxiety (Patient taking differently: Take 25 mg by mouth as needed for anxiety) 42 tablet 1     levonorgestrel (MIRENA) 20 MCG/24HR IUD 1 each by Intrauterine route once       levothyroxine (SYNTHROID/LEVOTHROID) 150 MCG tablet Take 1 tablet (150 mcg) by mouth daily 90 tablet 3     meloxicam (MOBIC) 15 MG tablet Take 1 tablet (15 mg) by mouth daily (Patient taking differently: Take 15 mg by mouth as needed) 30 tablet 3     metoprolol succinate ER (TOPROL XL) 25 MG 24 hr tablet Take 1 tablet (25 mg) by mouth daily +++NEED APPOINTMENT+++ 90 tablet 0     Rimegepant Sulfate 75 MG TBDP Take 75 mg by mouth every 48 hours 16 tablet 11       Allergies  No Known Allergies    Social History  Social History     Socioeconomic History     Marital status:      Spouse name: Not on file     Number of children: Not on file     Years of education: Not on file     Highest education level: Not on file   Occupational History      Not on file   Tobacco Use     Smoking status: Never     Smokeless tobacco: Never     Tobacco comments:     Nonsmoking household   Vaping Use     Vaping Use: Never used   Substance and Sexual Activity     Alcohol use: Not Currently     Drug use: No     Sexual activity: Yes     Partners: Male     Birth control/protection: I.U.D.   Other Topics Concern     Parent/sibling w/ CABG, MI or angioplasty before 65F 55M? Yes      Service No     Blood Transfusions No     Caffeine Concern No     Occupational Exposure No     Hobby Hazards No     Sleep Concern No     Stress Concern No     Weight Concern Yes     Special Diet No     Back Care Yes     Exercise No     Bike Helmet No     Seat Belt Yes     Self-Exams No   Social History Narrative     Not on file     Social Determinants of Health     Financial Resource Strain: Not on file   Food Insecurity: Not on file   Transportation Needs: Not on file   Physical Activity: Not on file   Stress: Not on file   Social Connections: Not on file   Intimate Partner Violence: Not on file   Housing Stability: Not on file       Family History  Family History   Problem Relation Age of Onset     Thyroid Disease Mother         graves-decompression and strabismus     Deep Vein Thrombosis Father      Breast Cancer Maternal Grandmother         in her 50s     Heart Disease Maternal Grandfather         50s     Myocardial Infarction Maternal Grandfather      Cancer Paternal Grandmother         cervical     Other Cancer Paternal Grandmother         Cervical     Heart Disease Paternal Grandfather         50s     Myocardial Infarction Paternal Grandfather      Thyroid Disease Paternal Aunt         nine affected in family     Glaucoma No family hx of      Macular Degeneration No family hx of      Cerebrovascular Disease No family hx of      Hypertension No family hx of      Diabetes No family hx of      Anesthesia Reaction No family hx of        Review of Systems  The complete review of systems  is negative other than noted in the HPI or here.     Anesthesia Evaluation   Pt has had prior anesthetic.     No history of anesthetic complications       ROS/MED HX  ENT/Pulmonary:     (+) SMITA risk factors, snores loudly, obese, allergic rhinitis, Intermittent, asthma Treatment: Inhaler prn,   (-) recent URI   Neurologic:     (+) migraines,  (-) no seizures and no CVA   Cardiovascular:  - neg cardiovascular ROS   (+) -----Previous cardiac testing   Echo: Date: 2020 Results:  Interpretation Summary     Global and regional left ventricular function is normal with an EF of 55-60%.  Right ventricular function, chamber size, wall motion, and thickness are  normal.  The inferior vena cava is normal.  No pericardial effusion is present.  Previous study not available for comparison.  Stress Test: Date: Results:    ECG Reviewed: Date: Results:    Cath: Date: Results:      METS/Exercise Tolerance: >4 METS    Hematologic:  - neg hematologic  ROS  (-) history of blood clots and history of blood transfusion   Musculoskeletal:  - neg musculoskeletal ROS     GI/Hepatic:  - neg GI/hepatic ROS  (-) GERD   Renal/Genitourinary:  - neg Renal ROS     Endo:     (+) thyroid problem, hypothyroidism, Obesity,  (-) Type I DM and Type II DM   Psychiatric/Substance Use:     (+) psychiatric history anxiety     Infectious Disease:  - neg infectious disease ROS     Malignancy:  - neg malignancy ROS     Other:  - neg other ROS          Virtual visit -  No vitals were obtained    Physical Exam  Constitutional: Awake, alert, cooperative, no apparent distress, and appears stated age.  HENT: Normocephalic  Respiratory: non labored breathing   Neurologic: Awake, alert, oriented to name, place and time.   Neuropsychiatric: Calm, cooperative. Normal affect.      Prior Labs/Diagnostic Studies   All labs and imaging personally reviewed     Component      Latest Ref Rng 12/8/2022  10:35 AM   WBC      4.0 - 11.0 10e3/uL 7.4    RBC Count      3.80 - 5.20  10e6/uL 4.88    Hemoglobin      11.7 - 15.7 g/dL 14.3    Hematocrit      35.0 - 47.0 % 44.2    MCV      78 - 100 fL 91    MCH      26.5 - 33.0 pg 29.3    MCHC      31.5 - 36.5 g/dL 32.4    RDW      10.0 - 15.0 % 12.4    Platelet Count      150 - 450 10e3/uL 289        Component      Latest Ref Rng 12/8/2022  10:35 AM   Sodium      133 - 144 mmol/L 137    Potassium      3.4 - 5.3 mmol/L 4.7    Chloride      94 - 109 mmol/L 105    Carbon Dioxide      20 - 32 mmol/L 27    Anion Gap      3 - 14 mmol/L 5    Urea Nitrogen      7 - 30 mg/dL 12    Creatinine      0.52 - 1.04 mg/dL 0.75    Calcium      8.5 - 10.1 mg/dL 9.3    Glucose      70 - 99 mg/dL 91    Alkaline Phosphatase      40 - 150 U/L 83    AST      0 - 45 U/L 12    ALT      0 - 50 U/L 21    Protein Total      6.8 - 8.8 g/dL 7.6    Albumin      3.4 - 5.0 g/dL 3.9    Bilirubin Total      0.2 - 1.3 mg/dL 0.6    GFR Estimate      >60 mL/min/1.73m2 >90              EKG/ stress test - if available please see in ROS above   Echo result w/o MOPS: Interpretation Summary Global and regional left ventricular function is normal with an EF of 55-60%.Right ventricular function, chamber size, wall motion, and thickness arenormal.The inferior vena cava is normal.No pericardial effusion is present.Previous study not available for comparison.      The patient's records and results personally reviewed by this provider.     Outside records reviewed from: Care Everywhere  Reviewed orthopedic surgery notes, labs, and testing above.    Assessment      Jennifer Pastrana is a 32 year old female seen as a PAC referral for risk assessment and optimization for anesthesia.    Plan/Recommendations  Pt will be optimized for the proposed procedure.  See below for details on the assessment, risk, and preoperative recommendations    NEUROLOGY  - No history of TIA, CVA or seizure    -Post Op delirium risk factors:  No risk identified    ENT  - No current airway concerns.  Will need to be reassessed  "day of surgery.  Mallampati: Unable to assess  TM: Unable to assess    CARDIAC  - No history of CAD, Hypertension and Afib  - METS (Metabolic Equivalents)      Patient performs 4 or more METS exercise without symptoms            Total Score: 0      RCRI-Very low risk: Class 1 0.4% complication rate            Total Score: 0        PULMONARY  SMITA Low Risk            Total Score: 1    SMITA: Snores loudly      - Asthma  Well controlled  - Tobacco History      History   Smoking Status     Never   Smokeless Tobacco     Never       GI  - Denies GERD  PONV High Risk  Total Score: 3           1 AN PONV: Pt is Female    1 AN PONV: Patient is not a current smoker    1 AN PONV: Intended Post Op Opioids          ENDOCRINE    - BMI: Estimated body mass index is 36.73 kg/m  as calculated from the following:    Height as of 5/22/23: 1.595 m (5' 2.8\").    Weight as of 5/22/23: 93.4 kg (206 lb).  Obesity (BMI >30)  - Thyroid disorder  Continue home replacement while hospitalized.  - No history of diabetes    HEME  VTE Low Risk 0.5%            Total Score: 4    VTE: Family Hx of VTE      - No history of abnormal bleeding or antiplatelet use.      PSYCH  -History of anxiety    Different anesthesia methods/types have been discussed with the patient, but they are aware that the final plan will be decided by the assigned anesthesia provider on the date of service.      The patient is optimized for their procedure. AVS with information on surgery time/arrival time, meds and NPO status given by nursing staff. No further diagnostic testing indicated.    Please refer to the physical examination documented by the anesthesiologist in the anesthesia record on the day of surgery.    Video-Visit Details    Type of service:  Video Visit    Provider received verbal consent for a Video Visit from the patient? Yes     Originating Location (pt. Location): Home    Distant Location (provider location):  Off-site  Mode of Communication:  Video Conference " via Gabriela Fontenot PA-C  Preoperative Assessment Center  [facility]  Clinic and Surgery Center  Phone: [phone]  Fax: [phone]

## 2023-06-29 NOTE — PROGRESS NOTES
Jennifer is a 32 year old who is being evaluated via a billable video visit.      How would you like to obtain your AVS? MyChart  If the video visit is dropped, the invitation should be resent by: Text to cell phone: [phone]            HPI                   Review of Systems               Physical Exam

## 2023-06-29 NOTE — PATIENT INSTRUCTIONS
Preparing for Your Surgery      Name:  Jennifer Pastrana   MRN:  [id number]   :  1990   Today's Date:  2023       Arriving for surgery:  Surgery date:  2023  Arrival time:  5:30 am    Please come to:     Please come to:      [facility] [address]  [address] [place]  [address]  The [facility] for patients and visitors is located beneath the [facility]. The parking facility entrance is at the intersection of [address] [place] [address] and [address]. Patients and visitors who self-park will receive the reduced hospital parking rate (no ticket validation needed).  [employer] parking, located at the [employer] main entrance on [address] [place] [address], is available Monday - Friday from 7 am to 3:30 pm.  Discounted parking pass options can be purchased from  attendants during business hours.  -Check in at the security desk in the [employer] ([facility]) Lobby. They will direct you to the correct elevators.  -Proceed to the 3rd floor, check in at the Adult Surgery Waiting Lounge. [phone]  If you are in need of directions, a wheelchair or escort please stop at the Information Desk in the lobby.  Inform the information person that you are here for surgery; a wheelchair and escort to [address] will be provided.   An escort to the Adult Surgery Waiting Lounge will be provided.    What can I eat or drink?  -  You may eat and drink normally up to 8 hours prior to arrival time. (Until 23 at 11 pm)  -  You may have clear liquids until 2 hours prior to arrival time. (Until 3:30 am)    Examples of clear liquids:  Water  Clear broth  Juices (apple, white grape, white cranberry  and cider) without pulp  Noncarbonated, powder based beverages  (lemonade and Trung-Aid)  Sodas (Sprite, 7-Up, ginger ale and seltzer)  Coffee or tea (without milk or cream)  Gatorade    -  No Alcohol or  cannabis products for at least 24 hours before surgery.     Which medicines can I take?    Hold Aspirin for 7 days before surgery.   Hold Multivitamins for 7 days before surgery.  Hold Supplements for 7 days before surgery.  Hold Ibuprofen (Advil, Motrin) for 3 day(s) before surgery--unless otherwise directed by surgeon.  Hold Naproxen (Aleve) for 4 days before surgery.    Hold Meloxicam (Mobic) for 10 days before surgery     -  DO NOT take these medications the day of surgery:  Rimegepant Sulfate (Nurtec)  Cetirizine (Zyrtec)  Ferrous Sulfate       -  PLEASE TAKE these medications the day of surgery:  Flonase   Gabapentin  Hydroxyzine if needed  Levothyroxine   Metoprolol       How do I prepare myself?  - Please take 2 showers (one the night prior to surgery and one the morning of surgery) using Scrubcare or Hibiclens soap.    Use this soap only from the neck to your toes.     Leave the soap on your skin for one minute--then rinse thoroughly.      You may use your own shampoo and conditioner. No other hair products.   - Please remove all jewelry and body piercings.  - No lotions, deodorants or fragrance.  - No makeup or fingernail polish.   - Bring your ID and insurance card.    -If you have a Deep Brain Stimulator, Spinal Cord Stimulator, or any Neuro Stimulator device---you must bring the remote control to the hospital.      ALL PATIENTS GOING HOME THE SAME DAY OF SURGERY ARE REQUIRED TO HAVE A RESPONSIBLE ADULT TO DRIVE AND BE IN ATTENDANCE WITH THEM FOR 24 HOURS FOLLOWING SURGERY.    Covid testing policy as of 12/06/2022  Your surgeon will notify and schedule you for a COVID test if one is needed before surgery--please direct any questions or COVID symptoms to your surgeon      Questions or Concerns:    - For any questions regarding the day of surgery or your hospital stay, please contact the Pre Admission Nursing Office at [phone].       - If you have health changes between today and your surgery, please  call your surgeon.       - For questions after surgery, please call your surgeons office.           Current Visitor Guidelines    You may have 2 visitors in the pre op area.    Visiting hours: 8 a.m. to 8:30 p.m.    You may have four visitors during your inpatient hospital stay.    Patients confirmed or suspected to have symptoms of COVID 19 or flu:     No visitors allowed for adult patients.   Children (under age 18) can have 1 named visitor.     People who are sick or showing symptoms of COVID 19 or flu:    Are not allowed to visit patients--we can only make exceptions in special situations.       Please follow these guidelines for your visit:          Please maintain social distance          Masking is optional--however at times you may be asked to wear a mask for the safety of yourself and others     Clean your hands with alcohol hand . Do this when you arrive at and leave the building and patient room,    And again after you touch your mask or anything in the room.     Go directly to and from the room you are visiting.     Stay in the patient s room during your visit. Limit going to other places in the hospital as much as possible     Leave bags and jackets at home or in the car.     For everyone s health, please don t come and go during your visit. That includes for smoking   during your visit.

## 2023-07-06 ENCOUNTER — MYC MEDICAL ADVICE (OUTPATIENT)
Dept: ORTHOPEDICS | Facility: CLINIC | Age: 33
End: 2023-07-06
Payer: COMMERCIAL

## 2023-07-07 ENCOUNTER — DOCUMENTATION ONLY (OUTPATIENT)
Dept: ORTHOPEDICS | Facility: CLINIC | Age: 33
End: 2023-07-07
Payer: COMMERCIAL

## 2023-07-07 NOTE — PROGRESS NOTES
Received Completed forms Yes   Faxed Forms Faxed To: [facility]  Fax Number: [phone]   Sent to HIM (Date) 7/6/23

## 2023-07-12 NOTE — TELEPHONE ENCOUNTER
Rhiannonr called and scheduled pt a 3 week virtual follow up appointment post surgery on 8/2/23 at 1:40 pm.     Kirstin Medina LPN

## 2023-07-13 ENCOUNTER — APPOINTMENT (OUTPATIENT)
Dept: GENERAL RADIOLOGY | Facility: CLINIC | Age: 33
End: 2023-07-13
Attending: ORTHOPAEDIC SURGERY
Payer: COMMERCIAL

## 2023-07-13 ENCOUNTER — HOSPITAL ENCOUNTER (OUTPATIENT)
Facility: CLINIC | Age: 33
Discharge: HOME | End: 2023-07-13
Attending: ORTHOPAEDIC SURGERY | Admitting: ORTHOPAEDIC SURGERY
Payer: COMMERCIAL

## 2023-07-13 ENCOUNTER — ANESTHESIA (OUTPATIENT)
Dept: SURGERY | Facility: CLINIC | Age: 33
End: 2023-07-13
Payer: COMMERCIAL

## 2023-07-13 VITALS
SYSTOLIC BLOOD PRESSURE: 110 MMHG | TEMPERATURE: 97.5 F | BODY MASS INDEX: 37.62 KG/M2 | HEIGHT: 63 IN | OXYGEN SATURATION: 98 % | DIASTOLIC BLOOD PRESSURE: 70 MMHG | HEART RATE: 98 BPM | WEIGHT: 212.3 LBS | RESPIRATION RATE: 16 BRPM

## 2023-07-13 DIAGNOSIS — M79.18 MYOFASCIAL MUSCLE PAIN: ICD-10-CM

## 2023-07-13 DIAGNOSIS — M51.26 HERNIATED LUMBAR INTERVERTEBRAL DISC: ICD-10-CM

## 2023-07-13 DIAGNOSIS — M54.16 LUMBAR RADICULOPATHY: Primary | ICD-10-CM

## 2023-07-13 DIAGNOSIS — F40.240 CLAUSTROPHOBIA: ICD-10-CM

## 2023-07-13 PROCEDURE — 63030 LAMOT DCMPRN NRV RT 1 LMBR: CPT | Mod: GC | Performed by: ORTHOPAEDIC SURGERY

## 2023-07-13 PROCEDURE — 258N000003 HC RX IP 258 OP 636

## 2023-07-13 PROCEDURE — 250N000011 HC RX IP 250 OP 636: Mod: JZ | Performed by: ORTHOPAEDIC SURGERY

## 2023-07-13 PROCEDURE — 250N000025 HC SEVOFLURANE, PER MIN: Performed by: ORTHOPAEDIC SURGERY

## 2023-07-13 PROCEDURE — 250N000013 HC RX MED GY IP 250 OP 250 PS 637: Performed by: ANESTHESIOLOGY

## 2023-07-13 PROCEDURE — 250N000009 HC RX 250: Performed by: ORTHOPAEDIC SURGERY

## 2023-07-13 PROCEDURE — 999N000141 HC STATISTIC PRE-PROCEDURE NURSING ASSESSMENT: Performed by: ORTHOPAEDIC SURGERY

## 2023-07-13 PROCEDURE — 710N000012 HC RECOVERY PHASE 2, PER MINUTE: Performed by: ORTHOPAEDIC SURGERY

## 2023-07-13 PROCEDURE — 250N000009 HC RX 250

## 2023-07-13 PROCEDURE — 250N000011 HC RX IP 250 OP 636: Performed by: ORTHOPAEDIC SURGERY

## 2023-07-13 PROCEDURE — 370N000017 HC ANESTHESIA TECHNICAL FEE, PER MIN: Performed by: ORTHOPAEDIC SURGERY

## 2023-07-13 PROCEDURE — 272N000001 HC OR GENERAL SUPPLY STERILE: Performed by: ORTHOPAEDIC SURGERY

## 2023-07-13 PROCEDURE — 710N000010 HC RECOVERY PHASE 1, LEVEL 2, PER MIN: Performed by: ORTHOPAEDIC SURGERY

## 2023-07-13 PROCEDURE — 250N000013 HC RX MED GY IP 250 OP 250 PS 637: Performed by: ORTHOPAEDIC SURGERY

## 2023-07-13 PROCEDURE — 360N000077 HC SURGERY LEVEL 4, PER MIN: Performed by: ORTHOPAEDIC SURGERY

## 2023-07-13 PROCEDURE — 258N000003 HC RX IP 258 OP 636: Performed by: ANESTHESIOLOGY

## 2023-07-13 PROCEDURE — 250N000011 HC RX IP 250 OP 636: Performed by: ANESTHESIOLOGY

## 2023-07-13 PROCEDURE — 250N000011 HC RX IP 250 OP 636

## 2023-07-13 PROCEDURE — 999N000180 XR SURGERY CARM FLUORO LESS THAN 5 MIN: Mod: TC

## 2023-07-13 RX ORDER — CELECOXIB 200 MG/1
400 CAPSULE ORAL ONCE
Status: COMPLETED | OUTPATIENT
Start: 2023-07-13 | End: 2023-07-13

## 2023-07-13 RX ORDER — OXYCODONE HYDROCHLORIDE 5 MG/1
5 TABLET ORAL EVERY 4 HOURS PRN
Status: DISCONTINUED | OUTPATIENT
Start: 2023-07-13 | End: 2023-07-13 | Stop reason: HOSPADM

## 2023-07-13 RX ORDER — LIDOCAINE HYDROCHLORIDE 20 MG/ML
INJECTION, SOLUTION INFILTRATION; PERINEURAL PRN
Status: DISCONTINUED | OUTPATIENT
Start: 2023-07-13 | End: 2023-07-13

## 2023-07-13 RX ORDER — OXYCODONE HYDROCHLORIDE 5 MG/1
5 TABLET ORAL EVERY 4 HOURS PRN
Qty: 20 TABLET | Refills: 0 | Status: SHIPPED | OUTPATIENT
Start: 2023-07-13 | End: 2023-07-18

## 2023-07-13 RX ORDER — ONDANSETRON 2 MG/ML
4 INJECTION INTRAMUSCULAR; INTRAVENOUS EVERY 30 MIN PRN
Status: DISCONTINUED | OUTPATIENT
Start: 2023-07-13 | End: 2023-07-13 | Stop reason: HOSPADM

## 2023-07-13 RX ORDER — VANCOMYCIN HYDROCHLORIDE 1 G/20ML
INJECTION, POWDER, LYOPHILIZED, FOR SOLUTION INTRAVENOUS PRN
Status: DISCONTINUED | OUTPATIENT
Start: 2023-07-13 | End: 2023-07-13 | Stop reason: HOSPADM

## 2023-07-13 RX ORDER — MAGNESIUM SULFATE HEPTAHYDRATE 40 MG/ML
INJECTION, SOLUTION INTRAVENOUS PRN
Status: DISCONTINUED | OUTPATIENT
Start: 2023-07-13 | End: 2023-07-13

## 2023-07-13 RX ORDER — HYDROXYZINE HYDROCHLORIDE 25 MG/1
25 TABLET, FILM COATED ORAL PRN
Qty: 30 TABLET | Refills: 0 | COMMUNITY
Start: 2023-07-13

## 2023-07-13 RX ORDER — AMOXICILLIN 250 MG
1 CAPSULE ORAL DAILY
Qty: 30 TABLET | Refills: 0 | Status: SHIPPED | OUTPATIENT
Start: 2023-07-13 | End: 2023-11-15

## 2023-07-13 RX ORDER — ONDANSETRON 2 MG/ML
INJECTION INTRAMUSCULAR; INTRAVENOUS PRN
Status: DISCONTINUED | OUTPATIENT
Start: 2023-07-13 | End: 2023-07-13

## 2023-07-13 RX ORDER — HYDRALAZINE HYDROCHLORIDE 20 MG/ML
2.5-5 INJECTION INTRAMUSCULAR; INTRAVENOUS EVERY 10 MIN PRN
Status: DISCONTINUED | OUTPATIENT
Start: 2023-07-13 | End: 2023-07-13 | Stop reason: HOSPADM

## 2023-07-13 RX ORDER — PROPOFOL 10 MG/ML
INJECTION, EMULSION INTRAVENOUS PRN
Status: DISCONTINUED | OUTPATIENT
Start: 2023-07-13 | End: 2023-07-13

## 2023-07-13 RX ORDER — CEFAZOLIN SODIUM/WATER 2 G/20 ML
2 SYRINGE (ML) INTRAVENOUS SEE ADMIN INSTRUCTIONS
Status: DISCONTINUED | OUTPATIENT
Start: 2023-07-13 | End: 2023-07-13 | Stop reason: HOSPADM

## 2023-07-13 RX ORDER — ACETAMINOPHEN 325 MG/1
975 TABLET ORAL
Status: COMPLETED | OUTPATIENT
Start: 2023-07-13 | End: 2023-07-13

## 2023-07-13 RX ORDER — SODIUM CHLORIDE, SODIUM LACTATE, POTASSIUM CHLORIDE, CALCIUM CHLORIDE 600; 310; 30; 20 MG/100ML; MG/100ML; MG/100ML; MG/100ML
INJECTION, SOLUTION INTRAVENOUS CONTINUOUS
Status: DISCONTINUED | OUTPATIENT
Start: 2023-07-13 | End: 2023-07-13 | Stop reason: HOSPADM

## 2023-07-13 RX ORDER — PROPOFOL 10 MG/ML
INJECTION, EMULSION INTRAVENOUS CONTINUOUS PRN
Status: DISCONTINUED | OUTPATIENT
Start: 2023-07-13 | End: 2023-07-13

## 2023-07-13 RX ORDER — HALOPERIDOL 5 MG/ML
1 INJECTION INTRAMUSCULAR
Status: COMPLETED | OUTPATIENT
Start: 2023-07-13 | End: 2023-07-13

## 2023-07-13 RX ORDER — TRANEXAMIC ACID 10 MG/ML
1 INJECTION, SOLUTION INTRAVENOUS ONCE
Status: COMPLETED | OUTPATIENT
Start: 2023-07-13 | End: 2023-07-13

## 2023-07-13 RX ORDER — ACETAMINOPHEN 325 MG/1
975 TABLET ORAL ONCE
Status: COMPLETED | OUTPATIENT
Start: 2023-07-13 | End: 2023-07-13

## 2023-07-13 RX ORDER — HYDROMORPHONE HYDROCHLORIDE 1 MG/ML
0.4 INJECTION, SOLUTION INTRAMUSCULAR; INTRAVENOUS; SUBCUTANEOUS EVERY 5 MIN PRN
Status: DISCONTINUED | OUTPATIENT
Start: 2023-07-13 | End: 2023-07-13 | Stop reason: HOSPADM

## 2023-07-13 RX ORDER — CEFAZOLIN SODIUM/WATER 2 G/20 ML
2 SYRINGE (ML) INTRAVENOUS
Status: COMPLETED | OUTPATIENT
Start: 2023-07-13 | End: 2023-07-13

## 2023-07-13 RX ORDER — DEXAMETHASONE SODIUM PHOSPHATE 4 MG/ML
INJECTION, SOLUTION INTRA-ARTICULAR; INTRALESIONAL; INTRAMUSCULAR; INTRAVENOUS; SOFT TISSUE PRN
Status: DISCONTINUED | OUTPATIENT
Start: 2023-07-13 | End: 2023-07-13

## 2023-07-13 RX ORDER — ONDANSETRON 4 MG/1
4 TABLET, ORALLY DISINTEGRATING ORAL EVERY 30 MIN PRN
Status: DISCONTINUED | OUTPATIENT
Start: 2023-07-13 | End: 2023-07-13 | Stop reason: HOSPADM

## 2023-07-13 RX ORDER — LABETALOL HYDROCHLORIDE 5 MG/ML
10 INJECTION, SOLUTION INTRAVENOUS
Status: DISCONTINUED | OUTPATIENT
Start: 2023-07-13 | End: 2023-07-13 | Stop reason: HOSPADM

## 2023-07-13 RX ORDER — MAGNESIUM HYDROXIDE 1200 MG/15ML
LIQUID ORAL PRN
Status: DISCONTINUED | OUTPATIENT
Start: 2023-07-13 | End: 2023-07-13 | Stop reason: HOSPADM

## 2023-07-13 RX ORDER — SODIUM CHLORIDE, SODIUM LACTATE, POTASSIUM CHLORIDE, CALCIUM CHLORIDE 600; 310; 30; 20 MG/100ML; MG/100ML; MG/100ML; MG/100ML
INJECTION, SOLUTION INTRAVENOUS CONTINUOUS PRN
Status: DISCONTINUED | OUTPATIENT
Start: 2023-07-13 | End: 2023-07-13

## 2023-07-13 RX ORDER — METHOCARBAMOL 750 MG/1
750 TABLET, FILM COATED ORAL EVERY 6 HOURS PRN
Qty: 60 TABLET | Refills: 0 | Status: SHIPPED | OUTPATIENT
Start: 2023-07-13

## 2023-07-13 RX ORDER — MELOXICAM 15 MG/1
15 TABLET ORAL PRN
COMMUNITY
Start: 2023-07-13 | End: 2024-03-27

## 2023-07-13 RX ORDER — HYDROMORPHONE HYDROCHLORIDE 1 MG/ML
0.2 INJECTION, SOLUTION INTRAMUSCULAR; INTRAVENOUS; SUBCUTANEOUS EVERY 5 MIN PRN
Status: DISCONTINUED | OUTPATIENT
Start: 2023-07-13 | End: 2023-07-13 | Stop reason: HOSPADM

## 2023-07-13 RX ORDER — GABAPENTIN 300 MG/1
300 CAPSULE ORAL 3 TIMES DAILY
Qty: 60 CAPSULE | Refills: 1 | Status: SHIPPED | OUTPATIENT
Start: 2023-07-13 | End: 2023-08-30

## 2023-07-13 RX ORDER — FENTANYL CITRATE 50 UG/ML
INJECTION, SOLUTION INTRAMUSCULAR; INTRAVENOUS PRN
Status: DISCONTINUED | OUTPATIENT
Start: 2023-07-13 | End: 2023-07-13

## 2023-07-13 RX ADMIN — CELECOXIB 400 MG: 200 CAPSULE ORAL at 06:39

## 2023-07-13 RX ADMIN — HYDROMORPHONE HYDROCHLORIDE 0.4 MG: 1 INJECTION, SOLUTION INTRAMUSCULAR; INTRAVENOUS; SUBCUTANEOUS at 10:21

## 2023-07-13 RX ADMIN — HYDROMORPHONE HYDROCHLORIDE 0.3 MG: 1 INJECTION, SOLUTION INTRAMUSCULAR; INTRAVENOUS; SUBCUTANEOUS at 10:00

## 2023-07-13 RX ADMIN — TRANEXAMIC ACID 1 G: 10 INJECTION, SOLUTION INTRAVENOUS at 07:57

## 2023-07-13 RX ADMIN — Medication 20 MG: at 08:17

## 2023-07-13 RX ADMIN — LIDOCAINE HYDROCHLORIDE 100 MG: 20 INJECTION, SOLUTION INFILTRATION; PERINEURAL at 07:45

## 2023-07-13 RX ADMIN — MIDAZOLAM 2 MG: 1 INJECTION INTRAMUSCULAR; INTRAVENOUS at 07:28

## 2023-07-13 RX ADMIN — Medication 50 MG: at 07:47

## 2023-07-13 RX ADMIN — FOSAPREPITANT 150 MG: 150 INJECTION, POWDER, LYOPHILIZED, FOR SOLUTION INTRAVENOUS at 14:38

## 2023-07-13 RX ADMIN — Medication 10 MG: at 09:17

## 2023-07-13 RX ADMIN — MAGNESIUM SULFATE HEPTAHYDRATE 2 G: 40 INJECTION, SOLUTION INTRAVENOUS at 08:26

## 2023-07-13 RX ADMIN — DEXAMETHASONE SODIUM PHOSPHATE 8 MG: 4 INJECTION, SOLUTION INTRA-ARTICULAR; INTRALESIONAL; INTRAMUSCULAR; INTRAVENOUS; SOFT TISSUE at 07:50

## 2023-07-13 RX ADMIN — ACETAMINOPHEN 975 MG: 325 TABLET, FILM COATED ORAL at 06:39

## 2023-07-13 RX ADMIN — Medication 2 G: at 07:51

## 2023-07-13 RX ADMIN — HYDROMORPHONE HYDROCHLORIDE 0.4 MG: 1 INJECTION, SOLUTION INTRAMUSCULAR; INTRAVENOUS; SUBCUTANEOUS at 10:47

## 2023-07-13 RX ADMIN — PROPOFOL 20 MCG/KG/MIN: 10 INJECTION, EMULSION INTRAVENOUS at 08:05

## 2023-07-13 RX ADMIN — HALOPERIDOL LACTATE 1 MG: 5 INJECTION, SOLUTION INTRAMUSCULAR at 11:51

## 2023-07-13 RX ADMIN — SUGAMMADEX 200 MG: 100 INJECTION, SOLUTION INTRAVENOUS at 09:42

## 2023-07-13 RX ADMIN — HYDROMORPHONE HYDROCHLORIDE 0.4 MG: 1 INJECTION, SOLUTION INTRAMUSCULAR; INTRAVENOUS; SUBCUTANEOUS at 10:35

## 2023-07-13 RX ADMIN — TRANEXAMIC ACID 1 G: 10 INJECTION, SOLUTION INTRAVENOUS at 09:20

## 2023-07-13 RX ADMIN — SODIUM CHLORIDE, POTASSIUM CHLORIDE, SODIUM LACTATE AND CALCIUM CHLORIDE: 600; 310; 30; 20 INJECTION, SOLUTION INTRAVENOUS at 07:26

## 2023-07-13 RX ADMIN — ONDANSETRON 4 MG: 2 INJECTION INTRAMUSCULAR; INTRAVENOUS at 09:20

## 2023-07-13 RX ADMIN — FENTANYL CITRATE 100 MCG: 50 INJECTION, SOLUTION INTRAMUSCULAR; INTRAVENOUS at 07:45

## 2023-07-13 RX ADMIN — PROPOFOL 20 MG: 10 INJECTION, EMULSION INTRAVENOUS at 09:37

## 2023-07-13 RX ADMIN — PROPOFOL 200 MG: 10 INJECTION, EMULSION INTRAVENOUS at 07:46

## 2023-07-13 RX ADMIN — ONDANSETRON 4 MG: 2 INJECTION INTRAMUSCULAR; INTRAVENOUS at 11:16

## 2023-07-13 RX ADMIN — OXYCODONE HYDROCHLORIDE 5 MG: 5 TABLET ORAL at 11:34

## 2023-07-13 RX ADMIN — HYDROMORPHONE HYDROCHLORIDE 0.2 MG: 1 INJECTION, SOLUTION INTRAMUSCULAR; INTRAVENOUS; SUBCUTANEOUS at 08:29

## 2023-07-13 RX ADMIN — HYDROMORPHONE HYDROCHLORIDE 0.4 MG: 1 INJECTION, SOLUTION INTRAMUSCULAR; INTRAVENOUS; SUBCUTANEOUS at 10:12

## 2023-07-13 RX ADMIN — ACETAMINOPHEN 975 MG: 325 TABLET, FILM COATED ORAL at 12:26

## 2023-07-13 ASSESSMENT — ACTIVITIES OF DAILY LIVING (ADL)
ADLS_ACUITY_SCORE: 35
ADLS_ACUITY_SCORE: 36
ADLS_ACUITY_SCORE: 35
ADLS_ACUITY_SCORE: 36
ADLS_ACUITY_SCORE: 35

## 2023-07-13 ASSESSMENT — ENCOUNTER SYMPTOMS: SEIZURES: 0

## 2023-07-13 NOTE — OP NOTE
DATE OF SURGERY: 7/13/2023    PREOPERATIVE DIAGNOSIS: Lumbar radiculopathy [M54.16]  Herniated lumbar intervertebral disc [M51.26]             POSTOPERATIVE DIAGNOSIS: same    PROCEDURES:  1. L4-L5 microdiscectomy for decompression of the L4 and L5 nerve roots.    PRIMARY SURGEON: Sergo Estrada MD    FIRST ASSISTANT: Christian Tyler MD PGY5    ANESTHESIA: General Endotracheal    COMPLICATIONS:  None.    SPECIMENS: None.    ESTIMATED BLOOD LOSS: 20 mL    INDICATIONS:                          Jennifer Pastrana is a 32 year old female who elected surgical treatment, and understood the indications for this surgery, as well as its risks, benefits, and alternatives as documented in the pre-operative H&P.  Specifically, we reviewed the risks and benefits of the surgery in detail. The risks include, but are not limited to, the general risks associated with anesthesia, including death, pulmonary embolism, DVT, stroke, myocardial infarction, pneumonia. Additional risks specific to the surgery include the risk of recurrent disc herniation, infection, dural tear with resultant CSF leak which might necessitate placement of a drain or revision surgery or could result in headaches, nerve injury resulting in weakness or paralysis, the risk of vascular injury, need for revision surgery in the future due to one of the above issues, or risk of incomplete symptom relief. Jennifer Pastrana understands the risks of the surgery and wishes to proceed.  No Guarantees were given.       DESCRIPTION OF PROCEDURE:           Jennifer Pastrana was taken to the operating room, where the Anesthesiology Service induced satisfactory general anesthesia. Ancef was given IV.  Venous thromboembolic prophylaxis was performed with sequential devices. The patient was placed prone on a Elver frame with all bony prominences well padded.  At this point I performed the erector spinae block using a mixture of 15 ml of 0.5% marcaine with 1:200,000 epinephrine  and 2 mg of decadron. The surgical site was first wiped with isopropyl alcohol and allowed to dry in order to decrease bacterial burden. The surgical site was then prepped with chloraprep and allowed to dry.  Fluoroscopy was brought in and 10 ml of the erector spinae plane block was injected through an 18 gauge spinal needle immediately dorsal to the L4 transverse process using fluoroscopy and palpation of the transverse process. I used these fluoroscopic images to jasbir the cranial and caudal aspect of the incision over the midline.  Following completion of the block the low back was then prepped and draped in its entirety in the usual sterile fashion.  We then held a multidisciplinary time out in which we verified the patient, procedure including laterality, antibiotics, and operative plan.  All team members were in agreement.    We made a midline incision and unilateral left-sided subperiosteal exposure was performed of the L4 through L5 spinous processes and medial lamina.  A penfield # 4 was placed under the L4 lamina and a final image was then obtained to verify our position. A jody retractor was then placed lateral to the facet joint.  Of note, patient has a sacralized L5 vertebral body. I decompressed the level of the most-caudal mobile disc segment which is consistent with her preoperative imaging.     The decompression was performed at the L4-5 levels which resulted in the decompression of the L4 and L5 nerve roots.      The caudal aspect of cephalad the lamina was thinned with a match stick bur in layers.  Eventually, the ligamentum flavum was uncovered by the bur in the midline.  A back angled microcurette was then used to develop a plane between the ligamentum flavum and the lamina.  A Vance was then used to split the ligamentum flavum and develop a plane between flavum and the dura.  The ligamentum flavum was then resected using Kerrison to expose the epidural fat and dura.  After dissecting away  the epidural fat and using bipolar electrocautery to obtain hemostasis, the shoulder of the nerve root was identified and a plane was developed lateral to the nerve root using a Penfield #4.  A D'Errico was then placed to protect the nerve root.  Bipolar was again used to coagulate the ventral venous plexus. The disc was identified and a longitudinal annulotomy was created using a 15 blade.   A nerve hook was used to develop a plane between the annulus and the nucleus below.  The herniated material was mobilized from the vertebral bodies cephalad and caudad.  Micropituitary rongeur was then used to remove the disc material in a piecemeal fashion.  We also identified fragmented disc material within the intervertebral space that was removed in a similar fashion to prevent recurrent herniation of pathologic disc material.  After this I irrigated the wound with sterile saline to dislodge any loose fragments which might later extrude and cause recurrent symptoms.  I then used a nerve hook to palpate ventral an dorsal to the nerve confirming decompression from the shoulder of the nerve root out well into the foramen.      Hemostasis was then obtained using surgiflo and thrombin soaked cottonoids. All sponge and needle counts were correct x 2. The wound was closed in layers using #0 vicryl in figure of eight fashion for fascia, 2-0 vicryl for deep dermal layer, 4-0 running subcuticular monocryl for skin closure.  Prineo and Acticoat dressing was placed over the incision and allowed to dry.  Drapes were removed and the patient was transferred to the hospital cart. The patient emerged from anesthesia and was extubated. Shee was taken to the recovery area in stable condition. I was scrubbed for the entire case.    POSTOPERATIVE CARES:  Dressing to remain in place for 2 weeks  May shower with dressing in place. Do not submerge surgical site. No creams or lotions at surgical site.   No repetitive bending, twisting or lifting  for 6 weeks. No lifting greater than 10 lbs for 6 weeks.   Will return to clinic in 6 weeks.     I was present and scrubbed for the critical portions of the procedure including positioning, erector spinae plane block, exposure and decompression, and closure.    Sergo Estrada MD

## 2023-07-13 NOTE — DISCHARGE INSTRUCTIONS
Same-Day Surgery   Adult Discharge Orders & Instructions     For 24 hours after surgery:  Get plenty of rest.  A responsible adult must stay with you for at least 24 hours after you leave the hospital.   Pain medication can slow your reflexes. Do not drive or use heavy equipment.  If you have weakness or tingling, don't drive or use heavy equipment until this feeling goes away.  Mixing alcohol and pain medication can cause dizziness and slow your breathing. It can even be fatal. Do not drink alcohol while taking pain medication.  Avoid strenuous or risky activities.  Ask for help when climbing stairs.   You may feel lightheaded.  If so, sit for a few minutes before standing.  Have someone help you get up.   If you have nausea (feel sick to your stomach), drink only clear liquids such as apple juice, ginger ale, broth or 7-Up.  Rest may also help.  Be sure to drink enough fluids.  Move to a regular diet as you feel able. Take pain medications with a small amount of solid food, such as toast or crackers, to avoid nausea.   A slight fever is normal. Call the doctor if your fever is over 100 F (37.7 C) (taken under the tongue) or lasts longer than 24 hours.  You may have a dry mouth, muscle aches, trouble sleeping or a sore throat.  These symptoms should go away after 24 hours.  Do not make important or legal decisions.   Pain Management:      1. Take pain medication (if prescribed) for pain as directed by your physician.        2. WARNING: If the pain medication you have been prescribed contains Tylenol  (acetaminophen), DO NOT take additional doses of Tylenol (acetaminophen).     Call your doctor for any of the followin.  Signs of infection (fever, growing tenderness at the surgery site, severe pain, a large amount of drainage or bleeding, foul-smelling drainage, redness, swelling).    2.  It has been over 8 to 10 hours since surgery and you are still not able to urinate (pee).    3.  Headache for over 24  hours.    4.  Numbness, tingling or weakness the day after surgery (if you had spinal anesthesia).  To contact a doctor, call Dr. Estrada, [facility], [phone]  or:  '   [phone] and ask for the Resident On Call for: Orthopedic Surgery (answered 24 hours a day)  '   Emergency Department:  [facility]: [phone]  [facility]: [phone]               Rev. 10/2014      Received Tylenol 975 mg at 12:30 pm  please wait 6 hours in between doses. Due again at 6:30 PM today. Do not exceed the recommended dosage of 4,000 mg per day.   Next dose of oxycodone: 3:30pm

## 2023-07-13 NOTE — ANESTHESIA CARE TRANSFER NOTE
Patient: Jennifer Pastrana    Procedure: Procedure(s):  LEFT Lumbar 4-Lumbar 5 MICRODISCECTOMY, SPINE, LUMBAR, 1 LEVEL, POSTERIOR APPROACH       Diagnosis: Lumbar radiculopathy [M54.16]  Herniated lumbar intervertebral disc [M51.26]  Diagnosis Additional Information: No value filed.    Anesthesia Type:   General     Note:    Oropharynx: oropharynx clear of all foreign objects and spontaneously breathing  Level of Consciousness: awake and drowsy  Oxygen Supplementation: face mask  Level of Supplemental Oxygen (L/min / FiO2): 6  Independent Airway: airway patency satisfactory and stable  Dentition: dentition unchanged  Vital Signs Stable: post-procedure vital signs reviewed and stable  Report to RN Given: handoff report given  Patient transferred to: PACU    Handoff Report: Identifed the Patient, Identified the Reponsible Provider, Reviewed the pertinent medical history, Discussed the surgical course, Reviewed Intra-OP anesthesia mangement and issues during anesthesia, Set expectations for post-procedure period and Allowed opportunity for questions and acknowledgement of understanding      Vitals:  Vitals Value Taken Time   /75 07/13/23 1000   Temp 36.4  C (97.5  F) 07/13/23 1000   Pulse 84 07/13/23 1004   Resp 31 07/13/23 1004   SpO2 98 % 07/13/23 1004   Vitals shown include unvalidated device data.    Electronically Signed By: GAYATHRI Harris CRNA  July 13, 2023  10:06 AM

## 2023-07-13 NOTE — ANESTHESIA POSTPROCEDURE EVALUATION
Patient: Jennifer Pastrana    Procedure: Procedure(s):  LEFT Lumbar 4-Lumbar 5 MICRODISCECTOMY, SPINE, LUMBAR, 1 LEVEL, POSTERIOR APPROACH       Anesthesia Type:  General    Note:  Disposition: Outpatient   Postop Pain Control: Uneventful            Sign Out: Well controlled pain   PONV: Yes            Symptoms: Nausea only            Sign Out: PONV/POV resolved with treatment (decadron, zofran intra-op; haldol, fosaprepitant PACU)   Neuro/Psych: Uneventful            Sign Out: Acceptable/Baseline neuro status   Airway/Respiratory: Uneventful            Sign Out: Acceptable/Baseline resp. status   CV/Hemodynamics: Uneventful            Sign Out: Acceptable CV status; No obvious hypovolemia; No obvious fluid overload   Other NRE: NONE   DID A NON-ROUTINE EVENT OCCUR? No           Last vitals:  Vitals Value Taken Time   /69 07/13/23 1230   Temp 36.4  C (97.5  F) 07/13/23 1200   Pulse 79 07/13/23 1233   Resp 13 07/13/23 1233   SpO2 96 % 07/13/23 1240   Vitals shown include unvalidated device data.    Electronically Signed By: ROGERS CHILDRESS MD  July 13, 2023  2:12 PM

## 2023-07-13 NOTE — ANESTHESIA PROCEDURE NOTES
Airway       Patient location during procedure: OR       Procedure Start/Stop Times: 7/13/2023 7:50 AM  Staff -        Anesthesiologist:  Veronica Rivera MD       CRNA: Rukhsana Herrera APRN CRNA       Performed By: CRNA  Consent for Airway        Urgency: elective  Indications and Patient Condition       Indications for airway management: davidson-procedural       Induction type:intravenous       Mask difficulty assessment: 1 - vent by mask    Final Airway Details       Final airway type: endotracheal airway       Successful airway: ETT - single and Oral  Endotracheal Airway Details        ETT size (mm): 7.0       Cuffed: yes       Cuff volume (mL): 8       Successful intubation technique: direct laryngoscopy       DL Blade Type: Lama 2       Grade View of Cords: 1       Adjucts: stylet       Position: Right       Measured from: gums/teeth       Secured at (cm): 21       Bite block used: None    Post intubation assessment        Placement verified by: capnometry, equal breath sounds and chest rise        Number of attempts at approach: 1       Number of other approaches attempted: 0       Secured with: silk tape       Ease of procedure: easy       Dentition: Intact and Unchanged    Medication(s) Administered   Medication Administration Time: 7/13/2023 7:50 AM

## 2023-07-13 NOTE — ANESTHESIA PREPROCEDURE EVALUATION
Anesthesia Pre-Procedure Evaluation    Patient: Jennifer Pastrana   MRN: [id number] : 1990        Procedure : Procedure(s):  LEFT Lumbar 4-Lumbar 5 MICRODISCECTOMY, SPINE, LUMBAR, 1 LEVEL, POSTERIOR APPROACH          Past Medical History:   Diagnosis Date     AR (allergic rhinitis)      ASCUS with positive high risk HPV 2013    type 53     Cervical high risk HPV (human papillomavirus) test positive 2/25/15     Dysplasia of cervix, low grade (MILO 1)      History of migraine headaches     controlled on Nortriptyline and Maxalt prn      Hypothyroidism      IUD (intrauterine device) in place     Mirena- placed 10/2019     Mood disorder (H)      Reactive airway disease that is not asthma     on Albuterol prn      Uncomplicated asthma 2015    Mild      Past Surgical History:   Procedure Laterality Date     BIOPSY  [id number]    Cervix     HC TOOTH EXTRACTION W/FORCEP        No Known Allergies   Social History     Tobacco Use     Smoking status: Never     Smokeless tobacco: Never     Tobacco comments:     Nonsmoking household   Substance Use Topics     Alcohol use: Not Currently      Wt Readings from Last 1 Encounters:   23 96.3 kg (212 lb 4.9 oz)        Anesthesia Evaluation   Pt has had prior anesthetic.     No history of anesthetic complications       ROS/MED HX  ENT/Pulmonary:     (+) SMITA risk factors, obese, allergic rhinitis, Intermittent, asthma     Neurologic:    (-) no seizures and no CVA   Cardiovascular:  - neg cardiovascular ROS     METS/Exercise Tolerance:     Hematologic:  - neg hematologic  ROS     Musculoskeletal: Comment: Lumbar radiculopathy       GI/Hepatic:    (-) GERD   Renal/Genitourinary:  - neg Renal ROS     Endo:     (+) thyroid problem, hypothyroidism,     Psychiatric/Substance Use:       Infectious Disease:       Malignancy:       Other: Comment: IUD in place           Physical Exam    Airway        Mallampati: II   TM distance: > 3 FB   Neck ROM: full   Mouth opening: > 3  cm    Respiratory Devices and Support         Dental       (+) Minor Abnormalities - some fillings, tiny chips      Cardiovascular          Rhythm and rate: regular and normal     Pulmonary           breath sounds clear to auscultation           OUTSIDE LABS:  CBC:   Lab Results   Component Value Date    WBC 7.4 12/08/2022    WBC 6.2 11/02/2021    HGB 14.3 12/08/2022    HGB 12.2 11/02/2021    HCT 44.2 12/08/2022    HCT 39.0 11/02/2021     12/08/2022     11/02/2021     BMP:   Lab Results   Component Value Date     12/08/2022     10/01/2021    POTASSIUM 4.7 12/08/2022    POTASSIUM 4.4 10/01/2021    CHLORIDE 105 12/08/2022    CHLORIDE 104 10/01/2021    CO2 27 12/08/2022    CO2 26 10/01/2021    BUN 12 12/08/2022    BUN 15 10/01/2021    CR 0.75 12/08/2022    CR 0.89 10/01/2021    GLC 91 12/08/2022    GLC 95 10/01/2021     COAGS: No results found for: PTT, INR, FIBR  POC: No results found for: BGM, HCG, HCGS  HEPATIC:   Lab Results   Component Value Date    ALBUMIN 3.9 12/08/2022    PROTTOTAL 7.6 12/08/2022    ALT 21 12/08/2022    AST 12 12/08/2022    ALKPHOS 83 12/08/2022    BILITOTAL 0.6 12/08/2022     OTHER:   Lab Results   Component Value Date    ORQUIDEA 9.3 12/08/2022    TSH 3.40 07/18/2022    T4 1.29 05/17/2022       Anesthesia Plan    ASA Status:  2   NPO Status:  NPO Appropriate    Anesthesia Type: General.     - Airway: ETT   Induction: Intravenous, Propofol.   Maintenance: Balanced.   Techniques and Equipment:     - Airway: Shoulder Harshad/Ramp     - Lines/Monitors: BIS     Consents    Anesthesia Plan(s) and associated risks, benefits, and realistic alternatives discussed. Questions answered and patient/representative(s) expressed understanding.    - Discussed:     - Discussed with:  Patient    Use of blood products discussed: Yes.     - Discussed with: Patient.     - Consented: consented to blood products            Reason for refusal: other.     Postoperative Care    Pain management: IV  analgesics, Multi-modal analgesia, Oral pain medications.   PONV prophylaxis: Ondansetron (or other 5HT-3), Dexamethasone or Solumedrol     Comments:                ROGERS CHILDRESS MD

## 2023-07-13 NOTE — INTERVAL H&P NOTE
"I have reviewed the surgical (or preoperative) H&P that is linked to this encounter, and examined the patient. There are no significant changes    Clinical Conditions Present on Arrival:  Clinically Significant Risk Factors Present on Admission                  # Obesity: Estimated body mass index is 37.85 kg/m  as calculated from the following:    Height as of this encounter: 1.595 m (5' 2.8\").    Weight as of this encounter: 96.3 kg (212 lb 4.9 oz).       "

## 2023-07-15 ENCOUNTER — DOCUMENTATION ONLY (OUTPATIENT)
Dept: ORTHOPEDICS | Facility: CLINIC | Age: 33
End: 2023-07-15
Payer: COMMERCIAL

## 2023-07-15 NOTE — PROGRESS NOTES
Received Completed forms Yes   Faxed Forms Faxed To: [facility]  Fax Number: [phone]   Sent to HIM (Date) 7/13/23

## 2023-07-17 ENCOUNTER — TELEPHONE (OUTPATIENT)
Dept: ORTHOPEDICS | Facility: CLINIC | Age: 33
End: 2023-07-17
Payer: COMMERCIAL

## 2023-07-17 NOTE — TELEPHONE ENCOUNTER
RN attempted to call patient to address discharge condition and concerns. Will attempt to reach patient at a later time.     Michaelle Cuadra RN

## 2023-07-18 NOTE — TELEPHONE ENCOUNTER
RN attempted to reach patient about discharge disposition and condition. Left clinic number to call back.    Michaelle Cuadra RN

## 2023-08-01 DIAGNOSIS — I49.1 SUPRAVENTRICULAR PREMATURE BEATS: ICD-10-CM

## 2023-08-01 DIAGNOSIS — R00.2 PALPITATIONS: ICD-10-CM

## 2023-08-01 DIAGNOSIS — E03.9 ACQUIRED HYPOTHYROIDISM: ICD-10-CM

## 2023-08-01 RX ORDER — METOPROLOL SUCCINATE 25 MG/1
TABLET, EXTENDED RELEASE ORAL
Qty: 90 TABLET | Refills: 0 | Status: SHIPPED | OUTPATIENT
Start: 2023-08-01 | End: 2023-10-30

## 2023-08-02 ENCOUNTER — VIRTUAL VISIT (OUTPATIENT)
Dept: ORTHOPEDICS | Facility: CLINIC | Age: 33
End: 2023-08-02
Payer: COMMERCIAL

## 2023-08-02 DIAGNOSIS — E03.9 ACQUIRED HYPOTHYROIDISM: ICD-10-CM

## 2023-08-02 DIAGNOSIS — Z98.890 H/O LUMBAR DISCECTOMY: Primary | ICD-10-CM

## 2023-08-02 DIAGNOSIS — M54.16 LUMBAR RADICULOPATHY: ICD-10-CM

## 2023-08-02 PROCEDURE — 99024 POSTOP FOLLOW-UP VISIT: CPT | Mod: VID | Performed by: ORTHOPAEDIC SURGERY

## 2023-08-02 NOTE — NURSING NOTE
Reason For Visit:   Chief Complaint   Patient presents with    RECHECK     [facility], Discuss workability, DOS: 7/13/23 // 3 wk Post-op // LEFT L4-L5 MICRODISCECTOMY,       Primary MD: Ashley Fish  Ref. MD: Est    ?  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     Date of injury: No  Type of injury: No.     Date of surgery: No  Type of surgery: No.     Smoker: No  Request smoking cessation information: No      There were no vitals taken for this visit.    Pain Assessment  Patient Currently in Pain: Yes  0-10 Pain Scale: 2  Primary Pain Location: Back    Oswestry (ERINN) Questionnaire        8/1/2023    10:32 PM   OSWESTRY DISABILITY INDEX   Count 10   Sum 26   Oswestry Score (%) 52 %        Visual Analog Pain Scale  Back Pain Scale 0-10: 2  Right leg pain: 0  Left leg pain: 3  Neck Pain Scale 0-10: 0  Right arm pain: 0  Left arm pain: 0    Promis 10 Assessment        5/11/2023     6:04 PM   PROMIS 10   In general, would you say your health is: Very good   In general, would you say your quality of life is: Very good   In general, how would you rate your physical health? Very good   In general, how would you rate your mental health, including your mood and your ability to think? Excellent   In general, how would you rate your satisfaction with your social activities and relationships? Excellent   In general, please rate how well you carry out your usual social activities and roles Excellent   To what extent are you able to carry out your everyday physical activities such as walking, climbing stairs, carrying groceries, or moving a chair? Moderately   In the past 7 days, how often have you been bothered by emotional problems such as feeling anxious, depressed, or irritable? Never   In the past 7 days, how would you rate your fatigue on average? None   In the past 7 days, how would you rate your pain on average, where 0 means no pain, and 10 means worst  imaginable pain? 5   In general, would you say your health is: 4   In general, would you say your quality of life is: 4   In general, how would you rate your physical health? 4   In general, how would you rate your mental health, including your mood and your ability to think? 5   In general, how would you rate your satisfaction with your social activities and relationships? 5   In general, please rate how well you carry out your usual social activities and roles. (This includes activities at home, at work and in your community, and responsibilities as a parent, child, spouse, employee, friend, etc.) 5   To what extent are you able to carry out your everyday physical activities such as walking, climbing stairs, carrying groceries, or moving a chair? 3   In the past 7 days, how often have you been bothered by emotional problems such as feeling anxious, depressed, or irritable? 1   In the past 7 days, how would you rate your fatigue on average? 1   In the past 7 days, how would you rate your pain on average, where 0 means no pain, and 10 means worst imaginable pain? 5   Global Mental Health Score 19   Global Physical Health Score 15   PROMIS TOTAL - SUBSCORES 34                Kirstin Medina LPN

## 2023-08-02 NOTE — PROGRESS NOTES
Chief concern: Follow-up regarding return to work    History of present illness:  Patient is a very pleasant 32-year-old female status post left-sided L4-5 microdiscectomy on 7/13/2023.  Purpose of this visit was to obtain documentation for return to work.  She works in administrative role doing largely desk work.  She does not have to do any repetitive bending lifting twisting for her job.    Reports that she is able to walk with increasing tolerance.  Occasionally has soreness around the surgical site and some nerve irritation but overall things are improving compared to preoperatively.  No weakness or numbness.    We reviewed that the happy to fill out a workability form.  We will plan to have her return to work on August 9.  We will build to return to work 6 hours/day.  We will need to be able to change posture for 15 minutes of every hour.  She states that she is able to work from home through her 12-week postop visit which I think would be quite helpful.  We will plan to start formal physical therapy at 6 weeks from surgery.  Referral for this has been placed.    Sergo Estrada MD  Orthopedic Spine Surgeon  , Department of Orthopedic Surgery

## 2023-08-02 NOTE — LETTER
8/2/2023         RE: Jennifer Pastrana  [address] [facility] [address]        Dear Colleague,    Thank you for referring your patient, Jennifer Pastrana, to the [facility] [place]. Please see a copy of my visit note below.    Chief concern: Follow-up regarding return to work    History of present illness:  Patient is a very pleasant 32-year-old female status post left-sided L4-5 microdiscectomy on 7/13/2023.  Purpose of this visit was to obtain documentation for return to work.  She works in administrative role doing largely desk work.  She does not have to do any repetitive bending lifting twisting for her job.    Reports that she is able to walk with increasing tolerance.  Occasionally has soreness around the surgical site and some nerve irritation but overall things are improving compared to preoperatively.  No weakness or numbness.    We reviewed that the happy to fill out a workability form.  We will plan to have her return to work on August 9.  We will build to return to work 6 hours/day.  We will need to be able to change posture for 15 minutes of every hour.  She states that she is able to work from home through her 12-week postop visit which I think would be quite helpful.  We will plan to start formal physical therapy at 6 weeks from surgery.  Referral for this has been placed.    Sergo Estrada MD  Orthopedic Spine Surgeon  , Department of Orthopedic Surgery

## 2023-08-04 ENCOUNTER — DOCUMENTATION ONLY (OUTPATIENT)
Dept: ORTHOPEDICS | Facility: CLINIC | Age: 33
End: 2023-08-04
Payer: COMMERCIAL

## 2023-08-04 RX ORDER — LEVOTHYROXINE SODIUM 150 UG/1
TABLET ORAL
Qty: 90 TABLET | Refills: 0 | Status: SHIPPED | OUTPATIENT
Start: 2023-08-04 | End: 2023-10-30

## 2023-08-04 RX ORDER — LEVOTHYROXINE SODIUM 150 UG/1
150 TABLET ORAL DAILY
Qty: 90 TABLET | Refills: 3 | OUTPATIENT
Start: 2023-08-04

## 2023-08-04 NOTE — PROGRESS NOTES
Received Completed forms Yes   Faxed Forms Faxed To: [facility]  Fax Number: [phone]   Sent to HIM (Date) 8/4/23

## 2023-08-14 ENCOUNTER — OFFICE VISIT (OUTPATIENT)
Dept: PALLIATIVE MEDICINE | Facility: CLINIC | Age: 33
End: 2023-08-14
Attending: PAIN MEDICINE
Payer: COMMERCIAL

## 2023-08-14 VITALS — SYSTOLIC BLOOD PRESSURE: 119 MMHG | DIASTOLIC BLOOD PRESSURE: 81 MMHG | HEART RATE: 76 BPM

## 2023-08-14 DIAGNOSIS — M79.18 MYOFASCIAL MUSCLE PAIN: ICD-10-CM

## 2023-08-14 DIAGNOSIS — M54.16 LUMBAR RADICULOPATHY: Primary | ICD-10-CM

## 2023-08-14 PROCEDURE — 99214 OFFICE O/P EST MOD 30 MIN: CPT | Mod: 24 | Performed by: PAIN MEDICINE

## 2023-08-14 ASSESSMENT — PAIN SCALES - GENERAL: PAINLEVEL: NO PAIN (1)

## 2023-08-14 NOTE — PATIENT INSTRUCTIONS
----------------------------------------------------------------  [facility] Number:  [phone]   Call with any questions about your care and for scheduling assistance.   Calls are returned Monday through Friday between 8 AM and 4:30 PM. We usually get back to you within 2 business days depending on the issue/request.    If we are prescribing your medications:  For opioid medication refills, call the clinic or send a YesVideo message 7 days in advance.  Please include:  Name of requested medication  Name of the pharmacy.  For non-opioid medications, call your pharmacy directly to request a refill. Please allow 3-4 days to be processed.   Per [place] State Law:  All controlled substance prescriptions must be filled within 30 days of being written.    For those controlled substances allowing refills, pickup must occur within 30 days of last fill.      We believe regular attendance is key to your success in our program!    Any time you are unable to keep your appointment we ask that you call us at least 24 hours in advance to cancel.This will allow us to offer the appointment time to another patient.   Multiple missed appointments may lead to dismissal from the clinic.

## 2023-08-14 NOTE — PROGRESS NOTES
"[facility] Follow Up    Date of visit: 8/14/23    Chief complaint:   Chief Complaint   Patient presents with    Pain       Interval history:    Recommendations/plan at the last visit included:  Physical Therapy:  Continue HEP  Clinical Health Psychologist to address issues of relaxation, behavioral change, coping style, and other factors important to improvement.  No  Diagnostic Studies:  None  Medication Management:    Continue meloxicam   Continue nurtec   - consider restarting gabapentin vs another option if back pain progresses   Further procedures recommended: Repeat L5-S1 ILESI  Follow up: 2-3 months after procedure      Interval   The pt reports sig benefit s/p surgery L4-L5 microdiscectomy for decompression of the L4 and L5 nerve roots.   The pt reports benefit with sathya  The pt still having some pain around the incision site  Occasional radiating symptoms  Trying to be active  Still avoiding a lot of activities  Still has not started PHYSICAL THERAPY  plan to wait 6 weeks   Denies any falls  Denies any weakness  Denies incontinence      Since her last visit, Jennifer Pastrana reports:  lumbar transforaminal epidural steroid injection at left L5,S1, fluoroscopically guided, contrast controlled  - L5-S1 IL CHRISTIE on 5/17/22 with excellent relief lasting about 3 months  - Was able to be more active - had no pain at all - took no medications during the 3 months that it was working  - Since early August the pain was gradually coming back  - The pain is getting worse  - Took Meloxicam for the first time in awhile today  - Still in pain but it is manageable after taking the meloxicam   - Pain is the same as before the injection  - Pain is in her low back and left hip  - Pain is radiating down lateral left thigh to mid calf  - Leg pain feels dull and achy, \"like a pinched nerve\"  - She is doing PT home exercised  - She a repeat CHRISTIE in 2 weeks, looking forward to this  - She is weaning off the " gabapentin - she has 2 doses left and then she will stop this.   - She is trying to eliminate medication - main reason is she believes the gabapentin and nortryptaline hindering her weight loss  - She is off the nortyptaline as well  - Having more headaches but starting Neurtec  - Sees a neuroogist for her headaches    Current pain treatments:   Meloxicam - not taking  Robaxin on rare occasions  Gabapentin 300mg ttid with benefit   Nurtec q48 benefit   toprol   Past pain treatments:  Flexeril - [place]  Top    Side Effects: no current side effects    Medications:  Current Outpatient Medications   Medication Sig Dispense Refill    cetirizine (ZYRTEC) 10 MG tablet       ferrous sulfate (FE TABS) 325 (65 Fe) MG EC tablet Take 1 tablet (325 mg) by mouth daily 90 tablet 1    fluticasone (FLONASE) 50 MCG/ACT nasal spray Spray 1-2 sprays into both nostrils daily 9.9 mL 3    hydrOXYzine (ATARAX) 25 MG tablet Take 1 tablet (25 mg) by mouth as needed for anxiety or itching (pain control) 30 tablet 0    levonorgestrel (MIRENA) 20 MCG/24HR IUD 1 each by Intrauterine route once      levothyroxine (SYNTHROID/LEVOTHROID) 150 MCG tablet TAKE ONE TABLET BY MOUTH ONCE DAILY 90 tablet 0    meloxicam (MOBIC) 15 MG tablet Take 1 tablet (15 mg) by mouth as needed      methocarbamol (ROBAXIN) 750 MG tablet Take 1 tablet (750 mg) by mouth every 6 hours as needed for muscle spasms (muscle spasm) 60 tablet 0    metoprolol succinate ER (TOPROL XL) 25 MG 24 hr tablet TAKE ONE TABLET BY MOUTH ONCE DAILY **NEED APPOINTMENT** 90 tablet 0    gabapentin (NEURONTIN) 300 MG capsule Take 1 capsule (300 mg) by mouth 3 times daily 60 capsule 1    rimegepant (NURTEC) 75 MG ODT tablet Take 1 tablet (75 mg) by mouth every 48 hours 16 tablet 11    senna-docusate (SENOKOT-S/PERICOLACE) 8.6-50 MG tablet Take 1 tablet by mouth daily 30 tablet 0       Medical History: any changes in medical history since they were last seen? No        Physical Exam:   Blood pressure  119/81, pulse 76, not currently breastfeeding.    GENERAL: Healthy, alert and no distress    RESP: NNo visible retractions or increased work of breathing.    SKIN: Visible skin clear  NEURO: Mentation and speech appropriate for age.  PSYCH: Mentation appears  Mild slump on the left  5/5 LE   + 2 achillis       Assessment:   Jennifer Pastrana is a 31 year old female who presents with the complaints of acute on chronic left-sided low back pain radiating to her calf   Jennifer was seen today for pain.     Diagnoses and all orders for this visit:     Lumbar radiculopathy     Myofascial muscle pain  Jennifer was seen today for pain.    Diagnoses and all orders for this visit:    Lumbar radiculopathy  -     Adult Pain Clinic Follow-Up Order  -     Adult Pain Clinic Follow-Up Order; Future    Myofascial muscle pain          Plan:  Physical Therapy:  Plan to start   Clinical Health Psychologist to address issues of relaxation, behavioral change, coping style, and other factors important to improvement.  No  Diagnostic Studies:  None  Medication Management:    - Continue nurtec   - can refill robaxin if needed   - continue gabapentin 300mg twice- Would consider gradual titration as you continue to heal from surgery. I am more than happy to help with titration. Would revisit after you start PHYSICAL THERAPY.   Further procedures recommended: not at this time  Follow up:  4-5 months      The total TIME spent on this patient on the day of the appointment was 25 minutes.   Time spent preparing to see the patient (reviewing records and tests)  Time spend face to face with the patient  Time spent ordering tests, medications, procedures and referrals  Time spent Referring and communicating with other healthcare professionals  Documenting clinical information in Epic    Vu Cagle MD

## 2023-08-23 ENCOUNTER — OFFICE VISIT (OUTPATIENT)
Dept: ORTHOPEDICS | Facility: CLINIC | Age: 33
End: 2023-08-23
Payer: COMMERCIAL

## 2023-08-23 DIAGNOSIS — M51.26 HERNIATED LUMBAR INTERVERTEBRAL DISC: Primary | ICD-10-CM

## 2023-08-23 DIAGNOSIS — Z98.890 HISTORY OF DISCECTOMY: ICD-10-CM

## 2023-08-23 PROCEDURE — 99024 POSTOP FOLLOW-UP VISIT: CPT | Performed by: ORTHOPAEDIC SURGERY

## 2023-08-23 NOTE — PROGRESS NOTES
Chief concern:  6-week postoperative follow-up    History of present illness:  Jennifer Pastrana returns today now 6 weeks s/p lumbar microdiscectomy.  Patient reports improvement in pain, strength and activity tolerance.  Has weaned from pain medications.  Has returned to work with restrictions and is working 6 hour days.  Denies any new focal strength or sensory deficits.  No bowel or bladder dysfunction    Objective:  Examination patient is alert and oriented with no acute distress  Nonlabored respiration with no audible wheeze  Examination of the surgical incision reveals well-healed surgical incision without surrounding erythema or fluctuance  Patient is able to stand and walk without assistive device  Resisted strength testing reveals 5/5 strength throughout the lower extremities  Sensations intact light light touch in L3-S1 distributions  There is no pathologic reflexes for patellar Achilles tendon      Impression plan:  6-week status post above surgery.  Doing well overall.  We will plan to gradually advance lifting restrictions to 25 pounds as tolerated.  May begin increasing daily flexion extension rotation.  Referral placed for formal physical therapy to focus on neutral spine stabilization and strengthening exercises, gait training.  Workability form provided. Patient may return to work for up to 6 hours daily.     Would like to see patient back in clinic in 6 weeks with repeat radiographs.    Sergo Estrada MD  Orthopedic Spine Surgeon  , Department of Orthopedic Surgery

## 2023-08-23 NOTE — NURSING NOTE
Reason For Visit:   Chief Complaint   Patient presents with    RECHECK     DOS: 7/13/23 // 6 wk Post-op // LEFT L4-L5 MICRODISCECTOMY, SPINE, LUMBAR, 1 LEVEL, POSTERIOR APPROACH       Primary MD: Ashley Fish  Ref. MD: Est    ?  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     Date of injury: No  Type of injury: No.     Date of surgery: No  Type of surgery: No.     Smoker: No  Request smoking cessation information: No    There were no vitals taken for this visit.    Pain Assessment  Patient Currently in Pain: Denies    Oswestry (ERINN) Questionnaire        8/1/2023    10:32 PM   OSWESTRY DISABILITY INDEX   Count 10   Sum 26   Oswestry Score (%) 52 %        Visual Analog Pain Scale  Back Pain Scale 0-10: 0  Right leg pain: 0  Left leg pain: 0  Neck Pain Scale 0-10: 0  Right arm pain: 0  Left arm pain: 0    Promis 10 Assessment        5/11/2023     6:04 PM   PROMIS 10   In general, would you say your health is: Very good   In general, would you say your quality of life is: Very good   In general, how would you rate your physical health? Very good   In general, how would you rate your mental health, including your mood and your ability to think? Excellent   In general, how would you rate your satisfaction with your social activities and relationships? Excellent   In general, please rate how well you carry out your usual social activities and roles Excellent   To what extent are you able to carry out your everyday physical activities such as walking, climbing stairs, carrying groceries, or moving a chair? Moderately   In the past 7 days, how often have you been bothered by emotional problems such as feeling anxious, depressed, or irritable? Never   In the past 7 days, how would you rate your fatigue on average? None   In the past 7 days, how would you rate your pain on average, where 0 means no pain, and 10 means worst imaginable pain? 5   In general, would you say your  health is: 4   In general, would you say your quality of life is: 4   In general, how would you rate your physical health? 4   In general, how would you rate your mental health, including your mood and your ability to think? 5   In general, how would you rate your satisfaction with your social activities and relationships? 5   In general, please rate how well you carry out your usual social activities and roles. (This includes activities at home, at work and in your community, and responsibilities as a parent, child, spouse, employee, friend, etc.) 5   To what extent are you able to carry out your everyday physical activities such as walking, climbing stairs, carrying groceries, or moving a chair? 3   In the past 7 days, how often have you been bothered by emotional problems such as feeling anxious, depressed, or irritable? 1   In the past 7 days, how would you rate your fatigue on average? 1   In the past 7 days, how would you rate your pain on average, where 0 means no pain, and 10 means worst imaginable pain? 5   Global Mental Health Score 19   Global Physical Health Score 15   PROMIS TOTAL - SUBSCORES 34                Kirstin Medina LPN

## 2023-08-23 NOTE — LETTER
8/23/2023         RE: Jennifer Pastrana  [address] [facility] [address]        Dear Colleague,    Thank you for referring your patient, Jennifer Pastrana, to the [facility] [place]. Please see a copy of my visit note below.    Chief concern:  6-week postoperative follow-up    History of present illness:  Jennifer Pastrana returns today now 6 weeks s/p lumbar microdiscectomy.  Patient reports improvement in pain, strength and activity tolerance.  Has weaned from pain medications.  Has returned to work with restrictions and is working 6 hour days.  Denies any new focal strength or sensory deficits.  No bowel or bladder dysfunction    Objective:  Examination patient is alert and oriented with no acute distress  Nonlabored respiration with no audible wheeze  Examination of the surgical incision reveals well-healed surgical incision without surrounding erythema or fluctuance  Patient is able to stand and walk without assistive device  Resisted strength testing reveals 5/5 strength throughout the lower extremities  Sensations intact light light touch in L3-S1 distributions  There is no pathologic reflexes for patellar Achilles tendon      Impression plan:  6-week status post above surgery.  Doing well overall.  We will plan to gradually advance lifting restrictions to 25 pounds as tolerated.  May begin increasing daily flexion extension rotation.  Referral placed for formal physical therapy to focus on neutral spine stabilization and strengthening exercises, gait training.  Workability form provided. Patient may return to work for up to 6 hours daily.     Would like to see patient back in clinic in 6 weeks with repeat radiographs.    Sergo Estrada MD  Orthopedic Spine Surgeon  , Department of Orthopedic Surgery

## 2023-08-29 ENCOUNTER — MYC MEDICAL ADVICE (OUTPATIENT)
Dept: PALLIATIVE MEDICINE | Facility: CLINIC | Age: 33
End: 2023-08-29
Payer: COMMERCIAL

## 2023-08-29 DIAGNOSIS — M51.26 HERNIATED LUMBAR INTERVERTEBRAL DISC: ICD-10-CM

## 2023-08-29 DIAGNOSIS — M54.16 LUMBAR RADICULOPATHY: ICD-10-CM

## 2023-08-30 RX ORDER — GABAPENTIN 300 MG/1
300 CAPSULE ORAL 3 TIMES DAILY
Qty: 60 CAPSULE | Refills: 1 | Status: SHIPPED | OUTPATIENT
Start: 2023-08-30 | End: 2023-10-30

## 2023-08-30 NOTE — TELEPHONE ENCOUNTER
Received fax request from [facility] requesting refill(s) for gabapentin (NEURONTIN) 300 MG capsule     Last refilled on 08/01/23    Pt last seen on 08/14/23  Next appt scheduled for: none    Will facilitate refill.

## 2023-08-30 NOTE — TELEPHONE ENCOUNTER
Patient requesting refill of Gabapentin 300mg    To be sent to [facility].     Routing to MAILE cote.     Leyla Drew RN  [facility] [place]  [phone]

## 2023-08-31 DIAGNOSIS — G43.009 MIGRAINE WITHOUT AURA AND WITHOUT STATUS MIGRAINOSUS, NOT INTRACTABLE: ICD-10-CM

## 2023-09-06 ENCOUNTER — THERAPY VISIT (OUTPATIENT)
Dept: PHYSICAL THERAPY | Facility: CLINIC | Age: 33
End: 2023-09-06
Attending: ORTHOPAEDIC SURGERY
Payer: COMMERCIAL

## 2023-09-06 DIAGNOSIS — Z98.890 H/O LUMBAR DISCECTOMY: ICD-10-CM

## 2023-09-06 DIAGNOSIS — M54.16 LUMBAR RADICULOPATHY: ICD-10-CM

## 2023-09-06 PROCEDURE — 97530 THERAPEUTIC ACTIVITIES: CPT | Mod: GP | Performed by: PHYSICAL THERAPIST

## 2023-09-06 PROCEDURE — 97110 THERAPEUTIC EXERCISES: CPT | Mod: GP | Performed by: PHYSICAL THERAPIST

## 2023-09-06 PROCEDURE — 97161 PT EVAL LOW COMPLEX 20 MIN: CPT | Mod: GP | Performed by: PHYSICAL THERAPIST

## 2023-09-06 NOTE — PROGRESS NOTES
PHYSICAL THERAPY EVALUATION  Type of Visit: Evaluation    See electronic medical record for Abuse and Falls Screening details.    Subjective       Presenting condition or subjective complaint: Post lumbar spine surgery  Date of onset:      Relevant medical history: Anemia; Migraines or headaches; Overweight; Thyroid problems   Dates & types of surgery: L4-L5 lumbar microdiscectomy 7/13/23    Prior diagnostic imaging/testing results: MRI; X-ray     Prior therapy history for the same diagnosis, illness or injury: No      Prior Level of Function  Transfers: Independent  Ambulation: Independent  ADL: Independent  IADL: Childcare, Driving, Finances, Housekeeping, Laundry, Meal preparation, Medication management, Work, Yard work    Living Environment  Social support: With family members   Type of home: House; 2-story; Basement   Stairs to enter the home: No       Ramp: No   Stairs inside the home: Yes 15 Is there a railing: No   Help at home: None  Equipment owned: Crutches     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  [employer]   Hobbies/Interests: Bike riding, aerobic exercise, walking, video/phone games, playing with son & dogs, road trips, music, movies, swimming  Prior to making the back worse, weight class - kettle bell that made the herniated disc worse.  02-2-2023'    Pt is  and has a 6 year old.      Patient goals for therapy: Bend, twist, jump, lift, increase mobility    Pain assessment: Pain present   Nerve pain down the leg started again to the knee on the left.    Sleeping typically on the right side, if she lays on the left side will wake up with hip pain.      Objective   LUMBAR SPINE EVALUATION  PAIN: Pain Level at Rest: 0/10  Pain Level with Use: 2/10  Pain Location: L leg nerve pain  Pain Quality: Aching and Dull  Pain Frequency: intermittent  Pain is Worst: non-dependent  Pain is Exacerbated By: moving  Pain is Relieved By: medication  Pain Progression:  Improved  INTEGUMENTARY (edema, incisions):  incision well healed, incr incisional tightness around the tissue   POSTURE:  Sit to stand: antalgic, use of UE to sit and antalgic upon rising.  Standing slight incr lumbar lordosis   GAIT:   Weightbearing Status:   Assistive Device(s):   Gait Deviations:   BALANCE/PROPRIOCEPTION:  SLS + Trendelenburg B- significant on the left.    WEIGHTBEARING ALIGNMENT:   NON-WEIGHTBEARING ALIGNMENT:    ROM:  AROM: flexion 75%, extension 50% slight tightness L without leg pain; SB WFL B, Rotation 75% B   PELVIC/SI SCREEN:   STRENGTH:  Heel raise 10 B; slight knee flexion at 6th repetition; able to walk on heels- denies weakness B.   MMT hip flexion 4-/5 B; ankle eversion R 5/5, L 5-/5.  All other seated myotome 5/5 B.      MYOTOMES: WNL  DTR S:   CORD SIGNS:   DERMATOMES:   NEURAL TENSION:  (-) SLR B   FLEXIBILITY:  + Hamstring tightness   LUMBAR/HIP Special Tests:    PELVIS/SI SPECIAL TESTS:   FUNCTIONAL TESTS:   PALPATION:   SPINAL SEGMENTAL CONCLUSIONS:       Assessment & Plan   CLINICAL IMPRESSIONS  Medical Diagnosis:      Treatment Diagnosis:     Impression/Assessment: Patient is a 32 year old female with lumbar complaints, s/p disectomy 7/13/2023.  The following significant findings have been identified: Pain, Decreased ROM/flexibility, Decreased joint mobility, Decreased strength, Impaired balance, Decreased proprioception, Impaired muscle performance, Decreased activity tolerance, and Impaired posture. These impairments interfere with their ability to perform self care tasks, work tasks, recreational activities, household mobility, and community mobility as compared to previous level of function.     Clinical Decision Making (Complexity):  Clinical Presentation: Stable/Uncomplicated  Clinical Presentation Rationale: based on medical and personal factors listed in PT evaluation  Clinical Decision Making (Complexity): Low complexity    PLAN OF CARE  Treatment  Interventions:  Modalities: Cryotherapy, Dry Needling, E-stim, Mechanical Traction, Ultrasound  Interventions: Gait Training, Manual Therapy, Neuromuscular Re-education, Therapeutic Activity, Therapeutic Exercise, Self-Care/Home Management    Long Term Goals            Frequency of Treatment:    Duration of Treatment:      Recommended Referrals to Other Professionals: Physical Therapy  Education Assessment:        Risks and benefits of evaluation/treatment have been explained.   Patient/Family/caregiver agrees with Plan of Care.     Evaluation Time:             Signing Clinician: Netta Mora PT

## 2023-09-11 ENCOUNTER — THERAPY VISIT (OUTPATIENT)
Dept: PHYSICAL THERAPY | Facility: CLINIC | Age: 33
End: 2023-09-11
Payer: COMMERCIAL

## 2023-09-11 DIAGNOSIS — M54.16 LUMBAR RADICULOPATHY: Primary | ICD-10-CM

## 2023-09-11 PROCEDURE — 97110 THERAPEUTIC EXERCISES: CPT | Mod: GP | Performed by: PHYSICAL THERAPIST

## 2023-09-11 PROCEDURE — 97140 MANUAL THERAPY 1/> REGIONS: CPT | Mod: GP | Performed by: PHYSICAL THERAPIST

## 2023-09-20 ENCOUNTER — THERAPY VISIT (OUTPATIENT)
Dept: PHYSICAL THERAPY | Facility: CLINIC | Age: 33
End: 2023-09-20
Attending: ORTHOPAEDIC SURGERY
Payer: COMMERCIAL

## 2023-09-20 DIAGNOSIS — M54.16 LUMBAR RADICULOPATHY: Primary | ICD-10-CM

## 2023-09-20 PROCEDURE — 97110 THERAPEUTIC EXERCISES: CPT | Mod: GP

## 2023-09-25 ENCOUNTER — OFFICE VISIT (OUTPATIENT)
Dept: ORTHOPEDICS | Facility: CLINIC | Age: 33
End: 2023-09-25
Payer: COMMERCIAL

## 2023-09-25 DIAGNOSIS — M54.16 LUMBAR RADICULOPATHY: ICD-10-CM

## 2023-09-25 DIAGNOSIS — Z98.890 H/O LUMBAR DISCECTOMY: Primary | ICD-10-CM

## 2023-09-25 DIAGNOSIS — M51.26 HERNIATED LUMBAR INTERVERTEBRAL DISC: ICD-10-CM

## 2023-09-25 PROCEDURE — 99024 POSTOP FOLLOW-UP VISIT: CPT | Performed by: ORTHOPAEDIC SURGERY

## 2023-09-25 NOTE — NURSING NOTE
Reason For Visit:   Chief Complaint   Patient presents with    RECHECK     DOS: 7/13/23 // 6 wk Post-op // LEFT L4-L5 MICRODISCECTOMY,       Primary MD: Ashley Fish  Ref. MD: EST    ?  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     Date of injury: No  Type of injury: No.     Date of surgery: No  Type of surgery: No.     Smoker: No  Request smoking cessation information: No       There were no vitals taken for this visit.    Pain Assessment  Patient Currently in Pain: Yes  0-10 Pain Scale: 3  Primary Pain Location: Back    Oswestry (ERINN) Questionnaire        9/5/2023    11:26 AM   OSWESTRY DISABILITY INDEX   Count 10   Sum 13   Oswestry Score (%) 26 %        Visual Analog Pain Scale  Back Pain Scale 0-10: 3.5  Right leg pain: 0  Left leg pain: 0  Neck Pain Scale 0-10: 0  Right arm pain: 0  Left arm pain: 0    Promis 10 Assessment        5/11/2023     6:04 PM   PROMIS 10   In general, would you say your health is: Very good   In general, would you say your quality of life is: Very good   In general, how would you rate your physical health? Very good   In general, how would you rate your mental health, including your mood and your ability to think? Excellent   In general, how would you rate your satisfaction with your social activities and relationships? Excellent   In general, please rate how well you carry out your usual social activities and roles Excellent   To what extent are you able to carry out your everyday physical activities such as walking, climbing stairs, carrying groceries, or moving a chair? Moderately   In the past 7 days, how often have you been bothered by emotional problems such as feeling anxious, depressed, or irritable? Never   In the past 7 days, how would you rate your fatigue on average? None   In the past 7 days, how would you rate your pain on average, where 0 means no pain, and 10 means worst imaginable pain? 5   In general, would you  say your health is: 4   In general, would you say your quality of life is: 4   In general, how would you rate your physical health? 4   In general, how would you rate your mental health, including your mood and your ability to think? 5   In general, how would you rate your satisfaction with your social activities and relationships? 5   In general, please rate how well you carry out your usual social activities and roles. (This includes activities at home, at work and in your community, and responsibilities as a parent, child, spouse, employee, friend, etc.) 5   To what extent are you able to carry out your everyday physical activities such as walking, climbing stairs, carrying groceries, or moving a chair? 3   In the past 7 days, how often have you been bothered by emotional problems such as feeling anxious, depressed, or irritable? 1   In the past 7 days, how would you rate your fatigue on average? 1   In the past 7 days, how would you rate your pain on average, where 0 means no pain, and 10 means worst imaginable pain? 5   Global Mental Health Score 19   Global Physical Health Score 15   PROMIS TOTAL - SUBSCORES 34                Kirstin Medina LPN

## 2023-09-25 NOTE — LETTER
9/25/2023       RE: Jennifer Pastrana  [address] [facility] [address]      Dear Colleague,    Thank you for referring your patient, Jennifer Pastrana, to the [facility]. Please see a copy of my visit note below.    Chief concern:  3 month postoperative follow-up    History of present illness:  Jennifer Pastrana returns today now 3 monhs s/p L4-5 microdisc.  Patient reports improvement in pain, strength and activity tolerance. No need for pain medications at this point.  Denies any new focal strength or sensory deficits.  No bowel or bladder dysfunction    Objective:  Examination patient is alert and oriented with no acute distress  Nonlabored respiration with no audible wheeze  Examination of the surgical incision reveals well-healed surgical incision without surrounding erythema or fluctuance  Patient is able to stand and walk without assistive device  Resisted strength testing reveals 5/5 strength throughout the lower extremities  Sensations intact light light touch in L3-S1 distributions  There is no pathologic reflexes for patellar Achilles tendon    Impression plan:  3 months status post above surgery.  Doing well overall.  We will plan to gradually advance activity restrictions as tolerated. She is appropriate for return to work in the upcoming weeks. I have filled out [place] workability form for her.   Very pleased that patient has had excellent response and am happy to see her back at any point necessary.     Sergo Estrada MD  Orthopedic Spine Surgeon  , Department of Orthopedic Surgery

## 2023-09-25 NOTE — PROGRESS NOTES
Chief concern:  3 month postoperative follow-up    History of present illness:  Jennifer Pasrtana returns today now 3 monhs s/p L4-5 microdisc.  Patient reports improvement in pain, strength and activity tolerance. No need for pain medications at this point.  Denies any new focal strength or sensory deficits.  No bowel or bladder dysfunction    Objective:  Examination patient is alert and oriented with no acute distress  Nonlabored respiration with no audible wheeze  Examination of the surgical incision reveals well-healed surgical incision without surrounding erythema or fluctuance  Patient is able to stand and walk without assistive device  Resisted strength testing reveals 5/5 strength throughout the lower extremities  Sensations intact light light touch in L3-S1 distributions  There is no pathologic reflexes for patellar Achilles tendon      Impression plan:  3 months status post above surgery.  Doing well overall.  We will plan to gradually advance activity restrictions as tolerated. She is appropriate for return to work in the upcoming weeks. I have filled out [employer] workability form for her.   Very pleased that patient has had excellent response and am happy to see her back at any point necessary.     Sergo Estrada MD  Orthopedic Spine Surgeon  , Department of Orthopedic Surgery

## 2023-09-26 ENCOUNTER — THERAPY VISIT (OUTPATIENT)
Dept: PHYSICAL THERAPY | Facility: CLINIC | Age: 33
End: 2023-09-26
Payer: COMMERCIAL

## 2023-09-26 DIAGNOSIS — M54.16 LUMBAR RADICULOPATHY: Primary | ICD-10-CM

## 2023-09-26 PROCEDURE — 97140 MANUAL THERAPY 1/> REGIONS: CPT | Mod: GP | Performed by: PHYSICAL THERAPIST

## 2023-09-26 PROCEDURE — 97112 NEUROMUSCULAR REEDUCATION: CPT | Mod: GP | Performed by: PHYSICAL THERAPIST

## 2023-09-28 PROBLEM — M54.50 LOW BACK PAIN: Status: ACTIVE | Noted: 2023-09-28

## 2023-10-03 ENCOUNTER — THERAPY VISIT (OUTPATIENT)
Dept: PHYSICAL THERAPY | Facility: CLINIC | Age: 33
End: 2023-10-03
Payer: COMMERCIAL

## 2023-10-03 DIAGNOSIS — M54.16 LUMBAR RADICULOPATHY: Primary | ICD-10-CM

## 2023-10-03 PROCEDURE — 97140 MANUAL THERAPY 1/> REGIONS: CPT | Mod: GP | Performed by: PHYSICAL THERAPIST

## 2023-10-03 PROCEDURE — 97110 THERAPEUTIC EXERCISES: CPT | Mod: GP | Performed by: PHYSICAL THERAPIST

## 2023-10-10 ENCOUNTER — THERAPY VISIT (OUTPATIENT)
Dept: PHYSICAL THERAPY | Facility: CLINIC | Age: 33
End: 2023-10-10
Payer: COMMERCIAL

## 2023-10-10 DIAGNOSIS — M54.16 LUMBAR RADICULOPATHY: Primary | ICD-10-CM

## 2023-10-10 PROCEDURE — 97110 THERAPEUTIC EXERCISES: CPT | Mod: GP | Performed by: PHYSICAL THERAPIST

## 2023-10-10 PROCEDURE — 97035 APP MDLTY 1+ULTRASOUND EA 15: CPT | Mod: GP | Performed by: PHYSICAL THERAPIST

## 2023-10-10 PROCEDURE — 97140 MANUAL THERAPY 1/> REGIONS: CPT | Mod: GP | Performed by: PHYSICAL THERAPIST

## 2023-10-28 DIAGNOSIS — E03.9 ACQUIRED HYPOTHYROIDISM: ICD-10-CM

## 2023-10-28 DIAGNOSIS — I49.1 SUPRAVENTRICULAR PREMATURE BEATS: ICD-10-CM

## 2023-10-28 DIAGNOSIS — R00.2 PALPITATIONS: ICD-10-CM

## 2023-10-30 DIAGNOSIS — M54.16 LUMBAR RADICULOPATHY: ICD-10-CM

## 2023-10-30 DIAGNOSIS — M51.26 HERNIATED LUMBAR INTERVERTEBRAL DISC: ICD-10-CM

## 2023-10-30 RX ORDER — LEVOTHYROXINE SODIUM 150 UG/1
TABLET ORAL
Qty: 90 TABLET | Refills: 0 | Status: SHIPPED | OUTPATIENT
Start: 2023-10-30 | End: 2023-12-18

## 2023-10-30 RX ORDER — GABAPENTIN 300 MG/1
300 CAPSULE ORAL 3 TIMES DAILY
Qty: 60 CAPSULE | Refills: 1 | Status: SHIPPED | OUTPATIENT
Start: 2023-10-30 | End: 2023-12-13

## 2023-10-30 RX ORDER — METOPROLOL SUCCINATE 25 MG/1
TABLET, EXTENDED RELEASE ORAL
Qty: 90 TABLET | Refills: 0 | Status: SHIPPED | OUTPATIENT
Start: 2023-10-30 | End: 2023-12-13

## 2023-10-30 NOTE — TELEPHONE ENCOUNTER
Received fax from pharmacy requesting refill(s) for    gabapentin (NEURONTIN) 300 MG capsule    Date last filled 09/30/23    Last Appt Date:08/14/23    Next Appt scheduled: 12/18/23    Pharmacy:      [facility] - [place], [place] - [address]    Will route for processing    Wendy Connelly MA  Pain management, [place]

## 2023-10-31 ENCOUNTER — THERAPY VISIT (OUTPATIENT)
Dept: PHYSICAL THERAPY | Facility: CLINIC | Age: 33
End: 2023-10-31
Payer: COMMERCIAL

## 2023-10-31 DIAGNOSIS — M54.16 LUMBAR RADICULOPATHY: Primary | ICD-10-CM

## 2023-10-31 PROCEDURE — 97110 THERAPEUTIC EXERCISES: CPT | Mod: GP | Performed by: PHYSICAL THERAPIST

## 2023-10-31 PROCEDURE — 97530 THERAPEUTIC ACTIVITIES: CPT | Mod: GP | Performed by: PHYSICAL THERAPIST

## 2023-10-31 PROCEDURE — 97112 NEUROMUSCULAR REEDUCATION: CPT | Mod: GP | Performed by: PHYSICAL THERAPIST

## 2023-11-14 ENCOUNTER — THERAPY VISIT (OUTPATIENT)
Dept: PHYSICAL THERAPY | Facility: CLINIC | Age: 33
End: 2023-11-14
Payer: COMMERCIAL

## 2023-11-14 DIAGNOSIS — M54.16 LUMBAR RADICULOPATHY: Primary | ICD-10-CM

## 2023-11-14 PROCEDURE — 97110 THERAPEUTIC EXERCISES: CPT | Mod: GP | Performed by: PHYSICAL THERAPIST

## 2023-11-14 PROCEDURE — 97112 NEUROMUSCULAR REEDUCATION: CPT | Mod: GP | Performed by: PHYSICAL THERAPIST

## 2023-11-15 ENCOUNTER — E-VISIT (OUTPATIENT)
Dept: FAMILY MEDICINE | Facility: CLINIC | Age: 33
End: 2023-11-15
Payer: COMMERCIAL

## 2023-11-15 ENCOUNTER — LAB (OUTPATIENT)
Dept: LAB | Facility: CLINIC | Age: 33
End: 2023-11-15
Attending: FAMILY MEDICINE
Payer: COMMERCIAL

## 2023-11-15 DIAGNOSIS — J06.9 ACUTE UPPER RESPIRATORY INFECTION: ICD-10-CM

## 2023-11-15 DIAGNOSIS — J06.9 ACUTE UPPER RESPIRATORY INFECTION: Primary | ICD-10-CM

## 2023-11-15 LAB
FLUAV AG SPEC QL IA: NEGATIVE
FLUBV AG SPEC QL IA: NEGATIVE

## 2023-11-15 PROCEDURE — 99421 OL DIG E/M SVC 5-10 MIN: CPT | Performed by: FAMILY MEDICINE

## 2023-11-15 PROCEDURE — 87635 SARS-COV-2 COVID-19 AMP PRB: CPT

## 2023-11-15 PROCEDURE — 87804 INFLUENZA ASSAY W/OPTIC: CPT | Performed by: FAMILY MEDICINE

## 2023-11-15 NOTE — PATIENT INSTRUCTIONS
Jennifer,    Thank you for choosing us for your care. I have placed an order for a lab test(s). View your full visit summary for details by clicking on the link below. You can schedule a lab only appointment right here in [facility], or by calling [phone].    You will receive your lab results and next steps via [facility] when the lab results return.    Sincerely,  Ashley Fish MD

## 2023-11-16 ENCOUNTER — TELEPHONE (OUTPATIENT)
Dept: NURSING | Facility: CLINIC | Age: 33
End: 2023-11-16
Payer: COMMERCIAL

## 2023-11-16 LAB — SARS-COV-2 RNA RESP QL NAA+PROBE: POSITIVE

## 2023-11-16 NOTE — TELEPHONE ENCOUNTER
Coronavirus (COVID-19) Notification    Caller Name (Patient, parent, daughter/son, grandparent, etc)  patient    Reason for call  Notify of Positive Coronavirus (COVID-19) lab results, assess symptoms,  review [facility] recommendations    Lab Result    Lab test:  2019-nCoV rRt-PCR or SARS-CoV-2 PCR    Oropharyngeal AND/OR nasopharyngeal swabs is POSITIVE for 2019-nCoV RNA/SARS-COV-2 PCR (COVID-19 virus)      Gather patient reported symptoms   Assessment   Current Symptoms at time of phone call, reported by patient: (if no symptoms, document: No symptoms] Congestion, cough, headaches , fatigue   Date of symptom(s) onset (if applicable) 11/13     If at time of call, Patients symptoms have worsened, the Patient should contact 911 or have someone drive them to Emergency Dept promptly:    If Patient calling 911, inform 911 personal that you have tested positive for the Coronavirus (COVID-19).  Place mask on and await 911 to arrive.  If Emergency Dept, If possible, please have another adult drive you to the Emergency Dept but you need to wear mask when in contact with other people.      Treatment Options:   Is patient interested in discussing COVID treatment? Yes.   Is this a [place] or [facility] Patient? No:     Are you an agent trained to scheduling? Yes.        Review information with Patient    Your result was positive. This means you have COVID-19 (coronavirus).    How can I protect others?    These guidelines are for isolating before returning to work, school or .    If you DO have symptoms  Stay home and away from others   For at least 5 days after your symptoms started, AND  You are fever free for 24 hours (with no medicine that reduces fever), AND  Your other symptoms are better  Wear a mask for 10 full days anytime you are around others    If you DON'T have symptoms  Stay home and away from others for at least 5 days after your positive test  Wear a mask for 10 full days anytime you are around  others    There may be different guidelines for healthcare facilities.  Please check with the specific sites before arriving.    If you have been told by a doctor that you were severely ill with COVID-19 or are immunocompromised, you should isolate for at least 10 days.    You should not go back to work until you meet the guidelines above for ending your home isolation. You don't need to be retested for COVID-19 before going back to work--studies show that you won't spread the virus if it's been at least 10 days since your symptoms started (or 20 days, if you have a weak immune system).    Employers, schools, and daycares: This is an official notice for this person's medical guidelines for returning in-person.  They must meet the above guidelines before going back to work, school or  in person.    You will receive a positive COVID-19 letter via [facility] or the mail soon with additional self-care information.    Would you like me to review some of that information with you now?  No    If you were tested for an upcoming procedure, please contact your provider for next steps.    Vidya Faulkner

## 2023-11-17 ENCOUNTER — VIRTUAL VISIT (OUTPATIENT)
Dept: URGENT CARE | Facility: CLINIC | Age: 33
End: 2023-11-17
Payer: COMMERCIAL

## 2023-11-17 DIAGNOSIS — U07.1 COVID: Primary | ICD-10-CM

## 2023-11-17 PROCEDURE — 99213 OFFICE O/P EST LOW 20 MIN: CPT | Mod: 95 | Performed by: EMERGENCY MEDICINE

## 2023-11-17 NOTE — PROGRESS NOTES
"  The patient has been notified of following:     \"This telephone visit will be conducted via a call between you and your physician/provider. We have found that certain health care needs can be provided without the need for a physical exam.  This service lets us provide the care you need with a short phone conversation.  If a prescription is necessary we can send it directly to your pharmacy.  If lab work is needed we can place an order for that and you can then stop by our lab to have the test done at a later time.    Telephone visits are billed at different rates depending on your insurance coverage. During this emergency period, for some insurers they may be billed the same as an in-person visit.  Please reach out to your insurance provider with any questions.    If during the course of the call the physician/provider feels a telephone visit is not appropriate, you will not be charged for this service.\"    Patient has given verbal consent for Telephone visit?  Yes    What phone number would you like to be contacted at?  [phone]    How would you like to obtain your AVS? MyChart    Subjective   CC: Jennifer Pastrana  is a 33 year old female who presents via phone visit today for the following health issues:   Chief Complaint   Patient presents with     Infection        COVID-19 Symptom Review  How many days ago did these symptoms start? 5    Are any of the following symptoms significant for you?  New or worsening difficulty breathing? Yes    Please describe what kind of difficulty you are having breathing:No dyspnea, or dyspnea at patients normal baseline    Worsening cough? Yes, it's a dry cough.     Fever or chills? No    Headache: YES    Sore throat: YES    Chest pain: No    Diarrhea: No    Body aches? No    What treatments has patient tried? Guaifenesin (mucinex)   Does patient live in a nursing home, group home, or shelter? No  Does patient have a way to get food/medications during quarantined? Yes, I have a " friend or family member who can help me. and Yes                       Reviewed and updated as needed this visit by Provider                  Review of Systems         Objective    Gen: Patient is alert, oriented  Gen: No acute distress on phone appointment.  Nondyspneic sounding speaking in full sentences.              Assessment/Plan:    Patient is a 33-year-old female who is on day 5 of COVID symptoms.  Symptoms are typical including mild dyspnea.  Pulse oximetry is ranging 93-97.  Patient has comorbidities of elevated BMI at 38.  She also was treated for palpitations.  She also had COVID December 2022.  Patient does request to take Paxlovid.  GFR is greater than 90.  No drug drug interaction concerns.      Phone call duration:  10 minutes    Derrick Small MD

## 2023-11-28 ENCOUNTER — THERAPY VISIT (OUTPATIENT)
Dept: PHYSICAL THERAPY | Facility: CLINIC | Age: 33
End: 2023-11-28
Payer: COMMERCIAL

## 2023-11-28 DIAGNOSIS — M54.16 LUMBAR RADICULOPATHY: Primary | ICD-10-CM

## 2023-11-28 PROCEDURE — 97530 THERAPEUTIC ACTIVITIES: CPT | Mod: GP | Performed by: PHYSICAL THERAPIST

## 2023-11-28 PROCEDURE — 97110 THERAPEUTIC EXERCISES: CPT | Mod: GP | Performed by: PHYSICAL THERAPIST

## 2023-11-28 PROCEDURE — 97112 NEUROMUSCULAR REEDUCATION: CPT | Mod: GP | Performed by: PHYSICAL THERAPIST

## 2023-12-02 ENCOUNTER — HEALTH MAINTENANCE LETTER (OUTPATIENT)
Age: 33
End: 2023-12-02

## 2023-12-12 ASSESSMENT — PAIN SCALES - PAIN ENJOYMENT GENERAL ACTIVITY SCALE (PEG)
INTERFERED_GENERAL_ACTIVITY: 0 - DOES NOT INTERFERE
PEG_TOTALSCORE: 0
AVG_PAIN_PASTWEEK: 0 - NO PAIN
INTERFERED_ENJOYMENT_LIFE: 0 - DOES NOT INTERFERE

## 2023-12-12 ASSESSMENT — ENCOUNTER SYMPTOMS
DIARRHEA: 0
COUGH: 0
EYE PAIN: 0
DIZZINESS: 0
SORE THROAT: 0
CONSTIPATION: 0
ARTHRALGIAS: 1
SHORTNESS OF BREATH: 0
HEADACHES: 0
WEAKNESS: 0
DYSURIA: 0
FEVER: 0
HEARTBURN: 0
PARESTHESIAS: 1
JOINT SWELLING: 0
NAUSEA: 0
ABDOMINAL PAIN: 0
NERVOUS/ANXIOUS: 0
HEMATURIA: 0
PALPITATIONS: 0
CHILLS: 0
HEMATOCHEZIA: 0
BREAST MASS: 0
FREQUENCY: 0
MYALGIAS: 1

## 2023-12-13 ENCOUNTER — OFFICE VISIT (OUTPATIENT)
Dept: FAMILY MEDICINE | Facility: CLINIC | Age: 33
End: 2023-12-13
Payer: COMMERCIAL

## 2023-12-13 ENCOUNTER — ANCILLARY PROCEDURE (OUTPATIENT)
Dept: GENERAL RADIOLOGY | Facility: CLINIC | Age: 33
End: 2023-12-13
Attending: FAMILY MEDICINE
Payer: COMMERCIAL

## 2023-12-13 VITALS
HEART RATE: 82 BPM | DIASTOLIC BLOOD PRESSURE: 82 MMHG | RESPIRATION RATE: 20 BRPM | BODY MASS INDEX: 35.51 KG/M2 | WEIGHT: 208 LBS | OXYGEN SATURATION: 100 % | SYSTOLIC BLOOD PRESSURE: 120 MMHG | HEIGHT: 64 IN | TEMPERATURE: 97.7 F

## 2023-12-13 DIAGNOSIS — G43.009 MIGRAINE WITHOUT AURA AND WITHOUT STATUS MIGRAINOSUS, NOT INTRACTABLE: ICD-10-CM

## 2023-12-13 DIAGNOSIS — M25.562 ANTERIOR KNEE PAIN, LEFT: ICD-10-CM

## 2023-12-13 DIAGNOSIS — Z13.220 LIPID SCREENING: ICD-10-CM

## 2023-12-13 DIAGNOSIS — E03.9 ACQUIRED HYPOTHYROIDISM: ICD-10-CM

## 2023-12-13 DIAGNOSIS — Z00.00 ROUTINE GENERAL MEDICAL EXAMINATION AT A HEALTH CARE FACILITY: Primary | ICD-10-CM

## 2023-12-13 DIAGNOSIS — I49.1 SUPRAVENTRICULAR PREMATURE BEATS: ICD-10-CM

## 2023-12-13 DIAGNOSIS — Z98.890 H/O LUMBAR DISCECTOMY: ICD-10-CM

## 2023-12-13 LAB
ALBUMIN SERPL BCG-MCNC: 4.6 G/DL (ref 3.5–5.2)
ALP SERPL-CCNC: 85 U/L (ref 40–150)
ALT SERPL W P-5'-P-CCNC: 15 U/L (ref 0–50)
ANION GAP SERPL CALCULATED.3IONS-SCNC: 11 MMOL/L (ref 7–15)
AST SERPL W P-5'-P-CCNC: 20 U/L (ref 0–45)
BILIRUB SERPL-MCNC: 1.5 MG/DL
BUN SERPL-MCNC: 11.4 MG/DL (ref 6–20)
CALCIUM SERPL-MCNC: 9.5 MG/DL (ref 8.6–10)
CHLORIDE SERPL-SCNC: 104 MMOL/L (ref 98–107)
CHOLEST SERPL-MCNC: 154 MG/DL
CREAT SERPL-MCNC: 0.73 MG/DL (ref 0.51–0.95)
DEPRECATED HCO3 PLAS-SCNC: 24 MMOL/L (ref 22–29)
EGFRCR SERPLBLD CKD-EPI 2021: >90 ML/MIN/1.73M2
ERYTHROCYTE [DISTWIDTH] IN BLOOD BY AUTOMATED COUNT: 12.2 % (ref 10–15)
FASTING STATUS PATIENT QL REPORTED: YES
GLUCOSE SERPL-MCNC: 92 MG/DL (ref 70–99)
HCT VFR BLD AUTO: 46.2 % (ref 35–47)
HDLC SERPL-MCNC: 35 MG/DL
HGB BLD-MCNC: 15.1 G/DL (ref 11.7–15.7)
LDLC SERPL CALC-MCNC: 101 MG/DL
MCH RBC QN AUTO: 30.3 PG (ref 26.5–33)
MCHC RBC AUTO-ENTMCNC: 32.7 G/DL (ref 31.5–36.5)
MCV RBC AUTO: 93 FL (ref 78–100)
NONHDLC SERPL-MCNC: 119 MG/DL
PLATELET # BLD AUTO: 276 10E3/UL (ref 150–450)
POTASSIUM SERPL-SCNC: 4.6 MMOL/L (ref 3.4–5.3)
PROT SERPL-MCNC: 7.7 G/DL (ref 6.4–8.3)
RBC # BLD AUTO: 4.98 10E6/UL (ref 3.8–5.2)
SODIUM SERPL-SCNC: 139 MMOL/L (ref 135–145)
T4 FREE SERPL-MCNC: 2 NG/DL (ref 0.9–1.7)
TRIGL SERPL-MCNC: 91 MG/DL
TSH SERPL DL<=0.005 MIU/L-ACNC: 0.29 UIU/ML (ref 0.3–4.2)
WBC # BLD AUTO: 7 10E3/UL (ref 4–11)

## 2023-12-13 PROCEDURE — 85027 COMPLETE CBC AUTOMATED: CPT | Performed by: FAMILY MEDICINE

## 2023-12-13 PROCEDURE — 84443 ASSAY THYROID STIM HORMONE: CPT | Performed by: FAMILY MEDICINE

## 2023-12-13 PROCEDURE — 99214 OFFICE O/P EST MOD 30 MIN: CPT | Mod: 25 | Performed by: FAMILY MEDICINE

## 2023-12-13 PROCEDURE — 80053 COMPREHEN METABOLIC PANEL: CPT | Performed by: FAMILY MEDICINE

## 2023-12-13 PROCEDURE — 80061 LIPID PANEL: CPT | Performed by: FAMILY MEDICINE

## 2023-12-13 PROCEDURE — 84439 ASSAY OF FREE THYROXINE: CPT | Performed by: FAMILY MEDICINE

## 2023-12-13 PROCEDURE — 73562 X-RAY EXAM OF KNEE 3: CPT | Mod: TC | Performed by: RADIOLOGY

## 2023-12-13 PROCEDURE — 99395 PREV VISIT EST AGE 18-39: CPT | Performed by: FAMILY MEDICINE

## 2023-12-13 PROCEDURE — 36415 COLL VENOUS BLD VENIPUNCTURE: CPT | Performed by: FAMILY MEDICINE

## 2023-12-13 RX ORDER — GABAPENTIN 300 MG/1
300 CAPSULE ORAL 2 TIMES DAILY
COMMUNITY
Start: 2023-12-13 | End: 2024-03-27

## 2023-12-13 RX ORDER — METOPROLOL SUCCINATE 25 MG/1
25 TABLET, EXTENDED RELEASE ORAL DAILY
Qty: 90 TABLET | Refills: 3 | Status: SHIPPED | OUTPATIENT
Start: 2023-12-13

## 2023-12-13 ASSESSMENT — ENCOUNTER SYMPTOMS
SORE THROAT: 0
CHILLS: 0
JOINT SWELLING: 0
ABDOMINAL PAIN: 0
WEAKNESS: 0
MYALGIAS: 1
NERVOUS/ANXIOUS: 0
DYSURIA: 0
HEMATURIA: 0
FEVER: 0
EYE PAIN: 0
ARTHRALGIAS: 1
BREAST MASS: 0
HEMATOCHEZIA: 0
DIARRHEA: 0
CONSTIPATION: 0
SHORTNESS OF BREATH: 0
FREQUENCY: 0
HEADACHES: 0
PALPITATIONS: 0
HEARTBURN: 0
DIZZINESS: 0
COUGH: 0
NAUSEA: 0
PARESTHESIAS: 1

## 2023-12-13 ASSESSMENT — ANXIETY QUESTIONNAIRES
1. FEELING NERVOUS, ANXIOUS, OR ON EDGE: NOT AT ALL
4. TROUBLE RELAXING: NOT AT ALL
5. BEING SO RESTLESS THAT IT IS HARD TO SIT STILL: NOT AT ALL
2. NOT BEING ABLE TO STOP OR CONTROL WORRYING: NOT AT ALL
6. BECOMING EASILY ANNOYED OR IRRITABLE: SEVERAL DAYS
IF YOU CHECKED OFF ANY PROBLEMS ON THIS QUESTIONNAIRE, HOW DIFFICULT HAVE THESE PROBLEMS MADE IT FOR YOU TO DO YOUR WORK, TAKE CARE OF THINGS AT HOME, OR GET ALONG WITH OTHER PEOPLE: NOT DIFFICULT AT ALL
GAD7 TOTAL SCORE: 1
3. WORRYING TOO MUCH ABOUT DIFFERENT THINGS: NOT AT ALL
7. FEELING AFRAID AS IF SOMETHING AWFUL MIGHT HAPPEN: NOT AT ALL
GAD7 TOTAL SCORE: 1

## 2023-12-13 ASSESSMENT — PAIN SCALES - GENERAL: PAINLEVEL: NO PAIN (0)

## 2023-12-13 NOTE — PROGRESS NOTES
SUBJECTIVE:   Jennifer is a 33 year old, presenting for the following:  Physical and Health Maintenance (Patient is fasting/Patient declines covid vaccine)        12/13/2023     7:43 AM   Additional Questions   Roomed by Mónica Stephens CMA   Accompanied by none         12/13/2023     7:43 AM   Patient Reported Additional Medications   Patient reports taking the following new medications none       Healthy Habits:     Getting at least 3 servings of Calcium per day:  Yes    Bi-annual eye exam:  Yes    Dental care twice a year:  Yes    Sleep apnea or symptoms of sleep apnea:  None    Diet:  Regular (no restrictions)    Frequency of exercise:  2-3 days/week    Duration of exercise:  15-30 minutes    Taking medications regularly:  Yes    Medication side effects:  None    Additional concerns today:  Yes      Patient has a known history of herniated lumbar intervertebral disc with lumbar radiculopathy.  Underwent a successful left lumbar 4-5 microdiscectomy posterior approach on 7/13/2023.  Doing well postop.  Currently in the process of completing her physical therapy sessions.  Pain is well-controlled.  Weaning off analgesics and muscle relaxants.  Following with the pain clinic.  Has an upcoming appointment with Dr. Cagle on 12/18/2023.     Med Refills-    Hypothyroidism Follow-up  Currently on levothyroxine 150 mcg daily.  Due for labs today  Since last visit, patient describes the following symptoms: Weight stable, no hair loss, no skin changes, no constipation, no loose stools   Last TSH   Lab Results   Component Value Date    TSH 3.40 07/18/2022       History of palpitations with supraventricular premature beats-this has been well-controlled on metoprolol 25 mg daily.  Tolerating well, no side effects reported.  Requesting a refill today.    History of migraine headaches without aura and without status migrainous, not intractable.  Reports that his headaches have been very well-controlled on rimegepant.   Following with neurology.  Next appointment scheduled 2/20/2024 with Dr. Parker.    Additional concern-      Joint Pain  Onset: many years, approx 15 years  Description:   Location: left knee  Character: Dull ache  Intensity: moderate  Progression of Symptoms: worse  Accompanying Signs & Symptoms:  Other symptoms: crunchy painful sensation during walking limiting work outs  History:   Previous similar pain: YES- ongoing but worsening    Precipitating factors:   Trauma or overuse: YES- possibly overuse  Alleviating factors:  Improved by: rest/inactivity    Therapies Tried and outcome: OTC NSAIDS, activity modification - ineffective.      HEALTH CARE MAINTENANCE: Due for labs.  Fasting today      Social History     Tobacco Use    Smoking status: Never    Smokeless tobacco: Never    Tobacco comments:     Nonsmoking household   Substance Use Topics    Alcohol use: Yes     Comment: occasional           12/12/2023     7:09 AM   Alcohol Use   Prescreen: >3 drinks/day or >7 drinks/week? No     Reviewed orders with patient.  Reviewed health maintenance and updated orders accordingly - Yes  Lab work is in process  Labs reviewed in EPIC  BP Readings from Last 3 Encounters:   12/13/23 120/82   08/14/23 119/81   07/13/23 110/70    Wt Readings from Last 3 Encounters:   12/13/23 94.3 kg (208 lb)   07/13/23 96.3 kg (212 lb 4.9 oz)   05/22/23 93.4 kg (206 lb)                  Patient Active Problem List   Diagnosis    CARDIOVASCULAR SCREENING; LDL GOAL LESS THAN 160    Hypothyroidism    Dry eye syndrome- mild    Dysplasia of cervix, low grade (MILO 1)    Generalized anxiety disorder    Supervision of normal first pregnancy    Migraine without aura and without status migrainosus, not intractable    Oral contraceptive pill surveillance    Seasonal allergic rhinitis due to pollen    Pollen-food allergy, subsequent encounter    Lumbar radiculopathy    Herniated lumbar intervertebral disc    H/O lumbar discectomy    Low back pain     Past  Surgical History:   Procedure Laterality Date    BIOPSY  [id number]    Cervix    DISCECTOMY LUMBAR POSTERIOR MICROSCOPIC ONE LEVEL Left 7/13/2023    Procedure: LEFT Lumbar 4-Lumbar 5 MICRODISCECTOMY, SPINE, LUMBAR, 1 LEVEL, POSTERIOR APPROACH;  Surgeon: Sergo Estrada MD;  Location: [facility] OR    HC TOOTH EXTRACTION W/FORCEP         Social History     Tobacco Use    Smoking status: Never    Smokeless tobacco: Never    Tobacco comments:     Nonsmoking household   Substance Use Topics    Alcohol use: Yes     Comment: occasional     Family History   Problem Relation Age of Onset    Thyroid Disease Mother         graves-decompression and strabismus    Deep Vein Thrombosis Father     Breast Cancer Maternal Grandmother         in her 50s    Heart Disease Maternal Grandfather         50s    Myocardial Infarction Maternal Grandfather     Cancer Paternal Grandmother         cervical    Other Cancer Paternal Grandmother         Cervical Rectal    Cerebrovascular Disease Paternal Grandmother     Heart Disease Paternal Grandfather         50s    Myocardial Infarction Paternal Grandfather     Thyroid Disease Paternal Aunt         nine affected in family    Glaucoma No family hx of     Macular Degeneration No family hx of     Hypertension No family hx of     Diabetes No family hx of     Anesthesia Reaction No family hx of          Current Outpatient Medications   Medication Sig Dispense Refill    cetirizine (ZYRTEC) 10 MG tablet       diclofenac (VOLTAREN) 1 % topical gel Apply 4 g topically 4 times daily 350 g 0    ferrous sulfate (FE TABS) 325 (65 Fe) MG EC tablet Take 1 tablet (325 mg) by mouth daily 90 tablet 1    fluticasone (FLONASE) 50 MCG/ACT nasal spray Spray 1-2 sprays into both nostrils daily 9.9 mL 3    gabapentin (NEURONTIN) 300 MG capsule Take 1 capsule (300 mg) by mouth 2 times daily      hydrOXYzine (ATARAX) 25 MG tablet Take 1 tablet (25 mg) by mouth as needed for anxiety or itching (pain control) 30 tablet 0     levonorgestrel (MIRENA) 20 MCG/24HR IUD 1 each by Intrauterine route once      levothyroxine (SYNTHROID/LEVOTHROID) 150 MCG tablet TAKE ONE TABLET BY MOUTH ONCE DAILY **NEED LABS** 90 tablet 0    meloxicam (MOBIC) 15 MG tablet Take 1 tablet (15 mg) by mouth as needed      methocarbamol (ROBAXIN) 750 MG tablet Take 1 tablet (750 mg) by mouth every 6 hours as needed for muscle spasms (muscle spasm) 60 tablet 0    metoprolol succinate ER (TOPROL XL) 25 MG 24 hr tablet Take 1 tablet (25 mg) by mouth daily 90 tablet 3    rimegepant (NURTEC) 75 MG ODT tablet Take 1 tablet (75 mg) by mouth every 48 hours 16 tablet 11     No Known Allergies    Breast Cancer Screening:    FHS-7:       9/24/2021     2:48 PM 10/28/2022     5:01 PM 12/12/2023     7:11 AM   Breast CA Risk Assessment (FHS-7)   Did any of your first-degree relatives have breast or ovarian cancer? No No No   Did any of your relatives have bilateral breast cancer? Unknown No No   Did any man in your family have breast cancer? No No No   Did any woman in your family have breast and ovarian cancer? No No No   Did any woman in your family have breast cancer before age 50 y? No No No   Do you have 2 or more relatives with breast and/or ovarian cancer? Yes No Yes   Do you have 2 or more relatives with breast and/or bowel cancer? No No Yes       Patient under 40 years of age: Routine Mammogram Screening not recommended.   Pertinent mammograms are reviewed under the imaging tab.    History of abnormal Pap smear: NO - age 30-65 PAP every 5 years with negative HPV co-testing recommended      Latest Ref Rng & Units 9/25/2020     3:39 PM 9/25/2020     3:23 PM 10/26/2017     1:14 PM   PAP / HPV   PAP (Historical)   NIL  NIL    HPV 16 DNA NEG^Negative Negative      HPV 18 DNA NEG^Negative Negative      Other HR HPV NEG^Negative Negative        Reviewed and updated as needed this visit by clinical staff   Tobacco  Allergies  Meds   Med Hx            Reviewed and updated as  "needed this visit by Provider       Med Hx             Review of Systems   Constitutional:  Negative for chills and fever.   HENT:  Positive for congestion. Negative for ear pain, hearing loss and sore throat.    Eyes:  Negative for pain and visual disturbance.   Respiratory:  Negative for cough and shortness of breath.    Cardiovascular:  Negative for chest pain, palpitations and peripheral edema.   Gastrointestinal:  Negative for abdominal pain, constipation, diarrhea, heartburn, hematochezia and nausea.   Breasts:  Negative for tenderness, breast mass and discharge.   Genitourinary:  Positive for vaginal bleeding. Negative for dysuria, frequency, genital sores, hematuria, pelvic pain, urgency and vaginal discharge.   Musculoskeletal:  Positive for arthralgias and myalgias. Negative for joint swelling.   Skin:  Negative for rash.   Neurological:  Positive for paresthesias. Negative for dizziness, weakness and headaches.   Psychiatric/Behavioral:  Negative for mood changes. The patient is not nervous/anxious.           OBJECTIVE:   /82   Pulse 82   Temp 97.7  F (36.5  C) (Temporal)   Resp 20   Ht 1.613 m (5' 3.5\")   Wt 94.3 kg (208 lb)   LMP  (LMP Unknown)   SpO2 100%   BMI 36.27 kg/m    Physical Exam  GENERAL: healthy, alert and no distress  EYES: Eyes grossly normal to inspection, PERRL and conjunctivae and sclerae normal  HENT: ear canals and TM's normal, nose and mouth without ulcers or lesions  NECK: no adenopathy, no asymmetry, masses, or scars and thyroid normal to palpation  RESP: lungs clear to auscultation - no rales, rhonchi or wheezes  BREAST: normal without masses, tenderness or nipple discharge and no palpable axillary masses or adenopathy  CV: regular rate and rhythm, normal S1 S2, no S3 or S4, no murmur, click or rub, no peripheral edema and peripheral pulses strong  ABDOMEN: soft, nontender, no hepatosplenomegaly, no masses and bowel sounds normal  MS: Right Ankle: Normal appearing " without any gross abnormalities.  No medial or lateral joint line tenderness.  Full range of motion.  Positive crepitus.  Negative anterior and posterior drawer sign.  Normal gait.  SKIN: no suspicious lesions or rashes  NEURO: Normal strength and tone, mentation intact and speech normal  PSYCH: mentation appears normal, affect normal/bright    Diagnostic Test Results:  Previous Labs reviewed in Epic    ASSESSMENT/PLAN:   Jennifer was seen today for physical and health maintenance.    Diagnoses and all orders for this visit:    Routine general medical examination at a health care facility  -     PRIMARY CARE FOLLOW-UP SCHEDULING; Future  -     REVIEW OF HEALTH MAINTENANCE PROTOCOL ORDERS  -     Comprehensive metabolic panel (BMP + Alb, Alk Phos, ALT, AST, Total. Bili, TP)  -     CBC with platelets    Lipid screening  -     Lipid panel reflex to direct LDL Fasting    Acquired hypothyroidism, compensated on levothyroxine  -     TSH WITH FREE T4 REFLEX  -     Refill levothyroxine after labs    H/O lumbar discectomy, date of procedure: 7/13/2023        -     Recovering well.  Pain controlled.  Continue physical therapy.    Supraventricular premature beats with palpitations, controlled.  -     Refill: Metoprolol succinate ER (TOPROL XL) 25 MG 24 hr tablet; Take 1 tablet (25 mg) by mouth daily    Migraine without aura and without status migrainosus, not intractable         -    Controlled on rimegepant         -     Following with neurology    Anterior knee pain, left- suspect patellofemoral compartment degenerative changes  -     XR Knee Left 3 Views; Future  -     diclofenac (VOLTAREN) 1 % topical gel; Apply 4 g topically 4 times daily  -     Physical Therapy Referral; Future        Patient has been advised of split billing requirements and indicates understanding: Yes      COUNSELING:  Reviewed preventive health counseling, as reflected in patient instructions       Regular exercise       Healthy  "diet/nutrition      BMI:   Estimated body mass index is 36.27 kg/m  as calculated from the following:    Height as of this encounter: 1.613 m (5' 3.5\").    Weight as of this encounter: 94.3 kg (208 lb).   Weight management plan: Discussed healthy diet and exercise guidelines      She reports that she has never smoked. She has never used smokeless tobacco.      Return in about 1 year (around 12/13/2024) for Physical Exam and Med check visit..      Ashley Fish MD  [facility]  Answers submitted by the patient for this visit:  ARNIE-7 (Submitted on 12/13/2023)  ARNIE 7 TOTAL SCORE: 1    "

## 2023-12-18 ENCOUNTER — OFFICE VISIT (OUTPATIENT)
Dept: PALLIATIVE MEDICINE | Facility: CLINIC | Age: 33
End: 2023-12-18
Attending: PAIN MEDICINE
Payer: COMMERCIAL

## 2023-12-18 VITALS — HEART RATE: 82 BPM | SYSTOLIC BLOOD PRESSURE: 123 MMHG | DIASTOLIC BLOOD PRESSURE: 81 MMHG

## 2023-12-18 DIAGNOSIS — M54.16 LUMBAR RADICULOPATHY: ICD-10-CM

## 2023-12-18 DIAGNOSIS — E03.9 ACQUIRED HYPOTHYROIDISM: ICD-10-CM

## 2023-12-18 PROCEDURE — 99214 OFFICE O/P EST MOD 30 MIN: CPT | Performed by: PAIN MEDICINE

## 2023-12-18 RX ORDER — LEVOTHYROXINE SODIUM 125 UG/1
125 TABLET ORAL DAILY
Qty: 90 TABLET | Refills: 0 | Status: SHIPPED | OUTPATIENT
Start: 2023-12-18 | End: 2024-03-11

## 2023-12-18 ASSESSMENT — PAIN SCALES - GENERAL: PAINLEVEL: NO PAIN (1)

## 2023-12-18 NOTE — PROGRESS NOTES
[facility] Follow Up    Date of visit: 12/18/2023    Chief complaint: No chief complaint on file.      Interval history:  Jennifer Pastrana was last seen by me on 8/14/2023.      Recommendations/plan at the last visit included:  Physical Therapy:  Plan to start   Clinical Health Psychologist to address issues of relaxation, behavioral change, coping style, and other factors important to improvement.  No  Diagnostic Studies:  None  Medication Management:    - Continue nurtec   - can refill robaxin if needed   - continue gabapentin 300mg twice- Would consider gradual titration as you continue to heal from surgery. I am more than happy to help with titration. Would revisit after you start PHYSICAL THERAPY.   Further procedures recommended: not at this time  Follow up:  4-5 months     Since her last visit, Jennifer Pastrana reports:   s/p L4-5 microdisc 7.23  Physical Therapy: About to complete PT only 1 session left  Left sided anterior lateral thigh numbness from last 1 month, no weight changes, or history   Pt interested in weaning off meds  Pain cont to improve s/p surgery   No new weakness   No recent falls  Pain not affecting her quality of life in the same way  Pt sleeping much better   Reports no further work up tx at this time  Discuss the importance of cont therapy     Pain scores:  Pain intensity on average is 0 on a scale of 0-10.     Current pain treatments:   Gabapentin 300 Mg BID  Meloxicam 50 once day hasn't taken it form last 1 month     Past pain treatments:  PT     Side Effects: no side effect    Medications:  Current Outpatient Medications   Medication Sig Dispense Refill    cetirizine (ZYRTEC) 10 MG tablet       diclofenac (VOLTAREN) 1 % topical gel Apply 4 g topically 4 times daily 350 g 0    ferrous sulfate (FE TABS) 325 (65 Fe) MG EC tablet Take 1 tablet (325 mg) by mouth daily 90 tablet 1    fluticasone (FLONASE) 50 MCG/ACT nasal spray Spray 1-2 sprays into both nostrils daily 9.9  mL 3    gabapentin (NEURONTIN) 300 MG capsule Take 1 capsule (300 mg) by mouth 2 times daily      hydrOXYzine (ATARAX) 25 MG tablet Take 1 tablet (25 mg) by mouth as needed for anxiety or itching (pain control) 30 tablet 0    levonorgestrel (MIRENA) 20 MCG/24HR IUD 1 each by Intrauterine route once      levothyroxine (SYNTHROID/LEVOTHROID) 150 MCG tablet TAKE ONE TABLET BY MOUTH ONCE DAILY **NEED LABS** 90 tablet 0    meloxicam (MOBIC) 15 MG tablet Take 1 tablet (15 mg) by mouth as needed      methocarbamol (ROBAXIN) 750 MG tablet Take 1 tablet (750 mg) by mouth every 6 hours as needed for muscle spasms (muscle spasm) 60 tablet 0    metoprolol succinate ER (TOPROL XL) 25 MG 24 hr tablet Take 1 tablet (25 mg) by mouth daily 90 tablet 3    rimegepant (NURTEC) 75 MG ODT tablet Take 1 tablet (75 mg) by mouth every 48 hours 16 tablet 11       Medical History: any changes in medical history since they were last seen? No    Review of Systems:  The 14 system ROS was reviewed from the intake questionnaire.   Any bowel or bladder problems: no   Mood: Good    Physical Exam:  LMP  (LMP Unknown)   Constitutional: alert and no distress  Head: Normocephalic    Respiratory: Good diaphragmatic excursion. No wheezes appreciated.  Speaking in complete sentences without shortness of breath.  No accessory muscle use.         Lumbar   Myofascial tenderness:  min      Straight leg exam: neg                 Neurologic exam:  Motor:  5/5 UE and LE strength      Sensory:  (upper and lower extremities):   Light touch: normal        Skin: no suspicious lesions or rashes appreciated on exposed areas  Psychiatric: mentation appears normal, affect full and good eye contact.        Assessment/:   Jennifer Pastrana is a 31 year old female who presents with the complaints of acute on chronic left-sided low back pain radiating to her calf   Jennifer was seen today for pain.     Diagnoses and all orders for this visit:     Lumbar radiculopathy      Myofascial muscle pain  Jennifer was seen today for pain.     Diagnoses and all orders for this visit:     Lumbar radiculopathy  -     Adult Pain Clinic Follow-Up Order  -     Adult Pain Clinic Follow-Up Order; Future     Myofascial muscle pain    Plan:  Physical Therapy:  continue  Diagnostic Studies:  None  Medication Management:    - Continue nurtec   - wean gabapentin 300mg at night for 1 week--> stop . If symptoms worsen could consider restarting at a lower dose   Follow up:  As needed       Cody SAMUEL  Pain Fellow  [facility]    I saw and examined the patient with the Pain Fellow/Resident. I have reviewed and agree with the resident's note and plan of care and made changes and corrections directly to the body of the note.   TIME SPENT:   BY FELLOW/RESIDENT ALONE 15 MIN   BY MYSELF AND FELLOW/RESIDENT TOGETHER 5 MIN   BY MYSELF WITHOUT THE FELLOW/RESIDENT 15 MIN     Vu Cagle MD

## 2023-12-18 NOTE — PATIENT INSTRUCTIONS
Physical Therapy:  continue  Diagnostic Studies:  None  Medication Management:    - Continue nurtec   - wean gabapentin 300mg at night for 1 week--> stop . If symptoms worsen could consider restarting at a lower dose   Follow up:  As needed     ----------------------------------------------------------------  Clinic Number:  [phone]   Call with any questions about your care and for scheduling assistance.   Calls are returned Monday through Friday between 8 AM and 4:30 PM. We usually get back to you within 2 business days depending on the issue/request.    If we are prescribing your medications:  For opioid medication refills, call the clinic or send a [employer] message 7 days in advance.  Please include:  Name of requested medication  Name of the pharmacy.  For non-opioid medications, call your pharmacy directly to request a refill. Please allow 3-4 days to be processed.   Per [place] State Law:  All controlled substance prescriptions must be filled within 30 days of being written.    For those controlled substances allowing refills, pickup must occur within 30 days of last fill.      We believe regular attendance is key to your success in our program!    Any time you are unable to keep your appointment we ask that you call us at least 24 hours in advance to cancel.This will allow us to offer the appointment time to another patient.   Multiple missed appointments may lead to dismissal from the clinic.

## 2024-01-21 ASSESSMENT — ACTIVITIES OF DAILY LIVING (ADL)
WALK: ACTIVITY IS NOT DIFFICULT
GIVING WAY, BUCKLING OR SHIFTING OF KNEE: THE SYMPTOM AFFECTS MY ACTIVITY MODERATELY
SWELLING: I DO NOT HAVE THE SYMPTOM
LIMPING: I DO NOT HAVE THE SYMPTOM
STIFFNESS: I DO NOT HAVE THE SYMPTOM
STAND: ACTIVITY IS NOT DIFFICULT
SQUAT: ACTIVITY IS FAIRLY DIFFICULT
SIT WITH YOUR KNEE BENT: ACTIVITY IS NOT DIFFICULT
GO DOWN STAIRS: ACTIVITY IS FAIRLY DIFFICULT
SQUAT: ACTIVITY IS FAIRLY DIFFICULT
PAIN: THE SYMPTOM AFFECTS MY ACTIVITY SLIGHTLY
WALK: ACTIVITY IS NOT DIFFICULT
SWELLING: I DO NOT HAVE THE SYMPTOM
PAIN: THE SYMPTOM AFFECTS MY ACTIVITY SLIGHTLY
GO UP STAIRS: ACTIVITY IS FAIRLY DIFFICULT
HOW_WOULD_YOU_RATE_THE_OVERALL_FUNCTION_OF_YOUR_KNEE_DURING_YOUR_USUAL_DAILY_ACTIVITIES?: NEARLY NORMAL
GIVING WAY, BUCKLING OR SHIFTING OF KNEE: THE SYMPTOM AFFECTS MY ACTIVITY MODERATELY
RISE FROM A CHAIR: ACTIVITY IS NOT DIFFICULT
SIT WITH YOUR KNEE BENT: ACTIVITY IS NOT DIFFICULT
HOW_WOULD_YOU_RATE_THE_CURRENT_FUNCTION_OF_YOUR_KNEE_DURING_YOUR_USUAL_DAILY_ACTIVITIES_ON_A_SCALE_FROM_0_TO_100_WITH_100_BEING_YOUR_LEVEL_OF_KNEE_FUNCTION_PRIOR_TO_YOUR_INJURY_AND_0_BEING_THE_INABILITY_TO_PERFORM_ANY_OF_YOUR_USUAL_DAILY_ACTIVITIES?: 75
RAW_SCORE: 51
AS_A_RESULT_OF_YOUR_KNEE_INJURY,_HOW_WOULD_YOU_RATE_YOUR_CURRENT_LEVEL_OF_DAILY_ACTIVITY?: NORMAL
KNEEL ON THE FRONT OF YOUR KNEE: ACTIVITY IS FAIRLY DIFFICULT
HOW_WOULD_YOU_RATE_THE_OVERALL_FUNCTION_OF_YOUR_KNEE_DURING_YOUR_USUAL_DAILY_ACTIVITIES?: NEARLY NORMAL
KNEE_ACTIVITY_OF_DAILY_LIVING_SCORE: 72.86
PLEASE_INDICATE_YOR_PRIMARY_REASON_FOR_REFERRAL_TO_THERAPY:: KNEE
GO UP STAIRS: ACTIVITY IS FAIRLY DIFFICULT
STIFFNESS: I DO NOT HAVE THE SYMPTOM
WEAKNESS: THE SYMPTOM AFFECTS MY ACTIVITY SLIGHTLY
HOW_WOULD_YOU_RATE_THE_CURRENT_FUNCTION_OF_YOUR_KNEE_DURING_YOUR_USUAL_DAILY_ACTIVITIES_ON_A_SCALE_FROM_0_TO_100_WITH_100_BEING_YOUR_LEVEL_OF_KNEE_FUNCTION_PRIOR_TO_YOUR_INJURY_AND_0_BEING_THE_INABILITY_TO_PERFORM_ANY_OF_YOUR_USUAL_DAILY_ACTIVITIES?: 75
STAND: ACTIVITY IS NOT DIFFICULT
KNEE_ACTIVITY_OF_DAILY_LIVING_SUM: 51
AS_A_RESULT_OF_YOUR_KNEE_INJURY,_HOW_WOULD_YOU_RATE_YOUR_CURRENT_LEVEL_OF_DAILY_ACTIVITY?: NORMAL
LIMPING: I DO NOT HAVE THE SYMPTOM
GO DOWN STAIRS: ACTIVITY IS FAIRLY DIFFICULT
KNEEL ON THE FRONT OF YOUR KNEE: ACTIVITY IS FAIRLY DIFFICULT
WEAKNESS: THE SYMPTOM AFFECTS MY ACTIVITY SLIGHTLY
RISE FROM A CHAIR: ACTIVITY IS NOT DIFFICULT

## 2024-01-22 ENCOUNTER — THERAPY VISIT (OUTPATIENT)
Dept: PHYSICAL THERAPY | Facility: CLINIC | Age: 34
End: 2024-01-22
Attending: FAMILY MEDICINE
Payer: COMMERCIAL

## 2024-01-22 DIAGNOSIS — M25.562 ANTERIOR KNEE PAIN, LEFT: ICD-10-CM

## 2024-01-22 DIAGNOSIS — M25.562 CHRONIC PAIN OF LEFT KNEE: Primary | ICD-10-CM

## 2024-01-22 DIAGNOSIS — G89.29 CHRONIC PAIN OF LEFT KNEE: Primary | ICD-10-CM

## 2024-01-22 PROCEDURE — 97161 PT EVAL LOW COMPLEX 20 MIN: CPT | Mod: GP | Performed by: PHYSICAL THERAPIST

## 2024-01-22 PROCEDURE — 97110 THERAPEUTIC EXERCISES: CPT | Mod: GP | Performed by: PHYSICAL THERAPIST

## 2024-01-22 PROCEDURE — 97530 THERAPEUTIC ACTIVITIES: CPT | Mod: GP | Performed by: PHYSICAL THERAPIST

## 2024-01-22 NOTE — PROGRESS NOTES
PHYSICAL THERAPY EVALUATION  Type of Visit: Evaluation    See electronic medical record for Abuse and Falls Screening details.    Subjective       Presenting condition or subjective complaint: Knee crunching and pain  Knee pain beginning 20 years ago, worsening last year.  L DEVON was the leg affected by the lumbar spine dysfunction- numbness/tingling.    Date of onset: 01/22/23    Relevant medical history: Anemia; Heart problems; Migraines or headaches; Overweight; Thyroid problems   Dates & types of surgery: 7/13/23 lumbar microdiscectomy l4-l5    Prior diagnostic imaging/testing results: X-ray     Prior therapy history for the same diagnosis, illness or injury: No        Living Environment  Social support: With family members   Type of home: House; 1 level; Basement   Stairs to enter the home: Yes 2 Is there a railing: No   Ramp: No   Stairs inside the home: Yes 15 Is there a railing: Yes   Help at home: None  Equipment owned: Crutches     Employment: Yes Pharmacy technician  Hobbies/Interests: Bike riding, aerobics, reading, writing, cooking, baking    Patient goals for therapy: Bend knee while bearing weight    Pain assessment: Pain present with activity      Objective   KNEE EVALUATION  PAIN: Pain Level at Rest: 0/10  Pain Level with Use: 6/10  Pain Location: anterior knee  Pain Quality: Sharp and Other  Pain Frequency: intermittent  Pain is Worst: daytime  Pain is Exacerbated By: stairs, going down vs going up, bending down.  Anything where I have to bend my knees and bear weight.  Sitting and bending does not cause any pain.    Pain is Relieved By: rest  Pain Progression: Worsened  INTEGUMENTARY (edema, incisions): WNL  GAIT:  Weightbearing Status: WBAT  Assistive Device(s): None  Gait Deviations: WNL.  Slight incr trunk rotation noted   BALANCE/PROPRIOCEPTION:  Slight trendelenburg L.  EC R 7 sec, L 10 sec   WEIGHTBEARING ALIGNMENT:  Standing: slight incr genu recruvatum L, femoral IR B with pes planus.  Mild  atrophy L gastroc.    Squat: noted crepitus L LE- weight shift to the right LE   NON-WEIGHTBEARING ALIGNMENT:   ROM: AROM WNL    STRENGTH:  MMT: all 5/5 seated excluding hip ER 4-/5 B.    FLEXIBILITY:   SPECIAL TESTS:    Left Right   Apley's (Meniscus)     Kathy's (Meniscus) Negative     Malinda's (ITB/TFL)     Patellar Apprehension Test Negative     Patella Tracking Lateral tilt with slight tracking laterally + reproduction with WB for patellar pathology.  Asterisk Sign      Ligamentous Stability Negative     Anterior Drawer (ACL) Negative,      Posterior Drawer (PCL) Negative,      Prone Dial Test at 30 Deg and 90 Deg (PCL/PLC)     Valgus Stress Testing at 0 Deg and 30 Deg     Varus Stress Testing at 0 Deg and 30 Deg        FUNCTIONAL TESTS:   PALPATION:   JOINT MOBILITY:     Assessment & Plan   CLINICAL IMPRESSIONS  Medical Diagnosis: Anterior knee pain, left    Treatment Diagnosis: PFP syndrome   Impression/Assessment: Patient is a 33 year old female with L  anterior knee pain, chronic complaints.  The following significant findings have been identified: Pain, Decreased strength, Impaired balance, Impaired muscle performance, and Decreased activity tolerance. These impairments interfere with their ability to perform self care tasks, work tasks, recreational activities, household chores, household mobility, and community mobility as compared to previous level of function.     Clinical Decision Making (Complexity):  Clinical Presentation: Stable/Uncomplicated  Clinical Presentation Rationale: based on medical and personal factors listed in PT evaluation  Clinical Decision Making (Complexity): Low complexity    PLAN OF CARE  Treatment Interventions:  Modalities: Cryotherapy, E-stim, Ultrasound  Interventions: Gait Training, Manual Therapy, Neuromuscular Re-education, Therapeutic Activity, Therapeutic Exercise    Long Term Goals     PT Goal 1  Goal Identifier: Stairs  Goal Description: Pt to descend stairs x 12  without sx or tape  Rationale: to maximize safety and independence with performance of ADLs and functional tasks  Target Date: 04/20/24      Frequency of Treatment: 2x/month  Duration of Treatment: 3 months    Recommended Referrals to Other Professionals: Physical Therapy  Education Assessment:   Learner/Method: Patient    Risks and benefits of evaluation/treatment have been explained.   Patient/Family/caregiver agrees with Plan of Care.     Evaluation Time:     PT Eval, Low Complexity Minutes (64166): 20       Signing Clinician: Netta Mora PT

## 2024-02-07 ENCOUNTER — OFFICE VISIT (OUTPATIENT)
Dept: OPTOMETRY | Facility: CLINIC | Age: 34
End: 2024-02-07
Payer: COMMERCIAL

## 2024-02-07 DIAGNOSIS — H52.13 MYOPIA OF BOTH EYES: ICD-10-CM

## 2024-02-07 DIAGNOSIS — H52.221 REGULAR ASTIGMATISM, RIGHT EYE: ICD-10-CM

## 2024-02-07 DIAGNOSIS — Z01.00 ROUTINE EYE EXAM: Primary | ICD-10-CM

## 2024-02-07 PROCEDURE — 92310 CONTACT LENS FITTING OU: CPT | Mod: GA | Performed by: OPTOMETRIST

## 2024-02-07 PROCEDURE — 92015 DETERMINE REFRACTIVE STATE: CPT | Performed by: OPTOMETRIST

## 2024-02-07 PROCEDURE — 92014 COMPRE OPH EXAM EST PT 1/>: CPT | Performed by: OPTOMETRIST

## 2024-02-07 ASSESSMENT — VISUAL ACUITY
METHOD: SNELLEN - LINEAR
OS_CC: 20/25
OD_CC: J1+
OS_CC: J1+
OD_CC: 20/20
CORRECTION_TYPE: CONTACTS

## 2024-02-07 ASSESSMENT — CONF VISUAL FIELD
OD_SUPERIOR_TEMPORAL_RESTRICTION: 0
OS_SUPERIOR_NASAL_RESTRICTION: 0
METHOD: COUNTING FINGERS
OD_INFERIOR_NASAL_RESTRICTION: 0
OS_NORMAL: 1
OS_INFERIOR_TEMPORAL_RESTRICTION: 0
OD_NORMAL: 1
OS_INFERIOR_NASAL_RESTRICTION: 0
OD_INFERIOR_TEMPORAL_RESTRICTION: 0
OS_SUPERIOR_TEMPORAL_RESTRICTION: 0
OD_SUPERIOR_NASAL_RESTRICTION: 0

## 2024-02-07 ASSESSMENT — REFRACTION_MANIFEST
OD_SPHERE: -2.25
OD_AXIS: 140
OS_SPHERE: -2.00
OD_CYLINDER: +0.50
OS_CYLINDER: SPHERE

## 2024-02-07 ASSESSMENT — REFRACTION_CURRENTRX
OS_SPHERE: -2.00
OS_BRAND: ALCON DAILIES TOTAL 1 BC 8.5, D 14.1
OD_BRAND: ALCON DAILIES TOTAL 1 BC 8.5, D 14.1
OS_SPHERE: -2.25
OS_BRAND: ALCON DAILIES TOTAL 1 BC 8.5, D 14.1
OD_SPHERE: -2.25
OD_SPHERE: -2.25
OD_BRAND: ALCON DAILIES TOTAL 1 BC 8.5, D 14.1

## 2024-02-07 ASSESSMENT — REFRACTION_WEARINGRX
SPECS_TYPE: SVL
OD_CYLINDER: +0.25
OD_SPHERE: -2.25
OS_CYLINDER: +0.25
OS_AXIS: 060
OD_AXIS: 155
OS_SPHERE: -2.00

## 2024-02-07 ASSESSMENT — TONOMETRY
IOP_METHOD: APPLANATION
OS_IOP_MMHG: 12
OD_IOP_MMHG: 12

## 2024-02-07 NOTE — PATIENT INSTRUCTIONS
Optional to fill new glasses prescription, minimal change  Contact lenses prescription released to patient today.  Test new trials one week, then alright to order supply.  Return for contact lenses check appointment as needed if any vision or comfort concerns  Return in 1 year for eye exam    Muna Slade, OD  [phone]

## 2024-02-07 NOTE — LETTER
2/7/2024         RE: Jennifer Pastrana  [address] [facility] [address]        Dear Colleague,    Thank you for referring your patient, Jennifer Pastrana, to the [facility]. Please see a copy of my visit note below.    Chief Complaint   Patient presents with     Annual Eye Exam        Previous contact lens wearer? Yes: dailies total 1   Comfort of contact lenses :good  Satisfied with current lenses: Yes        Last Eye Exam: March 2022  Dilated Previously: Yes, side effects of dilation explained today    Tech went over $75 CL Fitting fee    What are you currently using to see?  Contacts & glasses     Distance Vision Acuity: Satisfied with vision    Near Vision Acuity: Satisfied with vision while reading  unaided    Eye Comfort: good  Do you use eye drops? : biotrue   Occupation or Hobbies: pharm tech      Camryn Levin, OA        Medical, surgical and family histories reviewed and updated 2/7/2024.       OBJECTIVE: See Ophthalmology exam    ASSESSMENT:    ICD-10-CM    1. Routine eye exam  Z01.00 EYE EXAM (SIMPLE-NONBILLABLE)     REFRACTION     CONTACT LENS FITTING,BILAT w/ signed waiver      2. Myopia of both eyes  H52.13 EYE EXAM (SIMPLE-NONBILLABLE)     REFRACTION     CONTACT LENS FITTING,BILAT w/ signed waiver      3. Regular astigmatism, right eye  H52.221 EYE EXAM (SIMPLE-NONBILLABLE)     REFRACTION     CONTACT LENS FITTING,BILAT w/ signed waiver         PLAN:     Patient Instructions   Optional to fill new glasses prescription, minimal change  Contact lenses prescription released to patient today.  Test new trials one week, then alright to order supply.  Return for contact lenses check appointment as needed if any vision or comfort concerns  Return in 1 year for eye exam    Muna Slade, OD  [phone]                                            Again, thank you for allowing me to participate in the care of your patient.        Sincerely,        Muna Slade, OD

## 2024-02-07 NOTE — PROGRESS NOTES
Chief Complaint   Patient presents with    Annual Eye Exam        Previous contact lens wearer? Yes: florencees total 1   Comfort of contact lenses :good  Satisfied with current lenses: Yes        Last Eye Exam: March 2022  Dilated Previously: Yes, side effects of dilation explained today    Tech went over $75 CL Fitting fee    What are you currently using to see?  Contacts & glasses     Distance Vision Acuity: Satisfied with vision    Near Vision Acuity: Satisfied with vision while reading  unaided    Eye Comfort: good  Do you use eye drops? : biotrue   Occupation or Hobbies: pharm case Levin, OA        Medical, surgical and family histories reviewed and updated 2/7/2024.       OBJECTIVE: See Ophthalmology exam    ASSESSMENT:    ICD-10-CM    1. Routine eye exam  Z01.00 EYE EXAM (SIMPLE-NONBILLABLE)     REFRACTION     CONTACT LENS FITTING,BILAT w/ signed waiver      2. Myopia of both eyes  H52.13 EYE EXAM (SIMPLE-NONBILLABLE)     REFRACTION     CONTACT LENS FITTING,BILAT w/ signed waiver      3. Regular astigmatism, right eye  H52.221 EYE EXAM (SIMPLE-NONBILLABLE)     REFRACTION     CONTACT LENS FITTING,BILAT w/ signed waiver         PLAN:     Patient Instructions   Optional to fill new glasses prescription, minimal change  Contact lenses prescription released to patient today.  Test new trials one week, then alright to order supply.  Return for contact lenses check appointment as needed if any vision or comfort concerns  Return in 1 year for eye exam    Muna Slade, OD  [phone]

## 2024-02-19 ENCOUNTER — MYC REFILL (OUTPATIENT)
Dept: FAMILY MEDICINE | Facility: CLINIC | Age: 34
End: 2024-02-19

## 2024-02-19 ENCOUNTER — THERAPY VISIT (OUTPATIENT)
Dept: PHYSICAL THERAPY | Facility: CLINIC | Age: 34
End: 2024-02-19
Payer: COMMERCIAL

## 2024-02-19 ENCOUNTER — TELEPHONE (OUTPATIENT)
Dept: NEUROLOGY | Facility: CLINIC | Age: 34
End: 2024-02-19

## 2024-02-19 DIAGNOSIS — J30.2 CHRONIC SEASONAL ALLERGIC RHINITIS: ICD-10-CM

## 2024-02-19 DIAGNOSIS — M25.562 ANTERIOR KNEE PAIN, LEFT: Primary | ICD-10-CM

## 2024-02-19 PROCEDURE — 97140 MANUAL THERAPY 1/> REGIONS: CPT | Mod: GP | Performed by: PHYSICAL THERAPIST

## 2024-02-19 PROCEDURE — 97110 THERAPEUTIC EXERCISES: CPT | Mod: GP | Performed by: PHYSICAL THERAPIST

## 2024-02-19 RX ORDER — FLUTICASONE PROPIONATE 50 MCG
1-2 SPRAY, SUSPENSION (ML) NASAL DAILY
Qty: 9.9 ML | Refills: 3 | Status: SHIPPED | OUTPATIENT
Start: 2024-02-19

## 2024-02-19 NOTE — TELEPHONE ENCOUNTER
[facility]    Fax: [phone]    Spec: [phone]    MO: [phone]

## 2024-03-03 NOTE — TELEPHONE ENCOUNTER
PA Initiation    Medication: NURTEC 75 MG PO TBDP  Insurance Company: [employer] - Phone [phone] Fax [phone]  Pharmacy Filling the Rx: [facility] - [place], [place] - [facility] [address]  Filling Pharmacy Phone: [phone]  Filling Pharmacy Fax: [phone]  Start Date: 3/3/2024

## 2024-03-04 ENCOUNTER — THERAPY VISIT (OUTPATIENT)
Dept: PHYSICAL THERAPY | Facility: CLINIC | Age: 34
End: 2024-03-04
Payer: COMMERCIAL

## 2024-03-04 DIAGNOSIS — M25.562 ANTERIOR KNEE PAIN, LEFT: Primary | ICD-10-CM

## 2024-03-04 PROCEDURE — 97140 MANUAL THERAPY 1/> REGIONS: CPT | Mod: GP | Performed by: PHYSICAL THERAPIST

## 2024-03-04 PROCEDURE — 97112 NEUROMUSCULAR REEDUCATION: CPT | Mod: GP | Performed by: PHYSICAL THERAPIST

## 2024-03-04 NOTE — TELEPHONE ENCOUNTER
Prior Authorization Approval    Medication: NURTEC 75 MG PO TBDP  Authorization Effective Date: 3/3/2024  Authorization Expiration Date: 3/3/2025  Approved Dose/Quantity:   Reference #:     Insurance Company: [email or web address] - Phone [phone] Fax [phone]  Expected CoPay: $    CoPay Card Available:      Financial Assistance Needed:   Which Pharmacy is filling the prescription: [facility] - [facility] [address]  Pharmacy Notified: YES  Patient Notified: **Instructed pharmacy to notify patient when script is ready to /ship.**

## 2024-03-07 ENCOUNTER — LAB (OUTPATIENT)
Dept: LAB | Facility: CLINIC | Age: 34
End: 2024-03-07
Payer: COMMERCIAL

## 2024-03-07 DIAGNOSIS — E03.9 ACQUIRED HYPOTHYROIDISM: ICD-10-CM

## 2024-03-07 PROCEDURE — 36415 COLL VENOUS BLD VENIPUNCTURE: CPT

## 2024-03-07 PROCEDURE — 84443 ASSAY THYROID STIM HORMONE: CPT

## 2024-03-08 LAB — TSH SERPL DL<=0.005 MIU/L-ACNC: 3.6 UIU/ML (ref 0.3–4.2)

## 2024-03-11 DIAGNOSIS — E03.9 ACQUIRED HYPOTHYROIDISM: ICD-10-CM

## 2024-03-11 RX ORDER — LEVOTHYROXINE SODIUM 125 UG/1
125 TABLET ORAL DAILY
Qty: 90 TABLET | Refills: 2 | Status: SHIPPED | OUTPATIENT
Start: 2024-03-11 | End: 2024-08-27

## 2024-03-18 ENCOUNTER — THERAPY VISIT (OUTPATIENT)
Dept: PHYSICAL THERAPY | Facility: CLINIC | Age: 34
End: 2024-03-18
Payer: COMMERCIAL

## 2024-03-18 DIAGNOSIS — M25.562 ANTERIOR KNEE PAIN, LEFT: Primary | ICD-10-CM

## 2024-03-18 PROCEDURE — 97112 NEUROMUSCULAR REEDUCATION: CPT | Mod: GP | Performed by: PHYSICAL THERAPIST

## 2024-03-18 PROCEDURE — 97110 THERAPEUTIC EXERCISES: CPT | Mod: GP | Performed by: PHYSICAL THERAPIST

## 2024-03-26 NOTE — PROGRESS NOTES
[facility] Neurology Follow Up Visit    Jennifer Pastrana MRN# [id number]   Age: 33 year old YOB: 1990     Brief history of symptoms: The patient was initially seen in neurologic consultation on 10/5/2021 for evaluation of migraines and head pains. Please see the comprehensive neurologic consultation note from that date in the Epic records for details.     Interval history:   She went off of Nurtec for about 1.5 weeks due to an issue with the PA. She has been back on it for the last few weeks, but has been getting several headaches a week during this span. Previously she was getting about 1 headache a month when she was consistently on the Nurtec. Headaches respond well to ibuprofen.       Past Medical History:     Patient Active Problem List   Diagnosis    CARDIOVASCULAR SCREENING; LDL GOAL LESS THAN 160    Hypothyroidism    Dry eye syndrome- mild    Dysplasia of cervix, low grade (MILO 1)    Generalized anxiety disorder    Supervision of normal first pregnancy    Migraine without aura and without status migrainosus, not intractable    Oral contraceptive pill surveillance    Seasonal allergic rhinitis due to pollen    Pollen-food allergy, subsequent encounter    Lumbar radiculopathy    Herniated lumbar intervertebral disc    H/O lumbar discectomy    Low back pain    Anterior knee pain, left     Past Medical History:   Diagnosis Date    AR (allergic rhinitis)     ASCUS with positive high risk HPV 02/2013    type 53    Cervical high risk HPV (human papillomavirus) test positive 02/25/2015    Dysplasia of cervix, low grade (MILO 1)     History of migraine headaches     controlled on Nortriptyline and Maxalt prn     Hypothyroidism     IUD (intrauterine device) in place     Mirena- placed 10/2019    Mood disorder (H24)     Reactive airway disease that is not asthma     on Albuterol prn         Past Surgical History:     Past Surgical History:   Procedure Laterality Date    BIOPSY  [id number]    Cervix    DISCECTOMY  LUMBAR POSTERIOR MICROSCOPIC ONE LEVEL Left 7/13/2023    Procedure: LEFT Lumbar 4-Lumbar 5 MICRODISCECTOMY, SPINE, LUMBAR, 1 LEVEL, POSTERIOR APPROACH;  Surgeon: Sergo Estrada MD;  Location: [facility] OR    HC TOOTH EXTRACTION W/FORCEP          Social History:     Social History     Tobacco Use    Smoking status: Never    Smokeless tobacco: Never    Tobacco comments:     Nonsmoking household   Vaping Use    Vaping Use: Never used   Substance Use Topics    Alcohol use: Yes     Comment: occasional    Drug use: Never        Family History:     Family History   Problem Relation Age of Onset    Thyroid Disease Mother         graves-decompression and strabismus    Deep Vein Thrombosis Father     Breast Cancer Maternal Grandmother         in her 50s    Heart Disease Maternal Grandfather         50s    Myocardial Infarction Maternal Grandfather     Cancer Paternal Grandmother         cervical    Other Cancer Paternal Grandmother         Cervical Rectal    Cerebrovascular Disease Paternal Grandmother     Heart Disease Paternal Grandfather         50s    Myocardial Infarction Paternal Grandfather     Thyroid Disease Paternal Aunt         nine affected in family    Glaucoma No family hx of     Macular Degeneration No family hx of     Hypertension No family hx of     Diabetes No family hx of     Anesthesia Reaction No family hx of         Medications:     Current Outpatient Medications   Medication Sig    cetirizine (ZYRTEC) 10 MG tablet     diclofenac (VOLTAREN) 1 % topical gel Apply 4 g topically 4 times daily    ferrous sulfate (FE TABS) 325 (65 Fe) MG EC tablet Take 1 tablet (325 mg) by mouth daily    fluticasone (FLONASE) 50 MCG/ACT nasal spray Spray 1-2 sprays into both nostrils daily    gabapentin (NEURONTIN) 300 MG capsule Take 1 capsule (300 mg) by mouth 2 times daily    hydrOXYzine (ATARAX) 25 MG tablet Take 1 tablet (25 mg) by mouth as needed for anxiety or itching (pain control)    levonorgestrel (MIRENA) 20  MCG/24HR IUD 1 each by Intrauterine route once    levothyroxine (SYNTHROID/LEVOTHROID) 125 MCG tablet TAKE 1 TABLET BY MOUTH DAILY    meloxicam (MOBIC) 15 MG tablet Take 1 tablet (15 mg) by mouth as needed    methocarbamol (ROBAXIN) 750 MG tablet Take 1 tablet (750 mg) by mouth every 6 hours as needed for muscle spasms (muscle spasm)    metoprolol succinate ER (TOPROL XL) 25 MG 24 hr tablet Take 1 tablet (25 mg) by mouth daily    rimegepant (NURTEC) 75 MG ODT tablet Take 1 tablet (75 mg) by mouth every 48 hours     No current facility-administered medications for this visit.        Allergies:   No Known Allergies     Review of Systems:   As above     Physical Exam:   Vitals: There were no vitals taken for this visit.   No examination given virtual visit     Data reviewed on previous visits    Imaging:  MRA head 2019  IMPRESSION: Mildly motion degraded images. No aneurysm identified.      MRI cervical 2018  IMPRESSION:  1. Minimal C3-C4 disc bulge without stenosis.  2. No evidence for any demyelinating disease.    Pertinent Investigations since last visit:   None         Assessment and Plan:   Assessment:  Jennifer Pastrana is a 33 year old female who presents today for follow-up of episodic migraines without auras. Migraines were well controlled until recently when she was delayed in getting Nurtec prescription. She will let us know if headache frequency does not improve to adequate level. If headaches don't improve, we discussed switching to emgality.     Plan:  - Nurtec 75 mg q48 hours  - OTC medications (Excedrin, tylenol, ibuprofen) as needed for migraines  - Let us know if migraine frequency does not improve in 1 month    Follow up in Neurology clinic in 12 months or earlier as needed should new concerns arise.    Patric Duron MD   of Neurology  [facility]

## 2024-03-27 ENCOUNTER — VIRTUAL VISIT (OUTPATIENT)
Dept: NEUROLOGY | Facility: CLINIC | Age: 34
End: 2024-03-27
Payer: COMMERCIAL

## 2024-03-27 DIAGNOSIS — G43.009 MIGRAINE WITHOUT AURA AND WITHOUT STATUS MIGRAINOSUS, NOT INTRACTABLE: Primary | ICD-10-CM

## 2024-03-27 PROCEDURE — 99214 OFFICE O/P EST MOD 30 MIN: CPT | Mod: 95 | Performed by: INTERNAL MEDICINE

## 2024-03-27 NOTE — LETTER
3/27/2024         RE: Jennifer Pastrana  [address] [facility] [address]        Dear Colleague,    Thank you for referring your patient, Jennifer Pastrana, to the [facility] [place]. Please see a copy of my visit note below.    [facility] Neurology Follow Up Visit    Jennifer Pastrana MRN# [id number]   Age: 33 year old YOB: 1990     Brief history of symptoms: The patient was initially seen in neurologic consultation on 10/5/2021 for evaluation of migraines and head pains. Please see the comprehensive neurologic consultation note from that date in the Epic records for details.     Interval history:   She went off of Nurtec for about 1.5 weeks due to an issue with the PA. She has been back on it for the last few weeks, but has been getting several headaches a week during this span. Previously she was getting about 1 headache a month when she was consistently on the Nurtec. Headaches respond well to ibuprofen.       Past Medical History:     Patient Active Problem List   Diagnosis     CARDIOVASCULAR SCREENING; LDL GOAL LESS THAN 160     Hypothyroidism     Dry eye syndrome- mild     Dysplasia of cervix, low grade (MILO 1)     Generalized anxiety disorder     Supervision of normal first pregnancy     Migraine without aura and without status migrainosus, not intractable     Oral contraceptive pill surveillance     Seasonal allergic rhinitis due to pollen     Pollen-food allergy, subsequent encounter     Lumbar radiculopathy     Herniated lumbar intervertebral disc     H/O lumbar discectomy     Low back pain     Anterior knee pain, left     Past Medical History:   Diagnosis Date     AR (allergic rhinitis)      ASCUS with positive high risk HPV 02/2013    type 53     Cervical high risk HPV (human papillomavirus) test positive 02/25/2015     Dysplasia of cervix, low grade (MILO 1)      History of migraine headaches     controlled on Nortriptyline and Maxalt prn      Hypothyroidism      IUD  (intrauterine device) in place     Mirena- placed 10/2019     Mood disorder (H24)      Reactive airway disease that is not asthma     on Albuterol prn         Past Surgical History:     Past Surgical History:   Procedure Laterality Date     BIOPSY  [id number]    Cervix     DISCECTOMY LUMBAR POSTERIOR MICROSCOPIC ONE LEVEL Left 7/13/2023    Procedure: LEFT Lumbar 4-Lumbar 5 MICRODISCECTOMY, SPINE, LUMBAR, 1 LEVEL, POSTERIOR APPROACH;  Surgeon: Sergo Estrada MD;  Location: [facility] OR     HC TOOTH EXTRACTION W/FORCEP          Social History:     Social History     Tobacco Use     Smoking status: Never     Smokeless tobacco: Never     Tobacco comments:     Nonsmoking household   Vaping Use     Vaping Use: Never used   Substance Use Topics     Alcohol use: Yes     Comment: occasional     Drug use: Never        Family History:     Family History   Problem Relation Age of Onset     Thyroid Disease Mother         graves-decompression and strabismus     Deep Vein Thrombosis Father      Breast Cancer Maternal Grandmother         in her 50s     Heart Disease Maternal Grandfather         50s     Myocardial Infarction Maternal Grandfather      Cancer Paternal Grandmother         cervical     Other Cancer Paternal Grandmother         Cervical Rectal     Cerebrovascular Disease Paternal Grandmother      Heart Disease Paternal Grandfather         50s     Myocardial Infarction Paternal Grandfather      Thyroid Disease Paternal Aunt         nine affected in family     Glaucoma No family hx of      Macular Degeneration No family hx of      Hypertension No family hx of      Diabetes No family hx of      Anesthesia Reaction No family hx of         Medications:     Current Outpatient Medications   Medication Sig     cetirizine (ZYRTEC) 10 MG tablet      diclofenac (VOLTAREN) 1 % topical gel Apply 4 g topically 4 times daily     ferrous sulfate (FE TABS) 325 (65 Fe) MG EC tablet Take 1 tablet (325 mg) by mouth daily     fluticasone  (FLONASE) 50 MCG/ACT nasal spray Spray 1-2 sprays into both nostrils daily     gabapentin (NEURONTIN) 300 MG capsule Take 1 capsule (300 mg) by mouth 2 times daily     hydrOXYzine (ATARAX) 25 MG tablet Take 1 tablet (25 mg) by mouth as needed for anxiety or itching (pain control)     levonorgestrel (MIRENA) 20 MCG/24HR IUD 1 each by Intrauterine route once     levothyroxine (SYNTHROID/LEVOTHROID) 125 MCG tablet TAKE 1 TABLET BY MOUTH DAILY     meloxicam (MOBIC) 15 MG tablet Take 1 tablet (15 mg) by mouth as needed     methocarbamol (ROBAXIN) 750 MG tablet Take 1 tablet (750 mg) by mouth every 6 hours as needed for muscle spasms (muscle spasm)     metoprolol succinate ER (TOPROL XL) 25 MG 24 hr tablet Take 1 tablet (25 mg) by mouth daily     rimegepant (NURTEC) 75 MG ODT tablet Take 1 tablet (75 mg) by mouth every 48 hours     No current facility-administered medications for this visit.        Allergies:   No Known Allergies     Review of Systems:   As above     Physical Exam:   Vitals: There were no vitals taken for this visit.   No examination given virtual visit     Data reviewed on previous visits    Imaging:  MRA head 2019  IMPRESSION: Mildly motion degraded images. No aneurysm identified.      MRI cervical 2018  IMPRESSION:  1. Minimal C3-C4 disc bulge without stenosis.  2. No evidence for any demyelinating disease.    Pertinent Investigations since last visit:   None         Assessment and Plan:   Assessment:  Jennifer Pastrana is a 33 year old female who presents today for follow-up of episodic migraines without auras. Migraines were well controlled until recently when she was delayed in getting Nurtec prescription. She will let us know if headache frequency does not improve to adequate level. If headaches don't improve, we discussed switching to emgality.     Plan:  - Nurtec 75 mg q48 hours  - OTC medications (Excedrin, tylenol, ibuprofen) as needed for migraines  - Let us know if migraine frequency does not  improve in 1 month    Follow up in Neurology clinic in 12 months or earlier as needed should new concerns arise.    Patric Duron MD   of Neurology  [facility]    Jennifer is a 33 year old who is being evaluated via a billable video visit.    How would you like to obtain your AVS? MyChart  If the video visit is dropped, the invitation should be resent by: Text to cell phone: [phone]  Will anyone else be joining your video visit? No    Video-Visit Details    Type of service:  Video Visit   Video duration: 6 minutes  Originating Location (pt. Location): Home    Distant Location (provider location):  On-site  Platform used for Video Visit: Well       Again, thank you for allowing me to participate in the care of your patient.        Sincerely,        Patric Duron MD

## 2024-03-27 NOTE — PROGRESS NOTES
Jennifer is a 33 year old who is being evaluated via a billable video visit.    How would you like to obtain your AVS? MyChart  If the video visit is dropped, the invitation should be resent by: Text to cell phone: [phone]  Will anyone else be joining your video visit? No    Video-Visit Details    Type of service:  Video Visit   Video duration: 6 minutes  Originating Location (pt. Location): Home    Distant Location (provider location):  On-site  Platform used for Video Visit: Gabriela

## 2024-04-02 ENCOUNTER — THERAPY VISIT (OUTPATIENT)
Dept: PHYSICAL THERAPY | Facility: CLINIC | Age: 34
End: 2024-04-02
Payer: COMMERCIAL

## 2024-04-02 DIAGNOSIS — M25.562 ANTERIOR KNEE PAIN, LEFT: Primary | ICD-10-CM

## 2024-04-02 PROCEDURE — 97110 THERAPEUTIC EXERCISES: CPT | Mod: GP | Performed by: PHYSICAL THERAPIST

## 2024-04-02 PROCEDURE — 97140 MANUAL THERAPY 1/> REGIONS: CPT | Mod: GP | Performed by: PHYSICAL THERAPIST

## 2024-04-02 NOTE — PROGRESS NOTES
NOTES/NEXT:     Date  04/2/2024 Limb Occlusion Pressure  283 Percent (%) Occlusion  80% Training Occlusion Pressure  226 Exercises Performed  SLR x30 reps, Seated knee extension (5#AW) x 30 reps; single leg press    Total time under tourniquet  8:52    Immediate effects  discoloration Lingering effects (from previous session)  N/a   Blood Flow Restriction Training: Contraindications and precautions reviewed, pt safe for use of modality, Risks and benefits discussed; pt gave informed consent

## 2024-04-02 NOTE — PROGRESS NOTES
04/02/24 0500   Appointment Info   Signing clinician's name / credentials Netta Mora DPT   Total/Authorized Visits 9 per PN   Visits Used 5   Medical Diagnosis Anterior knee pain, left   PT Tx Diagnosis PFP syndrome   Precautions/Limitations Hx of disectomy 7/13/2023- neuro sx were on left leg   Other pertinent information Works as a Pharmacy Tech   Progress Note/Certification   Onset of illness/injury or Date of Surgery 01/22/23   Therapy Frequency 2x/month   Predicted Duration 2x/week   Progress Note Due Date 05/31/24   Progress Note Completed Date 04/02/24   PT Goal 1   Goal Identifier Stairs   Goal Description Pt to descend stairs x 12 without sx or tape   Rationale to maximize safety and independence with performance of ADLs and functional tasks   Goal Progress No change continued pain with stairs   Target Date 05/31/24   Subjective Report   Subjective Report The exercises are going ok.  I continue to hear the crunching.   Objective Measure 1   Objective Measure Palpation   Details TTP distal ITB, to continue with foam rolling.   Objective Measure 2   Objective Measure Strength   Details Trial of BFR to continue with strengthening   Therapeutic Procedure/Exercise   Therapeutic Procedures: strength, endurance, ROM, flexibility minutes (09993) 20   Ther Proc 1 BFR   Ther Proc 1 - Details Education, precautions   Ther Proc 2 SLR   Ther Proc 2 - Details with BFR x30 reps   Ther Proc 3 Single leg knee ext with BRF   Ther Proc 3 - Details x30 reps 5#   Ther Proc 4 Leg press   Ther Proc 4 - Details 3x10 80# at 80% occulsion   Skilled Intervention BFR initiated   Patient Response/Progress fatigued   Manual Therapy   Manual Therapy: Mobilization, MFR, MLD, friction massage minutes (75599) 15   Manual Therapy 1 L ITB, hamstring   Skilled Intervention Pain relief, addressing restricted muscles   Patient Response/Progress Improved soft tissue mobility post treatment   Manual Therapy 1 - Details MFR   Education    Learner/Method Patient   Plan   Home program Continue with HEP   Plan for next session Trial of BFR today- follow up on results.   Total Session Time   Timed Code Treatment Minutes 35   Total Treatment Time (sum of timed and untimed services) 35         PLAN  Continue therapy per current plan of care.    Beginning/End Dates of Progress Note Reporting Period:  1/22/2024 to 04/02/2024    Referring Provider:  Ashley Fish

## 2024-04-06 ENCOUNTER — E-VISIT (OUTPATIENT)
Dept: URGENT CARE | Facility: CLINIC | Age: 34
End: 2024-04-06
Payer: COMMERCIAL

## 2024-04-06 DIAGNOSIS — R06.2 WHEEZING: Primary | ICD-10-CM

## 2024-04-06 PROCEDURE — 99207 PR NON-BILLABLE SERV PER CHARTING: CPT | Performed by: NURSE PRACTITIONER

## 2024-04-08 NOTE — PATIENT INSTRUCTIONS
Dear Jennifer Pastrana,    We are sorry you are not feeling well. Based on the responses you provided, it is recommended that you be seen in-person in urgent care so we can better evaluate your symptoms. Please click here to find the nearest urgent care location to you.   You will not be charged for this Visit. Thank you for trusting us with your care.    Ora Frazier, CNP

## 2024-04-17 ENCOUNTER — THERAPY VISIT (OUTPATIENT)
Dept: PHYSICAL THERAPY | Facility: CLINIC | Age: 34
End: 2024-04-17
Payer: COMMERCIAL

## 2024-04-17 DIAGNOSIS — M25.562 ANTERIOR KNEE PAIN, LEFT: Primary | ICD-10-CM

## 2024-04-17 PROCEDURE — 97110 THERAPEUTIC EXERCISES: CPT | Mod: GP

## 2024-04-17 NOTE — PROGRESS NOTES
NOTES/NEXT:    Date  04/17/2024 Limb Occlusion Pressure  272 Percent (%) Occlusion  80% Training Occlusion Pressure  218 Exercises Performed  Seated knee extension 5# weight    Leg press 20# weight   Total time under tourniquet  16:26   Immediate effects  discoloration Lingering effects (from previous session)  Soreness for 4-5hrs   Blood Flow Restriction Training: Contraindications and precautions reviewed, pt safe for use of modality, Risks and benefits discussed; pt gave informed consent

## 2024-05-01 ENCOUNTER — THERAPY VISIT (OUTPATIENT)
Dept: PHYSICAL THERAPY | Facility: CLINIC | Age: 34
End: 2024-05-01
Payer: COMMERCIAL

## 2024-05-01 DIAGNOSIS — M25.562 CHRONIC PAIN OF LEFT KNEE: ICD-10-CM

## 2024-05-01 DIAGNOSIS — M25.562 ANTERIOR KNEE PAIN, LEFT: Primary | ICD-10-CM

## 2024-05-01 DIAGNOSIS — G89.29 CHRONIC PAIN OF LEFT KNEE: ICD-10-CM

## 2024-05-01 PROCEDURE — 97110 THERAPEUTIC EXERCISES: CPT | Mod: GP

## 2024-05-01 NOTE — PROGRESS NOTES
"NOTES/NEXT:    Date  05/01/2024 Limb Occlusion Pressure  275 Percent (%) Occlusion  80% Training Occlusion Pressure  220 Exercises Performed  Lateral step down 4\" (8/10 RPE at end)    Seated knee extension 2# weights (10/10)   Total time under tourniquet  15:06   Immediate effects  discoloration Lingering effects (from previous session)  N/a   Blood Flow Restriction Training: Contraindications and precautions reviewed, pt safe for use of modality, Risks and benefits discussed; pt gave informed consent    "

## 2024-05-15 ENCOUNTER — THERAPY VISIT (OUTPATIENT)
Dept: PHYSICAL THERAPY | Facility: CLINIC | Age: 34
End: 2024-05-15
Payer: COMMERCIAL

## 2024-05-15 DIAGNOSIS — M25.562 ANTERIOR KNEE PAIN, LEFT: Primary | ICD-10-CM

## 2024-05-15 PROCEDURE — 97110 THERAPEUTIC EXERCISES: CPT | Mod: GP

## 2024-05-15 NOTE — PROGRESS NOTES
NOTES/NEXT:    Date  05/15/2024 Limb Occlusion Pressure  234 Percent (%) Occlusion  80 Training Occlusion Pressure  187 Exercises Performed  SL leg press, seated knee extension, lateral step down   Total time under tourniquet  22:05   Immediate effects  discoloration Lingering effects (from previous session)  N/a   Blood Flow Restriction Training: Contraindications and precautions reviewed, pt safe for use of modality, Risks and benefits discussed; pt gave informed consent

## 2024-06-05 ENCOUNTER — OFFICE VISIT (OUTPATIENT)
Dept: OPTOMETRY | Facility: CLINIC | Age: 34
End: 2024-06-05
Payer: COMMERCIAL

## 2024-06-05 DIAGNOSIS — H02.206: Primary | ICD-10-CM

## 2024-06-05 DIAGNOSIS — H16.213 EXPOSURE KERATOCONJUNCTIVITIS OF EYE, BILATERAL: ICD-10-CM

## 2024-06-05 PROCEDURE — 99213 OFFICE O/P EST LOW 20 MIN: CPT | Performed by: OPTOMETRIST

## 2024-06-05 ASSESSMENT — VISUAL ACUITY
OD_CC+: -1
OS_CC: 20/20
METHOD: SNELLEN - LINEAR
CORRECTION_TYPE: GLASSES
OD_CC: 20/20

## 2024-06-05 NOTE — PROGRESS NOTES
Chief Complaint   Patient presents with    Eye Pain       Chief Complaint(s) and History of Present Illness(es)       Eye Pain              Laterality: In both eyes (Worse in the left)    Pain scale: 3/10    Frequency: intermittently    Timing: in the evening (A little worse at night. When it first started there was some crusting in the morning but that has resolved)    Duration: 2 months    Course: gradually improving (The redness has improved but the dryness remains the same)    Associated symptoms: blurred vision and redness (They eyes are dry. The left eye does not want to open in the morning. She finds this happens more often when she sleeps on her left side. The blurred vision is mostly in the left eye)    Treatments tried: eye drops (Allergy eye drops and preservative free drops and gel)    Response to treatment: mild improvement (The drops only give temporary drops and they do not seem to be working as well as they did when she started using them)                When sleeps on left side , left eye feels worse upon waking     Theratears Gel at bed time   Refresh optiveOptive four times a day   Alaway generic - not used for a while , no itchy eyes lately    Cecille Lamprecht  Optometric Assistant, [facility]       Review of Symptoms    EYES: See HPI  HEME/ALLERGY/IMMUNE:    NEGATIVE for   itchy eyes , sneezing         Medical, surgical and family histories reviewed and updated 6/5/2024.         OBJECTIVE: See Ophthalmology exam    ASSESSMENT:    ICD-10-CM    1. Incomplete closure of lid, left  H02.206       2. 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     Taking Fish oil helps body make more tears     Muna Slade,  ARMAAN  [facility]  [address] [place] [address]  [phone]      SleepTite/SleepRite -eye shield  use nightly due to incomplete lid closure while sleeping    Available  at [email or web address].

## 2024-06-05 NOTE — LETTER
6/5/2024      Jennifer Pastrana  [address] [place] [id number]      Dear Colleague,    Thank you for referring your patient, Jennifer Pastrana, to the [facility]. Please see a copy of my visit note below.    Chief Complaint   Patient presents with     Eye Pain       Chief Complaint(s) and History of Present Illness(es)       Eye Pain              Laterality: In both eyes (Worse in the left)    Pain scale: 3/10    Frequency: intermittently    Timing: in the evening (A little worse at night. When it first started there was some crusting in the morning but that has resolved)    Duration: 2 months    Course: gradually improving (The redness has improved but the dryness remains the same)    Associated symptoms: blurred vision and redness (They eyes are dry. The left eye does not want to open in the morning. She finds this happens more often when she sleeps on her left side. The blurred vision is mostly in the left eye)    Treatments tried: eye drops (Allergy eye drops and preservative free drops and gel)    Response to treatment: mild improvement (The drops only give temporary drops and they do not seem to be working as well as they did when she started using them)                When sleeps on left side , left eye feels worse upon waking     Theratears Gel at bed time   Refresh optiveOptive four times a day   Alaway generic - not used for a while , no itchy eyes lately    Cecille Lamprecht  Optometric Assistant, [facility]       Review of Symptoms    EYES: See HPI  HEME/ALLERGY/IMMUNE:    NEGATIVE for   itchy eyes , sneezing         Medical, surgical and family histories reviewed and updated 6/5/2024.         OBJECTIVE: See Ophthalmology exam    ASSESSMENT:    ICD-10-CM    1. Incomplete closure of lid, left  H02.206       2. 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     Taking Fish oil helps body make more tears     Muna Slade O.D.  [facility]  [address] [place] [address]  [phone]      SleepTite/SleepRite -eye shield  use nightly due to incomplete lid closure while sleeping    Available  at [email or web address].            Again, thank you for allowing me to participate in the care of your patient.        Sincerely,        Muna Slade, OD

## 2024-06-05 NOTE — PATIENT INSTRUCTIONS
Continue use of artificial tears  Thera tears gel at bed time   Use over the counter artificial tears 2 times a day (Soothe XP-Xtra Protection (white liquid), Systane Balance  (white liquid)  Or Systane Complete PF    These are oil based.     Taking Fish oil helps body make more tears     Muna Slade O.D.  [facility]  [address] [place] [address] [phone]      SleepTite/SleepRite -eye shield  use nightly due to incomplete lid closure while sleeping    Available  at [email or web address].

## 2024-06-20 ENCOUNTER — OFFICE VISIT (OUTPATIENT)
Dept: OPTOMETRY | Facility: CLINIC | Age: 34
End: 2024-06-20
Payer: COMMERCIAL

## 2024-06-20 DIAGNOSIS — H16.213 EXPOSURE KERATOCONJUNCTIVITIS OF EYE, BILATERAL: ICD-10-CM

## 2024-06-20 DIAGNOSIS — H52.13 MYOPIA OF BOTH EYES: Primary | ICD-10-CM

## 2024-06-20 DIAGNOSIS — H52.223 REGULAR ASTIGMATISM OF BOTH EYES: ICD-10-CM

## 2024-06-20 PROCEDURE — 99213 OFFICE O/P EST LOW 20 MIN: CPT | Performed by: OPTOMETRIST

## 2024-06-20 ASSESSMENT — REFRACTION_WEARINGRX
OS_AXIS: 060
OD_SPHERE: -2.25
OS_CYLINDER: SPHERE
OD_CYLINDER: +0.25
OD_SPHERE: -2.25
OS_SPHERE: -2.00
SPECS_TYPE: SVL
SPECS_TYPE: SVL
OD_CYLINDER: +0.50
OD_AXIS: 155
OS_SPHERE: -2.00
OD_AXIS: 140
OS_CYLINDER: +0.25

## 2024-06-20 ASSESSMENT — KERATOMETRY
OD_K2POWER_DIOPTERS: 44.00
OS_K2POWER_DIOPTERS: 43.75
OS_AXISANGLE2_DEGREES: 18
OS_K1POWER_DIOPTERS: 42.25
OD_K1POWER_DIOPTERS: 43.00
OD_AXISANGLE2_DEGREES: 27

## 2024-06-20 ASSESSMENT — VISUAL ACUITY
OS_CC+: -2
OD_CC: 20/20
METHOD: SNELLEN - LINEAR
OS_CC: 20/30
CORRECTION_TYPE: GLASSES

## 2024-06-20 ASSESSMENT — REFRACTION_MANIFEST
OD_AXIS: 149
OS_SPHERE: -1.50
OD_CYLINDER: +0.50
OS_CYLINDER: +1.00
OS_SPHERE: -1.50
METHOD_AUTOREFRACTION: 1
OS_CYLINDER: +0.75
OS_AXIS: 062
OD_CYLINDER: +0.50
OS_AXIS: 055
OD_SPHERE: -2.25
OD_AXIS: 140
OD_SPHERE: -2.50

## 2024-06-20 ASSESSMENT — SLIT LAMP EXAM - LIDS
COMMENTS: NORMAL
COMMENTS: NORMAL

## 2024-06-20 NOTE — LETTER
6/20/2024      Jennifer Pastrana  [address] [place] [facility] [address]      Dear Colleague,    Thank you for referring your patient, Jennifer Pastrana, to the [facility]. Please see a copy of my visit note below.    Chief Complaint   Patient presents with     Eye Problem Left Eye     Patient states she was here for a CE in February, Wearing old glasses still. Also mentioned that she was here 2 weeks ago for dry eyes. Now er left eye is blurry all of the time. Mentioned that she has been using OTC drops every couple hours and nothing is helping. She has ordered new glasses but they aren't here yet.        Chief Complaint(s) and History of Present Illness(es)       Eye Problem Left Eye              Laterality: left eye    Onset: gradual    Onset: 2 weeks ago    Quality: blurred vision    Frequency: constantly    Treatments tried: eye drops and artificial tears    Comments: Patient states she was here for a CE in February, Wearing old glasses still. Also mentioned that she was here 2 weeks ago for dry eyes. Now er left eye is blurry all of the time. Mentioned that she has been using OTC drops every couple hours and nothing is helping. She has ordered new glasses but they aren't here yet.                   Has not been using contact lenses due to dry eyes       Bee Cortez Optometric Assistant        Dryness has improved but now left is more blutrry        Medical, surgical and family histories reviewed and updated 6/20/2024.         OBJECTIVE: See Ophthalmology exam    ASSESSMENT:    ICD-10-CM    1. Myopia of both eyes  H52.13       2. Regular astigmatism of both eyes  H52.223       3. 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  [facility]  [address] [place] [address]  [phone]           Again, thank you for allowing me to participate in the care of your patient.        Sincerely,        Muna Slade, OD

## 2024-06-20 NOTE — PROGRESS NOTES
Chief Complaint   Patient presents with    Eye Problem Left Eye     Patient states she was here for a CE in February, Wearing old glasses still. Also mentioned that she was here 2 weeks ago for dry eyes. Now er left eye is blurry all of the time. Mentioned that she has been using OTC drops every couple hours and nothing is helping. She has ordered new glasses but they aren't here yet.        Chief Complaint(s) and History of Present Illness(es)       Eye Problem Left Eye              Laterality: left eye    Onset: gradual    Onset: 2 weeks ago    Quality: blurred vision    Frequency: constantly    Treatments tried: eye drops and artificial tears    Comments: Patient states she was here for a CE in February, Wearing old glasses still. Also mentioned that she was here 2 weeks ago for dry eyes. Now er left eye is blurry all of the time. Mentioned that she has been using OTC drops every couple hours and nothing is helping. She has ordered new glasses but they aren't here yet.                   Has not been using contact lenses due to dry eyes       Bee Apple Optometric Assistant        Dryness has improved but now left is more blutrry        Medical, surgical and family histories reviewed and updated 6/20/2024.         OBJECTIVE: See Ophthalmology exam    ASSESSMENT:    ICD-10-CM    1. Myopia of both eyes  H52.13       2. Regular astigmatism of both eyes  H52.223       3. 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  [facility]  [address] [place] [address]  [phone]

## 2024-06-20 NOTE — PATIENT INSTRUCTIONS
Continue use of frequent artificial tears both eyes     Fill glasses prescription, prescription change left lens  Allow 2 weeks to adapt to change in glasses  Return in 1 year for eye exam    Muna Slade O.D.  [facility]  [address] [place] [address] [phone]

## 2024-07-25 ENCOUNTER — TELEPHONE (OUTPATIENT)
Dept: NEUROLOGY | Facility: CLINIC | Age: 34
End: 2024-07-25

## 2024-07-25 NOTE — TELEPHONE ENCOUNTER
[facility] Prior Authorization Team   Phone: [phone]  Fax: [phone]    Prior Authorization Approval    Medication: EMGALITY 120 MG/ML SC SOAJ  STARTER Authorization Effective Date: 4/25/2024  STARTER Authorization Expiration Date: 05/25/2024  MAINTENANCE Authorization Effective Date: 05/18/2024  MAINTENANCE Authorization Expiration Date: 10/15/2024  Approved Dose/Quantity: 1ML/30DS  Reference #: [id number]   Insurance Company: [employer] - Phone [phone] Fax [phone]  Expected CoPay: $ 0  CoPay Card Available: Yes    Financial Assistance Needed: YES - PT IS ALREADY ENROLLED  Which [facility] filling the [facility] - [place], [place] - [address]  Pharmacy Notified: YES  Patient Notified: YES

## 2024-08-14 ENCOUNTER — ORDERS ONLY (AUTO-RELEASED) (OUTPATIENT)
Dept: FAMILY MEDICINE | Facility: CLINIC | Age: 34
End: 2024-08-14
Payer: COMMERCIAL

## 2024-08-14 ENCOUNTER — VIRTUAL VISIT (OUTPATIENT)
Dept: FAMILY MEDICINE | Facility: CLINIC | Age: 34
End: 2024-08-14
Payer: COMMERCIAL

## 2024-08-14 DIAGNOSIS — R00.2 PALPITATIONS: Primary | ICD-10-CM

## 2024-08-14 DIAGNOSIS — E03.9 ACQUIRED HYPOTHYROIDISM: ICD-10-CM

## 2024-08-14 DIAGNOSIS — R00.2 PALPITATIONS: ICD-10-CM

## 2024-08-14 DIAGNOSIS — E66.01 MORBID OBESITY (H): ICD-10-CM

## 2024-08-14 PROCEDURE — 99214 OFFICE O/P EST MOD 30 MIN: CPT | Mod: 95 | Performed by: FAMILY MEDICINE

## 2024-08-14 ASSESSMENT — ANXIETY QUESTIONNAIRES
1. FEELING NERVOUS, ANXIOUS, OR ON EDGE: NOT AT ALL
GAD7 TOTAL SCORE: 0
GAD7 TOTAL SCORE: 0
2. NOT BEING ABLE TO STOP OR CONTROL WORRYING: NOT AT ALL
7. FEELING AFRAID AS IF SOMETHING AWFUL MIGHT HAPPEN: NOT AT ALL
5. BEING SO RESTLESS THAT IT IS HARD TO SIT STILL: NOT AT ALL
6. BECOMING EASILY ANNOYED OR IRRITABLE: NOT AT ALL
3. WORRYING TOO MUCH ABOUT DIFFERENT THINGS: NOT AT ALL

## 2024-08-14 ASSESSMENT — PATIENT HEALTH QUESTIONNAIRE - PHQ9: 5. POOR APPETITE OR OVEREATING: NOT AT ALL

## 2024-08-14 NOTE — PROGRESS NOTES
"Jennifer is a 33 year old who is being evaluated via a billable video visit.    How would you like to obtain your AVS? MyChart  If the video visit is dropped, the invitation should be resent by: Text to cell phone: [phone]  Will anyone else be joining your video visit? No      Assessment & Plan     Jennifer was seen today for palpitations.    Diagnoses and all orders for this visit:    Palpitations- differentials to include anemia, arhythmia, thyroid abnormality, anxiety  -     CBC with platelets; Future  -     TSH with free T4 reflex; Future  -      ARNIE 7 assessment- normal   -     ZIO PATCH MAIL OUT; Future    Acquired hypothyroidism, compensated on Levothyroxine   -     TSH with free T4 reflex; Future    Morbid obesity (H)        -      Weight management plan: Discussed healthy diet and exercise guidelines      BMI  Estimated body mass index is 36.27 kg/m  as calculated from the following:    Height as of 12/13/23: 1.613 m (5' 3.5\").    Weight as of 12/13/23: 94.3 kg (208 lb).   Weight management plan: Discussed healthy diet and exercise guidelines    Will notify patient with results and next steps.     Return in about 4 months (around 12/14/2024) for Physical Exam and as needed throughout the year.      Subjective   Jennifer is a 33 year old, presenting for the following health issues:  Palpitations    HPI     Concern - palpitations   Onset: noticed it about 2 weeks ago  Description:   Occurs 3-4 days/week. Feels like fluttering in the chest even while at rest.   Intensity: moderate  Progression of Symptoms:  worsening and intermittent  Accompanying Signs & Symptoms:  No chest pain, no shortness of breath, no lightheadedness/diaphoresis  Previous history of similar problem:   Yes, in 2020  Precipitating factors:   Worsened by: unknown   Alleviating factors:  Improved by: unknown     Therapies Tried and outcome: tried cutting back on caffeine.     Assess for anxiety-       8/14/2024     5:35 PM   ARNIE-7    1. " Feeling nervous, anxious, or on edge 0   2. Not being able to stop or control worrying 0   3. Worrying too much about different things 0   4. Trouble relaxing 0   5. Being so restless that it is hard to sit still 0   6. Becoming easily annoyed or irritable 0   7. Feeling afraid, as if something awful might happen 0   ARNIE-7 Total Score 0     In the past two weeks have you had thoughts of suicide or self-harm?  No.    Do you have concerns about your personal safety or the safety of others?   No      Review of Systems  Constitutional, HEENT, cardiovascular, pulmonary, gi and gu systems are negative, except as otherwise noted.      Objective           Vitals:  No vitals were obtained today due to virtual visit.    Physical Exam   GENERAL: alert and no distress  EYES: Eyes grossly normal to inspection.  No discharge or erythema, or obvious scleral/conjunctival abnormalities.  RESP: No audible wheeze, cough, or visible cyanosis.    SKIN: Visible skin clear. No significant rash, abnormal pigmentation or lesions.  NEURO: Cranial nerves grossly intact.  Mentation and speech appropriate for age.  PSYCH: Appropriate affect, tone, and pace of words    DATA  Previous Zio patch from 2020 reviewed.       Video-Visit Details    Type of service:  Video Visit   Originating Location (pt. Location): Home  Distant Location (provider location):  On-site  Platform used for Video Visit: Gabriela  Signed Electronically by: Ashley Fish MD

## 2024-08-21 ENCOUNTER — LAB (OUTPATIENT)
Dept: LAB | Facility: CLINIC | Age: 34
End: 2024-08-21
Payer: COMMERCIAL

## 2024-08-21 DIAGNOSIS — E03.9 ACQUIRED HYPOTHYROIDISM: ICD-10-CM

## 2024-08-21 DIAGNOSIS — R00.2 PALPITATIONS: ICD-10-CM

## 2024-08-21 LAB
ERYTHROCYTE [DISTWIDTH] IN BLOOD BY AUTOMATED COUNT: 12.1 % (ref 10–15)
HCT VFR BLD AUTO: 43.8 % (ref 35–47)
HGB BLD-MCNC: 14.7 G/DL (ref 11.7–15.7)
MCH RBC QN AUTO: 30.7 PG (ref 26.5–33)
MCHC RBC AUTO-ENTMCNC: 33.6 G/DL (ref 31.5–36.5)
MCV RBC AUTO: 91 FL (ref 78–100)
PLATELET # BLD AUTO: 275 10E3/UL (ref 150–450)
RBC # BLD AUTO: 4.79 10E6/UL (ref 3.8–5.2)
WBC # BLD AUTO: 6.8 10E3/UL (ref 4–11)

## 2024-08-21 PROCEDURE — 84439 ASSAY OF FREE THYROXINE: CPT

## 2024-08-21 PROCEDURE — 84443 ASSAY THYROID STIM HORMONE: CPT

## 2024-08-21 PROCEDURE — 36415 COLL VENOUS BLD VENIPUNCTURE: CPT

## 2024-08-21 PROCEDURE — 85027 COMPLETE CBC AUTOMATED: CPT

## 2024-08-22 LAB
T4 FREE SERPL-MCNC: 1.55 NG/DL (ref 0.9–1.7)
TSH SERPL DL<=0.005 MIU/L-ACNC: 6.44 UIU/ML (ref 0.3–4.2)

## 2024-08-27 DIAGNOSIS — E03.9 ACQUIRED HYPOTHYROIDISM: ICD-10-CM

## 2024-08-27 RX ORDER — LEVOTHYROXINE SODIUM 137 UG/1
137 TABLET ORAL DAILY
Qty: 30 TABLET | Refills: 1 | Status: SHIPPED | OUTPATIENT
Start: 2024-08-27

## 2024-09-08 PROCEDURE — 93248 EXT ECG>7D<15D REV&INTERPJ: CPT | Performed by: INTERNAL MEDICINE

## 2024-10-20 DIAGNOSIS — E03.9 ACQUIRED HYPOTHYROIDISM: ICD-10-CM

## 2024-10-22 RX ORDER — LEVOTHYROXINE SODIUM 137 UG/1
137 TABLET ORAL DAILY
Qty: 30 TABLET | Refills: 1 | Status: SHIPPED | OUTPATIENT
Start: 2024-10-22 | End: 2024-10-31

## 2024-10-24 SDOH — HEALTH STABILITY: PHYSICAL HEALTH: ON AVERAGE, HOW MANY DAYS PER WEEK DO YOU ENGAGE IN MODERATE TO STRENUOUS EXERCISE (LIKE A BRISK WALK)?: 5 DAYS

## 2024-10-24 SDOH — HEALTH STABILITY: PHYSICAL HEALTH: ON AVERAGE, HOW MANY MINUTES DO YOU ENGAGE IN EXERCISE AT THIS LEVEL?: 40 MIN

## 2024-10-24 ASSESSMENT — SOCIAL DETERMINANTS OF HEALTH (SDOH): HOW OFTEN DO YOU GET TOGETHER WITH FRIENDS OR RELATIVES?: ONCE A WEEK

## 2024-10-29 ENCOUNTER — OFFICE VISIT (OUTPATIENT)
Dept: FAMILY MEDICINE | Facility: CLINIC | Age: 34
End: 2024-10-29
Payer: COMMERCIAL

## 2024-10-29 VITALS
HEART RATE: 78 BPM | WEIGHT: 203.1 LBS | HEIGHT: 64 IN | TEMPERATURE: 97.6 F | DIASTOLIC BLOOD PRESSURE: 70 MMHG | BODY MASS INDEX: 34.67 KG/M2 | SYSTOLIC BLOOD PRESSURE: 104 MMHG | RESPIRATION RATE: 18 BRPM | OXYGEN SATURATION: 99 %

## 2024-10-29 DIAGNOSIS — E66.812 CLASS 2 SEVERE OBESITY DUE TO EXCESS CALORIES WITH SERIOUS COMORBIDITY AND BODY MASS INDEX (BMI) OF 35.0 TO 35.9 IN ADULT (H): ICD-10-CM

## 2024-10-29 DIAGNOSIS — I49.1 SUPRAVENTRICULAR PREMATURE BEATS: ICD-10-CM

## 2024-10-29 DIAGNOSIS — J30.2 CHRONIC SEASONAL ALLERGIC RHINITIS: ICD-10-CM

## 2024-10-29 DIAGNOSIS — Z00.00 ROUTINE GENERAL MEDICAL EXAMINATION AT A HEALTH CARE FACILITY: Primary | ICD-10-CM

## 2024-10-29 DIAGNOSIS — Z23 NEED FOR VACCINATION: ICD-10-CM

## 2024-10-29 DIAGNOSIS — M54.16 LUMBAR RADICULOPATHY: ICD-10-CM

## 2024-10-29 DIAGNOSIS — E03.9 ACQUIRED HYPOTHYROIDISM: ICD-10-CM

## 2024-10-29 DIAGNOSIS — Z13.220 LIPID SCREENING: ICD-10-CM

## 2024-10-29 DIAGNOSIS — E66.01 CLASS 2 SEVERE OBESITY DUE TO EXCESS CALORIES WITH SERIOUS COMORBIDITY AND BODY MASS INDEX (BMI) OF 35.0 TO 35.9 IN ADULT (H): ICD-10-CM

## 2024-10-29 LAB
ALBUMIN SERPL BCG-MCNC: 4.5 G/DL (ref 3.5–5.2)
ALP SERPL-CCNC: 80 U/L (ref 40–150)
ALT SERPL W P-5'-P-CCNC: 10 U/L (ref 0–50)
ANION GAP SERPL CALCULATED.3IONS-SCNC: 9 MMOL/L (ref 7–15)
AST SERPL W P-5'-P-CCNC: 17 U/L (ref 0–45)
BILIRUB SERPL-MCNC: 1.1 MG/DL
BUN SERPL-MCNC: 13.6 MG/DL (ref 6–20)
CALCIUM SERPL-MCNC: 9.5 MG/DL (ref 8.8–10.4)
CHLORIDE SERPL-SCNC: 103 MMOL/L (ref 98–107)
CHOLEST SERPL-MCNC: 149 MG/DL
CREAT SERPL-MCNC: 0.8 MG/DL (ref 0.51–0.95)
EGFRCR SERPLBLD CKD-EPI 2021: >90 ML/MIN/1.73M2
FASTING STATUS PATIENT QL REPORTED: YES
FASTING STATUS PATIENT QL REPORTED: YES
GLUCOSE SERPL-MCNC: 89 MG/DL (ref 70–99)
HCO3 SERPL-SCNC: 25 MMOL/L (ref 22–29)
HDLC SERPL-MCNC: 36 MG/DL
LDLC SERPL CALC-MCNC: 96 MG/DL
NONHDLC SERPL-MCNC: 113 MG/DL
POTASSIUM SERPL-SCNC: 4.7 MMOL/L (ref 3.4–5.3)
PROT SERPL-MCNC: 7.4 G/DL (ref 6.4–8.3)
SODIUM SERPL-SCNC: 137 MMOL/L (ref 135–145)
TRIGL SERPL-MCNC: 87 MG/DL
TSH SERPL DL<=0.005 MIU/L-ACNC: 2.44 UIU/ML (ref 0.3–4.2)

## 2024-10-29 PROCEDURE — 99395 PREV VISIT EST AGE 18-39: CPT | Mod: 25 | Performed by: FAMILY MEDICINE

## 2024-10-29 PROCEDURE — 84443 ASSAY THYROID STIM HORMONE: CPT | Performed by: FAMILY MEDICINE

## 2024-10-29 PROCEDURE — 36415 COLL VENOUS BLD VENIPUNCTURE: CPT | Performed by: FAMILY MEDICINE

## 2024-10-29 PROCEDURE — 99214 OFFICE O/P EST MOD 30 MIN: CPT | Mod: 25 | Performed by: FAMILY MEDICINE

## 2024-10-29 PROCEDURE — 80061 LIPID PANEL: CPT | Performed by: FAMILY MEDICINE

## 2024-10-29 PROCEDURE — 90656 IIV3 VACC NO PRSV 0.5 ML IM: CPT | Performed by: FAMILY MEDICINE

## 2024-10-29 PROCEDURE — 80053 COMPREHEN METABOLIC PANEL: CPT | Performed by: FAMILY MEDICINE

## 2024-10-29 PROCEDURE — 90471 IMMUNIZATION ADMIN: CPT | Performed by: FAMILY MEDICINE

## 2024-10-29 RX ORDER — FLUTICASONE PROPIONATE 50 MCG
1-2 SPRAY, SUSPENSION (ML) NASAL DAILY
Qty: 9.9 ML | Refills: 3 | Status: SHIPPED | OUTPATIENT
Start: 2024-10-29

## 2024-10-29 RX ORDER — FERROUS SULFATE 325(65) MG
325 TABLET, DELAYED RELEASE (ENTERIC COATED) ORAL DAILY
Qty: 90 TABLET | Refills: 1 | Status: CANCELLED | OUTPATIENT
Start: 2024-10-29

## 2024-10-29 RX ORDER — METOPROLOL SUCCINATE 25 MG/1
25 TABLET, EXTENDED RELEASE ORAL DAILY
Qty: 90 TABLET | Refills: 3 | Status: SHIPPED | OUTPATIENT
Start: 2024-10-29

## 2024-10-29 RX ORDER — METHOCARBAMOL 750 MG/1
750 TABLET, FILM COATED ORAL EVERY 6 HOURS PRN
Qty: 60 TABLET | Refills: 0 | Status: SHIPPED | OUTPATIENT
Start: 2024-10-29

## 2024-10-29 NOTE — PATIENT INSTRUCTIONS
Patient Education   Preventive Care Advice   This is general advice given by our system to help you stay healthy. However, your care team may have specific advice just for you. Please talk to your care team about your preventive care needs.  Nutrition  Eat 5 or more servings of fruits and vegetables each day.  Try wheat bread, brown rice and whole grain pasta (instead of white bread, rice, and pasta).  Get enough calcium and vitamin D. Check the label on foods and aim for 100% of the RDA (recommended daily allowance).  Lifestyle  Exercise at least 150 minutes each week  (30 minutes a day, 5 days a week).  Do muscle strengthening activities 2 days a week. These help control your weight and prevent disease.  No smoking.  Wear sunscreen to prevent skin cancer.  Have a dental exam and cleaning every 6 months.  Yearly exams  See your health care team every year to talk about:  Any changes in your health.  Any medicines your care team has prescribed.  Preventive care, family planning, and ways to prevent chronic diseases.  Shots (vaccines)   HPV shots (up to age 26), if you've never had them before.  Hepatitis B shots (up to age 59), if you've never had them before.  COVID-19 shot: Get this shot when it's due.  Flu shot: Get a flu shot every year.  Tetanus shot: Get a tetanus shot every 10 years.  Pneumococcal, hepatitis A, and RSV shots: Ask your care team if you need these based on your risk.  Shingles shot (for age 50 and up)  General health tests  Diabetes screening:  Starting at age 35, Get screened for diabetes at least every 3 years.  If you are younger than age 35, ask your care team if you should be screened for diabetes.  Cholesterol test: At age 39, start having a cholesterol test every 5 years, or more often if advised.  Bone density scan (DEXA): At age 50, ask your care team if you should have this scan for osteoporosis (brittle bones).  Hepatitis C: Get tested at least once in your life.  STIs (sexually  transmitted infections)  Before age 24: Ask your care team if you should be screened for STIs.  After age 24: Get screened for STIs if you're at risk. You are at risk for STIs (including HIV) if:  You are sexually active with more than one person.  You don't use condoms every time.  You or a partner was diagnosed with a sexually transmitted infection.  If you are at risk for HIV, ask about PrEP medicine to prevent HIV.  Get tested for HIV at least once in your life, whether you are at risk for HIV or not.  Cancer screening tests  Cervical cancer screening: If you have a cervix, begin getting regular cervical cancer screening tests starting at age 21.  Breast cancer scan (mammogram): If you've ever had breasts, begin having regular mammograms starting at age 40. This is a scan to check for breast cancer.  Colon cancer screening: It is important to start screening for colon cancer at age 45.  Have a colonoscopy test every 10 years (or more often if you're at risk) Or, ask your provider about stool tests like a FIT test every year or Cologuard test every 3 years.  To learn more about your testing options, visit:   .  For help making a decision, visit:   https://bit.ly/fv04212.  Prostate cancer screening test: If you have a prostate, ask your care team if a prostate cancer screening test (PSA) at age 55 is right for you.  Lung cancer screening: If you are a current or former smoker ages 50 to 80, ask your care team if ongoing lung cancer screenings are right for you.  For informational purposes only. Not to replace the advice of your health care provider. Copyright   2023 [employer]. All rights reserved. Clinically reviewed by the [facility] Transitions Program. [employer] 178832 - REV 01/24.

## 2024-10-29 NOTE — PROGRESS NOTES
Preventive Care Visit  [facility] ADAM Fish MD, Family Medicine  Oct 29, 2024      Assessment & Plan     Jennifer was seen today for physical.    Diagnoses and all orders for this visit:    Routine general medical examination at a health care facility  -     PRIMARY CARE FOLLOW-UP SCHEDULING; Future  -     Comprehensive metabolic panel (BMP + Alb, Alk Phos, ALT, AST, Total. Bili, TP)    Lipid screening  -     Lipid panel reflex to direct LDL Fasting    Acquired hypothyroidism, on Levothyroxine  -     TSH with free T4 reflex    Chronic seasonal allergic rhinitis  -     Refill: fluticasone (FLONASE) 50 MCG/ACT nasal spray; Spray 1-2 sprays into both nostrils daily.    Supraventricular premature beats  -     Refill; metoprolol succinate ER (TOPROL XL) 25 MG 24 hr tablet; Take 1 tablet (25 mg) by mouth daily.    Lumbar radiculopathy with prior history of lumbar radiculopathy s/p lumbar discectomy- DOS 7/13/2023.   -     Refill: methocarbamol (ROBAXIN) 750 MG tablet; Take 1 tablet (750 mg) by mouth every 6 hours as needed for muscle spasms (muscle spasm).    Need for vaccination  -     INFLUENZA VACCINE,SPLIT VIRUS,TRIVALENT,PF(FLUZONE)    Class 2 severe obesity due to excess calories with serious comorbidity and body mass index (BMI) of 35.0 to 35.9 in adult (H)       -     Weight management plan: Discussed healthy diet and exercise guidelines          Patient has been advised of split billing requirements and indicates understanding: No        Counseling  Appropriate preventive services were addressed with this patient via screening, questionnaire, or discussion as appropriate for fall prevention, nutrition, physical activity, Tobacco-use cessation, social engagement, weight loss and cognition.  Checklist reviewing preventive services available has been given to the patient.  Reviewed patient's diet, addressing concerns and/or questions.       Return in about 2 weeks (around 11/12/2024), or  if symptoms worsen or fail to improve.      Subjective   Jennifer is a 33 year old, presenting for the following:  Physical        10/29/2024     7:46 AM   Additional Questions   Roomed by Vicky   Accompanied by self          HPI  Jennifer  is a pleasant 33 year old female patient of mine here for her Annual Physical and chronic disease management.     HYPOTHYROIDISM - Patient has a longstanding history of chronic Hypothyroidism. Patient has been doing well, noting no tremor, insomnia, hair loss or changes in skin texture. Continues to take medications as directed- Levothyroxine 137 mcg/day , without adverse reactions or side effects. Last TSH   Lab Results   Component Value Date    TSH 2.44 10/29/2024     History of supraventricular premature beats - controlled on Metoprolol. No longer having palpitations at this time. Requesting a refill.       History of seasonal allergic rhinitis - uses Flonase INH. Requesting a refill.     Chronic/Recurring Back Pain Follow Up  Prior history of lumbar radiculopathy s/p lumbar discectomy- DOS 7/13/2023.   Has been pain free until a couple of months ago  Where is your back pain located? (Select all that apply) low back middle  How would you describe your back pain?  dull ache, tightness 4/10  Where does your back pain spread? Nowhere but a few weeks ago radiated on the right side of the back.   Since your last clinic visit for back pain, how has your pain changed? gradually worsening  Does your back pain interfere with your job? No  Since your last visit, have you tried any new treatment? No    Health Care Directive  Patient does not have a Health Care Directive: Discussed advance care planning with patient; information given to patient to review.      10/24/2024   General Health   How would you rate your overall physical health? Good   Feel stress (tense, anxious, or unable to sleep) Not at all            10/24/2024   Nutrition   Three or more servings of calcium each day? Yes    Diet: Regular (no restrictions)   How many servings of fruit and vegetables per day? (!) 0-1   How many sweetened beverages each day? 0-1            10/24/2024   Exercise   Days per week of moderate/strenous exercise 5 days   Average minutes spent exercising at this level 40 min            10/24/2024   Social Factors   Frequency of gathering with friends or relatives Once a week   Worry food won't last until get money to buy more No   Food not last or not have enough money for food? No   Do you have housing? (Housing is defined as stable permanent housing and does not include staying ouside in a car, in a tent, in an abandoned building, in an overnight shelter, or couch-surfing.) Yes   Are you worried about losing your housing? No   Lack of transportation? No   Unable to get utilities (heat,electricity)? No            10/24/2024   Dental   Dentist two times every year? Yes            10/24/2024   TB Screening   Were you born outside of the [place]? No        Today's PHQ-2 Score:       3/27/2024     7:35 AM   PHQ-2 ( 1999 [employer])   Q1: Little interest or pleasure in doing things 0   Q2: Feeling down, depressed or hopeless 0   PHQ-2 Score 0         10/24/2024   Substance Use   Alcohol more than 3/day or more than 7/wk No   Do you use any other substances recreationally? No        Social History     Tobacco Use    Smoking status: Never     Passive exposure: Never    Smokeless tobacco: Never    Tobacco comments:     Nonsmoking household   Vaping Use    Vaping status: Never Used   Substance Use Topics    Alcohol use: Yes     Comment: occasional    Drug use: Never           12/12/2023   LAST FHS-7 RESULTS   1st degree relative breast or ovarian cancer No    Any relative bilateral breast cancer No    Any male have breast cancer No    Any ONE woman have BOTH breast AND ovarian cancer No    Any woman with breast cancer before 50yrs No    2 or more relatives with breast AND/OR ovarian cancer Yes    2 or more relatives with  breast AND/OR bowel cancer Yes        Patient-reported       Mammogram Screening - Patient under 40 years of age: Routine Mammogram Screening not recommended.         10/24/2024   STI Screening   New sexual partner(s) since last STI/HIV test? No        History of abnormal Pap smear: No - age 30-64 HPV with reflex Pap every 5 years recommended        Latest Ref Rng & Units 9/25/2020     3:39 PM 9/25/2020     3:23 PM 10/26/2017     1:14 PM   PAP / HPV   PAP (Historical)   NIL  NIL    HPV 16 DNA NEG^Negative Negative      HPV 18 DNA NEG^Negative Negative      Other HR HPV NEG^Negative Negative              10/24/2024   Contraception/Family Planning   Questions about contraception or family planning (!) YES           Reviewed and updated as needed this visit by Provider     Meds                Past Medical History:   Diagnosis Date    AR (allergic rhinitis)     ASCUS with positive high risk HPV 02/2013    type 53    Cervical high risk HPV (human papillomavirus) test positive 02/25/2015    Dysplasia of cervix, low grade (MILO 1)     History of migraine headaches     controlled on Nortriptyline and Maxalt prn     Hypothyroidism     IUD (intrauterine device) in place     Mirena- placed 10/2019    Mood disorder (H)     Reactive airway disease that is not asthma     on Albuterol prn      Past Surgical History:   Procedure Laterality Date    BIOPSY  [id number]    Cervix    DISCECTOMY LUMBAR POSTERIOR MICROSCOPIC ONE LEVEL Left 7/13/2023    Procedure: LEFT Lumbar 4-Lumbar 5 MICRODISCECTOMY, SPINE, LUMBAR, 1 LEVEL, POSTERIOR APPROACH;  Surgeon: Sergo Estrada MD;  Location:  OR     TOOTH EXTRACTION W/FORCEP       Lab work is in process  Labs reviewed in EPIC  BP Readings from Last 3 Encounters:   10/29/24 104/70   12/18/23 123/81   12/13/23 120/82    Wt Readings from Last 3 Encounters:   10/29/24 92.1 kg (203 lb 1.6 oz)   12/13/23 94.3 kg (208 lb)   07/13/23 96.3 kg (212 lb 4.9 oz)                  Patient Active  Problem List   Diagnosis    CARDIOVASCULAR SCREENING; LDL GOAL LESS THAN 160    Hypothyroidism    Dry eye syndrome- mild    Dysplasia of cervix, low grade (MILO 1)    Generalized anxiety disorder    Supervision of normal first pregnancy    Migraine without aura and without status migrainosus, not intractable    Oral contraceptive pill surveillance    Seasonal allergic rhinitis due to pollen    Pollen-food allergy, subsequent encounter    Lumbar radiculopathy    Herniated lumbar intervertebral disc    H/O lumbar discectomy    Low back pain    Anterior knee pain, left    Chronic pain of left knee    Morbid obesity (H)     Past Surgical History:   Procedure Laterality Date    BIOPSY  [id number]    Cervix    DISCECTOMY LUMBAR POSTERIOR MICROSCOPIC ONE LEVEL Left 7/13/2023    Procedure: LEFT Lumbar 4-Lumbar 5 MICRODISCECTOMY, SPINE, LUMBAR, 1 LEVEL, POSTERIOR APPROACH;  Surgeon: Sergo Estrada MD;  Location: [facility] OR    HC TOOTH EXTRACTION W/FORCEP         Social History     Tobacco Use    Smoking status: Never     Passive exposure: Never    Smokeless tobacco: Never    Tobacco comments:     Nonsmoking household   Substance Use Topics    Alcohol use: Yes     Comment: occasional     Family History   Problem Relation Age of Onset    Thyroid Disease Mother         graves-decompression and strabismus    Deep Vein Thrombosis Father     Breast Cancer Maternal Grandmother         in her 50s    Heart Disease Maternal Grandfather         50s    Myocardial Infarction Maternal Grandfather     Cancer Paternal Grandmother         cervical    Other Cancer Paternal Grandmother         Cervical Rectal    Cerebrovascular Disease Paternal Grandmother     Heart Disease Paternal Grandfather         50s    Myocardial Infarction Paternal Grandfather     Thyroid Disease Paternal Aunt         nine affected in family    Glaucoma No family hx of     Macular Degeneration No family hx of     Hypertension No family hx of     Diabetes No family hx of  "    Anesthesia Reaction No family hx of          Current Outpatient Medications   Medication Sig Dispense Refill    cetirizine (ZYRTEC) 10 MG tablet       fluticasone (FLONASE) 50 MCG/ACT nasal spray Spray 1-2 sprays into both nostrils daily. 9.9 mL 3    galcanezumab-gnlm (EMGALITY) 120 MG/ML injection Inject 1 mL (120 mg) Subcutaneous every 28 days 1 mL 11    hydrOXYzine (ATARAX) 25 MG tablet Take 1 tablet (25 mg) by mouth as needed for anxiety or itching (pain control) 30 tablet 0    levonorgestrel (MIRENA) 20 MCG/24HR IUD 1 each by Intrauterine route once      methocarbamol (ROBAXIN) 750 MG tablet Take 1 tablet (750 mg) by mouth every 6 hours as needed for muscle spasms (muscle spasm). 60 tablet 0    metoprolol succinate ER (TOPROL XL) 25 MG 24 hr tablet Take 1 tablet (25 mg) by mouth daily. 90 tablet 3    levothyroxine (SYNTHROID/LEVOTHROID) 137 MCG tablet Take 1 tablet (137 mcg) by mouth daily. 90 tablet 3     No Known Allergies      Review of Systems  Constitutional, HEENT, cardiovascular, pulmonary, gi and gu systems are negative, except as otherwise noted.     Objective    Exam  /70   Pulse 78   Temp 97.6  F (36.4  C) (Temporal)   Resp 18   Ht 1.614 m (5' 3.54\")   Wt 92.1 kg (203 lb 1.6 oz)   LMP  (LMP Unknown)   SpO2 99%   BMI 35.37 kg/m     Estimated body mass index is 35.37 kg/m  as calculated from the following:    Height as of this encounter: 1.614 m (5' 3.54\").    Weight as of this encounter: 92.1 kg (203 lb 1.6 oz).    Physical Exam  GENERAL: alert and no distress  EYES: Eyes grossly normal to inspection, PERRL and conjunctivae and sclerae normal  HENT: ear canals and TM's normal, nose and mouth without ulcers or lesions  NECK: no adenopathy, no asymmetry, masses, or scars  RESP: lungs clear to auscultation - no rales, rhonchi or wheezes  CV: regular rate and rhythm, normal S1 S2, no S3 or S4, no murmur, click or rub, no peripheral edema  ABDOMEN: soft, nontender, no hepatosplenomegaly, " no masses and bowel sounds normal  MS: no gross musculoskeletal defects noted, no edema  SKIN: no suspicious lesions or rashes  NEURO: Normal strength and tone, mentation intact and speech normal  PSYCH: mentation appears normal, affect normal/bright    DATA  Labs drawn and in process.      Signed Electronically by: Ashley Fish MD

## 2024-10-31 DIAGNOSIS — E03.9 ACQUIRED HYPOTHYROIDISM: ICD-10-CM

## 2024-10-31 RX ORDER — LEVOTHYROXINE SODIUM 137 UG/1
137 TABLET ORAL DAILY
Qty: 90 TABLET | Refills: 3 | Status: SHIPPED | OUTPATIENT
Start: 2024-10-31

## 2024-11-02 ENCOUNTER — OFFICE VISIT (OUTPATIENT)
Dept: URGENT CARE | Facility: URGENT CARE | Age: 34
End: 2024-11-02
Payer: COMMERCIAL

## 2024-11-02 VITALS
RESPIRATION RATE: 18 BRPM | TEMPERATURE: 98.2 F | SYSTOLIC BLOOD PRESSURE: 136 MMHG | WEIGHT: 205 LBS | HEART RATE: 101 BPM | BODY MASS INDEX: 35.7 KG/M2 | DIASTOLIC BLOOD PRESSURE: 87 MMHG | OXYGEN SATURATION: 100 %

## 2024-11-02 DIAGNOSIS — J02.9 SORE THROAT: Primary | ICD-10-CM

## 2024-11-02 LAB — DEPRECATED S PYO AG THROAT QL EIA: NEGATIVE

## 2024-11-02 PROCEDURE — 99207 PR NO CHG PAT LEFT NOT BEING SEEN: CPT

## 2024-11-02 ASSESSMENT — PAIN SCALES - GENERAL: PAINLEVEL_OUTOF10: SEVERE PAIN (6)

## 2024-11-02 NOTE — PROGRESS NOTES
Jennifer Pastrana is a 33 year old female with sore throat who left without being seen in urgent care after strep test. Attempted to call patient twice. Rapid strep negative. PCR strep cancelled.     /87 (BP Location: Right arm, Patient Position: Sitting, Cuff Size: Adult Regular)   Pulse 101   Temp 98.2  F (36.8  C) (Oral)   Resp 18   Wt 93 kg (205 lb)   LMP  (LMP Unknown)   SpO2 100%   BMI 35.70 kg/m      Labs:  Results for orders placed or performed in visit on 11/02/24   Streptococcus A Rapid Screen w/Reflex to PCR - Clinic Collect     Status: Normal    Specimen: Throat; Swab   Result Value Ref Range    Group A Strep antigen Negative Negative

## 2024-11-06 ENCOUNTER — TELEPHONE (OUTPATIENT)
Dept: NEUROLOGY | Facility: CLINIC | Age: 34
End: 2024-11-06

## 2024-11-06 ENCOUNTER — APPOINTMENT (OUTPATIENT)
Dept: GENERAL RADIOLOGY | Facility: CLINIC | Age: 34
End: 2024-11-06
Attending: PHYSICIAN ASSISTANT
Payer: COMMERCIAL

## 2024-11-06 ENCOUNTER — HOSPITAL ENCOUNTER (EMERGENCY)
Facility: CLINIC | Age: 34
Discharge: HOME | End: 2024-11-06
Attending: PHYSICIAN ASSISTANT | Admitting: PHYSICIAN ASSISTANT
Payer: COMMERCIAL

## 2024-11-06 VITALS
TEMPERATURE: 98.5 F | HEART RATE: 93 BPM | DIASTOLIC BLOOD PRESSURE: 90 MMHG | SYSTOLIC BLOOD PRESSURE: 138 MMHG | RESPIRATION RATE: 16 BRPM | OXYGEN SATURATION: 99 %

## 2024-11-06 DIAGNOSIS — R05.9 COUGH: ICD-10-CM

## 2024-11-06 DIAGNOSIS — H65.93 BILATERAL SEROUS OTITIS MEDIA: ICD-10-CM

## 2024-11-06 DIAGNOSIS — J06.9 URI (UPPER RESPIRATORY INFECTION): ICD-10-CM

## 2024-11-06 LAB
FLUAV RNA SPEC QL NAA+PROBE: NEGATIVE
FLUBV RNA RESP QL NAA+PROBE: NEGATIVE
RSV RNA SPEC NAA+PROBE: NEGATIVE
SARS-COV-2 RNA RESP QL NAA+PROBE: NEGATIVE

## 2024-11-06 PROCEDURE — 87637 SARSCOV2&INF A&B&RSV AMP PRB: CPT | Performed by: PHYSICIAN ASSISTANT

## 2024-11-06 PROCEDURE — G0463 HOSPITAL OUTPT CLINIC VISIT: HCPCS | Mod: 25 | Performed by: PHYSICIAN ASSISTANT

## 2024-11-06 PROCEDURE — 71046 X-RAY EXAM CHEST 2 VIEWS: CPT

## 2024-11-06 PROCEDURE — 99213 OFFICE O/P EST LOW 20 MIN: CPT | Performed by: PHYSICIAN ASSISTANT

## 2024-11-06 RX ORDER — AZITHROMYCIN 250 MG/1
TABLET, FILM COATED ORAL
Qty: 6 TABLET | Refills: 0 | Status: SHIPPED | OUTPATIENT
Start: 2024-11-06 | End: 2024-11-11

## 2024-11-06 ASSESSMENT — ACTIVITIES OF DAILY LIVING (ADL): ADLS_ACUITY_SCORE: 0

## 2024-11-06 ASSESSMENT — COLUMBIA-SUICIDE SEVERITY RATING SCALE - C-SSRS
1. IN THE PAST MONTH, HAVE YOU WISHED YOU WERE DEAD OR WISHED YOU COULD GO TO SLEEP AND NOT WAKE UP?: NO
6. HAVE YOU EVER DONE ANYTHING, STARTED TO DO ANYTHING, OR PREPARED TO DO ANYTHING TO END YOUR LIFE?: NO
2. HAVE YOU ACTUALLY HAD ANY THOUGHTS OF KILLING YOURSELF IN THE PAST MONTH?: NO

## 2024-11-06 NOTE — ED PROVIDER NOTES
History   No chief complaint on file.    HPI  Jennifer Pastrana is a 33 year old female who presents to Urgent Care with complaints of ongoing cough and intermittent fevers for the past week.  She also complains of bilateral ear pain, pressure, and muffled hearing.  Patient has chest congestion and diarrhea as well.  Patient states she got the flu shot 1 week ago and that night, developed fevers, chills, headache, and cough.  She has had ongoing chest congestion and cough and intermittent fever since that time.  Denies nausea, vomiting, abdominal pain, chest pain, or shortness of breath.  Patient had negative strep throat testing this last weekend.  History of reactive airway disease.  Son was ill with similar symptoms and tested negative for everything recently.      Allergies:  No Known Allergies    Problem List:    Patient Active Problem List    Diagnosis Date Noted    Morbid obesity (H) 08/14/2024     Priority: Medium    Chronic pain of left knee 05/01/2024     Priority: Medium    Anterior knee pain, left 01/22/2024     Priority: Medium    Low back pain 09/28/2023     Priority: Medium    H/O lumbar discectomy 08/23/2023     Priority: Medium    Lumbar radiculopathy 05/26/2023     Priority: Medium    Herniated lumbar intervertebral disc 05/26/2023     Priority: Medium    Seasonal allergic rhinitis due to pollen 04/01/2021     Priority: Medium    Pollen-food allergy, subsequent encounter 04/01/2021     Priority: Medium    Oral contraceptive pill surveillance 09/21/2018     Priority: Medium    Migraine without aura and without status migrainosus, not intractable 02/15/2018     Priority: Medium    Supervision of normal first pregnancy 04/10/2017     Priority: Medium    Generalized anxiety disorder 10/03/2013     Priority: Medium     Diagnosis updated by automated process. Provider to review and confirm.      Dysplasia of cervix, low grade (MILO 1) 01/10/2012     Priority: Medium     1/10/12:Pap - LSIL. Plan  colp.  1/27/12:[place] - mild dysplasia. Repeat pap 6 months. Reminder in epic.   10/30/12: Patient failed follow-up. Pap tracking file closed.   2/7/13: ASCUS pap, + HR HPV 53. Repeat pap in 6 months. Reminder in [facility].   8/19/13: ASCUS pap, Negative HPV. Repeat pap 6 months with AFE. Reminder in [facility].   2/24/14: NIL pap, Neg HPV. Repeat pap 1 year. Reminder in [facility].   2/25/15: NIL pap, + HR HPV. Plan cotest in 1 year. If NIL/Neg then routine screening.  6/1/16: NIL pap (abstracted from [place]). Not known if HPV was done. Plan cotest at pp visit. FARHAN 9/10/17  10/26/17 NIL pap, neg HR HPV. Plan pap in 3 years  9/25/20 NIL Pap, Neg HR HPV. Plan: cotest in 5 years.          Dry eye syndrome- mild 04/25/2011     Priority: Medium    Hypothyroidism 12/28/2010     Priority: Medium    CARDIOVASCULAR SCREENING; LDL GOAL LESS THAN 160 10/31/2010     Priority: Medium        Past Medical History:    Past Medical History:   Diagnosis Date    AR (allergic rhinitis)     ASCUS with positive high risk HPV 02/2013    Cervical high risk HPV (human papillomavirus) test positive 02/25/2015    Dysplasia of cervix, low grade (MILO 1)     History of migraine headaches     Hypothyroidism     IUD (intrauterine device) in place     Mood disorder (H)     Reactive airway disease that is not asthma        Past Surgical History:    Past Surgical History:   Procedure Laterality Date    BIOPSY  [id number]    Cervix    DISCECTOMY LUMBAR POSTERIOR MICROSCOPIC ONE LEVEL Left 7/13/2023    Procedure: LEFT Lumbar 4-Lumbar 5 MICRODISCECTOMY, SPINE, LUMBAR, 1 LEVEL, POSTERIOR APPROACH;  Surgeon: Sergo Estrada MD;  Location:  OR     TOOTH EXTRACTION W/FORCEP         Family History:    Family History   Problem Relation Age of Onset    Thyroid Disease Mother         graves-decompression and strabismus    Deep Vein Thrombosis Father     Breast Cancer Maternal Grandmother         in her 50s    Heart Disease Maternal Grandfather         50s     Myocardial Infarction Maternal Grandfather     Cancer Paternal Grandmother         cervical    Other Cancer Paternal Grandmother         Cervical Rectal    Cerebrovascular Disease Paternal Grandmother     Heart Disease Paternal Grandfather         50s    Myocardial Infarction Paternal Grandfather     Thyroid Disease Paternal Aunt         nine affected in family    Glaucoma No family hx of     Macular Degeneration No family hx of     Hypertension No family hx of     Diabetes No family hx of     Anesthesia Reaction No family hx of        Social History:  Marital Status:   [2]  Social History     Tobacco Use    Smoking status: Never     Passive exposure: Never    Smokeless tobacco: Never    Tobacco comments:     Nonsmoking household   Vaping Use    Vaping status: Never Used   Substance Use Topics    Alcohol use: Yes     Comment: occasional    Drug use: Never        Medications:    azithromycin (ZITHROMAX Z-HAIDER) 250 MG tablet  cetirizine (ZYRTEC) 10 MG tablet  fluticasone (FLONASE) 50 MCG/ACT nasal spray  galcanezumab-gnlm (EMGALITY) 120 MG/ML injection  hydrOXYzine (ATARAX) 25 MG tablet  levonorgestrel (MIRENA) 20 MCG/24HR IUD  levothyroxine (SYNTHROID/LEVOTHROID) 137 MCG tablet  methocarbamol (ROBAXIN) 750 MG tablet  metoprolol succinate ER (TOPROL XL) 25 MG 24 hr tablet          Review of Systems   Constitutional:  Positive for chills and fever.   HENT:  Positive for congestion and ear pain.    Respiratory:  Positive for cough.    Gastrointestinal:  Positive for diarrhea.   Neurological:  Positive for headaches.   All other systems reviewed and are negative.      Physical Exam   BP: (!) 138/90  Pulse: 93  Temp: 98.5  F (36.9  C)  Resp: 16  SpO2: 99 %      Physical Exam  Constitutional:       General: She is not in acute distress.     Appearance: Normal appearance. She is well-developed. She is not ill-appearing, toxic-appearing or diaphoretic.   HENT:      Head: Normocephalic and atraumatic.      Right Ear:  Ear canal and external ear normal. A middle ear effusion is present. Tympanic membrane is not erythematous or bulging.      Left Ear: Ear canal and external ear normal. A middle ear effusion is present. Tympanic membrane is not erythematous or bulging.      Nose: Nose normal. No congestion or rhinorrhea.      Mouth/Throat:      Lips: Pink.      Mouth: Mucous membranes are moist.      Pharynx: Oropharynx is clear. Uvula midline. No pharyngeal swelling, oropharyngeal exudate, posterior oropharyngeal erythema or uvula swelling.      Tonsils: No tonsillar exudate or tonsillar abscesses.   Eyes:      Extraocular Movements: Extraocular movements intact.      Conjunctiva/sclera: Conjunctivae normal.      Pupils: Pupils are equal, round, and reactive to light.   Cardiovascular:      Rate and Rhythm: Normal rate and regular rhythm.      Heart sounds: Normal heart sounds.   Pulmonary:      Effort: Pulmonary effort is normal. No respiratory distress.      Breath sounds: Normal breath sounds. No stridor. No wheezing, rhonchi or rales.   Musculoskeletal:         General: Normal range of motion.      Cervical back: Full passive range of motion without pain, normal range of motion and neck supple. No rigidity. Normal range of motion.   Lymphadenopathy:      Cervical: No cervical adenopathy.   Skin:     General: Skin is warm and dry.   Neurological:      Mental Status: She is alert and oriented to person, place, and time.   Psychiatric:         Behavior: Behavior is cooperative.         ED Course        Procedures      No results found for this or any previous visit (from the past 24 hours).    Results for orders placed or performed during the hospital encounter of 11/06/24   XR Chest 2 Views     Status: None    Narrative    CHEST TWO VIEWS 11/6/2024 1:04 PM     HISTORY: cough and intermittent fevers    COMPARISON: 7/24/2015       Impression    IMPRESSION: No acute cardiopulmonary abnormality.    KASIA HAN MD         SYSTEM  ID:  [id number]   Symptomatic Influenza A/B, RSV, & SARS-CoV2 PCR (COVID-19) Nose     Status: Normal    Specimen: Nose; Swab   Result Value Ref Range    Influenza A PCR Negative Negative    Influenza B PCR Negative Negative    RSV PCR Negative Negative    SARS CoV2 PCR Negative Negative    Narrative    Testing was performed using the Xpert Xpress CoV2/Flu/RSV Assay on the [employer] GeneXpert Instrument. This test should be ordered for the detection of SARS-CoV2, influenza, and RSV viruses in individuals with signs and symptoms of respiratory tract infection. This test is for in vitro diagnostic use under the US FDA for laboratories certified under CLIA to perform high or moderate complexity testing. This test has been [place] FDA cleared. A negative result does not rule out the presence of PCR inhibitors in the specimen or target RNA in concentration below the limit of detection for the assay. If only one viral target is positive but coinfection with multiple targets is suspected, the sample should be re-tested with another FDA cleared, approved, or authorized test, if coninfection would change clinical management. This test was validated by the [facility] [employer]. These laboratories are certified under the Clinical Laboratory Improvement Amendments of 1988 (CLIA-88) as qualified to perfom high complexity laboratory testing.       Medications - No data to display    Assessments & Plan (with Medical Decision Making)     Pt is a 33 year old female who presents to Urgent Care with complaints of ongoing cough and intermittent fevers for the past week.  She also complains of bilateral ear pain, pressure, and muffled hearing.  Patient has chest congestion and diarrhea as well.  Patient states she got the flu shot 1 week ago and that night, developed fevers, chills, headache, and cough.  She has had ongoing chest congestion and cough and intermittent fever since that time.  Denies nausea, vomiting, abdominal pain,  chest pain, or shortness of breath.  Patient had negative strep throat testing this last weekend.  History of reactive airway disease.  Son was ill with similar symptoms and tested negative for everything recently.    Pt is afebrile on arrival.  Exam as above.  COVID-19 and influenza testing were negative.  Chest x-ray initially reviewed by myself and appears to have a hazy opacity in the right lung.  Concern for possible atypical pneumonia given her history.  Will start Azithromycin.  Encouraged additional symptomatic treatments at home.  Return precautions were reviewed.  Hand-outs were provided.    Patient was instructed to follow-up with PCP for continued care and management.  She is to return to the ED for persistent and/or worsening symptoms.  Patient expressed understanding of the diagnosis and plan and was discharged home in good condition.    Chest x-ray eventually read by radiology as negative.  Discussed x-ray results with patient.    I have reviewed the nursing notes.    I have reviewed the findings, diagnosis, plan and need for follow up with the patient.      Discharge Medication List as of 11/6/2024  1:41 PM        START taking these medications    Details   azithromycin (ZITHROMAX Z-HAIDER) 250 MG tablet Two tablets on the first day, then one tablet daily for the next 4 days, Disp-6 tablet, R-0, E-Prescribe             Final diagnoses:   Cough - Concern for atypical pneumonia   URI (upper respiratory infection)   Bilateral serous otitis media       11/6/2024   [facility] EMERGENCY DEPT      Disclaimer:  This note consists of symbols derived from keyboarding, dictation and/or voice recognition software.  As a result, there may be errors in the script that have gone undetected.  Please consider this when interpreting information found in this chart.     Eva Eaton PA-C  11/07/24 1950       Eva Eaton PA-C  11/07/24 1951

## 2024-11-06 NOTE — ED TRIAGE NOTES
Recurring fever for a week, ear pain in both ear, chest congestion.  Strep test was negative on Saturday.  Rash on hands developed today.  Diarrhea.

## 2024-11-06 NOTE — TELEPHONE ENCOUNTER
[facility] Prior Authorization Team   Phone: [phone]  Fax: [phone]    PA Initiation    Medication: EMGALITY 120 MG/ML SC SOAJ  Insurance Company: [employer] - Phone [phone] Fax [phone]  Pharmacy Filling the Rx: [facility] - [place], [place] - [facility] [address]  Filling Pharmacy Phone: [phone]  Filling Pharmacy Fax: [phone]  Start Date: 10/31/2024

## 2024-11-07 ENCOUNTER — MYC MEDICAL ADVICE (OUTPATIENT)
Dept: FAMILY MEDICINE | Facility: CLINIC | Age: 34
End: 2024-11-07
Payer: COMMERCIAL

## 2024-11-07 ASSESSMENT — ENCOUNTER SYMPTOMS
COUGH: 1
DIARRHEA: 1
CHILLS: 1
FEVER: 1
HEADACHES: 1

## 2024-11-07 NOTE — TELEPHONE ENCOUNTER
[facility] Prior Authorization Team   Phone: [phone]  Fax: [phone]    Completed questionnaire and faxed back to [facility] fax line [phone].

## 2024-11-08 NOTE — TELEPHONE ENCOUNTER
[facility] Prior Authorization Team   Phone: [phone]  Fax: [phone]    Prior Authorization Approval    Medication: EMGALITY 120 MG/ML SC SOAJ  Authorization Effective Date: 11/7/2024  Authorization Expiration Date: 11/6/2025  Approved Dose/Quantity: 1/30ds  Reference #: CMM KEY: TRAY PEREZ#: [id number]   Insurance Company: [employer] - Phone [phone] Fax [phone]  Expected CoPay: $ 0  CoPay Card Available: Yes    Financial Assistance Needed: No  Which Pharmacy is filling the prescription: [facility] - [place], [place] - [address]  Pharmacy Notified: Yes  Patient Notified: Yes

## 2024-11-12 NOTE — PATIENT INSTRUCTIONS
To Schedule an Appointment 24/7  Call: [phone]    If you have any questions regarding your visit, Please contact your care team.  [facility]: [phone]  [facility] HOURS TELEPHONE NUMBER   Cephas Agbeh, M.D.      Joe Adams-Surgery Scheduler  Jacqueline-Surgery Scheduler       Monday - [place]:    8:00 am-4:45 pm  Tuesday - [place]:   8:00 am-4:45 pm  Friday-[place]:       8:00 am-4:45 pm  Typical Surgery Day:  Wednesday [facility]   [address] [place] [address]   [phone]   Fax [phone]    Imaging Scheduling all locations  [phone]      [facility]   [address] [place] [address] [place] [address]   [phone]    [facility]  [address] [facility] [address]  [phone]  Fax [phone]   Urgent Care locations:  [facility] Monday-Friday                               10 am - 8 pm  Saturday and Sunday                      9 am - 5 pm  Monday-Friday                              10 am- 8 pm  Saturday and Sunday                      9 am - 5 pm    [phone]   **Surgeries** Our Surgery Schedulers will contact you to schedule. If you do not receive a call within 3 business days, please call [phone].  If you need a medication refill, please contact your pharmacy. Please allow 3 business days for your refill to be completed.  As always, Thank you for trusting us with your healthcare needs!  see additional instructions from your care team below

## 2024-11-15 ENCOUNTER — OFFICE VISIT (OUTPATIENT)
Dept: OBGYN | Facility: CLINIC | Age: 34
End: 2024-11-15
Payer: COMMERCIAL

## 2024-11-15 DIAGNOSIS — Z53.9 ERRONEOUS ENCOUNTER--DISREGARD: Primary | ICD-10-CM

## 2025-03-24 NOTE — PROGRESS NOTES
[facility] Neurology Follow Up Visit    Jennifer Pastrana MRN# [id number]   Age: 34 year old YOB: 1990     Brief history of symptoms: The patient was initially seen in neurologic consultation on 10/5/2021 for evaluation of migraines and head pains. Please see the comprehensive neurologic consultation note from that date in the Epic records for details.          Assessment and Plan:   Assessment:  Jennifer Pastrana is a 34 year old female who presents today for follow-up of episodic migraines without auras. Migraines were well controlled with Emgality as a preventative. She would like to continue same regimen today.     She reports occasional vertiginous sensations, which could be BPPV related. PT referral could be done again if these symptoms don't improve with exercises at home.     Plan:  - Continue Emgality 120 mg q28 days  - OTC medications (Excedrin, tylenol, ibuprofen) as needed for migraines  - Patient to discuss with PCP filling future Emgality prescriptions if migraines are stable / alternatively can follow-up in neurology clinic in 1 year  - If patient on getting pregnant, stop Emgality 6 months prior    Follow up in Neurology clinic as needed should new concerns arise.    Patric Duron MD   of Neurology  [facility]  ---------------------------------------------------    Interval history:   Emgality has been helpful for migraines.     She has headaches about once a month.    The injection itself stings, but no other issues.     No other side effects.     She reports occasional feeling of vertigo when she moves her head too quickly. It is a feeling of movement that last a few seconds and then dissipates.       Past Medical History:     Patient Active Problem List   Diagnosis    CARDIOVASCULAR SCREENING; LDL GOAL LESS THAN 160    Hypothyroidism    Dry eye syndrome- mild    Dysplasia of cervix, low grade (MILO 1)    Generalized anxiety disorder    Supervision of normal first  pregnancy    Migraine without aura and without status migrainosus, not intractable    Oral contraceptive pill surveillance    Seasonal allergic rhinitis due to pollen    Pollen-food allergy, subsequent encounter    Lumbar radiculopathy    Herniated lumbar intervertebral disc    H/O lumbar discectomy    Low back pain    Anterior knee pain, left    Chronic pain of left knee    Morbid obesity (H)     Past Medical History:   Diagnosis Date    AR (allergic rhinitis)     ASCUS with positive high risk HPV 02/2013    type 53    Cervical high risk HPV (human papillomavirus) test positive 02/25/2015    Dysplasia of cervix, low grade (MILO 1)     History of migraine headaches     controlled on Nortriptyline and Maxalt prn     Hypothyroidism     IUD (intrauterine device) in place     Mirena- placed 10/2019    Mood disorder     Reactive airway disease that is not asthma     on Albuterol prn         Past Surgical History:     Past Surgical History:   Procedure Laterality Date    BIOPSY  [id number]    Cervix    DISCECTOMY LUMBAR POSTERIOR MICROSCOPIC ONE LEVEL Left 7/13/2023    Procedure: LEFT Lumbar 4-Lumbar 5 MICRODISCECTOMY, SPINE, LUMBAR, 1 LEVEL, POSTERIOR APPROACH;  Surgeon: Sergo Estrada MD;  Location: [facility] OR     TOOTH EXTRACTION W/FORCEP          Social History:     Social History     Tobacco Use    Smoking status: Never     Passive exposure: Never    Smokeless tobacco: Never    Tobacco comments:     Nonsmoking household   Vaping Use    Vaping status: Never Used   Substance Use Topics    Alcohol use: Yes     Comment: occasional    Drug use: Never        Family History:     Family History   Problem Relation Age of Onset    Thyroid Disease Mother         graves-decompression and strabismus    Deep Vein Thrombosis Father     Breast Cancer Maternal Grandmother         in her 50s    Heart Disease Maternal Grandfather         50s    Myocardial Infarction Maternal Grandfather     Cancer Paternal Grandmother         cervical     Other Cancer Paternal Grandmother         Cervical Rectal    Cerebrovascular Disease Paternal Grandmother     Heart Disease Paternal Grandfather         50s    Myocardial Infarction Paternal Grandfather     Thyroid Disease Paternal Aunt         nine affected in family    Glaucoma No family hx of     Macular Degeneration No family hx of     Hypertension No family hx of     Diabetes No family hx of     Anesthesia Reaction No family hx of         Medications:     Current Outpatient Medications   Medication Sig Dispense Refill    cetirizine (ZYRTEC) 10 MG tablet       fluticasone (FLONASE) 50 MCG/ACT nasal spray Spray 1-2 sprays into both nostrils daily. 9.9 mL 3    galcanezumab-gnlm (EMGALITY) 120 MG/ML injection Inject 1 mL (120 mg) Subcutaneous every 28 days 1 mL 11    hydrOXYzine (ATARAX) 25 MG tablet Take 1 tablet (25 mg) by mouth as needed for anxiety or itching (pain control) 30 tablet 0    levonorgestrel (MIRENA) 20 MCG/24HR IUD 1 each by Intrauterine route once      levothyroxine (SYNTHROID/LEVOTHROID) 137 MCG tablet Take 1 tablet (137 mcg) by mouth daily. 90 tablet 3    methocarbamol (ROBAXIN) 750 MG tablet Take 1 tablet (750 mg) by mouth every 6 hours as needed for muscle spasms (muscle spasm). 60 tablet 0    metoprolol succinate ER (TOPROL XL) 25 MG 24 hr tablet Take 1 tablet (25 mg) by mouth daily. 90 tablet 3     No current facility-administered medications for this visit.        Allergies:   No Known Allergies     Review of Systems:   As above     Physical Exam:   Vitals: /83 (BP Location: Right arm, Patient Position: Sitting, Cuff Size: Adult Regular)   Pulse 67   Wt 85.3 kg (188 lb)   LMP 01/05/2025 (Approximate)   BMI 32.74 kg/m     General: Seated comfortably in no acute distress.  HEENT: Optic discs sharp on funduscopic exam.   Neurologic:     Mental Status: Fully alert, attentive. Language normal, speech clear and fluent, no paraphasic errors.      Cranial Nerves: Visual fields intact.  PERRL. EOMI with normal smooth pursuit. Facial movements symmetric. Hearing not formally tested but intact to conversation. Palate elevation symmetric, uvula midline. No dysarthria. Tongue protrusion midline.     Motor: No tremors or other abnormal movements observed.      Coordination: Finger-nose-finger and heel-shin intact without dysmetria.      Gait: Normal, steady casual gait.      Data reviewed on previous visits    Imaging:  MRA head 2019  IMPRESSION: Mildly motion degraded images. No aneurysm identified.      MRI cervical 2018  IMPRESSION:  1. Minimal C3-C4 disc bulge without stenosis.  2. No evidence for any demyelinating disease.    Pertinent Investigations since last visit:   None

## 2025-03-25 ENCOUNTER — OFFICE VISIT (OUTPATIENT)
Dept: NEUROLOGY | Facility: CLINIC | Age: 35
End: 2025-03-25
Payer: COMMERCIAL

## 2025-03-25 VITALS
SYSTOLIC BLOOD PRESSURE: 119 MMHG | DIASTOLIC BLOOD PRESSURE: 83 MMHG | HEART RATE: 67 BPM | BODY MASS INDEX: 32.74 KG/M2 | WEIGHT: 188 LBS

## 2025-03-25 DIAGNOSIS — G43.009 MIGRAINE WITHOUT AURA AND WITHOUT STATUS MIGRAINOSUS, NOT INTRACTABLE: Primary | ICD-10-CM

## 2025-03-25 DIAGNOSIS — R42 VERTIGO: ICD-10-CM

## 2025-03-25 PROCEDURE — 3079F DIAST BP 80-89 MM HG: CPT | Performed by: INTERNAL MEDICINE

## 2025-03-25 PROCEDURE — 3074F SYST BP LT 130 MM HG: CPT | Performed by: INTERNAL MEDICINE

## 2025-03-25 PROCEDURE — 99214 OFFICE O/P EST MOD 30 MIN: CPT | Performed by: INTERNAL MEDICINE

## 2025-03-25 NOTE — LETTER
3/25/2025      Jennifer Pastrana  [address] [place] [address]      Dear Colleague,    Thank you for referring your patient, Jennifer Pastrana, to the [facility] [place]. Please see a copy of my visit note below.    [facility] Neurology Follow Up Visit    Jennifer Pastrana MRN# [id number]   Age: 34 year old YOB: 1990     Brief history of symptoms: The patient was initially seen in neurologic consultation on 10/5/2021 for evaluation of migraines and head pains. Please see the comprehensive neurologic consultation note from that date in the Epic records for details.          Assessment and Plan:   Assessment:  Jennifer Pastrana is a 34 year old female who presents today for follow-up of episodic migraines without auras. Migraines were well controlled with Emgality as a preventative. She would like to continue same regimen today.     She reports occasional vertiginous sensations, which could be BPPV related. PT referral could be done again if these symptoms don't improve with exercises at home.     Plan:  - Continue Emgality 120 mg q28 days  - OTC medications (Excedrin, tylenol, ibuprofen) as needed for migraines  - Patient to discuss with PCP filling future Emgality prescriptions if migraines are stable / alternatively can follow-up in neurology clinic in 1 year  - If patient on getting pregnant, stop Emgality 6 months prior    Follow up in Neurology clinic as needed should new concerns arise.    Patric Duron MD   of Neurology  [facility]  ---------------------------------------------------    Interval history:   Emgality has been helpful for migraines.     She has headaches about once a month.    The injection itself stings, but no other issues.     No other side effects.     She reports occasional feeling of vertigo when she moves her head too quickly. It is a feeling of movement that last a few seconds and then dissipates.       Past Medical History:      Patient Active Problem List   Diagnosis     CARDIOVASCULAR SCREENING; LDL GOAL LESS THAN 160     Hypothyroidism     Dry eye syndrome- mild     Dysplasia of cervix, low grade (MILO 1)     Generalized anxiety disorder     Supervision of normal first pregnancy     Migraine without aura and without status migrainosus, not intractable     Oral contraceptive pill surveillance     Seasonal allergic rhinitis due to pollen     Pollen-food allergy, subsequent encounter     Lumbar radiculopathy     Herniated lumbar intervertebral disc     H/O lumbar discectomy     Low back pain     Anterior knee pain, left     Chronic pain of left knee     Morbid obesity (H)     Past Medical History:   Diagnosis Date     AR (allergic rhinitis)      ASCUS with positive high risk HPV 02/2013    type 53     Cervical high risk HPV (human papillomavirus) test positive 02/25/2015     Dysplasia of cervix, low grade (MILO 1)      History of migraine headaches     controlled on Nortriptyline and Maxalt prn      Hypothyroidism      IUD (intrauterine device) in place     Mirena- placed 10/2019     Mood disorder      Reactive airway disease that is not asthma     on Albuterol prn         Past Surgical History:     Past Surgical History:   Procedure Laterality Date     BIOPSY  [id number]    Cervix     DISCECTOMY LUMBAR POSTERIOR MICROSCOPIC ONE LEVEL Left 7/13/2023    Procedure: LEFT Lumbar 4-Lumbar 5 MICRODISCECTOMY, SPINE, LUMBAR, 1 LEVEL, POSTERIOR APPROACH;  Surgeon: Sergo Estrada MD;  Location:  OR      TOOTH EXTRACTION W/FORCEP          Social History:     Social History     Tobacco Use     Smoking status: Never     Passive exposure: Never     Smokeless tobacco: Never     Tobacco comments:     Nonsmoking household   Vaping Use     Vaping status: Never Used   Substance Use Topics     Alcohol use: Yes     Comment: occasional     Drug use: Never        Family History:     Family History   Problem Relation Age of Onset     Thyroid Disease  Mother         graves-decompression and strabismus     Deep Vein Thrombosis Father      Breast Cancer Maternal Grandmother         in her 50s     Heart Disease Maternal Grandfather         50s     Myocardial Infarction Maternal Grandfather      Cancer Paternal Grandmother         cervical     Other Cancer Paternal Grandmother         Cervical Rectal     Cerebrovascular Disease Paternal Grandmother      Heart Disease Paternal Grandfather         50s     Myocardial Infarction Paternal Grandfather      Thyroid Disease Paternal Aunt         nine affected in family     Glaucoma No family hx of      Macular Degeneration No family hx of      Hypertension No family hx of      Diabetes No family hx of      Anesthesia Reaction No family hx of         Medications:     Current Outpatient Medications   Medication Sig Dispense Refill     cetirizine (ZYRTEC) 10 MG tablet        fluticasone (FLONASE) 50 MCG/ACT nasal spray Spray 1-2 sprays into both nostrils daily. 9.9 mL 3     galcanezumab-gnlm (EMGALITY) 120 MG/ML injection Inject 1 mL (120 mg) Subcutaneous every 28 days 1 mL 11     hydrOXYzine (ATARAX) 25 MG tablet Take 1 tablet (25 mg) by mouth as needed for anxiety or itching (pain control) 30 tablet 0     levonorgestrel (MIRENA) 20 MCG/24HR IUD 1 each by Intrauterine route once       levothyroxine (SYNTHROID/LEVOTHROID) 137 MCG tablet Take 1 tablet (137 mcg) by mouth daily. 90 tablet 3     methocarbamol (ROBAXIN) 750 MG tablet Take 1 tablet (750 mg) by mouth every 6 hours as needed for muscle spasms (muscle spasm). 60 tablet 0     metoprolol succinate ER (TOPROL XL) 25 MG 24 hr tablet Take 1 tablet (25 mg) by mouth daily. 90 tablet 3     No current facility-administered medications for this visit.        Allergies:   No Known Allergies     Review of Systems:   As above     Physical Exam:   Vitals: /83 (BP Location: Right arm, Patient Position: Sitting, Cuff Size: Adult Regular)   Pulse 67   Wt 85.3 kg (188 lb)   LMP  01/05/2025 (Approximate)   BMI 32.74 kg/m     General: Seated comfortably in no acute distress.  HEENT: Optic discs sharp on funduscopic exam.   Neurologic:     Mental Status: Fully alert, attentive. Language normal, speech clear and fluent, no paraphasic errors.      Cranial Nerves: Visual fields intact. PERRL. EOMI with normal smooth pursuit. Facial movements symmetric. Hearing not formally tested but intact to conversation. Palate elevation symmetric, uvula midline. No dysarthria. Tongue protrusion midline.     Motor: No tremors or other abnormal movements observed.      Coordination: Finger-nose-finger and heel-shin intact without dysmetria.      Gait: Normal, steady casual gait.      Data reviewed on previous visits    Imaging:  MRA head 2019  IMPRESSION: Mildly motion degraded images. No aneurysm identified.      MRI cervical 2018  IMPRESSION:  1. Minimal C3-C4 disc bulge without stenosis.  2. No evidence for any demyelinating disease.    Pertinent Investigations since last visit:   None      Again, thank you for allowing me to participate in the care of your patient.        Sincerely,        Patric Duron MD    Electronically signed

## 2025-04-01 ENCOUNTER — OFFICE VISIT (OUTPATIENT)
Dept: OPTOMETRY | Facility: CLINIC | Age: 35
End: 2025-04-01
Payer: COMMERCIAL

## 2025-04-01 DIAGNOSIS — H52.223 REGULAR ASTIGMATISM OF BOTH EYES: ICD-10-CM

## 2025-04-01 DIAGNOSIS — H52.13 MYOPIA OF BOTH EYES: ICD-10-CM

## 2025-04-01 DIAGNOSIS — Z01.00 ROUTINE EYE EXAM: Primary | ICD-10-CM

## 2025-04-01 PROCEDURE — 92015 DETERMINE REFRACTIVE STATE: CPT | Performed by: OPTOMETRIST

## 2025-04-01 PROCEDURE — 92310 CONTACT LENS FITTING OU: CPT | Mod: GA | Performed by: OPTOMETRIST

## 2025-04-01 PROCEDURE — 92014 COMPRE OPH EXAM EST PT 1/>: CPT | Performed by: OPTOMETRIST

## 2025-04-01 ASSESSMENT — CONF VISUAL FIELD
OD_NORMAL: 1
OS_SUPERIOR_TEMPORAL_RESTRICTION: 0
OS_INFERIOR_TEMPORAL_RESTRICTION: 0
OS_NORMAL: 1
OD_INFERIOR_NASAL_RESTRICTION: 0
OS_SUPERIOR_NASAL_RESTRICTION: 0
OD_SUPERIOR_TEMPORAL_RESTRICTION: 0
OD_SUPERIOR_NASAL_RESTRICTION: 0
OS_INFERIOR_NASAL_RESTRICTION: 0
OD_INFERIOR_TEMPORAL_RESTRICTION: 0
METHOD: COUNTING FINGERS

## 2025-04-01 ASSESSMENT — REFRACTION_WEARINGRX
OS_SPHERE: -1.50
OD_CYLINDER: +0.50
OD_SPHERE: -2.25
OS_AXIS: 055
OS_CYLINDER: +0.75
OD_AXIS: 142
SPECS_TYPE: SVL

## 2025-04-01 ASSESSMENT — REFRACTION_MANIFEST
OD_AXIS: 140
OD_SPHERE: -2.25
OD_AXIS: 158
METHOD_AUTOREFRACTION: 1
OD_CYLINDER: +0.75
OS_AXIS: 045
OS_SPHERE: -1.75
OS_CYLINDER: +0.50
OS_SPHERE: -1.75
OD_SPHERE: -2.25
OD_CYLINDER: +0.50
OS_CYLINDER: +0.75
OS_AXIS: 022

## 2025-04-01 ASSESSMENT — REFRACTION_CURRENTRX
OS_BRAND: ALCON DAILIES TOTAL 1 BC 8.5, D 14.1
OD_BRAND: ALCON DAILIES TOTAL 1 BC 8.5, D 14.1
OD_SPHERE: -2.25
OS_SPHERE: -2.00

## 2025-04-01 ASSESSMENT — TONOMETRY
OS_IOP_MMHG: 10
IOP_METHOD: APPLANATION
OD_IOP_MMHG: 10

## 2025-04-01 ASSESSMENT — VISUAL ACUITY
OS_CC: 20/20
METHOD: SNELLEN - LINEAR
CORRECTION_TYPE: CONTACTS
OD_CC: 20/20
OD_CC: 20/20
OS_CC: 20/20

## 2025-04-01 ASSESSMENT — SLIT LAMP EXAM - LIDS
COMMENTS: NORMAL
COMMENTS: NORMAL

## 2025-04-01 ASSESSMENT — KERATOMETRY
OS_AXISANGLE2_DEGREES: 43
OD_K1POWER_DIOPTERS: 43.00
OS_K2POWER_DIOPTERS: 42.75
OD_K2POWER_DIOPTERS: 43.50
OD_AXISANGLE2_DEGREES: 36
OS_K1POWER_DIOPTERS: 43.50

## 2025-04-01 ASSESSMENT — CUP TO DISC RATIO
OD_RATIO: 0.2
OS_RATIO: 0.2

## 2025-04-01 NOTE — LETTER
4/1/2025      Jennifer Pastrana  [address] [place] [address]      Dear Colleague,    Thank you for referring your patient, Jennifer Pastrana, to the [facility]. Please see a copy of my visit note below.    Chief Complaint   Patient presents with     COMPREHENSIVE EYE EXAM     Contact Lens Re-fitting        Previous contact lens wearer? Yes: wearing daily lenses   Comfort of contact lenses :Good   Satisfied with current lenses: Yes        Last Eye Exam: 2/2024  Dilated Previously: Yes    What are you currently using to see?  contacts and glasses as backup or at home     Distance Vision Acuity: Satisfied with vision    Near Vision Acuity: Satisfied with vision while reading and using computer with glasses or contacts     Eye Comfort: good  Do you use eye drops? 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       OBJECTIVE: See Ophthalmology exam    ASSESSMENT:    ICD-10-CM    1. Routine eye exam  Z01.00 EYE EXAM (SIMPLE-NONBILLABLE)     REFRACTION     CONTACT LENS FITTING,BILAT w/ signed waiver      2. Myopia of both eyes  H52.13 EYE EXAM (SIMPLE-NONBILLABLE)     REFRACTION     CONTACT LENS FITTING,BILAT w/ signed waiver      3. Regular astigmatism of both eyes  H52.223 EYE EXAM (SIMPLE-NONBILLABLE)     REFRACTION     CONTACT LENS FITTING,BILAT w/ signed waiver         PLAN:     Patient Instructions   Fill glasses prescription  Contact lenses prescription released to patient today.  Continue use of artificial tears and contact lens rewetting drops   Return in 1 year for eye exam    Muna Slade, OD  [phone]                                                Again, thank you for allowing me to participate in the care of your patient.        Sincerely,        Muna Slade, OD    Electronically signed

## 2025-04-01 NOTE — PATIENT INSTRUCTIONS
Fill glasses prescription  Contact lenses prescription released to patient today.  Continue use of artificial tears and contact lens rewetting drops   Return in 1 year for eye exam    Muna Slade, OD  [phone]

## 2025-04-01 NOTE — PROGRESS NOTES
Chief Complaint   Patient presents with    COMPREHENSIVE EYE EXAM    Contact Lens Re-fitting        Previous contact lens wearer? Yes: wearing daily lenses   Comfort of contact lenses :Good   Satisfied with current lenses: Yes        Last Eye Exam: 2/2024  Dilated Previously: Yes    What are you currently using to see?  contacts and glasses as backup or at home     Distance Vision Acuity: Satisfied with vision    Near Vision Acuity: Satisfied with vision while reading and using computer with glasses or contacts     Eye Comfort: good  Do you use eye drops? 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       OBJECTIVE: See Ophthalmology exam    ASSESSMENT:    ICD-10-CM    1. Routine eye exam  Z01.00 EYE EXAM (SIMPLE-NONBILLABLE)     REFRACTION     CONTACT LENS FITTING,BILAT w/ signed waiver      2. Myopia of both eyes  H52.13 EYE EXAM (SIMPLE-NONBILLABLE)     REFRACTION     CONTACT LENS FITTING,BILAT w/ signed waiver      3. Regular astigmatism of both eyes  H52.223 EYE EXAM (SIMPLE-NONBILLABLE)     REFRACTION     CONTACT LENS FITTING,BILAT w/ signed waiver         PLAN:     Patient Instructions   Fill glasses prescription  Contact lenses prescription released to patient today.  Continue use of artificial tears and contact lens rewetting drops   Return in 1 year for eye exam    Muna Slade, OD  [phone]

## 2025-04-22 DIAGNOSIS — G43.009 MIGRAINE WITHOUT AURA AND WITHOUT STATUS MIGRAINOSUS, NOT INTRACTABLE: ICD-10-CM

## 2025-04-22 NOTE — TELEPHONE ENCOUNTER
Hello the last time we got the script for emgality was last April of 2024 so those have been used and we need a new script to be sent over

## (undated) DEVICE — DRAPE C-ARM W/STRAPS 42X72" 07-CA104

## (undated) DEVICE — SU MONOCRYL 3-0 PS-2 18" UND Y497G

## (undated) DEVICE — SYR 50ML LL W/O NDL 309653

## (undated) DEVICE — TUBING IV EXTENSION SET 60" HI FLOW 2N3349

## (undated) DEVICE — GLOVE BIOGEL PI MICRO INDICATOR UNDERGLOVE SZ 8.0 48980

## (undated) DEVICE — TOOL DISSECT MIDAS MR8 14CM MATCH HEAD 3MM MR8-14MH30

## (undated) DEVICE — SU DERMABOND PRINEO 42CM CLR422US

## (undated) DEVICE — SOL WATER IRRIG 1000ML BOTTLE 2F7114

## (undated) DEVICE — SU VICRYL 2-0 CT-2 8X18" UND D8144

## (undated) DEVICE — Device

## (undated) DEVICE — DRSG KERLIX FLUFFS X5

## (undated) DEVICE — NDL INSUFFLATION 14GA STEP S100000

## (undated) DEVICE — PREP CHLORAPREP 26ML TINTED HI-LITE ORANGE 930815

## (undated) DEVICE — SUCTION TIP YANKAUER STR K87

## (undated) DEVICE — RX SURGIFLO HEMOSTATIC MATRIX W/THROMBIN 8ML 2994

## (undated) DEVICE — KNIFE MEDT BAYONETED DISCECTOMY 134MM 1564-00

## (undated) DEVICE — SUCTION MANIFOLD NEPTUNE 2 SYS 4 PORT 0702-020-000

## (undated) DEVICE — SOL ISOPROPYL RUBBING ALCOHOL USP 70% 4OZ HDX-20 I0020

## (undated) DEVICE — DRSG ACTICOAT 4X8" 66021771

## (undated) DEVICE — GLOVE BIOGEL PI ULTRATOUCH G SZ 8.0 42180

## (undated) DEVICE — SYR 30ML LL W/O NDL 302832

## (undated) DEVICE — LINEN BACK PACK 5440

## (undated) DEVICE — POSITIONER ARMBOARD FOAM 1PAIR LF FP-ARMB1

## (undated) DEVICE — SU VICRYL 1 CT-1 CR 8X18" J741D

## (undated) DEVICE — DRAPE MICROSCOPE MICRO-KOVER LEICA 48"X120" 09-MK651

## (undated) DEVICE — SUCTION FRAZIER 12FR W/HANDLE K73

## (undated) DEVICE — SOL NACL 0.9% IRRIG 1000ML BOTTLE 2F7124

## (undated) DEVICE — SYR 10ML FINGER CONTROL W/O NDL 309695

## (undated) RX ORDER — PROPOFOL 10 MG/ML
INJECTION, EMULSION INTRAVENOUS
Status: DISPENSED
Start: 2023-07-13

## (undated) RX ORDER — ONDANSETRON 2 MG/ML
INJECTION INTRAMUSCULAR; INTRAVENOUS
Status: DISPENSED
Start: 2023-07-13

## (undated) RX ORDER — ACETAMINOPHEN 325 MG/1
TABLET ORAL
Status: DISPENSED
Start: 2023-07-13

## (undated) RX ORDER — DEXAMETHASONE SODIUM PHOSPHATE 4 MG/ML
INJECTION, SOLUTION INTRA-ARTICULAR; INTRALESIONAL; INTRAMUSCULAR; INTRAVENOUS; SOFT TISSUE
Status: DISPENSED
Start: 2023-07-13

## (undated) RX ORDER — HYDROMORPHONE HYDROCHLORIDE 1 MG/ML
INJECTION, SOLUTION INTRAMUSCULAR; INTRAVENOUS; SUBCUTANEOUS
Status: DISPENSED
Start: 2023-07-13

## (undated) RX ORDER — BUPIVACAINE HYDROCHLORIDE AND EPINEPHRINE 5; 5 MG/ML; UG/ML
INJECTION, SOLUTION EPIDURAL; INTRACAUDAL; PERINEURAL
Status: DISPENSED
Start: 2023-07-13

## (undated) RX ORDER — CEFAZOLIN SODIUM/WATER 2 G/20 ML
SYRINGE (ML) INTRAVENOUS
Status: DISPENSED
Start: 2023-07-13

## (undated) RX ORDER — OXYCODONE HYDROCHLORIDE 5 MG/1
TABLET ORAL
Status: DISPENSED
Start: 2023-07-13

## (undated) RX ORDER — FENTANYL CITRATE 50 UG/ML
INJECTION, SOLUTION INTRAMUSCULAR; INTRAVENOUS
Status: DISPENSED
Start: 2023-07-13

## (undated) RX ORDER — CELECOXIB 200 MG/1
CAPSULE ORAL
Status: DISPENSED
Start: 2023-07-13

## (undated) RX ORDER — HALOPERIDOL 5 MG/ML
INJECTION INTRAMUSCULAR
Status: DISPENSED
Start: 2023-07-13

## (undated) RX ORDER — VANCOMYCIN HYDROCHLORIDE 1 G/20ML
INJECTION, POWDER, LYOPHILIZED, FOR SOLUTION INTRAVENOUS
Status: DISPENSED
Start: 2023-07-13